# Patient Record
Sex: FEMALE | Race: WHITE | NOT HISPANIC OR LATINO | Employment: OTHER | URBAN - NONMETROPOLITAN AREA
[De-identification: names, ages, dates, MRNs, and addresses within clinical notes are randomized per-mention and may not be internally consistent; named-entity substitution may affect disease eponyms.]

---

## 2020-08-06 ENCOUNTER — HOSPITAL ENCOUNTER (EMERGENCY)
Facility: HOSPITAL | Age: 60
Discharge: HOME | End: 2020-08-06
Attending: EMERGENCY MEDICINE
Payer: MEDICARE

## 2020-08-06 VITALS
OXYGEN SATURATION: 97 % | RESPIRATION RATE: 17 BRPM | TEMPERATURE: 98 F | WEIGHT: 243 LBS | SYSTOLIC BLOOD PRESSURE: 140 MMHG | DIASTOLIC BLOOD PRESSURE: 91 MMHG | BODY MASS INDEX: 43.05 KG/M2 | HEIGHT: 63 IN | HEART RATE: 71 BPM

## 2020-08-06 DIAGNOSIS — R07.89 CHEST WALL PAIN: ICD-10-CM

## 2020-08-06 LAB
ALBUMIN SERPL BCP-MCNC: 2.9 G/DL (ref 3.5–5.2)
ALP SERPL-CCNC: 89 U/L (ref 55–135)
ALT SERPL W/O P-5'-P-CCNC: 30 U/L (ref 10–44)
ANION GAP SERPL CALC-SCNC: 6 MMOL/L (ref 8–16)
AST SERPL-CCNC: 15 U/L (ref 10–40)
BASOPHILS # BLD AUTO: 0.02 K/UL (ref 0–0.2)
BASOPHILS NFR BLD: 0.2 % (ref 0–1.9)
BILIRUB SERPL-MCNC: 0.4 MG/DL (ref 0.1–1)
BUN SERPL-MCNC: 16 MG/DL (ref 8–23)
CALCIUM SERPL-MCNC: 8.4 MG/DL (ref 8.7–10.5)
CHLORIDE SERPL-SCNC: 110 MMOL/L (ref 95–110)
CO2 SERPL-SCNC: 27 MMOL/L (ref 23–29)
CREAT SERPL-MCNC: 1.5 MG/DL (ref 0.5–1.4)
DIFFERENTIAL METHOD: ABNORMAL
EOSINOPHIL # BLD AUTO: 0.1 K/UL (ref 0–0.5)
EOSINOPHIL NFR BLD: 1 % (ref 0–8)
ERYTHROCYTE [DISTWIDTH] IN BLOOD BY AUTOMATED COUNT: 22.9 % (ref 11.5–14.5)
EST. GFR  (AFRICAN AMERICAN): 43 ML/MIN/1.73 M^2
EST. GFR  (NON AFRICAN AMERICAN): 37.3 ML/MIN/1.73 M^2
GLUCOSE SERPL-MCNC: 132 MG/DL (ref 70–110)
HCT VFR BLD AUTO: 33.2 % (ref 37–48.5)
HGB BLD-MCNC: 10.5 G/DL (ref 12–16)
IMM GRANULOCYTES # BLD AUTO: 0.05 K/UL (ref 0–0.04)
IMM GRANULOCYTES NFR BLD AUTO: 0.5 % (ref 0–0.5)
LYMPHOCYTES # BLD AUTO: 2.3 K/UL (ref 1–4.8)
LYMPHOCYTES NFR BLD: 22.8 % (ref 18–48)
MCH RBC QN AUTO: 25.7 PG (ref 27–31)
MCHC RBC AUTO-ENTMCNC: 31.6 G/DL (ref 32–36)
MCV RBC AUTO: 81 FL (ref 82–98)
MONOCYTES # BLD AUTO: 1 K/UL (ref 0.3–1)
MONOCYTES NFR BLD: 9.4 % (ref 4–15)
NEUTROPHILS # BLD AUTO: 6.7 K/UL (ref 1.8–7.7)
NEUTROPHILS NFR BLD: 66.1 % (ref 38–73)
NRBC BLD-RTO: 0 /100 WBC
NT-PROBNP: 478 PG/ML (ref 5–900)
PLATELET # BLD AUTO: 258 K/UL (ref 150–350)
PMV BLD AUTO: 10.8 FL (ref 9.2–12.9)
POTASSIUM SERPL-SCNC: 3.2 MMOL/L (ref 3.5–5.1)
PROT SERPL-MCNC: 6.1 G/DL (ref 6–8.4)
RBC # BLD AUTO: 4.08 M/UL (ref 4–5.4)
SODIUM SERPL-SCNC: 143 MMOL/L (ref 136–145)
TROPONIN I SERPL DL<=0.01 NG/ML-MCNC: <0.02 NG/ML (ref 0–0.03)
WBC # BLD AUTO: 10.08 K/UL (ref 3.9–12.7)

## 2020-08-06 PROCEDURE — 93005 ELECTROCARDIOGRAM TRACING: CPT

## 2020-08-06 PROCEDURE — U0003 INFECTIOUS AGENT DETECTION BY NUCLEIC ACID (DNA OR RNA); SEVERE ACUTE RESPIRATORY SYNDROME CORONAVIRUS 2 (SARS-COV-2) (CORONAVIRUS DISEASE [COVID-19]), AMPLIFIED PROBE TECHNIQUE, MAKING USE OF HIGH THROUGHPUT TECHNOLOGIES AS DESCRIBED BY CMS-2020-01-R: HCPCS

## 2020-08-06 PROCEDURE — 99284 EMERGENCY DEPT VISIT MOD MDM: CPT | Mod: 25

## 2020-08-06 PROCEDURE — 84484 ASSAY OF TROPONIN QUANT: CPT

## 2020-08-06 PROCEDURE — 80053 COMPREHEN METABOLIC PANEL: CPT

## 2020-08-06 PROCEDURE — 83880 ASSAY OF NATRIURETIC PEPTIDE: CPT

## 2020-08-06 PROCEDURE — 36415 COLL VENOUS BLD VENIPUNCTURE: CPT

## 2020-08-06 PROCEDURE — 93010 EKG 12-LEAD: ICD-10-PCS | Mod: ,,, | Performed by: INTERNAL MEDICINE

## 2020-08-06 PROCEDURE — 85025 COMPLETE CBC W/AUTO DIFF WBC: CPT

## 2020-08-06 PROCEDURE — 93010 ELECTROCARDIOGRAM REPORT: CPT | Mod: ,,, | Performed by: INTERNAL MEDICINE

## 2020-08-06 RX ORDER — LEVOTHYROXINE SODIUM 50 UG/1
50 TABLET ORAL
COMMUNITY

## 2020-08-06 RX ORDER — ESOMEPRAZOLE MAGNESIUM 40 MG/1
40 CAPSULE, DELAYED RELEASE ORAL
COMMUNITY
End: 2022-12-25

## 2020-08-06 RX ORDER — BENZTROPINE MESYLATE 2 MG/1
1 TABLET ORAL 2 TIMES DAILY
COMMUNITY

## 2020-08-06 RX ORDER — TRAZODONE HYDROCHLORIDE 100 MG/1
100 TABLET ORAL NIGHTLY
COMMUNITY

## 2020-08-06 RX ORDER — AMLODIPINE BESYLATE 5 MG/1
5 TABLET ORAL DAILY
COMMUNITY
End: 2023-12-21

## 2020-08-06 RX ORDER — RISPERIDONE 3 MG/1
3 TABLET ORAL NIGHTLY
COMMUNITY

## 2020-08-06 RX ORDER — GABAPENTIN 600 MG/1
600 TABLET ORAL 2 TIMES DAILY
COMMUNITY
End: 2023-12-21

## 2020-08-06 RX ORDER — ASPIRIN 81 MG/1
81 TABLET ORAL DAILY
COMMUNITY

## 2020-08-06 RX ORDER — CHOLECALCIFEROL (VITAMIN D3) 25 MCG
1000 TABLET ORAL DAILY
COMMUNITY

## 2020-08-06 RX ORDER — MELOXICAM 15 MG/1
15 TABLET ORAL DAILY
COMMUNITY
End: 2022-12-25

## 2020-08-06 RX ORDER — ALBUTEROL SULFATE 90 UG/1
1 AEROSOL, METERED RESPIRATORY (INHALATION) EVERY 4 HOURS PRN
COMMUNITY
End: 2020-10-24

## 2020-08-06 RX ORDER — CITALOPRAM 20 MG/1
20 TABLET, FILM COATED ORAL DAILY
COMMUNITY

## 2020-08-06 RX ORDER — FUROSEMIDE 20 MG/1
20 TABLET ORAL 2 TIMES DAILY
COMMUNITY
End: 2023-12-21

## 2020-08-06 NOTE — ED TRIAGE NOTES
59 y/o F presents to ED with c/o intermittent chest pain.  Started yesterday and saw Dr. Merchant.  Said EKG was abnormal but not emergent.  Later went to [place] [facility] and said same but needed to see cardiologist ASAP.  Went to [facility] today and recommended to come here for further testing.  Describes pain as pressure and reports vomiting last night.

## 2020-08-06 NOTE — ED NOTES
Assumed cared of pt. Bed placed in lowest position, side rails up x 2. Call light is within reach of pt. and family member and pt instructed on how to use call light . Explanation of care provided to pt .Plan of Care; Observe and reassure, explain procedures to pt,continue to observe and monitor and keep patient informed. Position of comfort for patient to be maintained.    Psych: No acute distress is noted. Pt is calm and cooperative, good eye contact.    LOC: The patient is awake, alert and aware of environment with an appropriate affect, the patient is oriented x 3 and speaking appropriately.     APPEARANCE: Patient resting comfortably and in no acute distress, patient is clean and well groomed, patient's clothing is properly fastened.    SKIN: The skin is warm and dry, patient has normal skin turgor and moist mucus membranes,no rashes or lesions.       MUSCULOSKELETAL:  Normal range of motion noted. Moves all extremeties well, No swelling, deformity or tenderness noted.    GASTRO: Reports vomiting that began last night.  Reports nausea today.    RESPIRATORY: Airway is open and patent, respirations are spontaneous, patient has a normal effort and rate. Pt reports intermittent SOB and labored respirations.    CARDIAC: Patient has a normal rate and rhythm, no periphreal edema noted, capillary refill < 3 seconds. Reports intermittent CP described as pressure beginning yesterday.  Denies radiation. Skin warm and dry.    PULSES: 2+  And symmetrical in all extremeties    NEUROLOGIC:  MAGDALENA, Follows commands without difficulty. Speech is clear. No neuro deficits observed.      Ivett Carranza RN  08/06/20 1911       Ivett Carranza RN  08/06/20 5052

## 2020-08-07 LAB — SARS-COV-2 RNA RESP QL NAA+PROBE: NOT DETECTED

## 2020-09-17 DIAGNOSIS — R11.0 NAUSEA: Primary | ICD-10-CM

## 2020-09-24 ENCOUNTER — HOSPITAL ENCOUNTER (OUTPATIENT)
Dept: RADIOLOGY | Facility: HOSPITAL | Age: 60
Discharge: HOME | End: 2020-09-24
Attending: INTERNAL MEDICINE
Payer: MEDICARE

## 2020-09-24 DIAGNOSIS — R11.0 NAUSEA: ICD-10-CM

## 2020-09-24 PROCEDURE — A9698 NON-RAD CONTRAST MATERIALNOC: HCPCS | Performed by: INTERNAL MEDICINE

## 2020-09-24 PROCEDURE — 76700 US EXAM ABDOM COMPLETE: CPT | Mod: TC

## 2020-09-24 PROCEDURE — 25500020 PHARM REV CODE 255: Performed by: INTERNAL MEDICINE

## 2020-09-24 PROCEDURE — 74248 X-RAY SM INT F-THRU STD: CPT | Mod: TC

## 2020-09-24 RX ADMIN — BARIUM SULFATE 176 G: 960 POWDER, FOR SUSPENSION ORAL at 09:09

## 2020-09-24 RX ADMIN — BARIUM SULFATE 135 ML: 980 POWDER, FOR SUSPENSION ORAL at 09:09

## 2020-09-24 RX ADMIN — DIATRIZOATE MEGLUMINE AND DIATRIZOATE SODIUM 120 ML: 660; 100 LIQUID ORAL; RECTAL at 09:09

## 2020-10-21 ENCOUNTER — HOSPITAL ENCOUNTER (EMERGENCY)
Facility: HOSPITAL | Age: 60
Discharge: HOME | End: 2020-10-21
Attending: EMERGENCY MEDICINE
Payer: MEDICARE

## 2020-10-21 VITALS
WEIGHT: 237 LBS | HEART RATE: 74 BPM | HEIGHT: 63 IN | BODY MASS INDEX: 41.99 KG/M2 | TEMPERATURE: 98 F | RESPIRATION RATE: 14 BRPM | OXYGEN SATURATION: 98 % | DIASTOLIC BLOOD PRESSURE: 88 MMHG | SYSTOLIC BLOOD PRESSURE: 150 MMHG

## 2020-10-21 DIAGNOSIS — R11.0 NAUSEA: ICD-10-CM

## 2020-10-21 DIAGNOSIS — R51.9 NONINTRACTABLE HEADACHE, UNSPECIFIED CHRONICITY PATTERN, UNSPECIFIED HEADACHE TYPE: Primary | ICD-10-CM

## 2020-10-21 PROCEDURE — 63600175 PHARM REV CODE 636 W HCPCS: Performed by: NURSE PRACTITIONER

## 2020-10-21 PROCEDURE — 96372 THER/PROPH/DIAG INJ SC/IM: CPT

## 2020-10-21 PROCEDURE — 99284 EMERGENCY DEPT VISIT MOD MDM: CPT | Mod: 25

## 2020-10-21 PROCEDURE — 25000003 PHARM REV CODE 250: Performed by: NURSE PRACTITIONER

## 2020-10-21 RX ORDER — ONDANSETRON 4 MG/1
4 TABLET, ORALLY DISINTEGRATING ORAL
Status: COMPLETED | OUTPATIENT
Start: 2020-10-21 | End: 2020-10-21

## 2020-10-21 RX ORDER — BUTALBITAL, ACETAMINOPHEN AND CAFFEINE 50; 325; 40 MG/1; MG/1; MG/1
1 TABLET ORAL EVERY 4 HOURS PRN
Qty: 10 TABLET | Refills: 0 | Status: SHIPPED | OUTPATIENT
Start: 2020-10-21 | End: 2020-11-20

## 2020-10-21 RX ORDER — KETOROLAC TROMETHAMINE 30 MG/ML
30 INJECTION, SOLUTION INTRAMUSCULAR; INTRAVENOUS
Status: COMPLETED | OUTPATIENT
Start: 2020-10-21 | End: 2020-10-21

## 2020-10-21 RX ORDER — DIPHENHYDRAMINE HCL 25 MG
25 CAPSULE ORAL
Status: COMPLETED | OUTPATIENT
Start: 2020-10-21 | End: 2020-10-21

## 2020-10-21 RX ORDER — ONDANSETRON 4 MG/1
4 TABLET, FILM COATED ORAL EVERY 8 HOURS PRN
Qty: 12 TABLET | Refills: 0 | OUTPATIENT
Start: 2020-10-21 | End: 2022-12-25

## 2020-10-21 RX ADMIN — KETOROLAC TROMETHAMINE 30 MG: 30 INJECTION, SOLUTION INTRAMUSCULAR; INTRAVENOUS at 06:10

## 2020-10-21 RX ADMIN — ONDANSETRON 4 MG: 4 TABLET, ORALLY DISINTEGRATING ORAL at 06:10

## 2020-10-21 RX ADMIN — DIPHENHYDRAMINE HYDROCHLORIDE 25 MG: 25 CAPSULE ORAL at 06:10

## 2020-10-21 NOTE — ED PROVIDER NOTES
Encounter Date: 10/21/2020       History     Chief Complaint   Patient presents with    Headache     Patient reports frontal headache since last night, history of migraine.     The patient presents with complaints of migraine started last night.  Patient states the pain is in her forehead and in the top of her head.  She took 1 ibuprofen today and states that has given her no relief from the headache.  Patient states she now feels nauseated.  Patient denies this being the worst headache ever.  Patient denies changes vision.  Patient states usually she can not lay down with an ice pack on her head and that will make pain better but it has not offered any relief.  Nothing makes the headache worse, and patient denies photophobia.        Review of patient's allergies indicates:  No Known Allergies  Past Medical History:   Diagnosis Date    Anxiety     Cancer     Breast    Depression     GERD (gastroesophageal reflux disease)     Hypertension     Insomnia     Thyroid disease      Past Surgical History:   Procedure Laterality Date    BLADDER SUSPENSION      BREAST LUMPECTOMY      Right breast    HYSTERECTOMY      KNEE ARTHROSCOPY      Right knee     History reviewed. No pertinent family history.  Social History     Tobacco Use    Smoking status: Current Every Day Smoker     Packs/day: 0.50    Smokeless tobacco: Never Used   Substance Use Topics    Alcohol use: Not Currently    Drug use: Not on file     Review of Systems   Gastrointestinal: Positive for nausea.   Neurological: Positive for headaches.   All other systems reviewed and are negative.      Physical Exam     Initial Vitals [10/21/20 1818]   BP Pulse Resp Temp SpO2   (!) 168/92 77 20 98.1 °F (36.7 °C) 100 %      MAP       --         Physical Exam    Nursing note and vitals reviewed.  Constitutional: She appears well-developed and well-nourished. She is not diaphoretic. No distress.   HENT:   Head: Normocephalic.   Right Ear: Tympanic membrane,  external ear and ear canal normal.   Left Ear: Tympanic membrane, external ear and ear canal normal.   Mouth/Throat: Oropharynx is clear and moist.   Eyes: Conjunctivae and EOM are normal. Pupils are equal, round, and reactive to light.   Neck: Normal range of motion. Neck supple.   Cardiovascular: Normal rate.   Pulmonary/Chest: Breath sounds normal. No respiratory distress. She has no wheezes.   Abdominal: Soft. Bowel sounds are normal. She exhibits no distension. There is no abdominal tenderness.   Musculoskeletal: Normal range of motion.   Neurological: She is alert and oriented to person, place, and time. She has normal strength. She displays no tremor. No sensory deficit. She exhibits normal muscle tone. She displays a negative Romberg sign. Gait normal. GCS score is 15. GCS eye subscore is 4. GCS verbal subscore is 5. GCS motor subscore is 6.   Skin: Skin is warm and dry. Capillary refill takes less than 2 seconds.         ED Course   Procedures  Labs Reviewed - No data to display       Imaging Results    None          Medical Decision Making:   Differential Diagnosis:   Migraine headache  Dental pain  Otitis media  Sinusitis  Tension headache                    ED Course as of Oct 21 1856   Wed Oct 21, 2020   1849 Pt states she is feeling better     [NL]      ED Course User Index  [NL] Lilliana Lowery NP            Clinical Impression:     ICD-10-CM ICD-9-CM   1. Nonintractable headache, unspecified chronicity pattern, unspecified headache type  R51.9 784.0   2. Nausea  R11.0 787.02                      Disposition:   Disposition: Discharged  Condition: Stable     ED Disposition Condition    Discharge Stable        ED Prescriptions     Medication Sig Dispense Start Date End Date Auth. Provider    ondansetron (ZOFRAN) 4 MG tablet Take 1 tablet (4 mg total) by mouth every 8 (eight) hours as needed for Nausea. 12 tablet 10/21/2020  Lilliana Lowery NP    butalbital-acetaminophen-caffeine -40 mg  (FIORICET, ESGIC) -40 mg per tablet Take 1 tablet by mouth every 4 (four) hours as needed for Pain. 10 tablet 10/21/2020 11/20/2020 Lilliana Lowery NP        Follow-up Information     Follow up With Specialties Details Why Contact Info    [facility]  Schedule an appointment as soon as possible for a visit in 2 days CONTINUATION OF CARE, fever, Further evaluation and treatment, If symptoms worsen, re-evaluation [address]  [facility] [address]  [phone]                                       Lilliana Lowery NP  10/21/20 6653

## 2020-10-24 ENCOUNTER — HOSPITAL ENCOUNTER (EMERGENCY)
Facility: HOSPITAL | Age: 60
Discharge: HOME | End: 2020-10-24
Attending: EMERGENCY MEDICINE
Payer: MEDICARE

## 2020-10-24 VITALS
BODY MASS INDEX: 41.22 KG/M2 | DIASTOLIC BLOOD PRESSURE: 85 MMHG | SYSTOLIC BLOOD PRESSURE: 159 MMHG | TEMPERATURE: 98 F | HEART RATE: 84 BPM | HEIGHT: 62 IN | OXYGEN SATURATION: 98 % | WEIGHT: 224 LBS | RESPIRATION RATE: 18 BRPM

## 2020-10-24 DIAGNOSIS — R06.02 SOB (SHORTNESS OF BREATH): Primary | ICD-10-CM

## 2020-10-24 DIAGNOSIS — F41.9 ANXIETY: ICD-10-CM

## 2020-10-24 LAB — SARS-COV-2 RDRP RESP QL NAA+PROBE: NEGATIVE

## 2020-10-24 PROCEDURE — U0002 COVID-19 LAB TEST NON-CDC: HCPCS

## 2020-10-24 PROCEDURE — 25000003 PHARM REV CODE 250: Performed by: NURSE PRACTITIONER

## 2020-10-24 PROCEDURE — 99283 EMERGENCY DEPT VISIT LOW MDM: CPT | Mod: 25

## 2020-10-24 RX ORDER — ALBUTEROL SULFATE 90 UG/1
1-2 AEROSOL, METERED RESPIRATORY (INHALATION)
Qty: 18 G | Refills: 0 | OUTPATIENT
Start: 2020-10-24 | End: 2020-11-07

## 2020-10-24 RX ORDER — ALPRAZOLAM 0.5 MG/1
0.5 TABLET ORAL
Status: COMPLETED | OUTPATIENT
Start: 2020-10-24 | End: 2020-10-24

## 2020-10-24 RX ADMIN — ALPRAZOLAM 0.5 MG: 0.5 TABLET ORAL at 07:10

## 2020-10-25 NOTE — ED PROVIDER NOTES
Encounter Date: 10/24/2020       History     Chief Complaint   Patient presents with    Shortness of Breath     I have been SOB for the last couple of weeks but today is just worse.     This is a 60-year-old white female smoker with a history of anxiety who presents emergency department today with complaints of shortness of breath intermittently over the last 2-3 weeks.  Patient also reports mild body aches, chills, and increased anxiety, however she reports any other symptoms.  Patient denies fever, cough, appetite changes, or urinary or bowel changes.  Patient denies contact with any individual with COVID-19 and denies changes in smell or taste.  Patient reports that she takes Ativan as directed for anxiety however she denies having taken any over the last several weeks.        Review of patient's allergies indicates:  No Known Allergies  Past Medical History:   Diagnosis Date    Anxiety     Cancer     Breast    Depression     GERD (gastroesophageal reflux disease)     Hypertension     Insomnia     Thyroid disease      Past Surgical History:   Procedure Laterality Date    BLADDER SUSPENSION      BREAST LUMPECTOMY      Right breast    HYSTERECTOMY      KNEE ARTHROSCOPY      Right knee     History reviewed. No pertinent family history.  Social History     Tobacco Use    Smoking status: Current Every Day Smoker     Packs/day: 0.50    Smokeless tobacco: Never Used   Substance Use Topics    Alcohol use: Not Currently    Drug use: Not on file     Review of Systems   Constitutional: Positive for chills. Negative for diaphoresis and fever.   HENT: Negative.    Eyes: Negative.    Respiratory: Positive for shortness of breath. Negative for cough, chest tightness and stridor.    Cardiovascular: Negative for chest pain, palpitations and leg swelling.   Gastrointestinal: Negative.    Genitourinary: Negative.    Musculoskeletal: Positive for myalgias. Negative for arthralgias and gait problem.   Skin: Negative.     Neurological: Negative.        Physical Exam     Initial Vitals [10/24/20 1900]   BP Pulse Resp Temp SpO2   (!) 164/84 84 18 97.8 °F (36.6 °C) 98 %      MAP       --         Physical Exam    Nursing note and vitals reviewed.  Constitutional: She appears well-developed and well-nourished. She appears distressed (Mild tachypnea, patient appears anxious.  Symptoms seemed to improve during conversation).   HENT:   Head: Normocephalic and atraumatic.   Mucous membranes dry   Eyes: Conjunctivae and EOM are normal. Pupils are equal, round, and reactive to light.   Neck: Normal range of motion. Neck supple.   Cardiovascular: Normal rate, regular rhythm, normal heart sounds and intact distal pulses.   Pulmonary/Chest: She has wheezes ( mild inspiratory and expiratory wheezes noted throughout). She has no rales.   Abdominal: Soft. Bowel sounds are normal.   Musculoskeletal: Normal range of motion. No tenderness or edema.   Neurological: She is alert and oriented to person, place, and time. She has normal strength. GCS score is 15. GCS eye subscore is 4. GCS verbal subscore is 5. GCS motor subscore is 6.   Skin: Skin is warm and dry. Capillary refill takes less than 2 seconds.   Psychiatric: Thought content normal.   Patient appears anxious, asking for anxiety medication         ED Course   Procedures  Labs Reviewed   SARS-COV-2 RNA AMPLIFICATION, QUAL          Imaging Results          X-Ray Chest AP Portable (In process)                  Medical Decision Making:   Differential Diagnosis:   Anxiety  COPD exacerbation  Asthma  Acute bronchitis                   ED Course as of Oct 24 2014   Sat Oct 24, 2020   1954 No abnormalities noted on chest x-ray    [CB]      ED Course User Index  [CB] Leila Saenz NP            Clinical Impression:     ICD-10-CM ICD-9-CM   1. SOB (shortness of breath)  R06.02 786.05   2. Anxiety  F41.9 300.00                          ED Disposition Condition    Discharge Stable        ED  Prescriptions     Medication Sig Dispense Start Date End Date Auth. Provider    albuterol (PROVENTIL/VENTOLIN HFA) 90 mcg/actuation inhaler Inhale 1-2 puffs into the lungs every 4 to 6 hours as needed for Wheezing (as needed for cough, wheezing, shortness of breath). Rescue 18 g 10/24/2020 10/24/2021 Leila Saenz NP        Follow-up Information     Follow up With Specialties Details Why Contact Info    PCP Follow UP  Call in 2 days for follow-up, for re-evaluation of today's complaint                                        Leila Saenz NP  10/24/20 2014

## 2020-10-25 NOTE — ED NOTES
Assumed cared of pt. Pt placed into gown.  Bed placed in lowest position, side rails up x 2. Call light is within reach of pt and family member and pt and family members instructed on how to use call light . Explanation of care provided to pt and family member.Alarms set on monitor for heart rate, pulse ox, Blood Pressure. Plan of Care; Observe and reassure, explain procedures to pt and family member, continue to observe and monitor and keep patient and family informed.Position of comfort for patient to be maintained.    Psych: No acute distress is noted. Pt is calm and cooperative, good eye contact, pt reports anxiety.  LOC: The patient is awake, alert and aware of environment with an appropriate affect, the patient is oriented x 3 and speaking appropriately.     APPEARANCE: Patient resting comfortably and in no acute distress, patient is clean and well groomed, patient's clothing is properly fastened.    SKIN: The skin is warm and dry, patient has normal skin turgor and moist mucus membranes,no rashes or lesions.Skin Intact , No Breakdown noted.    MUSCULOSKELETAL:  Normal range of motion noted. Moves all extremeties well, No swelling, deformity or tenderness noted.    RESPIRATORY: Airway is open and patent, respirations are spontaneous, patient has a normal effort and rate.Bilateral Lungs Sounds are clear. Pink nailbeds.     CARDIAC: Patient has a normal rate and rhythm, no periphreal edema noted, capillary refill < 3 seconds. Denies chest pain. Skin warm and dry.     ABDOMEN: Soft and non tender to palpation, no distention noted. Bowels Sounds are active.  PULSES: 2+  And symmetrical in all extremeties    NEUROLOGIC:  MAGDALENA, Follows commands without difficulty. Speech is clear. No neuro deficits observed.

## 2020-11-07 ENCOUNTER — HOSPITAL ENCOUNTER (EMERGENCY)
Facility: HOSPITAL | Age: 60
Discharge: HOME | End: 2020-11-07
Attending: EMERGENCY MEDICINE
Payer: MEDICARE

## 2020-11-07 VITALS
DIASTOLIC BLOOD PRESSURE: 79 MMHG | BODY MASS INDEX: 43.9 KG/M2 | OXYGEN SATURATION: 96 % | WEIGHT: 238.56 LBS | HEIGHT: 62 IN | TEMPERATURE: 98 F | RESPIRATION RATE: 20 BRPM | HEART RATE: 91 BPM | SYSTOLIC BLOOD PRESSURE: 175 MMHG

## 2020-11-07 DIAGNOSIS — R06.02 SOB (SHORTNESS OF BREATH): ICD-10-CM

## 2020-11-07 DIAGNOSIS — F41.9 ANXIETY: Primary | ICD-10-CM

## 2020-11-07 DIAGNOSIS — Z76.0 ENCOUNTER FOR MEDICATION REFILL: ICD-10-CM

## 2020-11-07 PROCEDURE — 93005 ELECTROCARDIOGRAM TRACING: CPT

## 2020-11-07 PROCEDURE — 94640 AIRWAY INHALATION TREATMENT: CPT

## 2020-11-07 PROCEDURE — 99900031 HC PATIENT EDUCATION (STAT)

## 2020-11-07 PROCEDURE — 93010 ELECTROCARDIOGRAM REPORT: CPT | Mod: ,,, | Performed by: INTERNAL MEDICINE

## 2020-11-07 PROCEDURE — 25000242 PHARM REV CODE 250 ALT 637 W/ HCPCS: Performed by: EMERGENCY MEDICINE

## 2020-11-07 PROCEDURE — 99900035 HC TECH TIME PER 15 MIN (STAT)

## 2020-11-07 PROCEDURE — 99284 EMERGENCY DEPT VISIT MOD MDM: CPT | Mod: 25

## 2020-11-07 PROCEDURE — 25000003 PHARM REV CODE 250: Performed by: EMERGENCY MEDICINE

## 2020-11-07 PROCEDURE — 93010 EKG 12-LEAD: ICD-10-PCS | Mod: ,,, | Performed by: INTERNAL MEDICINE

## 2020-11-07 RX ORDER — ALBUTEROL SULFATE 2.5 MG/.5ML
2.5 SOLUTION RESPIRATORY (INHALATION) EVERY 4 HOURS PRN
Qty: 1 EACH | Refills: 5 | OUTPATIENT
Start: 2020-11-07 | End: 2021-06-13

## 2020-11-07 RX ORDER — IPRATROPIUM BROMIDE AND ALBUTEROL SULFATE 2.5; .5 MG/3ML; MG/3ML
3 SOLUTION RESPIRATORY (INHALATION)
Status: COMPLETED | OUTPATIENT
Start: 2020-11-07 | End: 2020-11-07

## 2020-11-07 RX ORDER — LORAZEPAM 1 MG/1
1 TABLET ORAL EVERY 12 HOURS PRN
Qty: 20 TABLET | Refills: 0 | Status: SHIPPED | OUTPATIENT
Start: 2020-11-07 | End: 2023-12-21

## 2020-11-07 RX ORDER — LORAZEPAM 1 MG/1
1 TABLET ORAL
Status: COMPLETED | OUTPATIENT
Start: 2020-11-07 | End: 2020-11-07

## 2020-11-07 RX ORDER — ALBUTEROL SULFATE 90 UG/1
2 AEROSOL, METERED RESPIRATORY (INHALATION) EVERY 4 HOURS PRN
Qty: 18 G | Refills: 11 | Status: SHIPPED | OUTPATIENT
Start: 2020-11-07 | End: 2021-11-07

## 2020-11-07 RX ADMIN — LORAZEPAM 1 MG: 1 TABLET ORAL at 04:11

## 2020-11-07 RX ADMIN — IPRATROPIUM BROMIDE AND ALBUTEROL SULFATE 3 ML: .5; 3 SOLUTION RESPIRATORY (INHALATION) at 04:11

## 2020-11-07 NOTE — ED PROVIDER NOTES
Encounter Date: 11/7/2020       History     Chief Complaint   Patient presents with    Shortness of Breath     came here a while back and given an inhaler and it isn't helping.  Hx of COPD.  Denies CP.  Tested for Covid but negative.     Underlying history of COPD and anxiety, depression, others as listed.  Multiple medications.  She recently tested negative for coronavirus.  She has run out of her albuterol nebulized solution at home, and today used the last of her albuterol metered dose inhaler and needs a refill.  She is also using Xanax occasionally with some relief for anxious spells associated with dyspnea, these episodes seem to be possibly relieved with bronchodilators and possibly relieved with angulated.  She is also anxious about an upcoming procedure scheduled later this week for endoscopy.  Primary care is Dr. Merchant.  No other complaints.  She denies fever, chills, sweats, cough, sputum production, ill contacts, or any known coronavirus exposure.  At the time my exam, lungs are clear, she is mildly anxious but much better, normal oxygen saturation and vital signs with mild hypertension.  No other complaints.  Discussed in detail, she would like refills on both meter dose inhaler and albuterol solution for nebulizer, and would like to try something different for anxiety.  I will put her on scheduled b.i.d. Ativan for the moment and defer further to Dr. Merchant, encouraged to call him in the next few days.  She is not asking for any other cysts at this time and does not particularly want any other testing today.    The history is provided by the patient. No  was used.     Review of patient's allergies indicates:  No Known Allergies  Past Medical History:   Diagnosis Date    Anxiety     Cancer     Breast    Depression     GERD (gastroesophageal reflux disease)     Hypertension     Insomnia     Thyroid disease      Past Surgical History:   Procedure Laterality Date     BLADDER SUSPENSION      BREAST LUMPECTOMY      Right breast    HYSTERECTOMY      KNEE ARTHROSCOPY      Right knee     History reviewed. No pertinent family history.  Social History     Tobacco Use    Smoking status: Current Every Day Smoker     Packs/day: 0.50    Smokeless tobacco: Never Used   Substance Use Topics    Alcohol use: Not Currently    Drug use: Not on file     Review of Systems   Constitutional: Negative for activity change, fatigue and fever.   HENT: Negative for congestion, ear pain, facial swelling, nosebleeds, sinus pressure and sore throat.    Eyes: Negative for pain, discharge, redness and visual disturbance.   Respiratory: Positive for shortness of breath. Negative for cough, choking, chest tightness and wheezing.    Cardiovascular: Negative for chest pain, palpitations and leg swelling.   Gastrointestinal: Negative for abdominal distention, abdominal pain, nausea and vomiting.   Endocrine: Negative for heat intolerance, polydipsia and polyuria.   Genitourinary: Negative for difficulty urinating, dysuria, flank pain, hematuria and urgency.   Musculoskeletal: Negative for back pain, gait problem, joint swelling and myalgias.   Skin: Negative for color change and rash.   Allergic/Immunologic: Negative for environmental allergies and food allergies.   Neurological: Negative for dizziness, weakness, numbness and headaches.   Hematological: Negative for adenopathy. Does not bruise/bleed easily.   Psychiatric/Behavioral: Negative for agitation and behavioral problems. The patient is nervous/anxious.    All other systems reviewed and are negative.      Physical Exam     Initial Vitals   BP Pulse Resp Temp SpO2   11/07/20 1542 11/07/20 1542 11/07/20 1542 11/07/20 1545 11/07/20 1542   (!) 175/79 89 19 98.4 °F (36.9 °C) 97 %      MAP       --                Physical Exam    Nursing note and vitals reviewed.  Constitutional: She appears well-developed and well-nourished. She is not diaphoretic. No  distress.   Mildly obese/ mildly to moderately anxious   HENT:   Head: Normocephalic and atraumatic.   Mouth/Throat: No oropharyngeal exudate.   Eyes: Conjunctivae and EOM are normal. Pupils are equal, round, and reactive to light. Right eye exhibits no discharge. Left eye exhibits no discharge. No scleral icterus.   Neck: Normal range of motion. Neck supple. No thyromegaly present. No tracheal deviation present. No JVD present.   Cardiovascular: Normal rate, regular rhythm, normal heart sounds and intact distal pulses. Exam reveals no gallop and no friction rub.    No murmur heard.  Pulmonary/Chest: Breath sounds normal. No stridor. No respiratory distress. She has no wheezes. She has no rhonchi. She has no rales. She exhibits no tenderness.   Clear lungs   Abdominal: Soft. Bowel sounds are normal. She exhibits no distension and no mass. There is no abdominal tenderness. There is no rebound and no guarding.   Musculoskeletal: Normal range of motion. No tenderness or edema.   Neurological: She is alert and oriented to person, place, and time. She has normal strength.   Skin: Skin is warm and dry. No rash and no abscess noted. No erythema.   Psychiatric: She has a normal mood and affect. Her behavior is normal. Judgment and thought content normal.   Anxious         ED Course   Procedures  Labs Reviewed - No data to display  EKG Readings: (Independently Interpreted)   Initial Reading: No STEMI. Rhythm: Normal Sinus Rhythm. Heart Rate: 95. Ectopy: PVCs.   Normal sinus rhythm with frequent PVCs, nonspecific T-wave abnormality, noisy baseline, no acute change.       Imaging Results    None                                      Clinical Impression:       ICD-10-CM ICD-9-CM   1. Anxiety  F41.9 300.00   2. SOB (shortness of breath)  R06.02 786.05   3. Encounter for medication refill  Z76.0 V68.1                      Disposition:   Disposition: Discharged  Condition: Stable     ED Disposition Condition    Discharge Stable         ED Prescriptions     Medication Sig Dispense Start Date End Date Auth. Provider    LORazepam (ATIVAN) 1 MG tablet Take 1 tablet (1 mg total) by mouth every 12 (twelve) hours as needed for Anxiety. 20 tablet 11/7/2020 11/22/2020 Freddy Breaux MD    albuterol (PROVENTIL/VENTOLIN HFA) 90 mcg/actuation inhaler Inhale 2 puffs into the lungs every 4 (four) hours as needed for Wheezing. For cough, shortness of breath and/ or wheezing 18 g 11/7/2020 11/7/2021 Freddy Breaux MD    albuterol sulfate 2.5 mg/0.5 mL Nebu Take 2.5 mg by nebulization every 4 (four) hours as needed. Rescue 1 each 11/7/2020 11/7/2021 Freddy Breaux MD        Follow-up Information     Follow up With Specialties Details Why Contact Info Additional Information    Burke Merchant MD Internal Medicine Call in 2 days  [address] [address]  [phone]       [facility] - Emergency Department Emergency Medicine  As needed [address] [facility] [id number] [phone] Floor [address]                                       Freddy Breaux MD  11/07/20 0453

## 2020-11-07 NOTE — ED NOTES
NEUROLOGICAL:   Patient is awake , alert  and oriented x 4 . Pupils are PERRL. Gait is steady.   Moves all extremities without difficulty.   Patient reports no neuro complaints..  GCS 15    HEENT:   Head appears normocephalic  and symmetric .   Eyes appear WNL to both eyes. Patient reports no complaints  to both eyes .   Ears appear WNL. Patient reports no complaints  to both ears.   Nares appear patent . Patient reports no nose complaints .  Mouth appears moist, pink and teeth intact. Patient reports no mouth complaints.   Throat appears pink and moist . Patient reports no throat complaints.    CARDIOVASCULAR:   S1 and S2 present, no murmurs, gallops, or rubs, rate regular  and pulses palpable (2+)    On palpation no edema noted , noted to none.   Patient reports no CV complaints.  .   Patient vitals are WNL.    RESPIRATORY:   Airway Clear, Open, and Patent.  Respirations are even and unlabored.   Breath sounds inspiratory wheeze and expiratory wheeze to all lung fields.   Patient reports shortness of breath on exertion and shortness of breath at rest.     GASTROINTESTINAL:   Abdomen is soft  and non-tender x 4 quadrants. Bowel sounds are normoactive to all quadrants .   Patient reports no GI complaints .     GENITOURINARY:   Patient reports no  complaints.     MUSCULOSKELETAL:   full range of motion to all extremities, no swelling noted , no tenderness noted and no weakness noted.   Patient reports no musculoskeletal complaints     SKIN:   Skin appears warm , dry , good turgor, color normal for race and intact. Patient reports no skin complaints.

## 2020-11-07 NOTE — DISCHARGE INSTRUCTIONS
Call Dr. Merchant Monday about medicines.  Refill prescriptions as given. Return if worse.

## 2020-12-23 DIAGNOSIS — R10.84 GENERALIZED ABDOMINAL PAIN: Primary | ICD-10-CM

## 2020-12-28 ENCOUNTER — HOSPITAL ENCOUNTER (EMERGENCY)
Facility: HOSPITAL | Age: 60
Discharge: HOME | End: 2020-12-28
Attending: EMERGENCY MEDICINE
Payer: MEDICARE

## 2020-12-28 VITALS
SYSTOLIC BLOOD PRESSURE: 132 MMHG | RESPIRATION RATE: 18 BRPM | WEIGHT: 238 LBS | DIASTOLIC BLOOD PRESSURE: 74 MMHG | HEIGHT: 62 IN | TEMPERATURE: 98 F | HEART RATE: 87 BPM | BODY MASS INDEX: 43.79 KG/M2 | OXYGEN SATURATION: 98 %

## 2020-12-28 DIAGNOSIS — N39.0 LOWER URINARY TRACT INFECTIOUS DISEASE: Primary | ICD-10-CM

## 2020-12-28 LAB
ALBUMIN SERPL BCP-MCNC: 2.9 G/DL (ref 3.5–5.2)
ALP SERPL-CCNC: 119 U/L (ref 55–135)
ALT SERPL W/O P-5'-P-CCNC: 19 U/L (ref 10–44)
AMYLASE SERPL-CCNC: 22 U/L (ref 20–110)
ANION GAP SERPL CALC-SCNC: 4 MMOL/L (ref 8–16)
AST SERPL-CCNC: 8 U/L (ref 10–40)
BACTERIA #/AREA URNS HPF: ABNORMAL /HPF
BASOPHILS # BLD AUTO: 0.04 K/UL (ref 0–0.2)
BASOPHILS NFR BLD: 0.5 % (ref 0–1.9)
BILIRUB SERPL-MCNC: 0.3 MG/DL (ref 0.1–1)
BILIRUB UR QL STRIP: NEGATIVE
BUN SERPL-MCNC: 10 MG/DL (ref 8–23)
CALCIUM SERPL-MCNC: 8.8 MG/DL (ref 8.7–10.5)
CHLORIDE SERPL-SCNC: 108 MMOL/L (ref 95–110)
CLARITY UR: CLEAR
CO2 SERPL-SCNC: 30 MMOL/L (ref 23–29)
COLOR UR: YELLOW
CREAT SERPL-MCNC: 1.3 MG/DL (ref 0.5–1.4)
DIFFERENTIAL METHOD: ABNORMAL
EOSINOPHIL # BLD AUTO: 0.2 K/UL (ref 0–0.5)
EOSINOPHIL NFR BLD: 2.5 % (ref 0–8)
ERYTHROCYTE [DISTWIDTH] IN BLOOD BY AUTOMATED COUNT: 15.4 % (ref 11.5–14.5)
EST. GFR  (AFRICAN AMERICAN): 51.2 ML/MIN/1.73 M^2
EST. GFR  (NON AFRICAN AMERICAN): 44.4 ML/MIN/1.73 M^2
GLUCOSE SERPL-MCNC: 107 MG/DL (ref 70–110)
GLUCOSE UR QL STRIP: NEGATIVE
HCT VFR BLD AUTO: 35.7 % (ref 37–48.5)
HGB BLD-MCNC: 11.2 G/DL (ref 12–16)
HGB UR QL STRIP: NEGATIVE
HYALINE CASTS #/AREA URNS LPF: 2 /LPF
IMM GRANULOCYTES # BLD AUTO: 0.03 K/UL (ref 0–0.04)
IMM GRANULOCYTES NFR BLD AUTO: 0.4 % (ref 0–0.5)
KETONES UR QL STRIP: NEGATIVE
LEUKOCYTE ESTERASE UR QL STRIP: ABNORMAL
LIPASE SERPL-CCNC: 70 U/L (ref 23–300)
LYMPHOCYTES # BLD AUTO: 1.6 K/UL (ref 1–4.8)
LYMPHOCYTES NFR BLD: 21 % (ref 18–48)
MCH RBC QN AUTO: 27 PG (ref 27–31)
MCHC RBC AUTO-ENTMCNC: 31.4 G/DL (ref 32–36)
MCV RBC AUTO: 86 FL (ref 82–98)
MICROSCOPIC COMMENT: ABNORMAL
MONOCYTES # BLD AUTO: 0.8 K/UL (ref 0.3–1)
MONOCYTES NFR BLD: 10.2 % (ref 4–15)
NEUTROPHILS # BLD AUTO: 5 K/UL (ref 1.8–7.7)
NEUTROPHILS NFR BLD: 65.4 % (ref 38–73)
NITRITE UR QL STRIP: POSITIVE
NRBC BLD-RTO: 0 /100 WBC
PH UR STRIP: 6 [PH] (ref 5–8)
PLATELET # BLD AUTO: 283 K/UL (ref 150–350)
PMV BLD AUTO: 10.5 FL (ref 9.2–12.9)
POTASSIUM SERPL-SCNC: 3.7 MMOL/L (ref 3.5–5.1)
PROT SERPL-MCNC: 6.3 G/DL (ref 6–8.4)
PROT UR QL STRIP: NEGATIVE
RBC # BLD AUTO: 4.15 M/UL (ref 4–5.4)
RBC #/AREA URNS HPF: 0 /HPF (ref 0–4)
SODIUM SERPL-SCNC: 142 MMOL/L (ref 136–145)
SP GR UR STRIP: 1.01 (ref 1–1.03)
SQUAMOUS #/AREA URNS HPF: 1 /HPF
URN SPEC COLLECT METH UR: ABNORMAL
UROBILINOGEN UR STRIP-ACNC: 1 EU/DL
WBC # BLD AUTO: 7.66 K/UL (ref 3.9–12.7)
WBC #/AREA URNS HPF: 34 /HPF (ref 0–5)

## 2020-12-28 PROCEDURE — 85025 COMPLETE CBC W/AUTO DIFF WBC: CPT

## 2020-12-28 PROCEDURE — 83690 ASSAY OF LIPASE: CPT

## 2020-12-28 PROCEDURE — 99283 EMERGENCY DEPT VISIT LOW MDM: CPT

## 2020-12-28 PROCEDURE — 82150 ASSAY OF AMYLASE: CPT

## 2020-12-28 PROCEDURE — 87186 SC STD MICRODIL/AGAR DIL: CPT

## 2020-12-28 PROCEDURE — 25000003 PHARM REV CODE 250: Performed by: NURSE PRACTITIONER

## 2020-12-28 PROCEDURE — 87088 URINE BACTERIA CULTURE: CPT

## 2020-12-28 PROCEDURE — 80053 COMPREHEN METABOLIC PANEL: CPT

## 2020-12-28 PROCEDURE — 87086 URINE CULTURE/COLONY COUNT: CPT

## 2020-12-28 PROCEDURE — 36415 COLL VENOUS BLD VENIPUNCTURE: CPT

## 2020-12-28 PROCEDURE — 87077 CULTURE AEROBIC IDENTIFY: CPT

## 2020-12-28 PROCEDURE — 81000 URINALYSIS NONAUTO W/SCOPE: CPT

## 2020-12-28 RX ORDER — NITROFURANTOIN 25; 75 MG/1; MG/1
100 CAPSULE ORAL 2 TIMES DAILY
Qty: 14 CAPSULE | Refills: 0 | OUTPATIENT
Start: 2020-12-28 | End: 2021-05-01

## 2020-12-28 RX ORDER — NITROFURANTOIN 25; 75 MG/1; MG/1
100 CAPSULE ORAL
Status: COMPLETED | OUTPATIENT
Start: 2020-12-28 | End: 2020-12-28

## 2020-12-28 RX ADMIN — NITROFURANTOIN (MONOHYDRATE/MACROCRYSTALS) 100 MG: 75; 25 CAPSULE ORAL at 09:12

## 2020-12-30 ENCOUNTER — HOSPITAL ENCOUNTER (OUTPATIENT)
Dept: RADIOLOGY | Facility: HOSPITAL | Age: 60
Discharge: HOME | End: 2020-12-30
Attending: NURSE PRACTITIONER
Payer: MEDICARE

## 2020-12-30 DIAGNOSIS — R10.84 GENERALIZED ABDOMINAL PAIN: ICD-10-CM

## 2020-12-30 PROCEDURE — 76700 US EXAM ABDOM COMPLETE: CPT | Mod: TC

## 2020-12-31 LAB — BACTERIA UR CULT: ABNORMAL

## 2021-01-04 ENCOUNTER — HOSPITAL ENCOUNTER (OUTPATIENT)
Dept: RADIOLOGY | Facility: HOSPITAL | Age: 61
Discharge: HOME | End: 2021-01-04
Attending: NURSE PRACTITIONER
Payer: MEDICARE

## 2021-01-04 DIAGNOSIS — R10.84 GENERALIZED ABDOMINAL PAIN: ICD-10-CM

## 2021-01-04 PROCEDURE — 63600175 PHARM REV CODE 636 W HCPCS

## 2021-01-04 PROCEDURE — A9537 TC99M MEBROFENIN: HCPCS

## 2021-01-04 PROCEDURE — 78226 HEPATOBILIARY SYSTEM IMAGING: CPT | Mod: TC

## 2021-01-04 RX ORDER — SINCALIDE 5 UG/5ML
0.02 INJECTION, POWDER, LYOPHILIZED, FOR SOLUTION INTRAVENOUS ONCE
Status: COMPLETED | OUTPATIENT
Start: 2021-01-04 | End: 2021-01-04

## 2021-01-04 RX ADMIN — SINCALIDE 2.2 MCG: 5 INJECTION, POWDER, LYOPHILIZED, FOR SOLUTION INTRAVENOUS at 12:01

## 2021-01-11 DIAGNOSIS — Z12.31 SCREENING MAMMOGRAM, ENCOUNTER FOR: Primary | ICD-10-CM

## 2021-01-16 ENCOUNTER — HOSPITAL ENCOUNTER (EMERGENCY)
Facility: HOSPITAL | Age: 61
Discharge: HOME | End: 2021-01-16
Attending: EMERGENCY MEDICINE
Payer: MEDICARE

## 2021-01-16 VITALS
OXYGEN SATURATION: 98 % | TEMPERATURE: 98 F | HEIGHT: 62 IN | BODY MASS INDEX: 43.79 KG/M2 | DIASTOLIC BLOOD PRESSURE: 67 MMHG | RESPIRATION RATE: 18 BRPM | HEART RATE: 87 BPM | SYSTOLIC BLOOD PRESSURE: 116 MMHG | WEIGHT: 238 LBS

## 2021-01-16 DIAGNOSIS — R10.31 RIGHT LOWER QUADRANT ABDOMINAL PAIN: Primary | ICD-10-CM

## 2021-01-16 LAB
ALBUMIN SERPL BCP-MCNC: 3.2 G/DL (ref 3.5–5.2)
ALP SERPL-CCNC: 116 U/L (ref 55–135)
ALT SERPL W/O P-5'-P-CCNC: 20 U/L (ref 10–44)
ANION GAP SERPL CALC-SCNC: 4 MMOL/L (ref 8–16)
AST SERPL-CCNC: 6 U/L (ref 10–40)
BASOPHILS # BLD AUTO: 0.05 K/UL (ref 0–0.2)
BASOPHILS NFR BLD: 0.5 % (ref 0–1.9)
BILIRUB SERPL-MCNC: 0.3 MG/DL (ref 0.1–1)
BILIRUB UR QL STRIP: NEGATIVE
BUN SERPL-MCNC: 10 MG/DL (ref 8–23)
CALCIUM SERPL-MCNC: 9.1 MG/DL (ref 8.7–10.5)
CHLORIDE SERPL-SCNC: 111 MMOL/L (ref 95–110)
CLARITY UR: CLEAR
CO2 SERPL-SCNC: 29 MMOL/L (ref 23–29)
COLOR UR: YELLOW
CREAT SERPL-MCNC: 1 MG/DL (ref 0.5–1.4)
DIFFERENTIAL METHOD: ABNORMAL
EOSINOPHIL # BLD AUTO: 0.4 K/UL (ref 0–0.5)
EOSINOPHIL NFR BLD: 3.4 % (ref 0–8)
ERYTHROCYTE [DISTWIDTH] IN BLOOD BY AUTOMATED COUNT: 17.3 % (ref 11.5–14.5)
EST. GFR  (AFRICAN AMERICAN): >60 ML/MIN/1.73 M^2
EST. GFR  (NON AFRICAN AMERICAN): >60 ML/MIN/1.73 M^2
GLUCOSE SERPL-MCNC: 83 MG/DL (ref 70–110)
GLUCOSE UR QL STRIP: NEGATIVE
HCT VFR BLD AUTO: 38.3 % (ref 37–48.5)
HGB BLD-MCNC: 12.2 G/DL (ref 12–16)
HGB UR QL STRIP: NEGATIVE
IMM GRANULOCYTES # BLD AUTO: 0.04 K/UL (ref 0–0.04)
IMM GRANULOCYTES NFR BLD AUTO: 0.4 % (ref 0–0.5)
KETONES UR QL STRIP: NEGATIVE
LEUKOCYTE ESTERASE UR QL STRIP: NEGATIVE
LIPASE SERPL-CCNC: 71 U/L (ref 23–300)
LYMPHOCYTES # BLD AUTO: 2.5 K/UL (ref 1–4.8)
LYMPHOCYTES NFR BLD: 24.5 % (ref 18–48)
MCH RBC QN AUTO: 27.4 PG (ref 27–31)
MCHC RBC AUTO-ENTMCNC: 31.9 G/DL (ref 32–36)
MCV RBC AUTO: 86 FL (ref 82–98)
MONOCYTES # BLD AUTO: 1 K/UL (ref 0.3–1)
MONOCYTES NFR BLD: 9.8 % (ref 4–15)
NEUTROPHILS # BLD AUTO: 6.4 K/UL (ref 1.8–7.7)
NEUTROPHILS NFR BLD: 61.4 % (ref 38–73)
NITRITE UR QL STRIP: NEGATIVE
NRBC BLD-RTO: 0 /100 WBC
PH UR STRIP: 5 [PH] (ref 5–8)
PLATELET # BLD AUTO: 311 K/UL (ref 150–350)
PMV BLD AUTO: 10.3 FL (ref 9.2–12.9)
POTASSIUM SERPL-SCNC: 3.7 MMOL/L (ref 3.5–5.1)
PROT SERPL-MCNC: 6.9 G/DL (ref 6–8.4)
PROT UR QL STRIP: NEGATIVE
RBC # BLD AUTO: 4.46 M/UL (ref 4–5.4)
SODIUM SERPL-SCNC: 144 MMOL/L (ref 136–145)
SP GR UR STRIP: 1.01 (ref 1–1.03)
URN SPEC COLLECT METH UR: NORMAL
UROBILINOGEN UR STRIP-ACNC: NEGATIVE EU/DL
WBC # BLD AUTO: 10.37 K/UL (ref 3.9–12.7)

## 2021-01-16 PROCEDURE — 85025 COMPLETE CBC W/AUTO DIFF WBC: CPT

## 2021-01-16 PROCEDURE — 25000003 PHARM REV CODE 250: Performed by: NURSE PRACTITIONER

## 2021-01-16 PROCEDURE — 99284 EMERGENCY DEPT VISIT MOD MDM: CPT | Mod: 25

## 2021-01-16 PROCEDURE — 96372 THER/PROPH/DIAG INJ SC/IM: CPT

## 2021-01-16 PROCEDURE — 81003 URINALYSIS AUTO W/O SCOPE: CPT

## 2021-01-16 PROCEDURE — 63600175 PHARM REV CODE 636 W HCPCS: Performed by: NURSE PRACTITIONER

## 2021-01-16 PROCEDURE — 80053 COMPREHEN METABOLIC PANEL: CPT

## 2021-01-16 PROCEDURE — 83690 ASSAY OF LIPASE: CPT

## 2021-01-16 PROCEDURE — 36415 COLL VENOUS BLD VENIPUNCTURE: CPT

## 2021-01-16 RX ORDER — KETOROLAC TROMETHAMINE 30 MG/ML
30 INJECTION, SOLUTION INTRAMUSCULAR; INTRAVENOUS
Status: COMPLETED | OUTPATIENT
Start: 2021-01-16 | End: 2021-01-16

## 2021-01-16 RX ORDER — ONDANSETRON 4 MG/1
4 TABLET, ORALLY DISINTEGRATING ORAL
Status: COMPLETED | OUTPATIENT
Start: 2021-01-16 | End: 2021-01-16

## 2021-01-16 RX ADMIN — KETOROLAC TROMETHAMINE 30 MG: 30 INJECTION, SOLUTION INTRAMUSCULAR at 02:01

## 2021-01-16 RX ADMIN — ONDANSETRON 4 MG: 4 TABLET, ORALLY DISINTEGRATING ORAL at 02:01

## 2021-01-21 ENCOUNTER — HOSPITAL ENCOUNTER (OUTPATIENT)
Dept: RADIOLOGY | Facility: HOSPITAL | Age: 61
Discharge: HOME | End: 2021-01-21
Attending: SURGERY
Payer: MEDICARE

## 2021-01-21 VITALS — WEIGHT: 237 LBS | BODY MASS INDEX: 41.99 KG/M2 | HEIGHT: 63 IN

## 2021-01-21 DIAGNOSIS — Z12.31 SCREENING MAMMOGRAM, ENCOUNTER FOR: ICD-10-CM

## 2021-01-21 PROCEDURE — 77067 SCR MAMMO BI INCL CAD: CPT | Mod: TC

## 2021-02-07 ENCOUNTER — HOSPITAL ENCOUNTER (EMERGENCY)
Facility: HOSPITAL | Age: 61
Discharge: HOME | End: 2021-02-07
Attending: EMERGENCY MEDICINE
Payer: MEDICARE

## 2021-02-07 VITALS
WEIGHT: 242 LBS | SYSTOLIC BLOOD PRESSURE: 140 MMHG | HEIGHT: 63 IN | HEART RATE: 70 BPM | BODY MASS INDEX: 42.88 KG/M2 | TEMPERATURE: 98 F | DIASTOLIC BLOOD PRESSURE: 70 MMHG | OXYGEN SATURATION: 99 % | RESPIRATION RATE: 16 BRPM

## 2021-02-07 DIAGNOSIS — R10.84 GENERALIZED ABDOMINAL PAIN: Primary | ICD-10-CM

## 2021-02-07 LAB
ALBUMIN SERPL BCP-MCNC: 3.2 G/DL (ref 3.5–5.2)
ALP SERPL-CCNC: 120 U/L (ref 55–135)
ALT SERPL W/O P-5'-P-CCNC: 21 U/L (ref 10–44)
ANION GAP SERPL CALC-SCNC: 5 MMOL/L (ref 8–16)
AST SERPL-CCNC: 10 U/L (ref 10–40)
BASOPHILS # BLD AUTO: 0.05 K/UL (ref 0–0.2)
BASOPHILS NFR BLD: 0.7 % (ref 0–1.9)
BILIRUB SERPL-MCNC: 0.3 MG/DL (ref 0.1–1)
BILIRUB UR QL STRIP: NEGATIVE
BUN SERPL-MCNC: 8 MG/DL (ref 8–23)
CALCIUM SERPL-MCNC: 8.9 MG/DL (ref 8.7–10.5)
CHLORIDE SERPL-SCNC: 109 MMOL/L (ref 95–110)
CLARITY UR: CLEAR
CO2 SERPL-SCNC: 28 MMOL/L (ref 23–29)
COLOR UR: YELLOW
CREAT SERPL-MCNC: 1.1 MG/DL (ref 0.5–1.4)
DIFFERENTIAL METHOD: ABNORMAL
EOSINOPHIL # BLD AUTO: 0.4 K/UL (ref 0–0.5)
EOSINOPHIL NFR BLD: 5.1 % (ref 0–8)
ERYTHROCYTE [DISTWIDTH] IN BLOOD BY AUTOMATED COUNT: 17.2 % (ref 11.5–14.5)
EST. GFR  (AFRICAN AMERICAN): >60 ML/MIN/1.73 M^2
EST. GFR  (NON AFRICAN AMERICAN): 54.3 ML/MIN/1.73 M^2
GLUCOSE SERPL-MCNC: 128 MG/DL (ref 70–110)
GLUCOSE UR QL STRIP: NEGATIVE
HCT VFR BLD AUTO: 39.7 % (ref 37–48.5)
HGB BLD-MCNC: 12.3 G/DL (ref 12–16)
HGB UR QL STRIP: NEGATIVE
IMM GRANULOCYTES # BLD AUTO: 0.02 K/UL (ref 0–0.04)
IMM GRANULOCYTES NFR BLD AUTO: 0.3 % (ref 0–0.5)
KETONES UR QL STRIP: NEGATIVE
LEUKOCYTE ESTERASE UR QL STRIP: NEGATIVE
LYMPHOCYTES # BLD AUTO: 2.1 K/UL (ref 1–4.8)
LYMPHOCYTES NFR BLD: 30.2 % (ref 18–48)
MCH RBC QN AUTO: 26.7 PG (ref 27–31)
MCHC RBC AUTO-ENTMCNC: 31 G/DL (ref 32–36)
MCV RBC AUTO: 86 FL (ref 82–98)
MONOCYTES # BLD AUTO: 0.8 K/UL (ref 0.3–1)
MONOCYTES NFR BLD: 11.4 % (ref 4–15)
NEUTROPHILS # BLD AUTO: 3.6 K/UL (ref 1.8–7.7)
NEUTROPHILS NFR BLD: 52.3 % (ref 38–73)
NITRITE UR QL STRIP: NEGATIVE
NRBC BLD-RTO: 0 /100 WBC
PH UR STRIP: 6 [PH] (ref 5–8)
PLATELET # BLD AUTO: 289 K/UL (ref 150–350)
PMV BLD AUTO: 11.2 FL (ref 9.2–12.9)
POTASSIUM SERPL-SCNC: 3.9 MMOL/L (ref 3.5–5.1)
PROT SERPL-MCNC: 6.6 G/DL (ref 6–8.4)
PROT UR QL STRIP: NEGATIVE
RBC # BLD AUTO: 4.6 M/UL (ref 4–5.4)
SODIUM SERPL-SCNC: 142 MMOL/L (ref 136–145)
SP GR UR STRIP: 1.01 (ref 1–1.03)
URN SPEC COLLECT METH UR: NORMAL
UROBILINOGEN UR STRIP-ACNC: 1 EU/DL
WBC # BLD AUTO: 6.82 K/UL (ref 3.9–12.7)

## 2021-02-07 PROCEDURE — 25000003 PHARM REV CODE 250: Performed by: EMERGENCY MEDICINE

## 2021-02-07 PROCEDURE — 96360 HYDRATION IV INFUSION INIT: CPT

## 2021-02-07 PROCEDURE — 85025 COMPLETE CBC W/AUTO DIFF WBC: CPT

## 2021-02-07 PROCEDURE — 96361 HYDRATE IV INFUSION ADD-ON: CPT

## 2021-02-07 PROCEDURE — 81003 URINALYSIS AUTO W/O SCOPE: CPT

## 2021-02-07 PROCEDURE — 80053 COMPREHEN METABOLIC PANEL: CPT

## 2021-02-07 PROCEDURE — 36415 COLL VENOUS BLD VENIPUNCTURE: CPT

## 2021-02-07 PROCEDURE — 99284 EMERGENCY DEPT VISIT MOD MDM: CPT | Mod: 25

## 2021-02-07 RX ORDER — ONDANSETRON 4 MG/1
4 TABLET, ORALLY DISINTEGRATING ORAL EVERY 6 HOURS PRN
Qty: 10 TABLET | Refills: 0 | OUTPATIENT
Start: 2021-02-07 | End: 2021-05-01

## 2021-02-07 RX ORDER — METOCLOPRAMIDE 10 MG/1
10 TABLET ORAL EVERY 6 HOURS PRN
Qty: 30 TABLET | Refills: 0 | Status: SHIPPED | OUTPATIENT
Start: 2021-02-07 | End: 2023-12-21

## 2021-02-07 RX ORDER — SODIUM CHLORIDE 9 MG/ML
INJECTION, SOLUTION INTRAVENOUS
Status: COMPLETED | OUTPATIENT
Start: 2021-02-07 | End: 2021-02-07

## 2021-02-07 RX ADMIN — SODIUM CHLORIDE: 0.9 INJECTION, SOLUTION INTRAVENOUS at 07:02

## 2021-03-17 ENCOUNTER — HOSPITAL ENCOUNTER (EMERGENCY)
Facility: HOSPITAL | Age: 61
Discharge: HOME | End: 2021-03-17
Attending: EMERGENCY MEDICINE
Payer: MEDICARE

## 2021-03-17 VITALS
RESPIRATION RATE: 17 BRPM | OXYGEN SATURATION: 98 % | DIASTOLIC BLOOD PRESSURE: 65 MMHG | TEMPERATURE: 100 F | HEIGHT: 63 IN | WEIGHT: 238 LBS | HEART RATE: 81 BPM | SYSTOLIC BLOOD PRESSURE: 141 MMHG | BODY MASS INDEX: 42.17 KG/M2

## 2021-03-17 DIAGNOSIS — R10.31 RIGHT LOWER QUADRANT ABDOMINAL PAIN: Primary | ICD-10-CM

## 2021-03-17 LAB
ALBUMIN SERPL BCP-MCNC: 3 G/DL (ref 3.5–5.2)
ALP SERPL-CCNC: 115 U/L (ref 55–135)
ALT SERPL W/O P-5'-P-CCNC: 21 U/L (ref 10–44)
ANION GAP SERPL CALC-SCNC: 5 MMOL/L (ref 8–16)
AST SERPL-CCNC: 11 U/L (ref 10–40)
BASOPHILS # BLD AUTO: 0.04 K/UL (ref 0–0.2)
BASOPHILS NFR BLD: 0.6 % (ref 0–1.9)
BILIRUB SERPL-MCNC: 0.3 MG/DL (ref 0.1–1)
BUN SERPL-MCNC: 11 MG/DL (ref 8–23)
CALCIUM SERPL-MCNC: 8.8 MG/DL (ref 8.7–10.5)
CHLORIDE SERPL-SCNC: 111 MMOL/L (ref 95–110)
CO2 SERPL-SCNC: 29 MMOL/L (ref 23–29)
CREAT SERPL-MCNC: 1 MG/DL (ref 0.5–1.4)
DIFFERENTIAL METHOD: ABNORMAL
EOSINOPHIL # BLD AUTO: 0.4 K/UL (ref 0–0.5)
EOSINOPHIL NFR BLD: 5.8 % (ref 0–8)
ERYTHROCYTE [DISTWIDTH] IN BLOOD BY AUTOMATED COUNT: 16.5 % (ref 11.5–14.5)
EST. GFR  (AFRICAN AMERICAN): >60 ML/MIN/1.73 M^2
EST. GFR  (NON AFRICAN AMERICAN): >60 ML/MIN/1.73 M^2
GLUCOSE SERPL-MCNC: 107 MG/DL (ref 70–110)
HCT VFR BLD AUTO: 36.1 % (ref 37–48.5)
HGB BLD-MCNC: 11.2 G/DL (ref 12–16)
IMM GRANULOCYTES # BLD AUTO: 0.02 K/UL (ref 0–0.04)
IMM GRANULOCYTES NFR BLD AUTO: 0.3 % (ref 0–0.5)
LIPASE SERPL-CCNC: 90 U/L (ref 23–300)
LYMPHOCYTES # BLD AUTO: 1.7 K/UL (ref 1–4.8)
LYMPHOCYTES NFR BLD: 25 % (ref 18–48)
MCH RBC QN AUTO: 26 PG (ref 27–31)
MCHC RBC AUTO-ENTMCNC: 31 G/DL (ref 32–36)
MCV RBC AUTO: 84 FL (ref 82–98)
MONOCYTES # BLD AUTO: 0.9 K/UL (ref 0.3–1)
MONOCYTES NFR BLD: 12.8 % (ref 4–15)
NEUTROPHILS # BLD AUTO: 3.8 K/UL (ref 1.8–7.7)
NEUTROPHILS NFR BLD: 55.5 % (ref 38–73)
NRBC BLD-RTO: 0 /100 WBC
PLATELET # BLD AUTO: 281 K/UL (ref 150–350)
PMV BLD AUTO: 10.8 FL (ref 9.2–12.9)
POTASSIUM SERPL-SCNC: 4.1 MMOL/L (ref 3.5–5.1)
PROT SERPL-MCNC: 6.3 G/DL (ref 6–8.4)
RBC # BLD AUTO: 4.31 M/UL (ref 4–5.4)
SODIUM SERPL-SCNC: 145 MMOL/L (ref 136–145)
WBC # BLD AUTO: 6.88 K/UL (ref 3.9–12.7)

## 2021-03-17 PROCEDURE — 85025 COMPLETE CBC W/AUTO DIFF WBC: CPT | Performed by: EMERGENCY MEDICINE

## 2021-03-17 PROCEDURE — 36415 COLL VENOUS BLD VENIPUNCTURE: CPT | Performed by: EMERGENCY MEDICINE

## 2021-03-17 PROCEDURE — 83690 ASSAY OF LIPASE: CPT | Performed by: EMERGENCY MEDICINE

## 2021-03-17 PROCEDURE — 99283 EMERGENCY DEPT VISIT LOW MDM: CPT

## 2021-03-17 PROCEDURE — 80053 COMPREHEN METABOLIC PANEL: CPT | Performed by: EMERGENCY MEDICINE

## 2021-03-17 PROCEDURE — 25000003 PHARM REV CODE 250: Performed by: EMERGENCY MEDICINE

## 2021-03-17 RX ORDER — ONDANSETRON 4 MG/1
8 TABLET, ORALLY DISINTEGRATING ORAL
Status: COMPLETED | OUTPATIENT
Start: 2021-03-17 | End: 2021-03-17

## 2021-03-17 RX ADMIN — ONDANSETRON 8 MG: 4 TABLET, ORALLY DISINTEGRATING ORAL at 01:03

## 2021-03-23 ENCOUNTER — HOSPITAL ENCOUNTER (OUTPATIENT)
Dept: RADIOLOGY | Facility: HOSPITAL | Age: 61
Discharge: HOME | End: 2021-03-23
Attending: INTERNAL MEDICINE
Payer: MEDICARE

## 2021-03-23 DIAGNOSIS — R10.31 ABDOMINAL PAIN, RIGHT LOWER QUADRANT: ICD-10-CM

## 2021-03-23 PROCEDURE — 74178 CT ABD&PLV WO CNTR FLWD CNTR: CPT | Mod: TC

## 2021-03-23 PROCEDURE — 25500020 PHARM REV CODE 255: Performed by: INTERNAL MEDICINE

## 2021-03-23 RX ADMIN — IOHEXOL 100 ML: 350 INJECTION, SOLUTION INTRAVENOUS at 01:03

## 2021-04-21 ENCOUNTER — HOSPITAL ENCOUNTER (EMERGENCY)
Facility: HOSPITAL | Age: 61
Discharge: HOME | End: 2021-04-21
Attending: FAMILY MEDICINE
Payer: MEDICARE

## 2021-04-21 VITALS
HEART RATE: 83 BPM | WEIGHT: 240 LBS | RESPIRATION RATE: 18 BRPM | SYSTOLIC BLOOD PRESSURE: 181 MMHG | BODY MASS INDEX: 42.52 KG/M2 | DIASTOLIC BLOOD PRESSURE: 77 MMHG | TEMPERATURE: 99 F | HEIGHT: 63 IN | OXYGEN SATURATION: 98 %

## 2021-04-21 DIAGNOSIS — R07.9 CHEST PAIN: ICD-10-CM

## 2021-04-21 DIAGNOSIS — K21.9 HIATAL HERNIA WITH GERD: Primary | ICD-10-CM

## 2021-04-21 DIAGNOSIS — K44.9 HIATAL HERNIA WITH GERD: Primary | ICD-10-CM

## 2021-04-21 LAB
ALBUMIN SERPL BCP-MCNC: 3.3 G/DL (ref 3.5–5.2)
ALP SERPL-CCNC: 120 U/L (ref 55–135)
ALT SERPL W/O P-5'-P-CCNC: 31 U/L (ref 10–44)
ANION GAP SERPL CALC-SCNC: 5 MMOL/L (ref 8–16)
AST SERPL-CCNC: 16 U/L (ref 10–40)
BASOPHILS # BLD AUTO: 0.04 K/UL (ref 0–0.2)
BASOPHILS NFR BLD: 0.5 % (ref 0–1.9)
BILIRUB SERPL-MCNC: 0.4 MG/DL (ref 0.1–1)
BUN SERPL-MCNC: 8 MG/DL (ref 8–23)
CALCIUM SERPL-MCNC: 9.1 MG/DL (ref 8.7–10.5)
CHLORIDE SERPL-SCNC: 107 MMOL/L (ref 95–110)
CO2 SERPL-SCNC: 29 MMOL/L (ref 23–29)
CREAT SERPL-MCNC: 1 MG/DL (ref 0.5–1.4)
DIFFERENTIAL METHOD: ABNORMAL
EOSINOPHIL # BLD AUTO: 0.4 K/UL (ref 0–0.5)
EOSINOPHIL NFR BLD: 4.4 % (ref 0–8)
ERYTHROCYTE [DISTWIDTH] IN BLOOD BY AUTOMATED COUNT: 19 % (ref 11.5–14.5)
EST. GFR  (AFRICAN AMERICAN): >60 ML/MIN/1.73 M^2
EST. GFR  (NON AFRICAN AMERICAN): >60 ML/MIN/1.73 M^2
GLUCOSE SERPL-MCNC: 95 MG/DL (ref 70–110)
HCT VFR BLD AUTO: 40.5 % (ref 37–48.5)
HGB BLD-MCNC: 13.2 G/DL (ref 12–16)
IMM GRANULOCYTES # BLD AUTO: 0.02 K/UL (ref 0–0.04)
IMM GRANULOCYTES NFR BLD AUTO: 0.2 % (ref 0–0.5)
LIPASE SERPL-CCNC: 79 U/L (ref 23–300)
LYMPHOCYTES # BLD AUTO: 2.5 K/UL (ref 1–4.8)
LYMPHOCYTES NFR BLD: 30.1 % (ref 18–48)
MCH RBC QN AUTO: 27.4 PG (ref 27–31)
MCHC RBC AUTO-ENTMCNC: 32.6 G/DL (ref 32–36)
MCV RBC AUTO: 84 FL (ref 82–98)
MONOCYTES # BLD AUTO: 0.9 K/UL (ref 0.3–1)
MONOCYTES NFR BLD: 10.5 % (ref 4–15)
NEUTROPHILS # BLD AUTO: 4.5 K/UL (ref 1.8–7.7)
NEUTROPHILS NFR BLD: 54.3 % (ref 38–73)
NRBC BLD-RTO: 0 /100 WBC
PLATELET # BLD AUTO: 243 K/UL (ref 150–450)
PMV BLD AUTO: 10.6 FL (ref 9.2–12.9)
POTASSIUM SERPL-SCNC: 3.7 MMOL/L (ref 3.5–5.1)
PROT SERPL-MCNC: 6.8 G/DL (ref 6–8.4)
RBC # BLD AUTO: 4.82 M/UL (ref 4–5.4)
SODIUM SERPL-SCNC: 141 MMOL/L (ref 136–145)
TROPONIN I SERPL DL<=0.01 NG/ML-MCNC: <0.02 NG/ML (ref 0–0.03)
WBC # BLD AUTO: 8.25 K/UL (ref 3.9–12.7)

## 2021-04-21 PROCEDURE — 99284 EMERGENCY DEPT VISIT MOD MDM: CPT | Mod: 25

## 2021-04-21 PROCEDURE — 85025 COMPLETE CBC W/AUTO DIFF WBC: CPT | Performed by: FAMILY MEDICINE

## 2021-04-21 PROCEDURE — 93005 ELECTROCARDIOGRAM TRACING: CPT

## 2021-04-21 PROCEDURE — 80053 COMPREHEN METABOLIC PANEL: CPT | Performed by: FAMILY MEDICINE

## 2021-04-21 PROCEDURE — 36415 COLL VENOUS BLD VENIPUNCTURE: CPT | Performed by: FAMILY MEDICINE

## 2021-04-21 PROCEDURE — 93010 ELECTROCARDIOGRAM REPORT: CPT | Mod: ,,, | Performed by: INTERNAL MEDICINE

## 2021-04-21 PROCEDURE — 84484 ASSAY OF TROPONIN QUANT: CPT | Performed by: FAMILY MEDICINE

## 2021-04-21 PROCEDURE — 25000003 PHARM REV CODE 250: Performed by: FAMILY MEDICINE

## 2021-04-21 PROCEDURE — 83690 ASSAY OF LIPASE: CPT | Performed by: FAMILY MEDICINE

## 2021-04-21 PROCEDURE — 93010 EKG 12-LEAD: ICD-10-PCS | Mod: ,,, | Performed by: INTERNAL MEDICINE

## 2021-04-21 RX ORDER — CIMETIDINE 800 MG
800 TABLET ORAL NIGHTLY
Qty: 30 TABLET | Refills: 11 | OUTPATIENT
Start: 2021-04-21 | End: 2022-12-25

## 2021-04-21 RX ORDER — SUCRALFATE 1 G/1
1 TABLET ORAL 4 TIMES DAILY
Qty: 30 TABLET | Refills: 0 | OUTPATIENT
Start: 2021-04-21 | End: 2022-12-25

## 2021-04-21 RX ADMIN — LIDOCAINE HYDROCHLORIDE: 20 SOLUTION ORAL; TOPICAL at 08:04

## 2021-05-01 ENCOUNTER — HOSPITAL ENCOUNTER (EMERGENCY)
Facility: HOSPITAL | Age: 61
Discharge: HOME | End: 2021-05-01
Attending: EMERGENCY MEDICINE
Payer: MEDICARE

## 2021-05-01 VITALS
TEMPERATURE: 98 F | RESPIRATION RATE: 16 BRPM | BODY MASS INDEX: 41.65 KG/M2 | DIASTOLIC BLOOD PRESSURE: 110 MMHG | WEIGHT: 250 LBS | HEIGHT: 65 IN | SYSTOLIC BLOOD PRESSURE: 143 MMHG | HEART RATE: 76 BPM | OXYGEN SATURATION: 96 %

## 2021-05-01 DIAGNOSIS — R11.2 NAUSEA AND VOMITING, INTRACTABILITY OF VOMITING NOT SPECIFIED, UNSPECIFIED VOMITING TYPE: Primary | ICD-10-CM

## 2021-05-01 DIAGNOSIS — A08.4 VIRAL GASTROENTERITIS: ICD-10-CM

## 2021-05-01 DIAGNOSIS — N30.00 ACUTE CYSTITIS WITHOUT HEMATURIA: ICD-10-CM

## 2021-05-01 LAB
ALBUMIN SERPL BCP-MCNC: 3.1 G/DL (ref 3.5–5.2)
ALP SERPL-CCNC: 118 U/L (ref 55–135)
ALT SERPL W/O P-5'-P-CCNC: 34 U/L (ref 10–44)
ANION GAP SERPL CALC-SCNC: 11 MMOL/L (ref 8–16)
AST SERPL-CCNC: 22 U/L (ref 10–40)
BACTERIA #/AREA URNS HPF: ABNORMAL /HPF
BASOPHILS # BLD AUTO: 0.03 K/UL (ref 0–0.2)
BASOPHILS NFR BLD: 0.4 % (ref 0–1.9)
BILIRUB SERPL-MCNC: 0.6 MG/DL (ref 0.1–1)
BILIRUB UR QL STRIP: NEGATIVE
BUN SERPL-MCNC: 4 MG/DL (ref 8–23)
CALCIUM SERPL-MCNC: 8.6 MG/DL (ref 8.7–10.5)
CHLORIDE SERPL-SCNC: 103 MMOL/L (ref 95–110)
CLARITY UR: ABNORMAL
CO2 SERPL-SCNC: 25 MMOL/L (ref 23–29)
COLOR UR: YELLOW
CREAT SERPL-MCNC: 1.1 MG/DL (ref 0.5–1.4)
DIFFERENTIAL METHOD: ABNORMAL
EOSINOPHIL # BLD AUTO: 0.3 K/UL (ref 0–0.5)
EOSINOPHIL NFR BLD: 4 % (ref 0–8)
ERYTHROCYTE [DISTWIDTH] IN BLOOD BY AUTOMATED COUNT: 18.6 % (ref 11.5–14.5)
EST. GFR  (AFRICAN AMERICAN): >60 ML/MIN/1.73 M^2
EST. GFR  (NON AFRICAN AMERICAN): 53.9 ML/MIN/1.73 M^2
GLUCOSE SERPL-MCNC: 96 MG/DL (ref 70–110)
GLUCOSE UR QL STRIP: NEGATIVE
HCT VFR BLD AUTO: 36.8 % (ref 37–48.5)
HGB BLD-MCNC: 12 G/DL (ref 12–16)
HGB UR QL STRIP: NEGATIVE
HYALINE CASTS #/AREA URNS LPF: 2 /LPF
IMM GRANULOCYTES # BLD AUTO: 0.02 K/UL (ref 0–0.04)
IMM GRANULOCYTES NFR BLD AUTO: 0.3 % (ref 0–0.5)
KETONES UR QL STRIP: NEGATIVE
LEUKOCYTE ESTERASE UR QL STRIP: ABNORMAL
LYMPHOCYTES # BLD AUTO: 1.8 K/UL (ref 1–4.8)
LYMPHOCYTES NFR BLD: 24.7 % (ref 18–48)
MCH RBC QN AUTO: 27.8 PG (ref 27–31)
MCHC RBC AUTO-ENTMCNC: 32.6 G/DL (ref 32–36)
MCV RBC AUTO: 85 FL (ref 82–98)
MICROSCOPIC COMMENT: ABNORMAL
MONOCYTES # BLD AUTO: 0.7 K/UL (ref 0.3–1)
MONOCYTES NFR BLD: 9.2 % (ref 4–15)
NEUTROPHILS # BLD AUTO: 4.4 K/UL (ref 1.8–7.7)
NEUTROPHILS NFR BLD: 61.4 % (ref 38–73)
NITRITE UR QL STRIP: NEGATIVE
NRBC BLD-RTO: 0 /100 WBC
PH UR STRIP: 7 [PH] (ref 5–8)
PLATELET # BLD AUTO: 240 K/UL (ref 150–450)
PMV BLD AUTO: 10.7 FL (ref 9.2–12.9)
POTASSIUM SERPL-SCNC: 4 MMOL/L (ref 3.5–5.1)
PROT SERPL-MCNC: 6.5 G/DL (ref 6–8.4)
PROT UR QL STRIP: NEGATIVE
RBC # BLD AUTO: 4.31 M/UL (ref 4–5.4)
RBC #/AREA URNS HPF: 1 /HPF (ref 0–4)
SODIUM SERPL-SCNC: 139 MMOL/L (ref 136–145)
SP GR UR STRIP: 1.01 (ref 1–1.03)
SQUAMOUS #/AREA URNS HPF: 5 /HPF
URN SPEC COLLECT METH UR: ABNORMAL
UROBILINOGEN UR STRIP-ACNC: 1 EU/DL
WBC # BLD AUTO: 7.18 K/UL (ref 3.9–12.7)
WBC #/AREA URNS HPF: 75 /HPF (ref 0–5)

## 2021-05-01 PROCEDURE — 87088 URINE BACTERIA CULTURE: CPT | Performed by: EMERGENCY MEDICINE

## 2021-05-01 PROCEDURE — 63600175 PHARM REV CODE 636 W HCPCS: Performed by: EMERGENCY MEDICINE

## 2021-05-01 PROCEDURE — 81000 URINALYSIS NONAUTO W/SCOPE: CPT | Performed by: EMERGENCY MEDICINE

## 2021-05-01 PROCEDURE — 87086 URINE CULTURE/COLONY COUNT: CPT | Performed by: EMERGENCY MEDICINE

## 2021-05-01 PROCEDURE — 80053 COMPREHEN METABOLIC PANEL: CPT | Performed by: EMERGENCY MEDICINE

## 2021-05-01 PROCEDURE — 87186 SC STD MICRODIL/AGAR DIL: CPT | Performed by: EMERGENCY MEDICINE

## 2021-05-01 PROCEDURE — 99284 EMERGENCY DEPT VISIT MOD MDM: CPT | Mod: 25

## 2021-05-01 PROCEDURE — 87077 CULTURE AEROBIC IDENTIFY: CPT | Performed by: EMERGENCY MEDICINE

## 2021-05-01 PROCEDURE — 96361 HYDRATE IV INFUSION ADD-ON: CPT

## 2021-05-01 PROCEDURE — 85025 COMPLETE CBC W/AUTO DIFF WBC: CPT | Performed by: EMERGENCY MEDICINE

## 2021-05-01 PROCEDURE — 96374 THER/PROPH/DIAG INJ IV PUSH: CPT

## 2021-05-01 PROCEDURE — 25000003 PHARM REV CODE 250: Performed by: EMERGENCY MEDICINE

## 2021-05-01 RX ORDER — ONDANSETRON 4 MG/1
4 TABLET, ORALLY DISINTEGRATING ORAL EVERY 6 HOURS PRN
Qty: 12 TABLET | Refills: 1 | OUTPATIENT
Start: 2021-05-01 | End: 2022-12-25

## 2021-05-01 RX ORDER — ONDANSETRON 2 MG/ML
4 INJECTION INTRAMUSCULAR; INTRAVENOUS
Status: COMPLETED | OUTPATIENT
Start: 2021-05-01 | End: 2021-05-01

## 2021-05-01 RX ORDER — NITROFURANTOIN 25; 75 MG/1; MG/1
100 CAPSULE ORAL
Status: COMPLETED | OUTPATIENT
Start: 2021-05-01 | End: 2021-05-01

## 2021-05-01 RX ORDER — NITROFURANTOIN 25; 75 MG/1; MG/1
100 CAPSULE ORAL 2 TIMES DAILY
Qty: 10 CAPSULE | Refills: 0 | Status: SHIPPED | OUTPATIENT
Start: 2021-05-01 | End: 2021-05-06

## 2021-05-01 RX ADMIN — NITROFURANTOIN (MONOHYDRATE/MACROCRYSTALS) 100 MG: 75; 25 CAPSULE ORAL at 08:05

## 2021-05-01 RX ADMIN — ONDANSETRON HYDROCHLORIDE 4 MG: 2 SOLUTION INTRAMUSCULAR; INTRAVENOUS at 07:05

## 2021-05-01 RX ADMIN — SODIUM CHLORIDE, SODIUM LACTATE, POTASSIUM CHLORIDE, AND CALCIUM CHLORIDE 1000 ML: .6; .31; .03; .02 INJECTION, SOLUTION INTRAVENOUS at 07:05

## 2021-05-04 LAB — BACTERIA UR CULT: ABNORMAL

## 2021-05-17 ENCOUNTER — LAB VISIT (OUTPATIENT)
Dept: LAB | Facility: HOSPITAL | Age: 61
End: 2021-05-17
Attending: INTERNAL MEDICINE
Payer: MEDICARE

## 2021-05-17 DIAGNOSIS — R11.2 NAUSEA WITH VOMITING: ICD-10-CM

## 2021-05-17 DIAGNOSIS — K44.9 DIAPHRAGMATIC HERNIA WITHOUT OBSTRUCTION OR GANGRENE: ICD-10-CM

## 2021-05-17 DIAGNOSIS — R10.31 ABDOMINAL PAIN, RIGHT LOWER QUADRANT: Primary | ICD-10-CM

## 2021-05-17 DIAGNOSIS — R10.31 RIGHT LOWER QUADRANT ABDOMINAL PAIN: Primary | ICD-10-CM

## 2021-05-17 DIAGNOSIS — K59.00 CONSTIPATION: ICD-10-CM

## 2021-05-17 LAB
ANION GAP SERPL CALC-SCNC: 5 MMOL/L (ref 8–16)
BUN SERPL-MCNC: 6 MG/DL (ref 8–23)
CALCIUM SERPL-MCNC: 9.9 MG/DL (ref 8.7–10.5)
CHLORIDE SERPL-SCNC: 112 MMOL/L (ref 95–110)
CO2 SERPL-SCNC: 29 MMOL/L (ref 23–29)
CREAT SERPL-MCNC: 0.9 MG/DL (ref 0.5–1.4)
EST. GFR  (AFRICAN AMERICAN): >60 ML/MIN/1.73 M^2
EST. GFR  (NON AFRICAN AMERICAN): >60 ML/MIN/1.73 M^2
GLUCOSE SERPL-MCNC: 97 MG/DL (ref 70–110)
POTASSIUM SERPL-SCNC: 4.4 MMOL/L (ref 3.5–5.1)
SODIUM SERPL-SCNC: 146 MMOL/L (ref 136–145)

## 2021-05-17 PROCEDURE — 36415 COLL VENOUS BLD VENIPUNCTURE: CPT | Performed by: INTERNAL MEDICINE

## 2021-05-17 PROCEDURE — 80048 BASIC METABOLIC PNL TOTAL CA: CPT | Performed by: INTERNAL MEDICINE

## 2021-05-18 ENCOUNTER — HOSPITAL ENCOUNTER (OUTPATIENT)
Dept: RADIOLOGY | Facility: HOSPITAL | Age: 61
Discharge: HOME | End: 2021-05-18
Attending: NURSE PRACTITIONER
Payer: MEDICARE

## 2021-05-18 DIAGNOSIS — K44.9 DIAPHRAGMATIC HERNIA WITHOUT OBSTRUCTION OR GANGRENE: ICD-10-CM

## 2021-05-18 DIAGNOSIS — R10.31 ABDOMINAL PAIN, RIGHT LOWER QUADRANT: ICD-10-CM

## 2021-05-18 DIAGNOSIS — K59.00 CONSTIPATION: ICD-10-CM

## 2021-05-18 DIAGNOSIS — R11.2 NAUSEA WITH VOMITING: ICD-10-CM

## 2021-05-18 PROCEDURE — 74177 CT ABD & PELVIS W/CONTRAST: CPT | Mod: TC

## 2021-05-18 PROCEDURE — 25500020 PHARM REV CODE 255

## 2021-05-18 RX ADMIN — IOHEXOL 100 ML: 350 INJECTION, SOLUTION INTRAVENOUS at 09:05

## 2021-05-29 ENCOUNTER — HOSPITAL ENCOUNTER (EMERGENCY)
Facility: HOSPITAL | Age: 61
Discharge: HOME | End: 2021-05-29
Attending: EMERGENCY MEDICINE
Payer: MEDICARE

## 2021-05-29 VITALS
OXYGEN SATURATION: 96 % | HEIGHT: 63 IN | DIASTOLIC BLOOD PRESSURE: 69 MMHG | SYSTOLIC BLOOD PRESSURE: 136 MMHG | HEART RATE: 79 BPM | TEMPERATURE: 99 F | RESPIRATION RATE: 16 BRPM | BODY MASS INDEX: 42.52 KG/M2 | WEIGHT: 240 LBS

## 2021-05-29 DIAGNOSIS — R10.84 ABDOMINAL PAIN, GENERALIZED: Primary | ICD-10-CM

## 2021-05-29 DIAGNOSIS — R07.9 CHEST PAIN: ICD-10-CM

## 2021-05-29 LAB
ALBUMIN SERPL BCP-MCNC: 3 G/DL (ref 3.5–5.2)
ALP SERPL-CCNC: 118 U/L (ref 55–135)
ALT SERPL W/O P-5'-P-CCNC: 27 U/L (ref 10–44)
ANION GAP SERPL CALC-SCNC: 5 MMOL/L (ref 8–16)
AST SERPL-CCNC: 20 U/L (ref 10–40)
BASOPHILS # BLD AUTO: 0.04 K/UL (ref 0–0.2)
BASOPHILS NFR BLD: 0.6 % (ref 0–1.9)
BILIRUB SERPL-MCNC: 0.5 MG/DL (ref 0.1–1)
BUN SERPL-MCNC: 9 MG/DL (ref 8–23)
CALCIUM SERPL-MCNC: 8.9 MG/DL (ref 8.7–10.5)
CHLORIDE SERPL-SCNC: 110 MMOL/L (ref 95–110)
CO2 SERPL-SCNC: 28 MMOL/L (ref 23–29)
CREAT SERPL-MCNC: 0.9 MG/DL (ref 0.5–1.4)
DIFFERENTIAL METHOD: ABNORMAL
EOSINOPHIL # BLD AUTO: 0.3 K/UL (ref 0–0.5)
EOSINOPHIL NFR BLD: 4.3 % (ref 0–8)
ERYTHROCYTE [DISTWIDTH] IN BLOOD BY AUTOMATED COUNT: 17.3 % (ref 11.5–14.5)
EST. GFR  (AFRICAN AMERICAN): >60 ML/MIN/1.73 M^2
EST. GFR  (NON AFRICAN AMERICAN): >60 ML/MIN/1.73 M^2
GLUCOSE SERPL-MCNC: 98 MG/DL (ref 70–110)
HCT VFR BLD AUTO: 37.1 % (ref 37–48.5)
HGB BLD-MCNC: 11.8 G/DL (ref 12–16)
IMM GRANULOCYTES # BLD AUTO: 0.02 K/UL (ref 0–0.04)
IMM GRANULOCYTES NFR BLD AUTO: 0.3 % (ref 0–0.5)
LIPASE SERPL-CCNC: 72 U/L (ref 23–300)
LYMPHOCYTES # BLD AUTO: 1.9 K/UL (ref 1–4.8)
LYMPHOCYTES NFR BLD: 28.6 % (ref 18–48)
MCH RBC QN AUTO: 27.1 PG (ref 27–31)
MCHC RBC AUTO-ENTMCNC: 31.8 G/DL (ref 32–36)
MCV RBC AUTO: 85 FL (ref 82–98)
MONOCYTES # BLD AUTO: 0.7 K/UL (ref 0.3–1)
MONOCYTES NFR BLD: 10.8 % (ref 4–15)
NEUTROPHILS # BLD AUTO: 3.7 K/UL (ref 1.8–7.7)
NEUTROPHILS NFR BLD: 55.4 % (ref 38–73)
NRBC BLD-RTO: 0 /100 WBC
PLATELET # BLD AUTO: 268 K/UL (ref 150–450)
PMV BLD AUTO: 10.5 FL (ref 9.2–12.9)
POTASSIUM SERPL-SCNC: 4.1 MMOL/L (ref 3.5–5.1)
PROT SERPL-MCNC: 6.7 G/DL (ref 6–8.4)
RBC # BLD AUTO: 4.36 M/UL (ref 4–5.4)
SODIUM SERPL-SCNC: 143 MMOL/L (ref 136–145)
TROPONIN I SERPL DL<=0.01 NG/ML-MCNC: <0.02 NG/ML (ref 0–0.03)
WBC # BLD AUTO: 6.68 K/UL (ref 3.9–12.7)

## 2021-05-29 PROCEDURE — 96375 TX/PRO/DX INJ NEW DRUG ADDON: CPT

## 2021-05-29 PROCEDURE — 84484 ASSAY OF TROPONIN QUANT: CPT | Performed by: EMERGENCY MEDICINE

## 2021-05-29 PROCEDURE — 80053 COMPREHEN METABOLIC PANEL: CPT | Performed by: EMERGENCY MEDICINE

## 2021-05-29 PROCEDURE — 96374 THER/PROPH/DIAG INJ IV PUSH: CPT

## 2021-05-29 PROCEDURE — 25000003 PHARM REV CODE 250: Performed by: EMERGENCY MEDICINE

## 2021-05-29 PROCEDURE — 83690 ASSAY OF LIPASE: CPT | Performed by: EMERGENCY MEDICINE

## 2021-05-29 PROCEDURE — 99284 EMERGENCY DEPT VISIT MOD MDM: CPT | Mod: 25

## 2021-05-29 PROCEDURE — 63600175 PHARM REV CODE 636 W HCPCS: Performed by: EMERGENCY MEDICINE

## 2021-05-29 PROCEDURE — 85025 COMPLETE CBC W/AUTO DIFF WBC: CPT | Performed by: EMERGENCY MEDICINE

## 2021-05-29 RX ORDER — KETOROLAC TROMETHAMINE 30 MG/ML
15 INJECTION, SOLUTION INTRAMUSCULAR; INTRAVENOUS
Status: COMPLETED | OUTPATIENT
Start: 2021-05-29 | End: 2021-05-29

## 2021-05-29 RX ORDER — SUCRALFATE 1 G/10ML
1 SUSPENSION ORAL 4 TIMES DAILY
Qty: 414 ML | Refills: 0 | OUTPATIENT
Start: 2021-05-29 | End: 2022-12-25

## 2021-05-29 RX ORDER — FAMOTIDINE 10 MG/ML
40 INJECTION INTRAVENOUS
Status: COMPLETED | OUTPATIENT
Start: 2021-05-29 | End: 2021-05-29

## 2021-05-29 RX ORDER — FAMOTIDINE 20 MG/1
40 TABLET, FILM COATED ORAL 2 TIMES DAILY
Qty: 20 TABLET | Refills: 0 | OUTPATIENT
Start: 2021-05-29 | End: 2022-12-25

## 2021-05-29 RX ORDER — METOCLOPRAMIDE HYDROCHLORIDE 5 MG/ML
5 INJECTION INTRAMUSCULAR; INTRAVENOUS
Status: COMPLETED | OUTPATIENT
Start: 2021-05-29 | End: 2021-05-29

## 2021-05-29 RX ADMIN — DICYCLOMINE HYDROCHLORIDE: 10 SOLUTION ORAL at 09:05

## 2021-05-29 RX ADMIN — KETOROLAC TROMETHAMINE 15 MG: 30 INJECTION, SOLUTION INTRAMUSCULAR at 11:05

## 2021-05-29 RX ADMIN — METOCLOPRAMIDE 5 MG: 5 INJECTION, SOLUTION INTRAMUSCULAR; INTRAVENOUS at 10:05

## 2021-05-29 RX ADMIN — FAMOTIDINE 40 MG: 10 INJECTION INTRAVENOUS at 10:05

## 2021-06-12 ENCOUNTER — HOSPITAL ENCOUNTER (EMERGENCY)
Facility: HOSPITAL | Age: 61
Discharge: HOME | End: 2021-06-12
Attending: EMERGENCY MEDICINE
Payer: MEDICARE

## 2021-06-12 VITALS
RESPIRATION RATE: 20 BRPM | WEIGHT: 233 LBS | OXYGEN SATURATION: 95 % | SYSTOLIC BLOOD PRESSURE: 151 MMHG | HEART RATE: 82 BPM | BODY MASS INDEX: 41.29 KG/M2 | HEIGHT: 63 IN | DIASTOLIC BLOOD PRESSURE: 62 MMHG | TEMPERATURE: 98 F

## 2021-06-12 DIAGNOSIS — F41.9 ANXIETY: Primary | ICD-10-CM

## 2021-06-12 PROCEDURE — 25000242 PHARM REV CODE 250 ALT 637 W/ HCPCS: Performed by: EMERGENCY MEDICINE

## 2021-06-12 PROCEDURE — 99900035 HC TECH TIME PER 15 MIN (STAT)

## 2021-06-12 PROCEDURE — 99283 EMERGENCY DEPT VISIT LOW MDM: CPT | Mod: 25

## 2021-06-12 PROCEDURE — 94640 AIRWAY INHALATION TREATMENT: CPT

## 2021-06-12 PROCEDURE — 25000003 PHARM REV CODE 250: Performed by: EMERGENCY MEDICINE

## 2021-06-12 RX ORDER — IPRATROPIUM BROMIDE AND ALBUTEROL SULFATE 2.5; .5 MG/3ML; MG/3ML
3 SOLUTION RESPIRATORY (INHALATION)
Status: COMPLETED | OUTPATIENT
Start: 2021-06-12 | End: 2021-06-12

## 2021-06-12 RX ORDER — LORAZEPAM 1 MG/1
2 TABLET ORAL
Status: COMPLETED | OUTPATIENT
Start: 2021-06-12 | End: 2021-06-12

## 2021-06-12 RX ADMIN — IPRATROPIUM BROMIDE AND ALBUTEROL SULFATE 3 ML: 2.5; .5 SOLUTION RESPIRATORY (INHALATION) at 05:06

## 2021-06-12 RX ADMIN — LORAZEPAM 2 MG: 1 TABLET ORAL at 05:06

## 2021-06-13 ENCOUNTER — HOSPITAL ENCOUNTER (EMERGENCY)
Facility: HOSPITAL | Age: 61
Discharge: HOME | End: 2021-06-13
Attending: EMERGENCY MEDICINE
Payer: MEDICARE

## 2021-06-13 VITALS
BODY MASS INDEX: 41.04 KG/M2 | HEART RATE: 93 BPM | OXYGEN SATURATION: 97 % | SYSTOLIC BLOOD PRESSURE: 123 MMHG | TEMPERATURE: 98 F | WEIGHT: 231.63 LBS | DIASTOLIC BLOOD PRESSURE: 61 MMHG | HEIGHT: 63 IN | RESPIRATION RATE: 18 BRPM

## 2021-06-13 DIAGNOSIS — R06.02 SOB (SHORTNESS OF BREATH): Primary | ICD-10-CM

## 2021-06-13 PROCEDURE — 63600175 PHARM REV CODE 636 W HCPCS: Performed by: NURSE PRACTITIONER

## 2021-06-13 PROCEDURE — 99284 EMERGENCY DEPT VISIT MOD MDM: CPT | Mod: 25

## 2021-06-13 PROCEDURE — 96372 THER/PROPH/DIAG INJ SC/IM: CPT

## 2021-06-13 PROCEDURE — 27000221 HC OXYGEN, UP TO 24 HOURS

## 2021-06-13 PROCEDURE — 25000242 PHARM REV CODE 250 ALT 637 W/ HCPCS: Performed by: NURSE PRACTITIONER

## 2021-06-13 PROCEDURE — 94640 AIRWAY INHALATION TREATMENT: CPT

## 2021-06-13 PROCEDURE — 94761 N-INVAS EAR/PLS OXIMETRY MLT: CPT

## 2021-06-13 PROCEDURE — 99900035 HC TECH TIME PER 15 MIN (STAT)

## 2021-06-13 PROCEDURE — 99900031 HC PATIENT EDUCATION (STAT)

## 2021-06-13 RX ORDER — ALBUTEROL SULFATE 2.5 MG/.5ML
2.5 SOLUTION RESPIRATORY (INHALATION) EVERY 4 HOURS PRN
Qty: 30 EACH | Refills: 0 | OUTPATIENT
Start: 2021-06-13 | End: 2022-12-25

## 2021-06-13 RX ORDER — METHYLPREDNISOLONE SOD SUCC 125 MG
125 VIAL (EA) INJECTION
Status: COMPLETED | OUTPATIENT
Start: 2021-06-13 | End: 2021-06-13

## 2021-06-13 RX ORDER — IPRATROPIUM BROMIDE AND ALBUTEROL SULFATE 2.5; .5 MG/3ML; MG/3ML
3 SOLUTION RESPIRATORY (INHALATION)
Status: COMPLETED | OUTPATIENT
Start: 2021-06-13 | End: 2021-06-13

## 2021-06-13 RX ADMIN — METHYLPREDNISOLONE SODIUM SUCCINATE 125 MG: 125 INJECTION, POWDER, FOR SOLUTION INTRAMUSCULAR; INTRAVENOUS at 09:06

## 2021-06-13 RX ADMIN — IPRATROPIUM BROMIDE AND ALBUTEROL SULFATE 3 ML: .5; 2.5 SOLUTION RESPIRATORY (INHALATION) at 09:06

## 2021-06-26 ENCOUNTER — HOSPITAL ENCOUNTER (EMERGENCY)
Facility: HOSPITAL | Age: 61
Discharge: HOME | End: 2021-06-26
Attending: EMERGENCY MEDICINE
Payer: MEDICARE

## 2021-06-26 VITALS
OXYGEN SATURATION: 99 % | RESPIRATION RATE: 18 BRPM | BODY MASS INDEX: 41.11 KG/M2 | HEART RATE: 92 BPM | TEMPERATURE: 98 F | WEIGHT: 232 LBS | HEIGHT: 63 IN | DIASTOLIC BLOOD PRESSURE: 89 MMHG | SYSTOLIC BLOOD PRESSURE: 125 MMHG

## 2021-06-26 DIAGNOSIS — S39.91XA GROIN INJURY, INITIAL ENCOUNTER: Primary | ICD-10-CM

## 2021-06-26 PROCEDURE — 25000003 PHARM REV CODE 250: Performed by: CLINICAL NURSE SPECIALIST

## 2021-06-26 PROCEDURE — 99284 EMERGENCY DEPT VISIT MOD MDM: CPT | Mod: 25

## 2021-06-26 PROCEDURE — 96372 THER/PROPH/DIAG INJ SC/IM: CPT

## 2021-06-26 PROCEDURE — 63600175 PHARM REV CODE 636 W HCPCS: Performed by: CLINICAL NURSE SPECIALIST

## 2021-06-26 RX ORDER — KETOROLAC TROMETHAMINE 30 MG/ML
30 INJECTION, SOLUTION INTRAMUSCULAR; INTRAVENOUS
Status: COMPLETED | OUTPATIENT
Start: 2021-06-26 | End: 2021-06-26

## 2021-06-26 RX ORDER — METHOCARBAMOL 500 MG/1
500 TABLET, FILM COATED ORAL 4 TIMES DAILY
Qty: 40 TABLET | Refills: 0 | Status: SHIPPED | OUTPATIENT
Start: 2021-06-26 | End: 2021-07-06

## 2021-06-26 RX ORDER — CYCLOBENZAPRINE HCL 10 MG
10 TABLET ORAL
Status: COMPLETED | OUTPATIENT
Start: 2021-06-26 | End: 2021-06-26

## 2021-06-26 RX ORDER — DEXAMETHASONE SODIUM PHOSPHATE 4 MG/ML
4 INJECTION, SOLUTION INTRA-ARTICULAR; INTRALESIONAL; INTRAMUSCULAR; INTRAVENOUS; SOFT TISSUE
Status: COMPLETED | OUTPATIENT
Start: 2021-06-26 | End: 2021-06-26

## 2021-06-26 RX ADMIN — DEXAMETHASONE SODIUM PHOSPHATE 4 MG: 4 INJECTION, SOLUTION INTRA-ARTICULAR; INTRALESIONAL; INTRAMUSCULAR; INTRAVENOUS; SOFT TISSUE at 03:06

## 2021-06-26 RX ADMIN — KETOROLAC TROMETHAMINE 30 MG: 30 INJECTION, SOLUTION INTRAMUSCULAR at 03:06

## 2021-06-26 RX ADMIN — CYCLOBENZAPRINE HYDROCHLORIDE 10 MG: 10 TABLET, FILM COATED ORAL at 03:06

## 2021-11-21 ENCOUNTER — HOSPITAL ENCOUNTER (EMERGENCY)
Facility: HOSPITAL | Age: 61
Discharge: HOME | End: 2021-11-21
Attending: EMERGENCY MEDICINE
Payer: MEDICARE

## 2021-11-21 VITALS
WEIGHT: 235 LBS | HEIGHT: 63 IN | TEMPERATURE: 98 F | RESPIRATION RATE: 16 BRPM | SYSTOLIC BLOOD PRESSURE: 157 MMHG | OXYGEN SATURATION: 96 % | BODY MASS INDEX: 41.64 KG/M2 | DIASTOLIC BLOOD PRESSURE: 74 MMHG | HEART RATE: 85 BPM

## 2021-11-21 DIAGNOSIS — B00.52 HSV (HERPES SIMPLEX VIRUS) DENDRITIC KERATITIS: Primary | ICD-10-CM

## 2021-11-21 PROCEDURE — 99284 EMERGENCY DEPT VISIT MOD MDM: CPT

## 2021-11-21 PROCEDURE — 25000003 PHARM REV CODE 250: Performed by: EMERGENCY MEDICINE

## 2021-11-21 RX ORDER — ACETAMINOPHEN 500 MG
1000 TABLET ORAL EVERY 6 HOURS PRN
Qty: 30 TABLET | Refills: 0 | Status: SHIPPED | OUTPATIENT
Start: 2021-11-21

## 2021-11-21 RX ORDER — TETRACAINE HYDROCHLORIDE 5 MG/ML
2 SOLUTION OPHTHALMIC
Status: COMPLETED | OUTPATIENT
Start: 2021-11-21 | End: 2021-11-21

## 2021-11-21 RX ORDER — NAPROXEN 500 MG/1
500 TABLET ORAL EVERY 12 HOURS PRN
Qty: 20 TABLET | Refills: 0 | OUTPATIENT
Start: 2021-11-21 | End: 2021-12-18

## 2021-11-21 RX ADMIN — TETRACAINE HYDROCHLORIDE 2 DROP: 5 SOLUTION OPHTHALMIC at 09:11

## 2021-11-21 RX ADMIN — FLUORESCEIN SODIUM 1 EACH: 1 STRIP OPHTHALMIC at 09:11

## 2021-12-18 ENCOUNTER — HOSPITAL ENCOUNTER (EMERGENCY)
Facility: HOSPITAL | Age: 61
Discharge: HOME | End: 2021-12-18
Attending: STUDENT IN AN ORGANIZED HEALTH CARE EDUCATION/TRAINING PROGRAM
Payer: MEDICARE

## 2021-12-18 VITALS
OXYGEN SATURATION: 96 % | BODY MASS INDEX: 40.21 KG/M2 | HEART RATE: 93 BPM | RESPIRATION RATE: 18 BRPM | WEIGHT: 227 LBS | SYSTOLIC BLOOD PRESSURE: 150 MMHG | DIASTOLIC BLOOD PRESSURE: 66 MMHG | TEMPERATURE: 98 F

## 2021-12-18 DIAGNOSIS — M79.674 PAIN OF TOE OF RIGHT FOOT: Primary | ICD-10-CM

## 2021-12-18 PROCEDURE — 25000003 PHARM REV CODE 250: Performed by: STUDENT IN AN ORGANIZED HEALTH CARE EDUCATION/TRAINING PROGRAM

## 2021-12-18 PROCEDURE — 99283 EMERGENCY DEPT VISIT LOW MDM: CPT | Mod: 25

## 2021-12-18 RX ORDER — IBUPROFEN 600 MG/1
600 TABLET ORAL
Status: COMPLETED | OUTPATIENT
Start: 2021-12-18 | End: 2021-12-18

## 2021-12-18 RX ORDER — NAPROXEN 500 MG/1
500 TABLET ORAL EVERY 12 HOURS PRN
Qty: 20 TABLET | Refills: 0 | OUTPATIENT
Start: 2021-12-18 | End: 2022-12-25

## 2021-12-18 RX ADMIN — IBUPROFEN 600 MG: 600 TABLET ORAL at 05:12

## 2022-02-21 ENCOUNTER — HOSPITAL ENCOUNTER (EMERGENCY)
Facility: HOSPITAL | Age: 62
Discharge: HOME | End: 2022-02-21
Attending: EMERGENCY MEDICINE
Payer: MEDICARE

## 2022-02-21 VITALS
RESPIRATION RATE: 18 BRPM | TEMPERATURE: 98 F | SYSTOLIC BLOOD PRESSURE: 104 MMHG | DIASTOLIC BLOOD PRESSURE: 72 MMHG | BODY MASS INDEX: 40.75 KG/M2 | HEIGHT: 63 IN | HEART RATE: 84 BPM | WEIGHT: 230 LBS | OXYGEN SATURATION: 100 %

## 2022-02-21 DIAGNOSIS — N39.0 URINARY TRACT INFECTION WITHOUT HEMATURIA, SITE UNSPECIFIED: Primary | ICD-10-CM

## 2022-02-21 LAB
ALBUMIN SERPL BCP-MCNC: 3 G/DL (ref 3.5–5.2)
ALP SERPL-CCNC: 112 U/L (ref 55–135)
ALT SERPL W/O P-5'-P-CCNC: 25 U/L (ref 10–44)
ANION GAP SERPL CALC-SCNC: 1 MMOL/L (ref 8–16)
AST SERPL-CCNC: 13 U/L (ref 10–40)
BACTERIA #/AREA URNS HPF: ABNORMAL /HPF
BASOPHILS # BLD AUTO: 0.05 K/UL (ref 0–0.2)
BASOPHILS NFR BLD: 0.5 % (ref 0–1.9)
BILIRUB SERPL-MCNC: 0.3 MG/DL (ref 0.1–1)
BILIRUB UR QL STRIP: NEGATIVE
BUN SERPL-MCNC: 10 MG/DL (ref 8–23)
CALCIUM SERPL-MCNC: 8.9 MG/DL (ref 8.7–10.5)
CHLORIDE SERPL-SCNC: 111 MMOL/L (ref 95–110)
CLARITY UR: ABNORMAL
CO2 SERPL-SCNC: 32 MMOL/L (ref 23–29)
COLOR UR: YELLOW
CREAT SERPL-MCNC: 1.2 MG/DL (ref 0.5–1.4)
DIFFERENTIAL METHOD: ABNORMAL
EOSINOPHIL # BLD AUTO: 0.3 K/UL (ref 0–0.5)
EOSINOPHIL NFR BLD: 2.7 % (ref 0–8)
ERYTHROCYTE [DISTWIDTH] IN BLOOD BY AUTOMATED COUNT: 20 % (ref 11.5–14.5)
EST. GFR  (AFRICAN AMERICAN): 55.6 ML/MIN/1.73 M^2
EST. GFR  (NON AFRICAN AMERICAN): 48.2 ML/MIN/1.73 M^2
GLUCOSE SERPL-MCNC: 116 MG/DL (ref 70–110)
GLUCOSE UR QL STRIP: NEGATIVE
HCT VFR BLD AUTO: 38.9 % (ref 37–48.5)
HGB BLD-MCNC: 13 G/DL (ref 12–16)
HGB UR QL STRIP: NEGATIVE
HYALINE CASTS #/AREA URNS LPF: 12 /LPF
IMM GRANULOCYTES # BLD AUTO: 0.03 K/UL (ref 0–0.04)
IMM GRANULOCYTES NFR BLD AUTO: 0.3 % (ref 0–0.5)
KETONES UR QL STRIP: ABNORMAL
LEUKOCYTE ESTERASE UR QL STRIP: ABNORMAL
LYMPHOCYTES # BLD AUTO: 2.9 K/UL (ref 1–4.8)
LYMPHOCYTES NFR BLD: 26.7 % (ref 18–48)
MCH RBC QN AUTO: 29.2 PG (ref 27–31)
MCHC RBC AUTO-ENTMCNC: 33.4 G/DL (ref 32–36)
MCV RBC AUTO: 87 FL (ref 82–98)
MICROSCOPIC COMMENT: ABNORMAL
MONOCYTES # BLD AUTO: 0.9 K/UL (ref 0.3–1)
MONOCYTES NFR BLD: 8.8 % (ref 4–15)
NEUTROPHILS # BLD AUTO: 6.5 K/UL (ref 1.8–7.7)
NEUTROPHILS NFR BLD: 61 % (ref 38–73)
NITRITE UR QL STRIP: POSITIVE
NRBC BLD-RTO: 0 /100 WBC
PH UR STRIP: 6 [PH] (ref 5–8)
PLATELET # BLD AUTO: 280 K/UL (ref 150–450)
PMV BLD AUTO: 10.6 FL (ref 9.2–12.9)
POTASSIUM SERPL-SCNC: 4.3 MMOL/L (ref 3.5–5.1)
PROT SERPL-MCNC: 6.3 G/DL (ref 6–8.4)
PROT UR QL STRIP: NEGATIVE
RBC # BLD AUTO: 4.45 M/UL (ref 4–5.4)
RBC #/AREA URNS HPF: ABNORMAL /HPF (ref 0–4)
SODIUM SERPL-SCNC: 144 MMOL/L (ref 136–145)
SP GR UR STRIP: 1.02 (ref 1–1.03)
SQUAMOUS #/AREA URNS HPF: 10 /HPF
URN SPEC COLLECT METH UR: ABNORMAL
UROBILINOGEN UR STRIP-ACNC: 1 EU/DL
WBC # BLD AUTO: 10.67 K/UL (ref 3.9–12.7)
WBC #/AREA URNS HPF: 7 /HPF (ref 0–5)

## 2022-02-21 PROCEDURE — 81000 URINALYSIS NONAUTO W/SCOPE: CPT | Performed by: EMERGENCY MEDICINE

## 2022-02-21 PROCEDURE — 85025 COMPLETE CBC W/AUTO DIFF WBC: CPT | Performed by: EMERGENCY MEDICINE

## 2022-02-21 PROCEDURE — 80053 COMPREHEN METABOLIC PANEL: CPT | Performed by: EMERGENCY MEDICINE

## 2022-02-21 PROCEDURE — 36415 COLL VENOUS BLD VENIPUNCTURE: CPT | Performed by: EMERGENCY MEDICINE

## 2022-02-21 PROCEDURE — 99284 EMERGENCY DEPT VISIT MOD MDM: CPT | Mod: 25

## 2022-02-21 PROCEDURE — 96372 THER/PROPH/DIAG INJ SC/IM: CPT

## 2022-02-21 PROCEDURE — 63600175 PHARM REV CODE 636 W HCPCS: Performed by: EMERGENCY MEDICINE

## 2022-02-21 RX ORDER — KETOROLAC TROMETHAMINE 30 MG/ML
30 INJECTION, SOLUTION INTRAMUSCULAR; INTRAVENOUS
Status: COMPLETED | OUTPATIENT
Start: 2022-02-21 | End: 2022-02-21

## 2022-02-21 RX ORDER — CEPHALEXIN 500 MG/1
500 CAPSULE ORAL 4 TIMES DAILY
Qty: 20 CAPSULE | Refills: 0 | Status: SHIPPED | OUTPATIENT
Start: 2022-02-21 | End: 2022-02-26

## 2022-02-21 RX ADMIN — KETOROLAC TROMETHAMINE 30 MG: 30 INJECTION, SOLUTION INTRAMUSCULAR at 03:02

## 2022-02-21 NOTE — ED PROVIDER NOTES
Encounter Date: 2/21/2022       History     Chief Complaint   Patient presents with    Abdominal Pain     Right lower quadrant pain that started yesterday no urine problems patient reports the pain as burning.      61 yo female presents with burning suprapubic pain, R>L onset yesterday. Constant. No dysuria. Endorses frequency. No hematuria. No fever. No N/V/D/C. Gradual onset. Similar to previous.         Review of patient's allergies indicates:  No Known Allergies  Past Medical History:   Diagnosis Date    Anxiety     Breast cancer     Cancer     Breast    COPD (chronic obstructive pulmonary disease)     Depression     GERD (gastroesophageal reflux disease)     Hypertension     Insomnia     Thyroid disease      Past Surgical History:   Procedure Laterality Date    BLADDER SUSPENSION      BREAST LUMPECTOMY      Right breast    CHOLECYSTECTOMY      HYSTERECTOMY      KNEE ARTHROSCOPY      Right knee     No family history on file.  Social History     Tobacco Use    Smoking status: Current Every Day Smoker     Packs/day: 0.50    Smokeless tobacco: Never Used    Tobacco comment: quit yesterday 6/12/21   Substance Use Topics    Alcohol use: Not Currently    Drug use: Never     Review of Systems   Constitutional: Negative.    Respiratory: Negative.    Cardiovascular: Negative.    Gastrointestinal: Negative.    All other systems reviewed and are negative.      Physical Exam     Initial Vitals [02/21/22 1520]   BP Pulse Resp Temp SpO2   (!) 145/70 84 18 98 °F (36.7 °C) 100 %      MAP       --         Physical Exam    Nursing note and vitals reviewed.  Constitutional: She appears well-developed and well-nourished. She is not diaphoretic. No distress.   HENT:   Head: Normocephalic and atraumatic.   Eyes: EOM are normal. Pupils are equal, round, and reactive to light.   Neck: Neck supple.   Normal range of motion.  Cardiovascular: Normal rate, regular rhythm and intact distal pulses.   Pulmonary/Chest:  Breath sounds normal. No respiratory distress. She has no wheezes. She has no rales.   Abdominal: Abdomen is soft. Bowel sounds are normal. She exhibits no distension. There is no abdominal tenderness. There is no rebound and no guarding.   Musculoskeletal:         General: No tenderness or edema. Normal range of motion.      Cervical back: Normal range of motion and neck supple.     Neurological: She is alert and oriented to person, place, and time. She has normal strength and normal reflexes.   Skin: Skin is warm and dry. Capillary refill takes less than 2 seconds.         ED Course   Procedures  Labs Reviewed   CBC W/ AUTO DIFFERENTIAL - Abnormal; Notable for the following components:       Result Value    RDW 20.0 (*)     All other components within normal limits   COMPREHENSIVE METABOLIC PANEL - Abnormal; Notable for the following components:    Chloride 111 (*)     CO2 32 (*)     Glucose 116 (*)     Albumin 3.0 (*)     Anion Gap 1 (*)     eGFR if  55.6 (*)     eGFR if non  48.2 (*)     All other components within normal limits   URINALYSIS, REFLEX TO URINE CULTURE - Abnormal; Notable for the following components:    Appearance, UA Cloudy (*)     Ketones, UA Trace (*)     Nitrite, UA Positive (*)     Leukocytes, UA Trace (*)     All other components within normal limits    Narrative:     Preferred Collection Type->Urine, Clean Catch  Specimen Source->Urine   URINALYSIS MICROSCOPIC - Abnormal; Notable for the following components:    RBC, UA Review (*)     WBC, UA 7 (*)     Bacteria Many (*)     Hyaline Casts, UA 12 (*)     All other components within normal limits    Narrative:     Preferred Collection Type->Urine, Clean Catch  Specimen Source->Urine          Imaging Results    None          Medications   ketorolac injection 30 mg (30 mg Intramuscular Given 2/21/22 6897)     Medical Decision Making:   Clinical Tests:   Lab Tests: Ordered and Reviewed                      Clinical  Impression:   Final diagnoses:  [N39.0] Urinary tract infection without hematuria, site unspecified (Primary)          ED Disposition Condition    Discharge Stable        ED Prescriptions     Medication Sig Dispense Start Date End Date Auth. Provider    cephALEXin (KEFLEX) 500 MG capsule Take 1 capsule (500 mg total) by mouth 4 (four) times daily. for 5 days 20 capsule 2/21/2022 2/26/2022 Philip Lopez MD        Follow-up Information     Follow up With Specialties Details Why Contact Info    Burke Merchant MD Internal Medicine Schedule an appointment as soon as possible for a visit   [address] [place] [id number]  [phone]             Philip Lopez MD  02/21/22 7564

## 2022-02-23 ENCOUNTER — PATIENT OUTREACH (OUTPATIENT)
Dept: EMERGENCY MEDICINE | Facility: HOSPITAL | Age: 62
End: 2022-02-23
Payer: MEDICARE

## 2022-03-03 NOTE — PROGRESS NOTES
Attempted to contact patient on 3 separate occasions, patient is unable to reach. ED Navigator to close encounter at this time.    Shasta Gutierres  ED Navigator- [place]

## 2022-03-19 ENCOUNTER — HOSPITAL ENCOUNTER (EMERGENCY)
Facility: HOSPITAL | Age: 62
Discharge: HOME | End: 2022-03-19
Attending: STUDENT IN AN ORGANIZED HEALTH CARE EDUCATION/TRAINING PROGRAM
Payer: MEDICARE

## 2022-03-19 VITALS
SYSTOLIC BLOOD PRESSURE: 128 MMHG | HEIGHT: 63 IN | TEMPERATURE: 98 F | RESPIRATION RATE: 20 BRPM | DIASTOLIC BLOOD PRESSURE: 65 MMHG | WEIGHT: 229 LBS | OXYGEN SATURATION: 98 % | BODY MASS INDEX: 40.57 KG/M2 | HEART RATE: 74 BPM

## 2022-03-19 DIAGNOSIS — R10.9 ABDOMINAL PAIN, UNSPECIFIED ABDOMINAL LOCATION: Primary | ICD-10-CM

## 2022-03-19 LAB
ALBUMIN SERPL BCP-MCNC: 3.2 G/DL (ref 3.5–5.2)
ALP SERPL-CCNC: 118 U/L (ref 55–135)
ALT SERPL W/O P-5'-P-CCNC: 28 U/L (ref 10–44)
ANION GAP SERPL CALC-SCNC: 1 MMOL/L (ref 8–16)
AST SERPL-CCNC: 15 U/L (ref 10–40)
BASOPHILS # BLD AUTO: 0.04 K/UL (ref 0–0.2)
BASOPHILS NFR BLD: 0.5 % (ref 0–1.9)
BILIRUB SERPL-MCNC: 0.3 MG/DL (ref 0.1–1)
BILIRUB UR QL STRIP: NEGATIVE
BUN SERPL-MCNC: 8 MG/DL (ref 8–23)
CALCIUM SERPL-MCNC: 8.9 MG/DL (ref 8.7–10.5)
CHLORIDE SERPL-SCNC: 110 MMOL/L (ref 95–110)
CLARITY UR: CLEAR
CO2 SERPL-SCNC: 30 MMOL/L (ref 23–29)
COLOR UR: YELLOW
CREAT SERPL-MCNC: 0.9 MG/DL (ref 0.5–1.4)
DIFFERENTIAL METHOD: ABNORMAL
EOSINOPHIL # BLD AUTO: 0.2 K/UL (ref 0–0.5)
EOSINOPHIL NFR BLD: 3.1 % (ref 0–8)
ERYTHROCYTE [DISTWIDTH] IN BLOOD BY AUTOMATED COUNT: 16.8 % (ref 11.5–14.5)
EST. GFR  (AFRICAN AMERICAN): >60 ML/MIN/1.73 M^2
EST. GFR  (NON AFRICAN AMERICAN): >60 ML/MIN/1.73 M^2
GLUCOSE SERPL-MCNC: 109 MG/DL (ref 70–110)
GLUCOSE UR QL STRIP: NEGATIVE
HCT VFR BLD AUTO: 38.4 % (ref 37–48.5)
HGB BLD-MCNC: 12.4 G/DL (ref 12–16)
HGB UR QL STRIP: NEGATIVE
IMM GRANULOCYTES # BLD AUTO: 0.02 K/UL (ref 0–0.04)
IMM GRANULOCYTES NFR BLD AUTO: 0.3 % (ref 0–0.5)
KETONES UR QL STRIP: NEGATIVE
LACTATE SERPL-SCNC: 1.3 MMOL/L (ref 0.5–2.2)
LEUKOCYTE ESTERASE UR QL STRIP: NEGATIVE
LYMPHOCYTES # BLD AUTO: 2.4 K/UL (ref 1–4.8)
LYMPHOCYTES NFR BLD: 30.8 % (ref 18–48)
MCH RBC QN AUTO: 27.7 PG (ref 27–31)
MCHC RBC AUTO-ENTMCNC: 32.3 G/DL (ref 32–36)
MCV RBC AUTO: 86 FL (ref 82–98)
MONOCYTES # BLD AUTO: 0.7 K/UL (ref 0.3–1)
MONOCYTES NFR BLD: 8.7 % (ref 4–15)
NEUTROPHILS # BLD AUTO: 4.4 K/UL (ref 1.8–7.7)
NEUTROPHILS NFR BLD: 56.6 % (ref 38–73)
NITRITE UR QL STRIP: NEGATIVE
NRBC BLD-RTO: 0 /100 WBC
PH UR STRIP: 6 [PH] (ref 5–8)
PLATELET # BLD AUTO: 277 K/UL (ref 150–450)
PMV BLD AUTO: 11.4 FL (ref 9.2–12.9)
POTASSIUM SERPL-SCNC: 3.7 MMOL/L (ref 3.5–5.1)
PROT SERPL-MCNC: 6.7 G/DL (ref 6–8.4)
PROT UR QL STRIP: NEGATIVE
RBC # BLD AUTO: 4.47 M/UL (ref 4–5.4)
SODIUM SERPL-SCNC: 141 MMOL/L (ref 136–145)
SP GR UR STRIP: 1.01 (ref 1–1.03)
URN SPEC COLLECT METH UR: NORMAL
UROBILINOGEN UR STRIP-ACNC: NEGATIVE EU/DL
WBC # BLD AUTO: 7.73 K/UL (ref 3.9–12.7)

## 2022-03-19 PROCEDURE — 83605 ASSAY OF LACTIC ACID: CPT | Performed by: STUDENT IN AN ORGANIZED HEALTH CARE EDUCATION/TRAINING PROGRAM

## 2022-03-19 PROCEDURE — 96374 THER/PROPH/DIAG INJ IV PUSH: CPT

## 2022-03-19 PROCEDURE — 99284 EMERGENCY DEPT VISIT MOD MDM: CPT | Mod: 25

## 2022-03-19 PROCEDURE — 36415 COLL VENOUS BLD VENIPUNCTURE: CPT | Performed by: STUDENT IN AN ORGANIZED HEALTH CARE EDUCATION/TRAINING PROGRAM

## 2022-03-19 PROCEDURE — 63600175 PHARM REV CODE 636 W HCPCS: Performed by: STUDENT IN AN ORGANIZED HEALTH CARE EDUCATION/TRAINING PROGRAM

## 2022-03-19 PROCEDURE — 81003 URINALYSIS AUTO W/O SCOPE: CPT | Performed by: STUDENT IN AN ORGANIZED HEALTH CARE EDUCATION/TRAINING PROGRAM

## 2022-03-19 PROCEDURE — 96375 TX/PRO/DX INJ NEW DRUG ADDON: CPT

## 2022-03-19 PROCEDURE — 80053 COMPREHEN METABOLIC PANEL: CPT | Performed by: STUDENT IN AN ORGANIZED HEALTH CARE EDUCATION/TRAINING PROGRAM

## 2022-03-19 PROCEDURE — 85025 COMPLETE CBC W/AUTO DIFF WBC: CPT | Performed by: STUDENT IN AN ORGANIZED HEALTH CARE EDUCATION/TRAINING PROGRAM

## 2022-03-19 RX ORDER — KETOROLAC TROMETHAMINE 10 MG/1
10 TABLET, FILM COATED ORAL EVERY 6 HOURS
Qty: 20 TABLET | Refills: 0 | Status: SHIPPED | OUTPATIENT
Start: 2022-03-19 | End: 2022-03-24

## 2022-03-19 RX ORDER — ONDANSETRON 2 MG/ML
4 INJECTION INTRAMUSCULAR; INTRAVENOUS
Status: COMPLETED | OUTPATIENT
Start: 2022-03-19 | End: 2022-03-19

## 2022-03-19 RX ORDER — KETOROLAC TROMETHAMINE 30 MG/ML
30 INJECTION, SOLUTION INTRAMUSCULAR; INTRAVENOUS
Status: COMPLETED | OUTPATIENT
Start: 2022-03-19 | End: 2022-03-19

## 2022-03-19 RX ADMIN — ONDANSETRON HYDROCHLORIDE 4 MG: 2 SOLUTION INTRAMUSCULAR; INTRAVENOUS at 11:03

## 2022-03-19 RX ADMIN — KETOROLAC TROMETHAMINE 30 MG: 30 INJECTION, SOLUTION INTRAMUSCULAR at 11:03

## 2022-03-19 NOTE — FIRST PROVIDER EVALUATION
Medical screening exam completed.  I have conducted a focused provider triage encounter, findings are as follows:    Brief history of present illness: Patient with complaints of right lower abdomen pain. Patient has had pain for about 3 months but it is getting worse. Patient states she has abdominal hernia . Denies fever, vomiting. Associated nausea. Patient was seen here on 2/21 with similar problem and treated for UTI  There were no vitals filed for this visit.    Pertinent physical exam:  Abdomen soft non distended. .AAOx3  Brief workup plan:     Preliminary workup initiated; this workup will be continued and followed by the physician or advanced practice provider that is assigned to the patient when roomed.

## 2022-03-19 NOTE — Clinical Note
"Vero SARGENT"Alyssia Allen was seen and treated in our emergency department on 3/19/2022.  She may return to work on 03/22/2022.       If you have any questions or concerns, please don't hesitate to call.      Akash Dailey MD"

## 2022-03-19 NOTE — ED PROVIDER NOTES
Encounter Date: 3/19/2022       History     Chief Complaint   Patient presents with    Abdominal Pain     Pt stated that she is experiencing RLQ/pelvic pain for the past year. Last few days has gotten worse and described as burning pain. Denied dysuria. Hx hernia.      62-year-old female with history of hiatal hernia, hypertension presents with right lower quadrant pelvic pain for over a year.  Patient says that the pain has progressively gotten worse and describes as burning in nature.  Patient has tried over-the-counter medication with some relief.  Patient is aware that she has a hernia and has follow-up on Monday.  Denies any fever, vomiting, diarrhea, dysuria, flank pain        Review of patient's allergies indicates:  No Known Allergies  Past Medical History:   Diagnosis Date    Anxiety     Breast cancer     Cancer     Breast    COPD (chronic obstructive pulmonary disease)     Depression     GERD (gastroesophageal reflux disease)     Hypertension     Insomnia     Thyroid disease      Past Surgical History:   Procedure Laterality Date    BLADDER SUSPENSION      BREAST LUMPECTOMY      Right breast    CHOLECYSTECTOMY      HYSTERECTOMY      KNEE ARTHROSCOPY      Right knee     No family history on file.  Social History     Tobacco Use    Smoking status: Current Every Day Smoker     Packs/day: 0.50    Smokeless tobacco: Never Used    Tobacco comment: quit yesterday 6/12/21   Substance Use Topics    Alcohol use: Not Currently    Drug use: Never     Review of Systems   Constitutional: Negative.    HENT: Negative.    Respiratory: Negative.    Cardiovascular: Negative.    Gastrointestinal: Positive for abdominal pain.   Genitourinary: Negative.    Musculoskeletal: Negative.    Skin: Negative.    Neurological: Negative.    Psychiatric/Behavioral: Negative.    All other systems reviewed and are negative.      Physical Exam     Initial Vitals [03/19/22 1039]   BP Pulse Resp Temp SpO2   128/65 74 20  97.9 °F (36.6 °C) 98 %      MAP       --         Physical Exam    Nursing note and vitals reviewed.  Constitutional: Vital signs are normal. She appears well-developed and well-nourished.   HENT:   Head: Normocephalic and atraumatic.   Eyes: Conjunctivae and lids are normal.   Neck: Trachea normal. Neck supple.   Cardiovascular: Normal rate, regular rhythm, normal heart sounds, intact distal pulses and normal pulses.   No murmur heard.  Pulmonary/Chest: Breath sounds normal. No respiratory distress.   Abdominal: Abdomen is soft. Bowel sounds are normal. She exhibits no distension. There is abdominal tenderness. There is no rebound.   Musculoskeletal:         General: Normal range of motion.      Cervical back: Neck supple.     Neurological: She is alert and oriented to person, place, and time. She has normal strength. No cranial nerve deficit or sensory deficit.   Skin: Skin is warm. Capillary refill takes less than 2 seconds.   Psychiatric: She has a normal mood and affect. Her speech is normal. Thought content normal.         ED Course   Procedures  Labs Reviewed   CBC W/ AUTO DIFFERENTIAL - Abnormal; Notable for the following components:       Result Value    RDW 16.8 (*)     All other components within normal limits   COMPREHENSIVE METABOLIC PANEL - Abnormal; Notable for the following components:    CO2 30 (*)     Albumin 3.2 (*)     Anion Gap 1 (*)     All other components within normal limits   URINALYSIS, REFLEX TO URINE CULTURE    Narrative:     Preferred Collection Type->Urine, Clean Catch  Specimen Source->Urine   LACTIC ACID, PLASMA          Imaging Results    None          Medications   ketorolac injection 30 mg (30 mg Intravenous Given 3/19/22 1112)   ondansetron injection 4 mg (4 mg Intravenous Given 3/19/22 1112)     Medical Decision Making:   Initial Assessment:   62-year-old female with history of hiatal  hernia, hypertension presents with right lower quadrant pelvic pain for over a year.  Afebrile  vitals stable.  Physical noted.  Will get labs to rule out possible strangulation, UTI.  Treat pain.  Re-evaluate abdomen none peritonitic  Clinical Tests:   Lab Tests: Ordered and Reviewed  The following lab test(s) were unremarkable: CBC, CMP and Urinalysis  Radiological Study: Ordered and Reviewed             ED Course as of 03/19/22 1203   Sat Mar 19, 2022   1202 Abdomen remained non peritonitic.  Advised patient that she should follow up with surgery on Monday.  Strict return precautions given.  Patient tolerated p.o.. [HD]      ED Course User Index  [HD] Akash Dailey MD             Clinical Impression:   Final diagnoses:  [R10.9] Abdominal pain, unspecified abdominal location (Primary)          ED Disposition Condition    Discharge Stable        ED Prescriptions     Medication Sig Dispense Start Date End Date Auth. Provider    ketorolac (TORADOL) 10 mg tablet Take 1 tablet (10 mg total) by mouth every 6 (six) hours. for 5 days 20 tablet 3/19/2022 3/24/2022 Akash Dailey MD        Follow-up Information     Follow up With Specialties Details Why Contact Info    Megan Burton MD General Surgery   [address]  [address]  [facility] [address]  [phone]             Akash Dailey MD  03/19/22 8753

## 2022-03-28 DIAGNOSIS — Z85.3 HX OF BREAST CANCER: ICD-10-CM

## 2022-03-28 DIAGNOSIS — Z12.31 ENCOUNTER FOR SCREENING MAMMOGRAM FOR MALIGNANT NEOPLASM OF BREAST: Primary | ICD-10-CM

## 2022-03-29 ENCOUNTER — HOSPITAL ENCOUNTER (OUTPATIENT)
Dept: RADIOLOGY | Facility: HOSPITAL | Age: 62
Discharge: HOME | End: 2022-03-29
Attending: SURGERY
Payer: MEDICARE

## 2022-03-29 VITALS — WEIGHT: 229 LBS | BODY MASS INDEX: 40.57 KG/M2 | HEIGHT: 63 IN

## 2022-03-29 DIAGNOSIS — Z85.3 HX OF BREAST CANCER: ICD-10-CM

## 2022-03-29 DIAGNOSIS — Z12.31 ENCOUNTER FOR SCREENING MAMMOGRAM FOR MALIGNANT NEOPLASM OF BREAST: ICD-10-CM

## 2022-03-29 PROCEDURE — 77067 SCR MAMMO BI INCL CAD: CPT | Mod: TC

## 2022-07-06 ENCOUNTER — HOSPITAL ENCOUNTER (OUTPATIENT)
Dept: RADIOLOGY | Facility: HOSPITAL | Age: 62
Discharge: HOME | End: 2022-07-06
Attending: INTERNAL MEDICINE
Payer: MEDICARE

## 2022-07-06 DIAGNOSIS — M54.6 THORACIC SPINE PAIN: Primary | ICD-10-CM

## 2022-07-06 DIAGNOSIS — M54.6 THORACIC SPINE PAIN: ICD-10-CM

## 2022-07-06 PROCEDURE — 72070 X-RAY EXAM THORAC SPINE 2VWS: CPT | Mod: TC

## 2022-08-02 DIAGNOSIS — R42 DIZZINESS: Primary | ICD-10-CM

## 2022-08-02 DIAGNOSIS — R51.9 HA (HEADACHE): ICD-10-CM

## 2022-12-16 ENCOUNTER — HOSPITAL ENCOUNTER (EMERGENCY)
Facility: HOSPITAL | Age: 62
Discharge: HOME | End: 2022-12-16
Attending: STUDENT IN AN ORGANIZED HEALTH CARE EDUCATION/TRAINING PROGRAM
Payer: MEDICARE

## 2022-12-16 VITALS
OXYGEN SATURATION: 98 % | WEIGHT: 230 LBS | BODY MASS INDEX: 40.74 KG/M2 | RESPIRATION RATE: 18 BRPM | DIASTOLIC BLOOD PRESSURE: 94 MMHG | HEART RATE: 93 BPM | SYSTOLIC BLOOD PRESSURE: 157 MMHG | TEMPERATURE: 98 F

## 2022-12-16 DIAGNOSIS — R51.9 NONINTRACTABLE HEADACHE, UNSPECIFIED CHRONICITY PATTERN, UNSPECIFIED HEADACHE TYPE: Primary | ICD-10-CM

## 2022-12-16 PROCEDURE — 63600175 PHARM REV CODE 636 W HCPCS: Performed by: CLINICAL NURSE SPECIALIST

## 2022-12-16 PROCEDURE — 99284 EMERGENCY DEPT VISIT MOD MDM: CPT

## 2022-12-16 PROCEDURE — 96372 THER/PROPH/DIAG INJ SC/IM: CPT | Performed by: CLINICAL NURSE SPECIALIST

## 2022-12-16 RX ORDER — IBUPROFEN 800 MG/1
800 TABLET ORAL EVERY 6 HOURS PRN
Qty: 20 TABLET | Refills: 0 | OUTPATIENT
Start: 2022-12-16 | End: 2023-06-13

## 2022-12-16 RX ORDER — PROCHLORPERAZINE EDISYLATE 5 MG/ML
10 INJECTION INTRAMUSCULAR; INTRAVENOUS ONCE
Status: COMPLETED | OUTPATIENT
Start: 2022-12-16 | End: 2022-12-16

## 2022-12-16 RX ORDER — DIPHENHYDRAMINE HYDROCHLORIDE 50 MG/ML
50 INJECTION INTRAMUSCULAR; INTRAVENOUS ONCE
Status: COMPLETED | OUTPATIENT
Start: 2022-12-16 | End: 2022-12-16

## 2022-12-16 RX ORDER — KETOROLAC TROMETHAMINE 30 MG/ML
30 INJECTION, SOLUTION INTRAMUSCULAR; INTRAVENOUS
Status: COMPLETED | OUTPATIENT
Start: 2022-12-16 | End: 2022-12-16

## 2022-12-16 RX ADMIN — PROCHLORPERAZINE EDISYLATE 10 MG: 5 INJECTION INTRAMUSCULAR; INTRAVENOUS at 06:12

## 2022-12-16 RX ADMIN — KETOROLAC TROMETHAMINE 30 MG: 30 INJECTION, SOLUTION INTRAMUSCULAR at 06:12

## 2022-12-16 RX ADMIN — DIPHENHYDRAMINE HYDROCHLORIDE 50 MG: 50 INJECTION, SOLUTION INTRAMUSCULAR; INTRAVENOUS at 06:12

## 2022-12-17 NOTE — ED PROVIDER NOTES
Encounter Date: 12/16/2022       History     Chief Complaint   Patient presents with    Headache     Patient reports headache, patient reports no head injury, ibuprofen taken 5 hours ago.     62-year-old female presents to the emergency room with frontal headache that started today.  Patient states she took ibuprofen approximately 5 hours ago with no improvement.  Denies any history of headaches.  Denies any blurred vision, dizziness, sensitivity light, nausea, vomiting.  Patient does have a history of depression anxiety and breast cancer    Review of patient's allergies indicates:  No Known Allergies  Past Medical History:   Diagnosis Date    Anxiety     Breast cancer 2010    Cancer     Breast    COPD (chronic obstructive pulmonary disease)     Depression     GERD (gastroesophageal reflux disease)     Hypertension     Insomnia     Thyroid disease      Past Surgical History:   Procedure Laterality Date    BLADDER SUSPENSION      BREAST LUMPECTOMY      Right breast    CHOLECYSTECTOMY      HYSTERECTOMY      KNEE ARTHROSCOPY      Right knee    OOPHORECTOMY       Family History   Problem Relation Age of Onset    No Known Problems Mother     No Known Problems Father     No Known Problems Sister     No Known Problems Daughter     No Known Problems Maternal Aunt     No Known Problems Maternal Uncle     No Known Problems Paternal Aunt     No Known Problems Paternal Uncle     No Known Problems Maternal Grandmother     No Known Problems Maternal Grandfather     No Known Problems Paternal Grandmother     No Known Problems Paternal Grandfather     Breast cancer Neg Hx     Ovarian cancer Neg Hx     BRCA 1/2 Neg Hx      Social History     Tobacco Use    Smoking status: Every Day     Packs/day: 0.50     Types: Cigarettes    Smokeless tobacco: Never    Tobacco comments:     quit yesterday 6/12/21   Substance Use Topics    Alcohol use: Not Currently    Drug use: Never     Review of Systems   Constitutional:  Negative for fever.    HENT:  Negative for sore throat.    Respiratory:  Negative for shortness of breath.    Cardiovascular:  Negative for chest pain.   Gastrointestinal:  Negative for nausea.   Genitourinary:  Negative for dysuria.   Musculoskeletal:  Negative for back pain.   Skin:  Negative for rash.   Neurological:  Positive for headaches. Negative for weakness.   Hematological:  Does not bruise/bleed easily.   All other systems reviewed and are negative.    Physical Exam     Initial Vitals [12/16/22 1837]   BP Pulse Resp Temp SpO2   (!) 157/94 93 18 98.2 °F (36.8 °C) 98 %      MAP       --         Physical Exam    Nursing note and vitals reviewed.  Constitutional: She appears well-developed and well-nourished.   HENT:   Head: Normocephalic and atraumatic.   Eyes: Pupils are equal, round, and reactive to light.   Neck:   Normal range of motion.  Cardiovascular:  Normal rate and regular rhythm.           Pulmonary/Chest: Breath sounds normal.   Abdominal: Abdomen is soft. Bowel sounds are normal.   Musculoskeletal:         General: Normal range of motion.      Cervical back: Normal range of motion.     Neurological: She is alert and oriented to person, place, and time.   Skin: Skin is warm and dry.   Psychiatric: She has a normal mood and affect.       ED Course   Procedures  Labs Reviewed - No data to display       Imaging Results    None          Medications   prochlorperazine injection Soln 10 mg (10 mg Intramuscular Given 12/16/22 1852)   ketorolac injection 30 mg (30 mg Intramuscular Given 12/16/22 1852)   diphenhydrAMINE injection 50 mg (50 mg Intramuscular Given 12/16/22 1852)     Medical Decision Making:   Differential Diagnosis:   Headache, migraine                        Clinical Impression:   Final diagnoses:  [R51.9] Nonintractable headache, unspecified chronicity pattern, unspecified headache type (Primary)        ED Disposition Condition    Discharge Stable          ED Prescriptions       Medication Sig Dispense Start  Date End Date Auth. Provider    ibuprofen (ADVIL,MOTRIN) 800 MG tablet Take 1 tablet (800 mg total) by mouth every 6 (six) hours as needed for Pain. 20 tablet 12/16/2022 -- Stefania Allen NP          Follow-up Information       Follow up With Specialties Details Why Contact Info    Burke Merchant MD Internal Medicine  As needed [address]  [facility] [address]  [phone]               Stefania Allen NP  12/16/22 3957

## 2022-12-25 ENCOUNTER — HOSPITAL ENCOUNTER (EMERGENCY)
Facility: HOSPITAL | Age: 62
Discharge: HOME | End: 2022-12-25
Attending: EMERGENCY MEDICINE
Payer: MEDICARE

## 2022-12-25 VITALS
DIASTOLIC BLOOD PRESSURE: 61 MMHG | TEMPERATURE: 98 F | RESPIRATION RATE: 16 BRPM | HEIGHT: 63 IN | HEART RATE: 70 BPM | OXYGEN SATURATION: 97 % | SYSTOLIC BLOOD PRESSURE: 137 MMHG | WEIGHT: 237 LBS | BODY MASS INDEX: 41.99 KG/M2

## 2022-12-25 DIAGNOSIS — R07.89 ATYPICAL CHEST PAIN: ICD-10-CM

## 2022-12-25 DIAGNOSIS — R11.0 NAUSEA: ICD-10-CM

## 2022-12-25 DIAGNOSIS — R07.9 CHEST PAIN: ICD-10-CM

## 2022-12-25 DIAGNOSIS — R06.02 SOB (SHORTNESS OF BREATH): ICD-10-CM

## 2022-12-25 DIAGNOSIS — K21.9 GASTROESOPHAGEAL REFLUX DISEASE, UNSPECIFIED WHETHER ESOPHAGITIS PRESENT: ICD-10-CM

## 2022-12-25 DIAGNOSIS — R05.9 COUGH, UNSPECIFIED TYPE: ICD-10-CM

## 2022-12-25 DIAGNOSIS — J06.9 UPPER RESPIRATORY TRACT INFECTION, UNSPECIFIED TYPE: Primary | ICD-10-CM

## 2022-12-25 LAB
ALBUMIN SERPL BCP-MCNC: 3 G/DL (ref 3.5–5.2)
ALP SERPL-CCNC: 95 U/L (ref 55–135)
ALT SERPL W/O P-5'-P-CCNC: 21 U/L (ref 10–44)
ANION GAP SERPL CALC-SCNC: 3 MMOL/L (ref 8–16)
AST SERPL-CCNC: 12 U/L (ref 10–40)
BASOPHILS # BLD AUTO: 0.05 K/UL (ref 0–0.2)
BASOPHILS NFR BLD: 0.5 % (ref 0–1.9)
BILIRUB SERPL-MCNC: 0.2 MG/DL (ref 0.1–1)
BUN SERPL-MCNC: 12 MG/DL (ref 8–23)
CALCIUM SERPL-MCNC: 8.5 MG/DL (ref 8.7–10.5)
CHLORIDE SERPL-SCNC: 109 MMOL/L (ref 95–110)
CO2 SERPL-SCNC: 28 MMOL/L (ref 23–29)
CREAT SERPL-MCNC: 1.3 MG/DL (ref 0.5–1.4)
CTP QC/QA: YES
CTP QC/QA: YES
DIFFERENTIAL METHOD: ABNORMAL
EOSINOPHIL # BLD AUTO: 0.4 K/UL (ref 0–0.5)
EOSINOPHIL NFR BLD: 3.6 % (ref 0–8)
ERYTHROCYTE [DISTWIDTH] IN BLOOD BY AUTOMATED COUNT: 21.8 % (ref 11.5–14.5)
EST. GFR  (NO RACE VARIABLE): 46.2 ML/MIN/1.73 M^2
GLUCOSE SERPL-MCNC: 140 MG/DL (ref 70–110)
HCT VFR BLD AUTO: 33.1 % (ref 37–48.5)
HGB BLD-MCNC: 10.5 G/DL (ref 12–16)
IMM GRANULOCYTES # BLD AUTO: 0.03 K/UL (ref 0–0.04)
IMM GRANULOCYTES NFR BLD AUTO: 0.3 % (ref 0–0.5)
INR PPP: 1 (ref 0.8–1.2)
LYMPHOCYTES # BLD AUTO: 2.8 K/UL (ref 1–4.8)
LYMPHOCYTES NFR BLD: 28.9 % (ref 18–48)
MAGNESIUM SERPL-MCNC: 1.9 MG/DL (ref 1.6–2.6)
MCH RBC QN AUTO: 23.9 PG (ref 27–31)
MCHC RBC AUTO-ENTMCNC: 31.7 G/DL (ref 32–36)
MCV RBC AUTO: 75 FL (ref 82–98)
MONOCYTES # BLD AUTO: 1.1 K/UL (ref 0.3–1)
MONOCYTES NFR BLD: 11 % (ref 4–15)
NEUTROPHILS # BLD AUTO: 5.4 K/UL (ref 1.8–7.7)
NEUTROPHILS NFR BLD: 55.7 % (ref 38–73)
NRBC BLD-RTO: 0 /100 WBC
NT-PROBNP SERPL-MCNC: 191 PG/ML (ref 5–900)
PLATELET # BLD AUTO: 286 K/UL (ref 150–450)
PMV BLD AUTO: 10.7 FL (ref 9.2–12.9)
POC MOLECULAR INFLUENZA A AGN: NEGATIVE
POC MOLECULAR INFLUENZA B AGN: NEGATIVE
POTASSIUM SERPL-SCNC: 3.8 MMOL/L (ref 3.5–5.1)
PROT SERPL-MCNC: 6.3 G/DL (ref 6–8.4)
PROTHROMBIN TIME: 11.3 SEC (ref 9–12.5)
RBC # BLD AUTO: 4.4 M/UL (ref 4–5.4)
SARS-COV-2 RDRP RESP QL NAA+PROBE: NEGATIVE
SODIUM SERPL-SCNC: 140 MMOL/L (ref 136–145)
TROPONIN I SERPL DL<=0.01 NG/ML-MCNC: 8.1 PG/ML (ref 0–60)
WBC # BLD AUTO: 9.64 K/UL (ref 3.9–12.7)

## 2022-12-25 PROCEDURE — 80053 COMPREHEN METABOLIC PANEL: CPT | Performed by: EMERGENCY MEDICINE

## 2022-12-25 PROCEDURE — 84484 ASSAY OF TROPONIN QUANT: CPT | Performed by: EMERGENCY MEDICINE

## 2022-12-25 PROCEDURE — 25000003 PHARM REV CODE 250: Performed by: EMERGENCY MEDICINE

## 2022-12-25 PROCEDURE — 85610 PROTHROMBIN TIME: CPT | Performed by: EMERGENCY MEDICINE

## 2022-12-25 PROCEDURE — 83880 ASSAY OF NATRIURETIC PEPTIDE: CPT | Performed by: EMERGENCY MEDICINE

## 2022-12-25 PROCEDURE — 85025 COMPLETE CBC W/AUTO DIFF WBC: CPT | Performed by: EMERGENCY MEDICINE

## 2022-12-25 PROCEDURE — 87502 INFLUENZA DNA AMP PROBE: CPT

## 2022-12-25 PROCEDURE — 87635 SARS-COV-2 COVID-19 AMP PRB: CPT | Performed by: EMERGENCY MEDICINE

## 2022-12-25 PROCEDURE — 63600175 PHARM REV CODE 636 W HCPCS: Performed by: EMERGENCY MEDICINE

## 2022-12-25 PROCEDURE — 99285 EMERGENCY DEPT VISIT HI MDM: CPT | Mod: 25

## 2022-12-25 PROCEDURE — 96374 THER/PROPH/DIAG INJ IV PUSH: CPT

## 2022-12-25 PROCEDURE — 93005 ELECTROCARDIOGRAM TRACING: CPT

## 2022-12-25 PROCEDURE — 83735 ASSAY OF MAGNESIUM: CPT | Performed by: EMERGENCY MEDICINE

## 2022-12-25 PROCEDURE — 36415 COLL VENOUS BLD VENIPUNCTURE: CPT | Performed by: EMERGENCY MEDICINE

## 2022-12-25 PROCEDURE — 93010 ELECTROCARDIOGRAM REPORT: CPT | Mod: ,,, | Performed by: INTERNAL MEDICINE

## 2022-12-25 PROCEDURE — 93010 EKG 12-LEAD: ICD-10-PCS | Mod: ,,, | Performed by: INTERNAL MEDICINE

## 2022-12-25 RX ORDER — LIDOCAINE HYDROCHLORIDE 20 MG/ML
15 SOLUTION OROPHARYNGEAL ONCE
Status: COMPLETED | OUTPATIENT
Start: 2022-12-25 | End: 2022-12-25

## 2022-12-25 RX ORDER — AZITHROMYCIN 250 MG/1
TABLET, FILM COATED ORAL
Qty: 6 TABLET | Refills: 0 | Status: SHIPPED | OUTPATIENT
Start: 2022-12-25 | End: 2023-12-21

## 2022-12-25 RX ORDER — IPRATROPIUM BROMIDE AND ALBUTEROL SULFATE 2.5; .5 MG/3ML; MG/3ML
3 SOLUTION RESPIRATORY (INHALATION) EVERY 6 HOURS PRN
Qty: 75 ML | Refills: 0 | Status: SHIPPED | OUTPATIENT
Start: 2022-12-25 | End: 2023-11-26 | Stop reason: SDUPTHER

## 2022-12-25 RX ORDER — SUCRALFATE 1 G/1
1 TABLET ORAL
Qty: 28 TABLET | Refills: 0 | Status: SHIPPED | OUTPATIENT
Start: 2022-12-25 | End: 2023-01-01

## 2022-12-25 RX ORDER — PANTOPRAZOLE SODIUM 20 MG/1
20 TABLET, DELAYED RELEASE ORAL
Qty: 28 TABLET | Refills: 0 | Status: SHIPPED | OUTPATIENT
Start: 2022-12-25 | End: 2023-01-08

## 2022-12-25 RX ORDER — ALBUTEROL SULFATE 90 UG/1
1-2 AEROSOL, METERED RESPIRATORY (INHALATION) EVERY 6 HOURS PRN
Qty: 8 G | Refills: 0 | Status: SHIPPED | OUTPATIENT
Start: 2022-12-25 | End: 2023-11-26 | Stop reason: SDUPTHER

## 2022-12-25 RX ORDER — FAMOTIDINE 20 MG/1
20 TABLET, FILM COATED ORAL 2 TIMES DAILY
Qty: 28 TABLET | Refills: 0 | Status: SHIPPED | OUTPATIENT
Start: 2022-12-25 | End: 2023-12-21

## 2022-12-25 RX ORDER — ONDANSETRON 2 MG/ML
8 INJECTION INTRAMUSCULAR; INTRAVENOUS
Status: COMPLETED | OUTPATIENT
Start: 2022-12-25 | End: 2022-12-25

## 2022-12-25 RX ORDER — MAG HYDROX/ALUMINUM HYD/SIMETH 200-200-20
30 SUSPENSION, ORAL (FINAL DOSE FORM) ORAL ONCE
Status: COMPLETED | OUTPATIENT
Start: 2022-12-25 | End: 2022-12-25

## 2022-12-25 RX ORDER — PROMETHAZINE HYDROCHLORIDE AND CODEINE PHOSPHATE 6.25; 1 MG/5ML; MG/5ML
5 SOLUTION ORAL EVERY 4 HOURS PRN
Qty: 118 ML | Refills: 0 | Status: SHIPPED | OUTPATIENT
Start: 2022-12-25 | End: 2023-01-04

## 2022-12-25 RX ADMIN — ONDANSETRON HYDROCHLORIDE 8 MG: 2 SOLUTION INTRAMUSCULAR; INTRAVENOUS at 08:12

## 2022-12-25 RX ADMIN — ALUMINUM HYDROXIDE, MAGNESIUM HYDROXIDE, AND SIMETHICONE 30 ML: 200; 200; 20 SUSPENSION ORAL at 08:12

## 2022-12-25 RX ADMIN — LIDOCAINE HYDROCHLORIDE 15 ML: 20 SOLUTION ORAL at 08:12

## 2022-12-26 NOTE — DISCHARGE INSTRUCTIONS
Do not take promethazine with codeine cough syrup at the same time you take your gabapentin or you will be too sedated.  I have written you a prescriptions for gastritis as I believe that this is causing your symptoms as well as upper respiratory infection.  Patient is non-toxic appearing, in no acute distress, and vital signs are stable and normal upon discharge. Upon completion of ED evaluation and management, with consideration of thorough differential diagnosis, the patient was found to have no acutely abnormal physical exam findings or other pathology requiring further emergent intervention or admission at this time. Patient/caregiver has no complaints upon discharge and verbalizes understanding and agreement with diagnosis and treatment plan. Discharge instructions with return precautions provided. Patient/caregiver verbalizes understanding to return to ED immediately for any new or worsening symptoms and to follow up with PCP/specialist recommended in 1-2 days.

## 2022-12-26 NOTE — ED PROVIDER NOTES
Encounter Date: 12/25/2022       History     Chief Complaint   Patient presents with    Chest Pain     Pt stated that approx 2 hours prior to arrival she began experiencing chest pain accompanied by nausea / dyspnea.      63 y/o F w a PMHx anxiety, COPD, HTN, comes in c/o epigastric CP accompanied by nausea and cough.  Reports symptoms have been present for a couple of hours prior to arrival.  Patient also with mild cough and SOB. No diaphoresis, abd pain, vomiting, diarrhea, fever.     Review of patient's allergies indicates:  No Known Allergies  Past Medical History:   Diagnosis Date    Anxiety     Breast cancer 2010    Cancer     Breast    COPD (chronic obstructive pulmonary disease)     Depression     GERD (gastroesophageal reflux disease)     Hypertension     Insomnia     Thyroid disease      Past Surgical History:   Procedure Laterality Date    BLADDER SUSPENSION      BREAST LUMPECTOMY      Right breast    CHOLECYSTECTOMY      HYSTERECTOMY      KNEE ARTHROSCOPY      Right knee    OOPHORECTOMY       Family History   Problem Relation Age of Onset    No Known Problems Mother     No Known Problems Father     No Known Problems Sister     No Known Problems Daughter     No Known Problems Maternal Aunt     No Known Problems Maternal Uncle     No Known Problems Paternal Aunt     No Known Problems Paternal Uncle     No Known Problems Maternal Grandmother     No Known Problems Maternal Grandfather     No Known Problems Paternal Grandmother     No Known Problems Paternal Grandfather     Breast cancer Neg Hx     Ovarian cancer Neg Hx     BRCA 1/2 Neg Hx      Social History     Tobacco Use    Smoking status: Every Day     Packs/day: 0.50     Types: Cigarettes    Smokeless tobacco: Never    Tobacco comments:     quit yesterday 6/12/21   Substance Use Topics    Alcohol use: Not Currently    Drug use: Never     Review of Systems   Constitutional:  Negative for fever.   HENT:  Negative for sore throat, trouble swallowing and  voice change.    Eyes:  Negative for visual disturbance.   Respiratory:  Positive for cough. Negative for shortness of breath, wheezing and stridor.    Cardiovascular:  Positive for chest pain.   Gastrointestinal:  Positive for nausea. Negative for abdominal pain, diarrhea and vomiting.   Genitourinary:  Negative for dysuria.   Musculoskeletal:  Negative for back pain and neck stiffness.   Skin:  Negative for rash.   Neurological:  Negative for syncope, facial asymmetry, speech difficulty, weakness, numbness and headaches.   Hematological:  Does not bruise/bleed easily.   Psychiatric/Behavioral:  Negative for confusion.    All other systems reviewed and are negative.    Physical Exam     Initial Vitals [12/25/22 1914]   BP Pulse Resp Temp SpO2   (!) 140/66 80 18 98.2 °F (36.8 °C) 97 %      MAP       --         Physical Exam    Nursing note and vitals reviewed.  Constitutional: She appears well-developed and well-nourished. She is not diaphoretic. No distress.   HENT:   Head: Normocephalic and atraumatic.   Eyes: EOM are normal. Pupils are equal, round, and reactive to light.   Neck: Neck supple.   Normal range of motion.  Cardiovascular:  Normal rate and regular rhythm.           Pulmonary/Chest: Breath sounds normal. She has no wheezes.   Abdominal: Abdomen is soft. Bowel sounds are normal. There is no abdominal tenderness.   Musculoskeletal:         General: No edema. Normal range of motion.      Cervical back: Normal range of motion and neck supple.     Neurological: She is alert and oriented to person, place, and time. She has normal strength. No cranial nerve deficit or sensory deficit.   Skin: Skin is warm and dry. Capillary refill takes less than 2 seconds.   Psychiatric: She has a normal mood and affect. Thought content normal.       ED Course   Procedures  Labs Reviewed   CBC W/ AUTO DIFFERENTIAL - Abnormal; Notable for the following components:       Result Value    Hemoglobin 10.5 (*)     Hematocrit 33.1  (*)     MCV 75 (*)     MCH 23.9 (*)     MCHC 31.7 (*)     RDW 21.8 (*)     Mono # 1.1 (*)     All other components within normal limits   COMPREHENSIVE METABOLIC PANEL - Abnormal; Notable for the following components:    Glucose 140 (*)     Calcium 8.5 (*)     Albumin 3.0 (*)     Anion Gap 3 (*)     eGFR 46.2 (*)     All other components within normal limits   NT-PRO NATRIURETIC PEPTIDE   MAGNESIUM   PROTIME-INR   TROPONIN I HIGH SENSITIVITY   SARS-COV-2 RDRP GENE    Narrative:     This test utilizes isothermal nucleic acid amplification technology to detect the SARS-CoV-2 RdRp nucleic acid segment. The analytical sensitivity (limit of detection) is 500 copies/swab.     A POSITIVE result is indicative of the presence of SARS-CoV-2 RNA; clinical correlation with patient history and other diagnostic information is necessary to determine patient infection status.    A NEGATIVE result means that SARS-CoV-2 nucleic acids are not present above the limit of detection. A NEGATIVE result should be treated as presumptive. It does not rule out the possibility of COVID-19 and should not be the sole basis for treatment decisions. If COVID-19 is strongly suspected based on clinical and exposure history, re-testing using an alternate molecular assay should be considered.     This test is only for use under the Food and Drug Administration s Emergency Use Authorization (EUA).     Commercial kits are provided by [employer]. Performance characteristics of the EUA have been independently verified by [facility] Department of Pathology and Laboratory Medicine.   _________________________________________________________________   The authorized Fact Sheet for Healthcare Providers and the authorized Fact Sheet for Patients of the ID NOW COVID-19 are available on the FDA website:    https://www.fda.gov/media/932223/download      https://www.fda.gov/media/757159/download       POCT INFLUENZA A/B MOLECULAR     EKG  Readings: (Independently Interpreted)   Initial Reading: No STEMI. Rhythm: Normal Sinus Rhythm.     Imaging Results              X-Ray Chest AP Portable (In process)                   X-Rays:   Independently Interpreted Readings:   Chest X-Ray: No infiltrates.  No acute abnormalities.   Medications   ondansetron injection 8 mg (8 mg Intravenous Given 12/25/22 2025)   aluminum-magnesium hydroxide-simethicone 200-200-20 mg/5 mL suspension 30 mL (30 mLs Oral Given 12/25/22 2026)     And   LIDOcaine HCl 2% oral solution 15 mL (15 mLs Oral Given 12/25/22 2025)                Attending Attestation:             Attending ED Notes:   63 y/o F w a PMHx anxiety, COPD, HTN, comes in c/o epigastric CP accompanied by cough and nausea.  Reports symptoms have been present for a couple of hours prior to arrival.  No diaphoresis, abd pain, vomiting, diarrhea, fever.     GI cocktail Zofran given.  Patient remained chest pain-free while in the emergency department.  I have considered but do not suspect ACS, PE, any other emergent intrathoracic pathology.  CBC CMP troponin chest x-ray EKG influenza COVID BNP magnesium INR all revealed no acute/emergent pathology.  Presentation consistent with GERD, atypical chest pain, URI.  Patient given prescriptions for Protonix, Carafate, Pepcid, Phenergan with codeine, DuoNeb, Ventolin, Z-Jignesh. Patient is non-toxic appearing, in no acute distress, and vital signs are stable and normal upon discharge. Upon completion of ED evaluation and management, with consideration of thorough differential diagnosis, the patient was found to have no acutely abnormal physical exam findings or other pathology requiring further emergent intervention or admission at this time. Patient/caregiver has no complaints upon discharge and verbalizes understanding and agreement with diagnosis and treatment plan. Discharge instructions with return precautions provided. Patient/caregiver verbalizes understanding to return to ED  immediately for any new or worsening symptoms and to follow up with PCP/specialist recommended in 1-2 days.                          Clinical Impression:   Final diagnoses:  [R07.9] Chest pain  [R07.89] Atypical chest pain  [R05.9] Cough, unspecified type  [R06.02] SOB (shortness of breath)  [R11.0] Nausea  [J06.9] Upper respiratory tract infection, unspecified type (Primary)  [K21.9] Gastroesophageal reflux disease, unspecified whether esophagitis present        ED Disposition Condition    Discharge Stable          ED Prescriptions       Medication Sig Dispense Start Date End Date Auth. Provider    pantoprazole (PROTONIX) 20 MG tablet Take 1 tablet (20 mg total) by mouth 2 (two) times daily before meals. for 14 days 28 tablet 12/25/2022 1/8/2023 Marilou Newell MD    sucralfate (CARAFATE) 1 gram tablet Take 1 tablet (1 g total) by mouth 4 (four) times daily before meals and nightly. for 7 days 28 tablet 12/25/2022 1/1/2023 Marilou Newell MD    famotidine (PEPCID) 20 MG tablet Take 1 tablet (20 mg total) by mouth 2 (two) times daily. for 14 days 28 tablet 12/25/2022 1/8/2023 Marilou Newell MD    promethazine-codeine 6.25-10 mg/5 ml (PHENERGAN WITH CODEINE) 6.25-10 mg/5 mL syrup Take 5 mLs by mouth every 4 (four) hours as needed for Cough. 118 mL 12/25/2022 1/4/2023 Marilou Newell MD    albuterol-ipratropium (DUO-NEB) 2.5 mg-0.5 mg/3 mL nebulizer solution Take 3 mLs by nebulization every 6 (six) hours as needed for Wheezing. Rescue 75 mL 12/25/2022 12/25/2023 Marilou Newell MD    albuterol (PROVENTIL/VENTOLIN HFA) 90 mcg/actuation inhaler Inhale 1-2 puffs into the lungs every 6 (six) hours as needed for Wheezing or Shortness of Breath. Rescue 8 g 12/25/2022 -- Marilou Newell MD    azithromycin (Z-PAOLA) 250 MG tablet Take 2 tablets by mouth on day 1; Take 1 tablet by mouth on days 2-5 6 tablet 12/25/2022 -- Marilou Newell MD          Follow-up Information       Follow up With Specialties Details Why Contact Info  Additional Information    Burke Merchant MD Internal Medicine Schedule an appointment as soon as possible for a visit   [address] [address]  [phone]       [facility] Emergency Department Emergency Medicine Go to  As needed, If symptoms worsen [address] [facility] [id number] [phone] Floor [address]             Marilou Newell MD  12/26/22 0542

## 2023-01-20 ENCOUNTER — HOSPITAL ENCOUNTER (EMERGENCY)
Facility: HOSPITAL | Age: 63
Discharge: HOME | End: 2023-01-21
Attending: STUDENT IN AN ORGANIZED HEALTH CARE EDUCATION/TRAINING PROGRAM
Payer: MEDICARE

## 2023-01-20 DIAGNOSIS — J02.9 PHARYNGITIS, UNSPECIFIED ETIOLOGY: Primary | ICD-10-CM

## 2023-01-20 PROCEDURE — 99284 EMERGENCY DEPT VISIT MOD MDM: CPT | Mod: 25

## 2023-01-21 VITALS
WEIGHT: 237.38 LBS | HEART RATE: 85 BPM | TEMPERATURE: 99 F | OXYGEN SATURATION: 96 % | DIASTOLIC BLOOD PRESSURE: 78 MMHG | BODY MASS INDEX: 42.06 KG/M2 | HEIGHT: 63 IN | RESPIRATION RATE: 16 BRPM | SYSTOLIC BLOOD PRESSURE: 138 MMHG

## 2023-01-21 LAB — GROUP A STREP, MOLECULAR: NEGATIVE

## 2023-01-21 PROCEDURE — 96372 THER/PROPH/DIAG INJ SC/IM: CPT | Performed by: STUDENT IN AN ORGANIZED HEALTH CARE EDUCATION/TRAINING PROGRAM

## 2023-01-21 PROCEDURE — 87651 STREP A DNA AMP PROBE: CPT | Performed by: STUDENT IN AN ORGANIZED HEALTH CARE EDUCATION/TRAINING PROGRAM

## 2023-01-21 PROCEDURE — 63600175 PHARM REV CODE 636 W HCPCS: Performed by: STUDENT IN AN ORGANIZED HEALTH CARE EDUCATION/TRAINING PROGRAM

## 2023-01-21 RX ORDER — AMOXICILLIN AND CLAVULANATE POTASSIUM 875; 125 MG/1; MG/1
1 TABLET, FILM COATED ORAL EVERY 12 HOURS
Qty: 14 TABLET | Refills: 0 | Status: SHIPPED | OUTPATIENT
Start: 2023-01-21 | End: 2023-01-28

## 2023-01-21 RX ORDER — DEXAMETHASONE SODIUM PHOSPHATE 4 MG/ML
12 INJECTION, SOLUTION INTRA-ARTICULAR; INTRALESIONAL; INTRAMUSCULAR; INTRAVENOUS; SOFT TISSUE
Status: COMPLETED | OUTPATIENT
Start: 2023-01-21 | End: 2023-01-21

## 2023-01-21 RX ADMIN — DEXAMETHASONE SODIUM PHOSPHATE 12 MG: 4 INJECTION, SOLUTION INTRA-ARTICULAR; INTRALESIONAL; INTRAMUSCULAR; INTRAVENOUS; SOFT TISSUE at 01:01

## 2023-01-21 NOTE — ED PROVIDER NOTES
"  History  Chief Complaint   Patient presents with    Sore Throat     Sore throat x 2 days. Denies n/v/d/f.      62-year-old female with history of COPD, depression and hypertension presents due to a sore throat.  Symptoms ongoing for the past 2 days      Past Medical History:   Diagnosis Date    Anxiety     Breast cancer 2010    Cancer     Breast    COPD (chronic obstructive pulmonary disease)     Depression     GERD (gastroesophageal reflux disease)     Hypertension     Insomnia     Thyroid disease        Past Surgical History:   Procedure Laterality Date    BLADDER SUSPENSION      BREAST LUMPECTOMY      Right breast    CHOLECYSTECTOMY      HYSTERECTOMY      KNEE ARTHROSCOPY      Right knee    OOPHORECTOMY         Family History   Problem Relation Age of Onset    No Known Problems Mother     No Known Problems Father     No Known Problems Sister     No Known Problems Daughter     No Known Problems Maternal Aunt     No Known Problems Maternal Uncle     No Known Problems Paternal Aunt     No Known Problems Paternal Uncle     No Known Problems Maternal Grandmother     No Known Problems Maternal Grandfather     No Known Problems Paternal Grandmother     No Known Problems Paternal Grandfather     Breast cancer Neg Hx     Ovarian cancer Neg Hx     BRCA 1/2 Neg Hx        Social History     Tobacco Use    Smoking status: Every Day     Packs/day: 0.50     Types: Cigarettes    Smokeless tobacco: Never    Tobacco comments:     quit yesterday 6/12/21   Substance Use Topics    Alcohol use: Not Currently    Drug use: Never       ROS  Review of Systems   HENT:  Positive for sore throat.      Physical Exam  /78   Pulse 85   Temp 98.7 °F (37.1 °C)   Resp 16   Ht 5' 3" (1.6 m)   Wt 107.7 kg (237 lb 6.4 oz)   SpO2 96%   Breastfeeding No   BMI 42.05 kg/m²   Physical Exam    Constitutional: She appears well-developed and well-nourished. She is cooperative.   HENT:   Head: Normocephalic and atraumatic.   Mouth/Throat: " Posterior oropharyngeal erythema present.   Uvular erythema with mild    Eyes: Conjunctivae, EOM and lids are normal. Pupils are equal, round, and reactive to light.   Neck: Phonation normal.   Normal range of motion.  Cardiovascular:  Normal rate, regular rhythm and intact distal pulses.           Pulmonary/Chest: Breath sounds normal. No stridor. No respiratory distress.   Abdominal: Abdomen is soft. There is no abdominal tenderness.   Musculoskeletal:         General: No tenderness. Normal range of motion.      Cervical back: Normal range of motion.     Lymphadenopathy:     She has cervical adenopathy.   Neurological: She is alert and oriented to person, place, and time.   Skin: Skin is warm and dry.   Psychiatric: She has a normal mood and affect. Her speech is normal and behavior is normal.             Labs Reviewed   GROUP A STREP, MOLECULAR                         Procedures             Medical Decision Making  Amount and/or Complexity of Data Reviewed  Labs: ordered. Decision-making details documented in ED Course.    Risk  Prescription drug management.               ED Course as of 01/23/23 0537   Sat Jan 21, 2023   0106 Group A Strep, Molecular: Negative  Despite negative strep test patient does have posterior or pharyngeal erythema with slight edema.  Will give Decadron.  Will still give prescription for antibiotics symptoms likely related to tonsillitis versus alternate pathology.  Patient advised continue pain control with anti-inflammatories in the outpatient setting.  Return precautions were given. [NA]   0106 Covid negative   [NA]      ED Course User Index  [NA] Kellie Cruz MD       Clinical Impression  The encounter diagnosis was Pharyngitis, unspecified etiology.       Kellie Cruz MD  01/23/23 0537

## 2023-02-26 ENCOUNTER — HOSPITAL ENCOUNTER (EMERGENCY)
Facility: HOSPITAL | Age: 63
Discharge: HOME | End: 2023-02-26
Attending: EMERGENCY MEDICINE
Payer: MEDICARE

## 2023-02-26 VITALS
HEIGHT: 63 IN | RESPIRATION RATE: 18 BRPM | BODY MASS INDEX: 42.17 KG/M2 | WEIGHT: 238 LBS | HEART RATE: 60 BPM | DIASTOLIC BLOOD PRESSURE: 70 MMHG | SYSTOLIC BLOOD PRESSURE: 144 MMHG | TEMPERATURE: 98 F | OXYGEN SATURATION: 98 %

## 2023-02-26 DIAGNOSIS — R10.9 ABDOMINAL PAIN, UNSPECIFIED ABDOMINAL LOCATION: ICD-10-CM

## 2023-02-26 DIAGNOSIS — R11.2 NAUSEA AND VOMITING, UNSPECIFIED VOMITING TYPE: Primary | ICD-10-CM

## 2023-02-26 LAB
ALBUMIN SERPL BCP-MCNC: 3.2 G/DL (ref 3.5–5.2)
ALP SERPL-CCNC: 104 U/L (ref 55–135)
ALT SERPL W/O P-5'-P-CCNC: 21 U/L (ref 10–44)
ANION GAP SERPL CALC-SCNC: 3 MMOL/L (ref 8–16)
AST SERPL-CCNC: 11 U/L (ref 10–40)
BASOPHILS # BLD AUTO: 0.04 K/UL (ref 0–0.2)
BASOPHILS NFR BLD: 0.4 % (ref 0–1.9)
BILIRUB SERPL-MCNC: 0.5 MG/DL (ref 0.1–1)
BILIRUB UR QL STRIP: NEGATIVE
BUN SERPL-MCNC: 10 MG/DL (ref 8–23)
CALCIUM SERPL-MCNC: 9.6 MG/DL (ref 8.7–10.5)
CHLORIDE SERPL-SCNC: 110 MMOL/L (ref 95–110)
CLARITY UR: CLEAR
CO2 SERPL-SCNC: 28 MMOL/L (ref 23–29)
COLOR UR: YELLOW
CREAT SERPL-MCNC: 1 MG/DL (ref 0.5–1.4)
DIFFERENTIAL METHOD: ABNORMAL
EOSINOPHIL # BLD AUTO: 0.3 K/UL (ref 0–0.5)
EOSINOPHIL NFR BLD: 2.3 % (ref 0–8)
ERYTHROCYTE [DISTWIDTH] IN BLOOD BY AUTOMATED COUNT: 20.9 % (ref 11.5–14.5)
EST. GFR  (NO RACE VARIABLE): >60 ML/MIN/1.73 M^2
GLUCOSE SERPL-MCNC: 109 MG/DL (ref 70–110)
GLUCOSE UR QL STRIP: NEGATIVE
HCT VFR BLD AUTO: 39 % (ref 37–48.5)
HGB BLD-MCNC: 12.4 G/DL (ref 12–16)
HGB UR QL STRIP: NEGATIVE
IMM GRANULOCYTES # BLD AUTO: 0.04 K/UL (ref 0–0.04)
IMM GRANULOCYTES NFR BLD AUTO: 0.4 % (ref 0–0.5)
KETONES UR QL STRIP: NEGATIVE
LEUKOCYTE ESTERASE UR QL STRIP: NEGATIVE
LIPASE SERPL-CCNC: 84 U/L (ref 23–300)
LYMPHOCYTES # BLD AUTO: 2.3 K/UL (ref 1–4.8)
LYMPHOCYTES NFR BLD: 20.3 % (ref 18–48)
MCH RBC QN AUTO: 25.7 PG (ref 27–31)
MCHC RBC AUTO-ENTMCNC: 31.8 G/DL (ref 32–36)
MCV RBC AUTO: 81 FL (ref 82–98)
MONOCYTES # BLD AUTO: 1.1 K/UL (ref 0.3–1)
MONOCYTES NFR BLD: 9.7 % (ref 4–15)
NEUTROPHILS # BLD AUTO: 7.4 K/UL (ref 1.8–7.7)
NEUTROPHILS NFR BLD: 66.9 % (ref 38–73)
NITRITE UR QL STRIP: NEGATIVE
NRBC BLD-RTO: 0 /100 WBC
PH UR STRIP: 6 [PH] (ref 5–8)
PLATELET # BLD AUTO: 272 K/UL (ref 150–450)
PMV BLD AUTO: 10.5 FL (ref 9.2–12.9)
POTASSIUM SERPL-SCNC: 3.9 MMOL/L (ref 3.5–5.1)
PROT SERPL-MCNC: 6.7 G/DL (ref 6–8.4)
PROT UR QL STRIP: NEGATIVE
RBC # BLD AUTO: 4.82 M/UL (ref 4–5.4)
SODIUM SERPL-SCNC: 141 MMOL/L (ref 136–145)
SP GR UR STRIP: 1.01 (ref 1–1.03)
URN SPEC COLLECT METH UR: NORMAL
UROBILINOGEN UR STRIP-ACNC: NEGATIVE EU/DL
WBC # BLD AUTO: 11.1 K/UL (ref 3.9–12.7)

## 2023-02-26 PROCEDURE — 96361 HYDRATE IV INFUSION ADD-ON: CPT

## 2023-02-26 PROCEDURE — 96372 THER/PROPH/DIAG INJ SC/IM: CPT | Performed by: EMERGENCY MEDICINE

## 2023-02-26 PROCEDURE — 80053 COMPREHEN METABOLIC PANEL: CPT | Performed by: EMERGENCY MEDICINE

## 2023-02-26 PROCEDURE — 63600175 PHARM REV CODE 636 W HCPCS: Performed by: EMERGENCY MEDICINE

## 2023-02-26 PROCEDURE — 96374 THER/PROPH/DIAG INJ IV PUSH: CPT

## 2023-02-26 PROCEDURE — 99285 EMERGENCY DEPT VISIT HI MDM: CPT | Mod: 25

## 2023-02-26 PROCEDURE — 25000003 PHARM REV CODE 250: Performed by: EMERGENCY MEDICINE

## 2023-02-26 PROCEDURE — 83690 ASSAY OF LIPASE: CPT | Performed by: EMERGENCY MEDICINE

## 2023-02-26 PROCEDURE — 81003 URINALYSIS AUTO W/O SCOPE: CPT | Performed by: EMERGENCY MEDICINE

## 2023-02-26 PROCEDURE — 85025 COMPLETE CBC W/AUTO DIFF WBC: CPT | Performed by: EMERGENCY MEDICINE

## 2023-02-26 RX ORDER — DICYCLOMINE HYDROCHLORIDE 20 MG/1
20 TABLET ORAL EVERY 8 HOURS PRN
Qty: 20 TABLET | Refills: 0 | Status: SHIPPED | OUTPATIENT
Start: 2023-02-26 | End: 2023-03-28

## 2023-02-26 RX ORDER — ONDANSETRON 4 MG/1
4 TABLET, FILM COATED ORAL EVERY 6 HOURS PRN
Qty: 12 TABLET | Refills: 0 | Status: SHIPPED | OUTPATIENT
Start: 2023-02-26 | End: 2023-12-21

## 2023-02-26 RX ORDER — ONDANSETRON 2 MG/ML
4 INJECTION INTRAMUSCULAR; INTRAVENOUS
Status: COMPLETED | OUTPATIENT
Start: 2023-02-26 | End: 2023-02-26

## 2023-02-26 RX ORDER — DICYCLOMINE HYDROCHLORIDE 10 MG/ML
20 INJECTION INTRAMUSCULAR
Status: COMPLETED | OUTPATIENT
Start: 2023-02-26 | End: 2023-02-26

## 2023-02-26 RX ADMIN — DICYCLOMINE HYDROCHLORIDE 20 MG: 20 INJECTION INTRAMUSCULAR at 05:02

## 2023-02-26 RX ADMIN — ONDANSETRON HYDROCHLORIDE 4 MG: 2 SOLUTION INTRAMUSCULAR; INTRAVENOUS at 03:02

## 2023-02-26 RX ADMIN — SODIUM CHLORIDE 1000 ML: 9 INJECTION, SOLUTION INTRAVENOUS at 03:02

## 2023-02-26 NOTE — ED PROVIDER NOTES
Encounter Date: 2/26/2023       History     Chief Complaint   Patient presents with    Abdominal Pain    Vomiting     Pt stated that she has been experiencing RLQ abdominal pain with nausea / vomiting for the past 2 days. Denied dysuria. Last BM couple days ago and normal.      62 yo female with history as below here via POV with RLQ/R flank pain x 2 days. Associated with N/V. No fever. No urinary complaint. No aggravating or alleviating factors. No prior similar. No known sick contacts.     Review of patient's allergies indicates:  No Known Allergies  Past Medical History:   Diagnosis Date    Anxiety     Breast cancer 2010    Cancer     Breast    COPD (chronic obstructive pulmonary disease)     Depression     GERD (gastroesophageal reflux disease)     Hypertension     Insomnia     Thyroid disease      Past Surgical History:   Procedure Laterality Date    BLADDER SUSPENSION      BREAST LUMPECTOMY      Right breast    CHOLECYSTECTOMY      HYSTERECTOMY      KNEE ARTHROSCOPY      Right knee    OOPHORECTOMY       Family History   Problem Relation Age of Onset    No Known Problems Mother     No Known Problems Father     No Known Problems Sister     No Known Problems Daughter     No Known Problems Maternal Aunt     No Known Problems Maternal Uncle     No Known Problems Paternal Aunt     No Known Problems Paternal Uncle     No Known Problems Maternal Grandmother     No Known Problems Maternal Grandfather     No Known Problems Paternal Grandmother     No Known Problems Paternal Grandfather     Breast cancer Neg Hx     Ovarian cancer Neg Hx     BRCA 1/2 Neg Hx      Social History     Tobacco Use    Smoking status: Every Day     Packs/day: 0.50     Types: Cigarettes    Smokeless tobacco: Never    Tobacco comments:     quit yesterday 6/12/21   Substance Use Topics    Alcohol use: Not Currently    Drug use: Never     Review of Systems   Constitutional: Negative.    Respiratory: Negative.     Cardiovascular: Negative.     Gastrointestinal:  Positive for abdominal pain, nausea and vomiting.   All other systems reviewed and are negative.    Physical Exam     Initial Vitals   BP Pulse Resp Temp SpO2   02/26/23 1418 02/26/23 1418 02/26/23 1418 02/26/23 1418 02/26/23 1419   (!) 148/79 77 14 97.8 °F (36.6 °C) 98 %      MAP       --                Physical Exam    Nursing note and vitals reviewed.  Constitutional: She appears well-developed and well-nourished. She is not diaphoretic. No distress.   HENT:   Head: Normocephalic and atraumatic.   Eyes: EOM are normal. Pupils are equal, round, and reactive to light.   Neck: Neck supple.   Normal range of motion.  Cardiovascular:  Normal rate, regular rhythm and intact distal pulses.           Pulmonary/Chest: Breath sounds normal. No respiratory distress. She has no wheezes. She has no rales.   Abdominal: Abdomen is soft. Bowel sounds are normal. She exhibits no distension. There is abdominal tenderness (RLQ). There is no rebound.   Musculoskeletal:         General: No tenderness or edema. Normal range of motion.      Cervical back: Normal range of motion and neck supple.     Neurological: She is alert. She has normal strength and normal reflexes.   Skin: Skin is warm and dry. Capillary refill takes less than 2 seconds.   Psychiatric: She has a normal mood and affect.       ED Course   Procedures  Labs Reviewed   CBC W/ AUTO DIFFERENTIAL - Abnormal; Notable for the following components:       Result Value    MCV 81 (*)     MCH 25.7 (*)     MCHC 31.8 (*)     RDW 20.9 (*)     Mono # 1.1 (*)     All other components within normal limits   COMPREHENSIVE METABOLIC PANEL - Abnormal; Notable for the following components:    Albumin 3.2 (*)     Anion Gap 3 (*)     All other components within normal limits   LIPASE   URINALYSIS, REFLEX TO URINE CULTURE    Narrative:     Preferred Collection Type->Urine, Clean Catch  Specimen Source->Urine          Imaging Results              CT Renal Stone Study ABD  Pelvis WO (In process)                   X-Rays:   Other Radiology Reports: CTAP: No acute findings.    Medications   dicyclomine injection 20 mg (has no administration in time range)   ondansetron injection 4 mg (4 mg Intravenous Given 2/26/23 1501)   sodium chloride 0.9% bolus 1,000 mL 1,000 mL (0 mLs Intravenous Stopped 2/26/23 1601)     Medical Decision Making:   Clinical Tests:   Lab Tests: Reviewed and Ordered  Radiological Study: Ordered and Reviewed                        Clinical Impression:   Final diagnoses:  [R11.2] Nausea and vomiting, unspecified vomiting type (Primary)  [R10.9] Abdominal pain, unspecified abdominal location        ED Disposition Condition    Discharge Stable          ED Prescriptions       Medication Sig Dispense Start Date End Date Auth. Provider    dicyclomine (BENTYL) 20 mg tablet Take 1 tablet (20 mg total) by mouth every 8 (eight) hours as needed (abdominal pain). 20 tablet 2/26/2023 3/28/2023 Philip Lopez MD    ondansetron (ZOFRAN) 4 MG tablet Take 1 tablet (4 mg total) by mouth every 6 (six) hours as needed for Nausea. 12 tablet 2/26/2023 -- Philip Lopez MD          Follow-up Information       Follow up With Specialties Details Why Contact Info    Burke Merchant MD Internal Medicine Schedule an appointment as soon as possible for a visit   [address] [place] [address]  [phone]               Philip Lopez MD  02/26/23 7752

## 2023-03-30 ENCOUNTER — LAB VISIT (OUTPATIENT)
Dept: LAB | Facility: HOSPITAL | Age: 63
End: 2023-03-30
Attending: NURSE PRACTITIONER
Payer: MEDICARE

## 2023-03-30 DIAGNOSIS — K59.00 CN (CONSTIPATION): ICD-10-CM

## 2023-03-30 DIAGNOSIS — K76.0 FATTY METAMORPHOSIS OF LIVER: ICD-10-CM

## 2023-03-30 DIAGNOSIS — K21.9 ESOPHAGEAL REFLUX: ICD-10-CM

## 2023-03-30 DIAGNOSIS — R10.31 ABDOMINAL PAIN, RIGHT LOWER QUADRANT: Primary | ICD-10-CM

## 2023-03-30 LAB
BASOPHILS # BLD AUTO: 0.04 K/UL (ref 0–0.2)
BASOPHILS NFR BLD: 0.6 % (ref 0–1.9)
DIFFERENTIAL METHOD: ABNORMAL
EOSINOPHIL # BLD AUTO: 0.3 K/UL (ref 0–0.5)
EOSINOPHIL NFR BLD: 3.8 % (ref 0–8)
ERYTHROCYTE [DISTWIDTH] IN BLOOD BY AUTOMATED COUNT: 19.7 % (ref 11.5–14.5)
HCT VFR BLD AUTO: 40.3 % (ref 37–48.5)
HGB BLD-MCNC: 12.7 G/DL (ref 12–16)
IMM GRANULOCYTES # BLD AUTO: 0.02 K/UL (ref 0–0.04)
IMM GRANULOCYTES NFR BLD AUTO: 0.3 % (ref 0–0.5)
LYMPHOCYTES # BLD AUTO: 2.1 K/UL (ref 1–4.8)
LYMPHOCYTES NFR BLD: 29.3 % (ref 18–48)
MCH RBC QN AUTO: 26.5 PG (ref 27–31)
MCHC RBC AUTO-ENTMCNC: 31.5 G/DL (ref 32–36)
MCV RBC AUTO: 84 FL (ref 82–98)
MONOCYTES # BLD AUTO: 0.7 K/UL (ref 0.3–1)
MONOCYTES NFR BLD: 9.4 % (ref 4–15)
NEUTROPHILS # BLD AUTO: 4.1 K/UL (ref 1.8–7.7)
NEUTROPHILS NFR BLD: 56.6 % (ref 38–73)
NRBC BLD-RTO: 0 /100 WBC
PLATELET # BLD AUTO: 223 K/UL (ref 150–450)
PMV BLD AUTO: 11.1 FL (ref 9.2–12.9)
RBC # BLD AUTO: 4.8 M/UL (ref 4–5.4)
WBC # BLD AUTO: 7.2 K/UL (ref 3.9–12.7)

## 2023-03-30 PROCEDURE — 85025 COMPLETE CBC W/AUTO DIFF WBC: CPT | Performed by: NURSE PRACTITIONER

## 2023-03-30 PROCEDURE — 36415 COLL VENOUS BLD VENIPUNCTURE: CPT | Performed by: NURSE PRACTITIONER

## 2023-04-12 ENCOUNTER — HOSPITAL ENCOUNTER (OUTPATIENT)
Dept: RADIOLOGY | Facility: HOSPITAL | Age: 63
Discharge: HOME | End: 2023-04-12
Attending: INTERNAL MEDICINE
Payer: MEDICARE

## 2023-04-12 DIAGNOSIS — J41.0 SIMPLE CHRONIC BRONCHITIS: Primary | ICD-10-CM

## 2023-04-12 DIAGNOSIS — J41.0 SIMPLE CHRONIC BRONCHITIS: ICD-10-CM

## 2023-04-12 PROCEDURE — 71046 X-RAY EXAM CHEST 2 VIEWS: CPT | Mod: TC

## 2023-04-13 ENCOUNTER — HOSPITAL ENCOUNTER (OUTPATIENT)
Dept: RADIOLOGY | Facility: HOSPITAL | Age: 63
Discharge: HOME | End: 2023-04-13
Attending: NURSE PRACTITIONER
Payer: MEDICARE

## 2023-04-13 DIAGNOSIS — R10.31 ABDOMINAL PAIN, RIGHT LOWER QUADRANT: ICD-10-CM

## 2023-04-13 DIAGNOSIS — K21.9 GERD (GASTROESOPHAGEAL REFLUX DISEASE): ICD-10-CM

## 2023-04-13 DIAGNOSIS — K76.0 FATTY LIVER DISEASE, NONALCOHOLIC: ICD-10-CM

## 2023-04-13 DIAGNOSIS — K59.00 CONSTIPATION: ICD-10-CM

## 2023-04-13 PROCEDURE — 74177 CT ABD & PELVIS W/CONTRAST: CPT | Mod: TC

## 2023-04-13 PROCEDURE — 25500020 PHARM REV CODE 255: Performed by: NURSE PRACTITIONER

## 2023-04-13 RX ADMIN — IOHEXOL 100 ML: 350 INJECTION, SOLUTION INTRAVENOUS at 10:04

## 2023-06-01 ENCOUNTER — HOSPITAL ENCOUNTER (EMERGENCY)
Facility: HOSPITAL | Age: 63
Discharge: HOME | End: 2023-06-01
Attending: STUDENT IN AN ORGANIZED HEALTH CARE EDUCATION/TRAINING PROGRAM
Payer: MEDICARE

## 2023-06-01 VITALS
TEMPERATURE: 98 F | BODY MASS INDEX: 40.08 KG/M2 | WEIGHT: 226.19 LBS | RESPIRATION RATE: 16 BRPM | OXYGEN SATURATION: 97 % | SYSTOLIC BLOOD PRESSURE: 133 MMHG | HEART RATE: 77 BPM | HEIGHT: 63 IN | DIASTOLIC BLOOD PRESSURE: 69 MMHG

## 2023-06-01 DIAGNOSIS — K14.6 TONGUE PAIN: Primary | ICD-10-CM

## 2023-06-01 PROCEDURE — 99283 EMERGENCY DEPT VISIT LOW MDM: CPT

## 2023-06-01 PROCEDURE — 25000003 PHARM REV CODE 250: Performed by: STUDENT IN AN ORGANIZED HEALTH CARE EDUCATION/TRAINING PROGRAM

## 2023-06-01 RX ORDER — LIDOCAINE HYDROCHLORIDE 20 MG/ML
5 SOLUTION OROPHARYNGEAL
Status: COMPLETED | OUTPATIENT
Start: 2023-06-01 | End: 2023-06-01

## 2023-06-01 RX ADMIN — LIDOCAINE HYDROCHLORIDE 5 ML: 20 SOLUTION ORAL at 09:06

## 2023-06-01 NOTE — Clinical Note
"Vero SARGENT"Alyssia Allen was seen and treated in our emergency department on 6/1/2023.  She may return to work on 06/02/2023.       If you have any questions or concerns, please don't hesitate to call.      Akash Dailey MD"

## 2023-06-01 NOTE — ED PROVIDER NOTES
Encounter Date: 6/1/2023       History     Chief Complaint   Patient presents with    Mouth Lesions     Patient reports ulcers on tongue x 2-3 days.      63-year-old female with multiple medical problems presents with pain to tongue.  Denies any fever, trauma, difficulty swallowing, shortness of breath    Review of patient's allergies indicates:  No Known Allergies  Past Medical History:   Diagnosis Date    Anxiety     Breast cancer 2010    Cancer     Breast    COPD (chronic obstructive pulmonary disease)     Depression     Diabetes mellitus     GERD (gastroesophageal reflux disease)     Hypertension     Insomnia     Thyroid disease      Past Surgical History:   Procedure Laterality Date    BLADDER SUSPENSION      BREAST LUMPECTOMY      Right breast    CHOLECYSTECTOMY      HYSTERECTOMY      KNEE ARTHROSCOPY      Right knee    OOPHORECTOMY       Family History   Problem Relation Age of Onset    No Known Problems Mother     No Known Problems Father     No Known Problems Sister     No Known Problems Daughter     No Known Problems Maternal Aunt     No Known Problems Maternal Uncle     No Known Problems Paternal Aunt     No Known Problems Paternal Uncle     No Known Problems Maternal Grandmother     No Known Problems Maternal Grandfather     No Known Problems Paternal Grandmother     No Known Problems Paternal Grandfather     Breast cancer Neg Hx     Ovarian cancer Neg Hx     BRCA 1/2 Neg Hx      Social History     Tobacco Use    Smoking status: Every Day     Packs/day: 0.50     Types: Cigarettes    Smokeless tobacco: Never    Tobacco comments:     quit yesterday 6/12/21   Substance Use Topics    Alcohol use: Not Currently    Drug use: Never     Review of Systems   Constitutional: Negative.    HENT:  Positive for mouth sores.    Respiratory: Negative.     Cardiovascular: Negative.    Gastrointestinal: Negative.    Genitourinary: Negative.    Musculoskeletal: Negative.    Skin: Negative.    Neurological: Negative.     Psychiatric/Behavioral: Negative.     All other systems reviewed and are negative.    Physical Exam     Initial Vitals [06/01/23 0925]   BP Pulse Resp Temp SpO2   133/69 77 16 97.5 °F (36.4 °C) 97 %      MAP       --         Physical Exam    Nursing note and vitals reviewed.  Constitutional: Vital signs are normal. She appears well-developed and well-nourished.   HENT:   Head: Normocephalic and atraumatic.   Abrasion to tip of tongue.  No obvious ulcer laceration.  No Alek.  No peritonsillar abscess.   Eyes: Conjunctivae and lids are normal.   Neck: Trachea normal. Neck supple.   Cardiovascular:  Normal rate, regular rhythm, normal heart sounds, intact distal pulses and normal pulses.           No murmur heard.  Pulmonary/Chest: Breath sounds normal. No respiratory distress.   Abdominal: Abdomen is soft. Bowel sounds are normal.   Musculoskeletal:         General: Normal range of motion.      Cervical back: Neck supple.     Neurological: She is alert and oriented to person, place, and time. She has normal strength. No cranial nerve deficit or sensory deficit.   Skin: Skin is warm. Capillary refill takes less than 2 seconds.   Psychiatric: She has a normal mood and affect. Her speech is normal. Thought content normal.       ED Course   Procedures  Labs Reviewed - No data to display       Imaging Results    None          Medications   LIDOcaine HCl 2% oral solution 5 mL (has no administration in time range)     Medical Decision Making:   Initial Assessment:   Small abrasion to tip of tongue.  No pooling of saliva.  No Alek.  No ulcer.  Will advise symptomatic treatment                        Clinical Impression:   Final diagnoses:  [K14.6] Tongue pain (Primary)        ED Disposition Condition    Discharge Stable          ED Prescriptions    None       Follow-up Information       Follow up With Specialties Details Why Contact Info    Burke Merchant MD Internal Medicine In 2 days  [address]  [place] [address]  [phone]               Akash Dailey MD  06/01/23 0941

## 2023-06-13 ENCOUNTER — HOSPITAL ENCOUNTER (EMERGENCY)
Facility: HOSPITAL | Age: 63
Discharge: HOME | End: 2023-06-13
Attending: STUDENT IN AN ORGANIZED HEALTH CARE EDUCATION/TRAINING PROGRAM
Payer: MEDICARE

## 2023-06-13 VITALS
WEIGHT: 225 LBS | RESPIRATION RATE: 16 BRPM | BODY MASS INDEX: 39.87 KG/M2 | TEMPERATURE: 98 F | SYSTOLIC BLOOD PRESSURE: 136 MMHG | OXYGEN SATURATION: 97 % | HEIGHT: 63 IN | HEART RATE: 72 BPM | DIASTOLIC BLOOD PRESSURE: 66 MMHG

## 2023-06-13 DIAGNOSIS — R10.31 RIGHT LOWER QUADRANT ABDOMINAL PAIN: Primary | ICD-10-CM

## 2023-06-13 LAB
ALBUMIN SERPL BCP-MCNC: 3.3 G/DL (ref 3.5–5.2)
ALP SERPL-CCNC: 99 U/L (ref 55–135)
ALT SERPL W/O P-5'-P-CCNC: 26 U/L (ref 10–44)
ANION GAP SERPL CALC-SCNC: 3 MMOL/L (ref 8–16)
AST SERPL-CCNC: 15 U/L (ref 10–40)
BASOPHILS # BLD AUTO: 0.04 K/UL (ref 0–0.2)
BASOPHILS NFR BLD: 0.5 % (ref 0–1.9)
BILIRUB SERPL-MCNC: 0.5 MG/DL (ref 0.1–1)
BILIRUB UR QL STRIP: NEGATIVE
BUN SERPL-MCNC: 10 MG/DL (ref 8–23)
CALCIUM SERPL-MCNC: 9.5 MG/DL (ref 8.7–10.5)
CHLORIDE SERPL-SCNC: 113 MMOL/L (ref 95–110)
CLARITY UR: CLEAR
CO2 SERPL-SCNC: 23 MMOL/L (ref 23–29)
COLOR UR: YELLOW
CREAT SERPL-MCNC: 0.9 MG/DL (ref 0.5–1.4)
DIFFERENTIAL METHOD: ABNORMAL
EOSINOPHIL # BLD AUTO: 0.2 K/UL (ref 0–0.5)
EOSINOPHIL NFR BLD: 2.8 % (ref 0–8)
ERYTHROCYTE [DISTWIDTH] IN BLOOD BY AUTOMATED COUNT: 16.1 % (ref 11.5–14.5)
EST. GFR  (NO RACE VARIABLE): >60 ML/MIN/1.73 M^2
GLUCOSE SERPL-MCNC: 97 MG/DL (ref 70–110)
GLUCOSE UR QL STRIP: NEGATIVE
HCT VFR BLD AUTO: 37.8 % (ref 37–48.5)
HGB BLD-MCNC: 12.1 G/DL (ref 12–16)
HGB UR QL STRIP: NEGATIVE
IMM GRANULOCYTES # BLD AUTO: 0.01 K/UL (ref 0–0.04)
IMM GRANULOCYTES NFR BLD AUTO: 0.1 % (ref 0–0.5)
KETONES UR QL STRIP: NEGATIVE
LEUKOCYTE ESTERASE UR QL STRIP: NEGATIVE
LIPASE SERPL-CCNC: 82 U/L (ref 23–300)
LYMPHOCYTES # BLD AUTO: 2.5 K/UL (ref 1–4.8)
LYMPHOCYTES NFR BLD: 33.6 % (ref 18–48)
MCH RBC QN AUTO: 26.3 PG (ref 27–31)
MCHC RBC AUTO-ENTMCNC: 32 G/DL (ref 32–36)
MCV RBC AUTO: 82 FL (ref 82–98)
MONOCYTES # BLD AUTO: 0.8 K/UL (ref 0.3–1)
MONOCYTES NFR BLD: 10.8 % (ref 4–15)
NEUTROPHILS # BLD AUTO: 3.9 K/UL (ref 1.8–7.7)
NEUTROPHILS NFR BLD: 52.2 % (ref 38–73)
NITRITE UR QL STRIP: NEGATIVE
NRBC BLD-RTO: 0 /100 WBC
PH UR STRIP: 6 [PH] (ref 5–8)
PLATELET # BLD AUTO: 306 K/UL (ref 150–450)
PMV BLD AUTO: 10.6 FL (ref 9.2–12.9)
POTASSIUM SERPL-SCNC: 3.6 MMOL/L (ref 3.5–5.1)
PROT SERPL-MCNC: 6.7 G/DL (ref 6–8.4)
PROT UR QL STRIP: NEGATIVE
RBC # BLD AUTO: 4.6 M/UL (ref 4–5.4)
SODIUM SERPL-SCNC: 139 MMOL/L (ref 136–145)
SP GR UR STRIP: 1.01 (ref 1–1.03)
URN SPEC COLLECT METH UR: NORMAL
UROBILINOGEN UR STRIP-ACNC: 1 EU/DL
WBC # BLD AUTO: 7.42 K/UL (ref 3.9–12.7)

## 2023-06-13 PROCEDURE — 96372 THER/PROPH/DIAG INJ SC/IM: CPT | Performed by: NURSE PRACTITIONER

## 2023-06-13 PROCEDURE — 36415 COLL VENOUS BLD VENIPUNCTURE: CPT | Performed by: NURSE PRACTITIONER

## 2023-06-13 PROCEDURE — 83690 ASSAY OF LIPASE: CPT | Performed by: NURSE PRACTITIONER

## 2023-06-13 PROCEDURE — 99284 EMERGENCY DEPT VISIT MOD MDM: CPT

## 2023-06-13 PROCEDURE — 63600175 PHARM REV CODE 636 W HCPCS: Performed by: NURSE PRACTITIONER

## 2023-06-13 PROCEDURE — 85025 COMPLETE CBC W/AUTO DIFF WBC: CPT | Performed by: NURSE PRACTITIONER

## 2023-06-13 PROCEDURE — 80053 COMPREHEN METABOLIC PANEL: CPT | Performed by: NURSE PRACTITIONER

## 2023-06-13 PROCEDURE — 81003 URINALYSIS AUTO W/O SCOPE: CPT | Performed by: NURSE PRACTITIONER

## 2023-06-13 RX ORDER — DICLOFENAC SODIUM 75 MG/1
75 TABLET, DELAYED RELEASE ORAL 2 TIMES DAILY
Qty: 8 TABLET | Refills: 0 | Status: SHIPPED | OUTPATIENT
Start: 2023-06-13 | End: 2023-06-17

## 2023-06-13 RX ORDER — KETOROLAC TROMETHAMINE 30 MG/ML
30 INJECTION, SOLUTION INTRAMUSCULAR; INTRAVENOUS
Status: COMPLETED | OUTPATIENT
Start: 2023-06-13 | End: 2023-06-13

## 2023-06-13 RX ADMIN — KETOROLAC TROMETHAMINE 30 MG: 30 INJECTION, SOLUTION INTRAMUSCULAR; INTRAVENOUS at 05:06

## 2023-06-14 NOTE — DISCHARGE INSTRUCTIONS
Take medication as directed.  It is important that you see your primary doctor for further evaluation of your ongoing abdominal pain.  Return to the emergency department for worsening condition.

## 2023-06-30 ENCOUNTER — TELEPHONE (OUTPATIENT)
Dept: OBSTETRICS AND GYNECOLOGY | Facility: CLINIC | Age: 63
End: 2023-06-30
Payer: MEDICARE

## 2023-06-30 NOTE — TELEPHONE ENCOUNTER
Attempted to call pt regarding appt on 7/12/23. Would like her to come in closer to 9:30, as dr rodriguez will be in a meeting until then

## 2023-07-03 ENCOUNTER — TELEPHONE (OUTPATIENT)
Dept: OBSTETRICS AND GYNECOLOGY | Facility: CLINIC | Age: 63
End: 2023-07-03
Payer: MEDICARE

## 2023-08-03 ENCOUNTER — OFFICE VISIT (OUTPATIENT)
Dept: OBSTETRICS AND GYNECOLOGY | Facility: CLINIC | Age: 63
End: 2023-08-03
Payer: MEDICARE

## 2023-08-03 VITALS
SYSTOLIC BLOOD PRESSURE: 125 MMHG | DIASTOLIC BLOOD PRESSURE: 60 MMHG | BODY MASS INDEX: 39.51 KG/M2 | HEIGHT: 63 IN | HEART RATE: 68 BPM | WEIGHT: 223 LBS

## 2023-08-03 DIAGNOSIS — Z90.710 HISTORY OF HYSTERECTOMY: ICD-10-CM

## 2023-08-03 DIAGNOSIS — Z01.419 ENCOUNTER FOR ANNUAL ROUTINE GYNECOLOGICAL EXAMINATION: Primary | ICD-10-CM

## 2023-08-03 DIAGNOSIS — E11.69 TYPE 2 DIABETES MELLITUS WITH OTHER SPECIFIED COMPLICATION, WITHOUT LONG-TERM CURRENT USE OF INSULIN: ICD-10-CM

## 2023-08-03 DIAGNOSIS — I10 PRIMARY HYPERTENSION: ICD-10-CM

## 2023-08-03 DIAGNOSIS — Z12.72 SPECIAL SCREENING FOR MALIGNANT NEOPLASMS, VAGINA: ICD-10-CM

## 2023-08-03 DIAGNOSIS — Z12.31 SCREENING MAMMOGRAM, ENCOUNTER FOR: ICD-10-CM

## 2023-08-03 PROCEDURE — 1159F PR MEDICATION LIST DOCUMENTED IN MEDICAL RECORD: ICD-10-PCS | Mod: CPTII,S$GLB,, | Performed by: OBSTETRICS & GYNECOLOGY

## 2023-08-03 PROCEDURE — 1160F RVW MEDS BY RX/DR IN RCRD: CPT | Mod: CPTII,S$GLB,, | Performed by: OBSTETRICS & GYNECOLOGY

## 2023-08-03 PROCEDURE — G0101 CA SCREEN;PELVIC/BREAST EXAM: HCPCS | Mod: S$GLB,,, | Performed by: OBSTETRICS & GYNECOLOGY

## 2023-08-03 PROCEDURE — 1159F MED LIST DOCD IN RCRD: CPT | Mod: CPTII,S$GLB,, | Performed by: OBSTETRICS & GYNECOLOGY

## 2023-08-03 PROCEDURE — 99999 PR PBB SHADOW E&M-EST. PATIENT-LVL III: ICD-10-PCS | Mod: PBBFAC,,, | Performed by: OBSTETRICS & GYNECOLOGY

## 2023-08-03 PROCEDURE — 99999 PR PBB SHADOW E&M-EST. PATIENT-LVL III: CPT | Mod: PBBFAC,,, | Performed by: OBSTETRICS & GYNECOLOGY

## 2023-08-03 PROCEDURE — G0101 PR CA SCREEN;PELVIC/BREAST EXAM: ICD-10-PCS | Mod: S$GLB,,, | Performed by: OBSTETRICS & GYNECOLOGY

## 2023-08-03 PROCEDURE — 3074F PR MOST RECENT SYSTOLIC BLOOD PRESSURE < 130 MM HG: ICD-10-PCS | Mod: CPTII,S$GLB,, | Performed by: OBSTETRICS & GYNECOLOGY

## 2023-08-03 PROCEDURE — 3074F SYST BP LT 130 MM HG: CPT | Mod: CPTII,S$GLB,, | Performed by: OBSTETRICS & GYNECOLOGY

## 2023-08-03 PROCEDURE — 1160F PR REVIEW ALL MEDS BY PRESCRIBER/CLIN PHARMACIST DOCUMENTED: ICD-10-PCS | Mod: CPTII,S$GLB,, | Performed by: OBSTETRICS & GYNECOLOGY

## 2023-08-03 PROCEDURE — 3078F PR MOST RECENT DIASTOLIC BLOOD PRESSURE < 80 MM HG: ICD-10-PCS | Mod: CPTII,S$GLB,, | Performed by: OBSTETRICS & GYNECOLOGY

## 2023-08-03 PROCEDURE — 3008F BODY MASS INDEX DOCD: CPT | Mod: CPTII,S$GLB,, | Performed by: OBSTETRICS & GYNECOLOGY

## 2023-08-03 PROCEDURE — 3078F DIAST BP <80 MM HG: CPT | Mod: CPTII,S$GLB,, | Performed by: OBSTETRICS & GYNECOLOGY

## 2023-08-03 PROCEDURE — 3008F PR BODY MASS INDEX (BMI) DOCUMENTED: ICD-10-PCS | Mod: CPTII,S$GLB,, | Performed by: OBSTETRICS & GYNECOLOGY

## 2023-08-03 RX ORDER — HYDROGEN PEROXIDE 3 %
20 SOLUTION, NON-ORAL MISCELLANEOUS
COMMUNITY

## 2023-08-03 NOTE — PROGRESS NOTES
Subjective:    Patient ID: Vero Allen is a 63 y.o. female.     Chief Complaint: Annual Well Woman Exam     History of Present Illness:  Vero SARGENT presents today for Annual Well Woman exam. .No LMP recorded. Patient has had a hysterectomy.. She is currently using no hormone therapy and she reports no problems with hot flashes, night sweats, irritability or vaginal dryness. She denies breast tenderness, denies masses, denies nipple discharge. She denies difficulty with urination. Bowel movements have not significantly changed.    States her boyfriend  in February and says she has been coping fairly well       Menstrual History:   No LMP recorded. Patient has had a hysterectomy..     OB History          0    Para   0    Term   0       0    AB   0    Living   0         SAB   0    IAB   0    Ectopic   0    Multiple   0    Live Births   0                 Review of Systems   Constitutional:  Negative for chills, fatigue and fever.   Respiratory:  Negative for shortness of breath.    Cardiovascular:  Negative for chest pain.   Gastrointestinal:  Negative for abdominal pain, constipation, diarrhea and nausea.   Genitourinary:  Negative for bladder incontinence, dysuria, hot flashes, pelvic pain and vaginal bleeding.   Neurological:  Negative for headaches.   Psychiatric/Behavioral:  Negative for depression.          Objective:    Vital Signs:  Vitals:    23 0836   BP: 125/60   Pulse: 68       Physical Exam:  General:  alert, no distress, obese   Skin:  Skin color, texture, turgor normal. No rashes or lesions   HEENT:  conjunctivae/corneas clear. PERRL.   Neck: supple, trachea midline, no adenopathy or thyromegaly   Respiratory:  clear to auscultation bilaterally   Heart:  regular rate and rhythm, S1, S2 normal, no murmur, click, rub or gallop   Breasts: Left Breast: Nipple protruding. No nipple discharge. No palpable masses, erythema, skin changes, tenderness, or adenopathy.  Right Breast:  Nipple protruding. No nipple discharge. No palpable masses, erythema, skin changes, tenderness, or adenopathy.   Abdomen:  Soft, non-tender. Bowel sounds normal. No masses,  no organomegaly   Pelvis: External genitalia: normal general appearance  Urinary system: urethral meatus normal, bladder nontender  Vaginal: normal mucosa without prolapse or lesions  Cervix: removed surgically  Uterus: removed surgically  Adnexa: normal bimanual exam   Extremities: Normal ROM; no edema, no cyanosis   Neurological: Normal strength and tone. No focal numbness or weakness. Reflexes 2+ and equal.   Psychiatric: normal mood, speech, dress, and thought processes             Assessment:      1. Encounter for annual routine gynecological examination    2. Special screening for malignant neoplasms, vagina    3. Screening mammogram, encounter for    4. History of hysterectomy    5. Primary hypertension    6. Type 2 diabetes mellitus with other specified complication, without long-term current use of insulin          Plan:      Encounter for annual routine gynecological examination    Special screening for malignant neoplasms, vagina  -     Liquid-Based Pap Smear, Screening    Screening mammogram, encounter for  -     Mammo Digital Screening Bilat w/ Silviano; Future; Expected date: 08/03/2023    History of hysterectomy    Primary hypertension    Type 2 diabetes mellitus with other specified complication, without long-term current use of insulin        Health Maintenance and Screening:   Vero SARGENT was counseled on A.C.O.G. Pap guidelines and recommendations for yearly pelvic exams in addition to recommendations for yearly mammograms and monthly self breast exams, and adequate calcium and vitamin D in her diet.     A discussion of needed Health Maintenance Screening was done with Vero SARGENT. She was counseled that she is overdue for Mammogram: She will schedule: today.    Sagrario Carrera MD FACOG    08/03/2023  9:03 AM

## 2023-08-06 ENCOUNTER — HOSPITAL ENCOUNTER (EMERGENCY)
Facility: HOSPITAL | Age: 63
Discharge: HOME | End: 2023-08-06
Attending: STUDENT IN AN ORGANIZED HEALTH CARE EDUCATION/TRAINING PROGRAM
Payer: MEDICARE

## 2023-08-06 VITALS
DIASTOLIC BLOOD PRESSURE: 91 MMHG | HEART RATE: 79 BPM | HEIGHT: 63 IN | SYSTOLIC BLOOD PRESSURE: 145 MMHG | WEIGHT: 224 LBS | TEMPERATURE: 98 F | BODY MASS INDEX: 39.69 KG/M2 | RESPIRATION RATE: 16 BRPM | OXYGEN SATURATION: 99 %

## 2023-08-06 DIAGNOSIS — L30.4 INTERTRIGO: Primary | ICD-10-CM

## 2023-08-06 PROCEDURE — 99283 EMERGENCY DEPT VISIT LOW MDM: CPT

## 2023-08-06 RX ORDER — CLOTRIMAZOLE AND BETAMETHASONE DIPROPIONATE 10; .64 MG/G; MG/G
CREAM TOPICAL 2 TIMES DAILY
Qty: 30 G | Refills: 0 | Status: SHIPPED | OUTPATIENT
Start: 2023-08-06

## 2023-08-06 NOTE — DISCHARGE INSTRUCTIONS
Clean and dry site prior to applying cream. Use of barrier cream (A&D, armando's butt paste). Follow up with PCP if worsens

## 2023-08-06 NOTE — ED PROVIDER NOTES
Encounter Date: 8/6/2023       History     Chief Complaint   Patient presents with    Skin Problem     Patient report ringworm to right axilla x 1 day.     63-year-old female with complaints of a skin irritation right axilla x1 day.  No associated itching.      Review of patient's allergies indicates:  No Known Allergies  Past Medical History:   Diagnosis Date    Anxiety     Breast cancer 2010    Cancer     Breast    COPD (chronic obstructive pulmonary disease)     Depression     Diabetes mellitus     GERD (gastroesophageal reflux disease)     Hypertension     Insomnia     Thyroid disease      Past Surgical History:   Procedure Laterality Date    BLADDER SUSPENSION      BREAST LUMPECTOMY      Right breast    CHOLECYSTECTOMY      HYSTERECTOMY      KNEE ARTHROSCOPY      Right knee    OOPHORECTOMY       Family History   Problem Relation Age of Onset    No Known Problems Mother     No Known Problems Father     No Known Problems Sister     No Known Problems Daughter     No Known Problems Maternal Aunt     No Known Problems Maternal Uncle     No Known Problems Paternal Aunt     No Known Problems Paternal Uncle     No Known Problems Maternal Grandmother     No Known Problems Maternal Grandfather     No Known Problems Paternal Grandmother     No Known Problems Paternal Grandfather     Breast cancer Neg Hx     Ovarian cancer Neg Hx     BRCA 1/2 Neg Hx      Social History     Tobacco Use    Smoking status: Every Day     Current packs/day: 0.50     Types: Cigarettes    Smokeless tobacco: Never    Tobacco comments:     quit yesterday 6/12/21   Substance Use Topics    Alcohol use: Not Currently    Drug use: Never     Review of Systems   Constitutional:  Negative for fever.   HENT:  Negative for sore throat.    Respiratory:  Negative for shortness of breath.    Cardiovascular:  Negative for chest pain.   Gastrointestinal:  Negative for nausea.   Genitourinary:  Negative for dysuria.   Musculoskeletal:  Negative for back pain.    Skin:  Positive for rash.        Red irritated skin of right axilla   Neurological:  Negative for weakness.   Hematological:  Does not bruise/bleed easily.       Physical Exam     Initial Vitals [08/06/23 1144]   BP Pulse Resp Temp SpO2   (!) 145/91 79 16 97.9 °F (36.6 °C) 99 %      MAP       --         Physical Exam    Nursing note and vitals reviewed.  Constitutional: Vital signs are normal. She appears well-developed and well-nourished. She is cooperative.   HENT:   Head: Normocephalic and atraumatic.   Right Ear: Hearing and external ear normal.   Left Ear: Hearing and external ear normal.   Nose: Nose normal.   Mouth/Throat: Uvula is midline, oropharynx is clear and moist and mucous membranes are normal.   Eyes: Conjunctivae, EOM and lids are normal. Pupils are equal, round, and reactive to light.   Neck: Trachea normal. Neck supple.   Normal range of motion.   Full passive range of motion without pain.     Cardiovascular:  Normal rate, regular rhythm, S1 normal, S2 normal, normal heart sounds and normal pulses.           Pulmonary/Chest: Effort normal and breath sounds normal.   Musculoskeletal:      Cervical back: Full passive range of motion without pain, normal range of motion and neck supple.     Neurological: She is alert.   Skin: Skin is warm. Capillary refill takes less than 2 seconds. Rash noted. Rash is macular.         ED Course   Procedures  Labs Reviewed - No data to display       Imaging Results    None          Medications - No data to display  Medical Decision Making:   Differential Diagnosis:   Intertrigo, candidiasis  ED Management:  At the history physical exam discussed with patient that I believe that the have intertrigo.  Will start patient on a course of Lotrisone.                          Clinical Impression:   Final diagnoses:  [L30.4] Intertrigo (Primary)        ED Disposition Condition    Discharge Stable          ED Prescriptions       Medication Sig Dispense Start Date End Date  Auth. Provider    clotrimazole-betamethasone 1-0.05% (LOTRISONE) cream Apply topically 2 (two) times daily. 30 g 8/6/2023 -- Rambo Rodriguez III, NP          Follow-up Information       Follow up With Specialties Details Why Contact Info    Burke Merchant MD Internal Medicine In 1 week If symptoms worsen [address] [facility] [address]  [facility] [address]  [phone]               Rambo Rodriguez III, NP  08/06/23 1200

## 2023-08-14 LAB
HPV16+18+H RISK 12 DNA CVX-IMP: NORMAL
LIQUID BASED PAP SMEAR, SCREENING: NORMAL

## 2023-08-18 ENCOUNTER — HOSPITAL ENCOUNTER (OUTPATIENT)
Dept: RADIOLOGY | Facility: HOSPITAL | Age: 63
Discharge: HOME | End: 2023-08-18
Attending: OBSTETRICS & GYNECOLOGY
Payer: MEDICARE

## 2023-08-18 VITALS — WEIGHT: 224 LBS | BODY MASS INDEX: 39.69 KG/M2 | HEIGHT: 63 IN

## 2023-08-18 DIAGNOSIS — Z12.31 SCREENING MAMMOGRAM, ENCOUNTER FOR: ICD-10-CM

## 2023-08-18 PROCEDURE — 77067 SCR MAMMO BI INCL CAD: CPT | Mod: TC

## 2023-09-07 ENCOUNTER — HOSPITAL ENCOUNTER (OUTPATIENT)
Dept: RADIOLOGY | Facility: HOSPITAL | Age: 63
Discharge: HOME | End: 2023-09-07
Attending: INTERNAL MEDICINE
Payer: MEDICARE

## 2023-09-07 DIAGNOSIS — M54.50 LOW BACK PAIN: ICD-10-CM

## 2023-09-07 DIAGNOSIS — M54.50 LOW BACK PAIN: Primary | ICD-10-CM

## 2023-09-07 PROCEDURE — 72114 X-RAY EXAM L-S SPINE BENDING: CPT | Mod: TC

## 2023-09-07 PROCEDURE — 72070 X-RAY EXAM THORAC SPINE 2VWS: CPT | Mod: TC

## 2023-11-26 ENCOUNTER — HOSPITAL ENCOUNTER (EMERGENCY)
Facility: HOSPITAL | Age: 63
Discharge: HOME | End: 2023-11-26
Attending: EMERGENCY MEDICINE
Payer: MEDICARE

## 2023-11-26 VITALS
BODY MASS INDEX: 41.64 KG/M2 | OXYGEN SATURATION: 96 % | DIASTOLIC BLOOD PRESSURE: 63 MMHG | HEIGHT: 63 IN | WEIGHT: 235 LBS | TEMPERATURE: 98 F | SYSTOLIC BLOOD PRESSURE: 131 MMHG | RESPIRATION RATE: 18 BRPM | HEART RATE: 67 BPM

## 2023-11-26 DIAGNOSIS — R07.89 CHEST WALL PAIN: Primary | ICD-10-CM

## 2023-11-26 DIAGNOSIS — F41.9 ANXIETY: ICD-10-CM

## 2023-11-26 DIAGNOSIS — J44.9 CHRONIC OBSTRUCTIVE PULMONARY DISEASE, UNSPECIFIED COPD TYPE: ICD-10-CM

## 2023-11-26 DIAGNOSIS — R07.9 CHEST PAIN: ICD-10-CM

## 2023-11-26 LAB
ALBUMIN SERPL BCP-MCNC: 3.2 G/DL (ref 3.5–5.2)
ALP SERPL-CCNC: 93 U/L (ref 55–135)
ALT SERPL W/O P-5'-P-CCNC: 20 U/L (ref 10–44)
ANION GAP SERPL CALC-SCNC: 3 MMOL/L (ref 3–11)
AST SERPL-CCNC: 13 U/L (ref 10–40)
BASOPHILS # BLD AUTO: 0.05 K/UL (ref 0–0.2)
BASOPHILS NFR BLD: 0.5 % (ref 0–1.9)
BILIRUB SERPL-MCNC: 0.3 MG/DL (ref 0.1–1)
BUN SERPL-MCNC: 14 MG/DL (ref 8–23)
CALCIUM SERPL-MCNC: 9.5 MG/DL (ref 8.7–10.5)
CHLORIDE SERPL-SCNC: 107 MMOL/L (ref 95–110)
CO2 SERPL-SCNC: 29 MMOL/L (ref 23–29)
CREAT SERPL-MCNC: 0.9 MG/DL (ref 0.5–1.4)
DIFFERENTIAL METHOD: ABNORMAL
EOSINOPHIL # BLD AUTO: 0.2 K/UL (ref 0–0.5)
EOSINOPHIL NFR BLD: 2.4 % (ref 0–8)
ERYTHROCYTE [DISTWIDTH] IN BLOOD BY AUTOMATED COUNT: 20.1 % (ref 11.5–14.5)
EST. GFR  (NO RACE VARIABLE): >60 ML/MIN/1.73 M^2
GLUCOSE SERPL-MCNC: 100 MG/DL (ref 70–110)
HCT VFR BLD AUTO: 42.3 % (ref 37–48.5)
HGB BLD-MCNC: 13.7 G/DL (ref 12–16)
IMM GRANULOCYTES # BLD AUTO: 0.03 K/UL (ref 0–0.04)
IMM GRANULOCYTES NFR BLD AUTO: 0.3 % (ref 0–0.5)
LYMPHOCYTES # BLD AUTO: 2.6 K/UL (ref 1–4.8)
LYMPHOCYTES NFR BLD: 26.5 % (ref 18–48)
MCH RBC QN AUTO: 27.6 PG (ref 27–31)
MCHC RBC AUTO-ENTMCNC: 32.4 G/DL (ref 32–36)
MCV RBC AUTO: 85 FL (ref 82–98)
MONOCYTES # BLD AUTO: 0.9 K/UL (ref 0.3–1)
MONOCYTES NFR BLD: 9.5 % (ref 4–15)
NEUTROPHILS # BLD AUTO: 5.9 K/UL (ref 1.8–7.7)
NEUTROPHILS NFR BLD: 60.8 % (ref 38–73)
NRBC BLD-RTO: 0 /100 WBC
NT-PROBNP SERPL-MCNC: 94 PG/ML (ref 5–900)
PLATELET # BLD AUTO: 261 K/UL (ref 150–450)
PMV BLD AUTO: 10.4 FL (ref 9.2–12.9)
POTASSIUM SERPL-SCNC: 4.1 MMOL/L (ref 3.5–5.1)
PROT SERPL-MCNC: 6.9 G/DL (ref 6–8.4)
RBC # BLD AUTO: 4.96 M/UL (ref 4–5.4)
SODIUM SERPL-SCNC: 139 MMOL/L (ref 136–145)
TROPONIN I SERPL DL<=0.01 NG/ML-MCNC: 6.4 PG/ML (ref 0–60)
TROPONIN I SERPL DL<=0.01 NG/ML-MCNC: 7 PG/ML (ref 0–60)
WBC # BLD AUTO: 9.74 K/UL (ref 3.9–12.7)

## 2023-11-26 PROCEDURE — 85025 COMPLETE CBC W/AUTO DIFF WBC: CPT | Performed by: NURSE PRACTITIONER

## 2023-11-26 PROCEDURE — 80053 COMPREHEN METABOLIC PANEL: CPT | Performed by: NURSE PRACTITIONER

## 2023-11-26 PROCEDURE — 96374 THER/PROPH/DIAG INJ IV PUSH: CPT

## 2023-11-26 PROCEDURE — 93010 EKG 12-LEAD: ICD-10-PCS | Mod: ,,, | Performed by: INTERNAL MEDICINE

## 2023-11-26 PROCEDURE — 99285 EMERGENCY DEPT VISIT HI MDM: CPT | Mod: 25

## 2023-11-26 PROCEDURE — 93005 ELECTROCARDIOGRAM TRACING: CPT

## 2023-11-26 PROCEDURE — 63600175 PHARM REV CODE 636 W HCPCS: Performed by: NURSE PRACTITIONER

## 2023-11-26 PROCEDURE — 96375 TX/PRO/DX INJ NEW DRUG ADDON: CPT

## 2023-11-26 PROCEDURE — 93010 ELECTROCARDIOGRAM REPORT: CPT | Mod: ,,, | Performed by: INTERNAL MEDICINE

## 2023-11-26 PROCEDURE — 84484 ASSAY OF TROPONIN QUANT: CPT | Performed by: NURSE PRACTITIONER

## 2023-11-26 PROCEDURE — 83880 ASSAY OF NATRIURETIC PEPTIDE: CPT | Performed by: NURSE PRACTITIONER

## 2023-11-26 PROCEDURE — 36415 COLL VENOUS BLD VENIPUNCTURE: CPT | Performed by: NURSE PRACTITIONER

## 2023-11-26 RX ORDER — ALBUTEROL SULFATE 90 UG/1
1-2 AEROSOL, METERED RESPIRATORY (INHALATION) EVERY 6 HOURS PRN
Qty: 8 G | Refills: 0 | Status: SHIPPED | OUTPATIENT
Start: 2023-11-26

## 2023-11-26 RX ORDER — IPRATROPIUM BROMIDE AND ALBUTEROL SULFATE 2.5; .5 MG/3ML; MG/3ML
3 SOLUTION RESPIRATORY (INHALATION) EVERY 6 HOURS PRN
Qty: 75 ML | Refills: 0 | Status: SHIPPED | OUTPATIENT
Start: 2023-11-26 | End: 2023-12-21

## 2023-11-26 RX ORDER — LORAZEPAM 2 MG/ML
0.5 INJECTION INTRAMUSCULAR
Status: COMPLETED | OUTPATIENT
Start: 2023-11-26 | End: 2023-11-26

## 2023-11-26 RX ORDER — DEXAMETHASONE SODIUM PHOSPHATE 4 MG/ML
4 INJECTION, SOLUTION INTRA-ARTICULAR; INTRALESIONAL; INTRAMUSCULAR; INTRAVENOUS; SOFT TISSUE
Status: COMPLETED | OUTPATIENT
Start: 2023-11-26 | End: 2023-11-26

## 2023-11-26 RX ADMIN — DEXAMETHASONE SODIUM PHOSPHATE 4 MG: 4 INJECTION INTRA-ARTICULAR; INTRALESIONAL; INTRAMUSCULAR; INTRAVENOUS; SOFT TISSUE at 03:11

## 2023-11-26 RX ADMIN — LORAZEPAM 0.5 MG: 2 INJECTION INTRAMUSCULAR; INTRAVENOUS at 03:11

## 2023-11-26 NOTE — DISCHARGE INSTRUCTIONS
Continue use of nebulizer/albuterol at home as directed.  Follow-up with your primary doctor in 1-2 days and return to the emergency department for worsening symptoms.

## 2023-11-26 NOTE — ED PROVIDER NOTES
Encounter Date: 11/26/2023       History     Chief Complaint   Patient presents with    Chest Pain     Midsternal chest pain, non radiating. Began at rest. Pain is described as tightness. Onset 1 hour ago while relaxing.      This is a 63-year-old white female with multiple medical problems including hypertension, diabetes mellitus type 2, COPD, and anxiety who presents to the emergency department with complaints of chest pain that began 1 hour prior to arrival.  Patient reports that she was resting in a chair when she developed gradual onset midsternal chest pain, characterized as sharp/tight and constant.  Patient states that pain is seemingly worse with movement and position changes, however no other specific exacerbating or relenting features.  She denies headache, vision changes, cough, URI signs and symptoms, shortness of breath, nausea/vomiting, abdominal pain, or diarrhea.  She denies palpitations or peripheral swelling.  She has experienced similar symptoms in the past with anxiety.      Review of patient's allergies indicates:  No Known Allergies  Past Medical History:   Diagnosis Date    Anxiety     Breast cancer 2010    Cancer     Breast    COPD (chronic obstructive pulmonary disease)     Depression     Diabetes mellitus     GERD (gastroesophageal reflux disease)     Hypertension     Insomnia     Thyroid disease      Past Surgical History:   Procedure Laterality Date    BLADDER SUSPENSION      BREAST LUMPECTOMY      Right breast    CHOLECYSTECTOMY      HYSTERECTOMY      KNEE ARTHROSCOPY      Right knee    OOPHORECTOMY       Family History   Problem Relation Age of Onset    No Known Problems Mother     No Known Problems Father     No Known Problems Sister     No Known Problems Daughter     No Known Problems Maternal Aunt     No Known Problems Maternal Uncle     No Known Problems Paternal Aunt     No Known Problems Paternal Uncle     No Known Problems Maternal Grandmother     No Known Problems Maternal  Grandfather     No Known Problems Paternal Grandmother     No Known Problems Paternal Grandfather     Breast cancer Neg Hx     Ovarian cancer Neg Hx     BRCA 1/2 Neg Hx      Social History     Tobacco Use    Smoking status: Every Day     Current packs/day: 0.50     Types: Cigarettes    Smokeless tobacco: Never    Tobacco comments:     quit yesterday 6/12/21   Substance Use Topics    Alcohol use: Not Currently    Drug use: Never     Review of Systems   Constitutional:  Negative for appetite change and fever.   HENT:  Negative for congestion and rhinorrhea.    Respiratory:  Positive for chest tightness. Negative for cough and shortness of breath.    Cardiovascular:  Positive for chest pain. Negative for palpitations and leg swelling.   Gastrointestinal:  Negative for abdominal pain, diarrhea and vomiting.   Neurological:  Negative for dizziness, syncope, weakness, light-headedness and headaches.       Physical Exam     Initial Vitals   BP Pulse Resp Temp SpO2   11/26/23 1435 11/26/23 1433 11/26/23 1433 11/26/23 1433 11/26/23 1433   (!) 163/81 75 20 97.9 °F (36.6 °C) 97 %      MAP       --                Physical Exam    Nursing note and vitals reviewed.  Constitutional: She appears well-developed and well-nourished. She is active. No distress.   HENT:   Head: Normocephalic and atraumatic.   Mouth/Throat: Oropharynx is clear and moist.   Eyes: EOM are normal. Pupils are equal, round, and reactive to light.   Neck: Neck supple.   Normal range of motion.  Cardiovascular:  Normal rate and regular rhythm.           Pulmonary/Chest: No respiratory distress. She has wheezes (mild expiratory wheezing bilaterally).   Abdominal: Abdomen is soft. Bowel sounds are normal. She exhibits no distension.   Musculoskeletal:         General: No edema.      Cervical back: Normal range of motion and neck supple.     Neurological: She is alert and oriented to person, place, and time. GCS score is 15. GCS eye subscore is 4. GCS verbal  subscore is 5. GCS motor subscore is 6.   Skin: Skin is warm and dry. Capillary refill takes less than 2 seconds.   Psychiatric: Her behavior is normal. Thought content normal.   Patient is tearful, appears anxious         ED Course   Procedures  Labs Reviewed   CBC W/ AUTO DIFFERENTIAL - Abnormal; Notable for the following components:       Result Value    RDW 20.1 (*)     All other components within normal limits   COMPREHENSIVE METABOLIC PANEL - Abnormal; Notable for the following components:    Albumin 3.2 (*)     All other components within normal limits   TROPONIN I HIGH SENSITIVITY   NT-PRO NATRIURETIC PEPTIDE   TROPONIN I HIGH SENSITIVITY     EKG Readings: (Independently Interpreted)   Initial Reading: No STEMI. Rhythm: Normal Sinus Rhythm. Heart Rate: 68. Ectopy: No Ectopy. ST Segments: Normal ST Segments.       Imaging Results              X-Ray Chest AP Portable (Final result)  Result time 11/26/23 15:04:43      Final result by Marshall Whitehead MD (11/26/23 15:04:43)                   Impression:      No acute findings.      Electronically signed by: Marshall Whitehead MD  Date:    11/26/2023  Time:    15:04               Narrative:    EXAMINATION:  XR CHEST AP PORTABLE    CLINICAL HISTORY:  Chest Pain;    COMPARISON:  Chest x-ray 12/25/2022.    FINDINGS:  Prominent AP cardiac silhouette.  No focal airspace disease.  No pleural fluid.                                       Medications   LORazepam injection 0.5 mg (0.5 mg Intravenous Given 11/26/23 1553)   dexAMETHasone injection 4 mg (4 mg Intravenous Given 11/26/23 1553)     Medical Decision Making  Amount and/or Complexity of Data Reviewed  Labs: ordered.  Radiology: ordered.    Risk  Prescription drug management.               ED Course as of 11/26/23 1734   Sun Nov 26, 2023   1733 Initial labs relatively unremarkable, normal WBCs, normal BNP, and no electrolyte disturbances.  Initial troponin is 6.4.  Given patient developed symptoms within an hour of  arrival to ED repeat troponin was obtained and resulted at 7.0.  No acute findings on chest x-ray.  Given patient's atypical chest pain and associated wheezing likely underlying COPD exacerbation versus musculoskeletal pain complicated by underlying anxiety.  Patient reports resolution of symptoms after receiving anti-inflammatory antianxiety medication. [CB]      ED Course User Index  [CB] Leila Saenz NP                        Clinical Impression:  Final diagnoses:  [R07.9] Chest pain  [R07.89] Chest wall pain (Primary)  [J44.9] Chronic obstructive pulmonary disease, unspecified COPD type  [F41.9] Anxiety          ED Disposition Condition    Discharge Stable          ED Prescriptions    None       Follow-up Information       Follow up With Specialties Details Why Contact Info    Burke Merchant MD Internal Medicine Schedule an appointment as soon as possible for a visit in 2 days for re-evaluation of today's complaint [address]  [facility] [address]  [phone]               Leila Saenz NP  11/26/23 9606

## 2023-12-12 ENCOUNTER — HOSPITAL ENCOUNTER (EMERGENCY)
Facility: HOSPITAL | Age: 63
Discharge: HOME | End: 2023-12-12
Attending: INTERNAL MEDICINE
Payer: MEDICARE

## 2023-12-12 VITALS
BODY MASS INDEX: 44.3 KG/M2 | HEIGHT: 63 IN | DIASTOLIC BLOOD PRESSURE: 80 MMHG | WEIGHT: 250 LBS | TEMPERATURE: 98 F | OXYGEN SATURATION: 99 % | RESPIRATION RATE: 16 BRPM | SYSTOLIC BLOOD PRESSURE: 165 MMHG | HEART RATE: 74 BPM

## 2023-12-12 DIAGNOSIS — H60.501 ACUTE OTITIS EXTERNA OF RIGHT EAR, UNSPECIFIED TYPE: Primary | ICD-10-CM

## 2023-12-12 DIAGNOSIS — H65.193 ACUTE MIDDLE EAR EFFUSION, BILATERAL: ICD-10-CM

## 2023-12-12 PROCEDURE — 99283 EMERGENCY DEPT VISIT LOW MDM: CPT

## 2023-12-12 RX ORDER — CETIRIZINE HYDROCHLORIDE 10 MG/1
10 TABLET ORAL DAILY
Qty: 10 TABLET | Refills: 0 | Status: SHIPPED | OUTPATIENT
Start: 2023-12-12 | End: 2023-12-22

## 2023-12-12 RX ORDER — NEOMYCIN SULFATE, POLYMYXIN B SULFATE AND HYDROCORTISONE 10; 3.5; 1 MG/ML; MG/ML; [USP'U]/ML
3 SUSPENSION/ DROPS AURICULAR (OTIC) 3 TIMES DAILY
Qty: 6 ML | Refills: 0 | Status: SHIPPED | OUTPATIENT
Start: 2023-12-12 | End: 2023-12-22

## 2023-12-12 NOTE — ED PROVIDER NOTES
"Encounter Date: 12/12/2023       History     Chief Complaint   Patient presents with    Ear Problem     Pt stated that since earlier this morning she is experiencing right ear fullness like there is a "bump" inside. Denied drainage.      This is a 63-year-old white female with a history of anxiety, hypertension, smoker who presents to the emergency department with complaints of right ear fullness that began today.  Patient suspects that something may have called into her ear she has been experiencing relatively constant right ear pain and muffled hearing.  She denies recent/current URI, fever, or known injury.  She denies drainage from the ear.      Review of patient's allergies indicates:  No Known Allergies  Past Medical History:   Diagnosis Date    Anxiety     Breast cancer 2010    Cancer     Breast    COPD (chronic obstructive pulmonary disease)     Depression     Diabetes mellitus     GERD (gastroesophageal reflux disease)     Hypertension     Insomnia     Thyroid disease      Past Surgical History:   Procedure Laterality Date    BLADDER SUSPENSION      BREAST LUMPECTOMY      Right breast    CHOLECYSTECTOMY      HYSTERECTOMY      KNEE ARTHROSCOPY      Right knee    OOPHORECTOMY       Family History   Problem Relation Age of Onset    No Known Problems Mother     No Known Problems Father     No Known Problems Sister     No Known Problems Daughter     No Known Problems Maternal Aunt     No Known Problems Maternal Uncle     No Known Problems Paternal Aunt     No Known Problems Paternal Uncle     No Known Problems Maternal Grandmother     No Known Problems Maternal Grandfather     No Known Problems Paternal Grandmother     No Known Problems Paternal Grandfather     Breast cancer Neg Hx     Ovarian cancer Neg Hx     BRCA 1/2 Neg Hx      Social History     Tobacco Use    Smoking status: Every Day     Current packs/day: 0.50     Types: Cigarettes    Smokeless tobacco: Never    Tobacco comments:     quit yesterday " 6/12/21   Substance Use Topics    Alcohol use: Not Currently    Drug use: Never     Review of Systems   Constitutional:  Negative for appetite change and fever.   HENT:  Positive for ear pain. Negative for congestion, ear discharge and rhinorrhea.    Respiratory:  Negative for cough.    Gastrointestinal:  Negative for diarrhea and vomiting.       Physical Exam     Initial Vitals   BP Pulse Resp Temp SpO2   12/12/23 1709 12/12/23 1708 12/12/23 1708 12/12/23 1708 12/12/23 1708   (!) 165/80 74 16 97.9 °F (36.6 °C) 99 %      MAP       --                Physical Exam    Nursing note and vitals reviewed.  Constitutional: She appears well-developed and well-nourished. She is active. No distress.   HENT:   Head: Normocephalic and atraumatic.   Right Ear: There is swelling (erythema and swelling to right ear canal) and tenderness. Tympanic membrane is not erythematous, not retracted and not bulging. A middle ear effusion (clear) is present.   Left Ear: No swelling or tenderness. Tympanic membrane is not erythematous, not retracted and not bulging. A middle ear effusion (clear) is present.   Mouth/Throat: Oropharynx is clear and moist.   Eyes: EOM are normal. Pupils are equal, round, and reactive to light.   Neck: Neck supple.   Normal range of motion.  Cardiovascular:  Normal rate.           Pulmonary/Chest: No respiratory distress.   Musculoskeletal:         General: Normal range of motion.      Cervical back: Normal range of motion and neck supple.     Neurological: She is alert and oriented to person, place, and time. GCS score is 15. GCS eye subscore is 4. GCS verbal subscore is 5. GCS motor subscore is 6.   Skin: Skin is warm and dry. Capillary refill takes less than 2 seconds.   Psychiatric: She has a normal mood and affect. Her behavior is normal. Thought content normal.         ED Course   Procedures  Labs Reviewed - No data to display       Imaging Results    None          Medications - No data to display  Medical  Decision Making  Risk  OTC drugs.  Prescription drug management.                                      Clinical Impression:  Final diagnoses:  [H60.501] Acute otitis externa of right ear, unspecified type (Primary)  [H65.193] Acute middle ear effusion, bilateral          ED Disposition Condition    Discharge Stable          ED Prescriptions       Medication Sig Dispense Start Date End Date Auth. Provider    cetirizine (ZYRTEC) 10 MG tablet Take 1 tablet (10 mg total) by mouth once daily. for 10 days 10 tablet 12/12/2023 12/22/2023 Leila Saenz NP    neomycin-polymyxin-hydrocortisone (CORTISPORIN) 3.5-10,000-1 mg/mL-unit/mL-% otic suspension Place 3 drops into the right ear 3 (three) times daily. for 10 days 6 mL 12/12/2023 12/22/2023 Leila Saenz NP          Follow-up Information       Follow up With Specialties Details Why Contact Info    Burke Merchant MD Internal Medicine Schedule an appointment as soon as possible for a visit in 2 days for re-evaluation of today's complaint [address]  [facility] [address]  [phone]               Leila Saenz NP  12/12/23 9145

## 2023-12-12 NOTE — DISCHARGE INSTRUCTIONS
Be sure to complete all antibiotics. Use medications as directed. Avoid inserting anything into your ear except for drops prescribed for your right ear. See your doctor in 1-2 days and return to the ED for worsening symptoms.

## 2023-12-21 PROBLEM — E66.813 CLASS 3 OBESITY: Status: ACTIVE | Noted: 2023-12-21

## 2023-12-21 PROBLEM — E66.01 CLASS 3 OBESITY: Status: ACTIVE | Noted: 2023-12-21

## 2023-12-21 PROBLEM — F17.200 NICOTINE DEPENDENCE, UNCOMPLICATED: Status: ACTIVE | Noted: 2023-12-21

## 2024-01-12 ENCOUNTER — LAB VISIT (OUTPATIENT)
Dept: LAB | Facility: HOSPITAL | Age: 64
End: 2024-01-12
Attending: INTERNAL MEDICINE
Payer: MEDICARE

## 2024-01-12 DIAGNOSIS — E78.00 HIGH CHOLESTEROL: ICD-10-CM

## 2024-01-12 DIAGNOSIS — Z13.1 SCREENING FOR DIABETES MELLITUS: ICD-10-CM

## 2024-01-12 DIAGNOSIS — Z13.29 SCREENING FOR THYROID DISORDER: ICD-10-CM

## 2024-01-12 DIAGNOSIS — Z01.89 ENCOUNTER FOR OTHER SPECIFIED SPECIAL EXAMINATIONS: ICD-10-CM

## 2024-01-12 DIAGNOSIS — Z13.21 ENCOUNTER FOR VITAMIN DEFICIENCY SCREENING: ICD-10-CM

## 2024-01-12 DIAGNOSIS — E66.01 CLASS 3 OBESITY: ICD-10-CM

## 2024-01-12 DIAGNOSIS — Z13.6 SCREENING FOR CARDIOVASCULAR CONDITION: ICD-10-CM

## 2024-01-12 DIAGNOSIS — Z79.899 ENCOUNTER FOR LONG-TERM (CURRENT) USE OF OTHER MEDICATIONS: ICD-10-CM

## 2024-01-12 DIAGNOSIS — Z13.220 SCREENING FOR LIPOID DISORDERS: ICD-10-CM

## 2024-01-12 DIAGNOSIS — F17.210 CIGARETTE NICOTINE DEPENDENCE WITHOUT COMPLICATION: ICD-10-CM

## 2024-01-12 LAB
25(OH)D3+25(OH)D2 SERPL-MCNC: 42 NG/ML (ref 30–96)
ALBUMIN SERPL BCP-MCNC: 3.2 G/DL (ref 3.5–5.2)
ALP SERPL-CCNC: 89 U/L (ref 55–135)
ALT SERPL W/O P-5'-P-CCNC: 25 U/L (ref 10–44)
ANION GAP SERPL CALC-SCNC: 7 MMOL/L (ref 3–11)
AST SERPL-CCNC: 15 U/L (ref 10–40)
BASOPHILS # BLD AUTO: 0.04 K/UL (ref 0–0.2)
BASOPHILS NFR BLD: 0.5 % (ref 0–1.9)
BILIRUB SERPL-MCNC: 0.5 MG/DL (ref 0.1–1)
BNP SERPL-MCNC: 37 PG/ML (ref 0–99)
BUN SERPL-MCNC: 10 MG/DL (ref 8–23)
CALCIUM SERPL-MCNC: 9.5 MG/DL (ref 8.7–10.5)
CHLORIDE SERPL-SCNC: 108 MMOL/L (ref 95–110)
CHOLEST SERPL-MCNC: 147 MG/DL (ref 120–199)
CHOLEST/HDLC SERPL: 3.3 {RATIO} (ref 2–5)
CO2 SERPL-SCNC: 25 MMOL/L (ref 23–29)
CREAT SERPL-MCNC: 0.9 MG/DL (ref 0.5–1.4)
CRP SERPL-MCNC: 5.74 MG/L (ref 0–3.19)
DIFFERENTIAL METHOD BLD: ABNORMAL
EOSINOPHIL # BLD AUTO: 0.2 K/UL (ref 0–0.5)
EOSINOPHIL NFR BLD: 2.7 % (ref 0–8)
ERYTHROCYTE [DISTWIDTH] IN BLOOD BY AUTOMATED COUNT: 15.5 % (ref 11.5–14.5)
EST. GFR  (NO RACE VARIABLE): >60 ML/MIN/1.73 M^2
ESTIMATED AVG GLUCOSE: 117 MG/DL (ref 68–131)
FOLATE SERPL-MCNC: 7 NG/ML (ref 4–24)
GLUCOSE SERPL-MCNC: 90 MG/DL (ref 70–110)
HBA1C MFR BLD: 5.7 % (ref 4–5.6)
HCT VFR BLD AUTO: 41.3 % (ref 37–48.5)
HCYS SERPL-SCNC: 8.6 UMOL/L (ref 4–15.5)
HDLC SERPL-MCNC: 44 MG/DL (ref 40–75)
HDLC SERPL: 29.9 % (ref 20–50)
HGB BLD-MCNC: 13.4 G/DL (ref 12–16)
IMM GRANULOCYTES # BLD AUTO: 0.03 K/UL (ref 0–0.04)
IMM GRANULOCYTES NFR BLD AUTO: 0.3 % (ref 0–0.5)
INSULIN COLLECTION INTERVAL: NORMAL
INSULIN SERPL-ACNC: 14.3 UU/ML
LDLC SERPL CALC-MCNC: 55.4 MG/DL (ref 63–159)
LYMPHOCYTES # BLD AUTO: 2.1 K/UL (ref 1–4.8)
LYMPHOCYTES NFR BLD: 24 % (ref 18–48)
MAGNESIUM SERPL-MCNC: 2.1 MG/DL (ref 1.6–2.6)
MCH RBC QN AUTO: 29.2 PG (ref 27–31)
MCHC RBC AUTO-ENTMCNC: 32.4 G/DL (ref 32–36)
MCV RBC AUTO: 90 FL (ref 82–98)
MONOCYTES # BLD AUTO: 0.9 K/UL (ref 0.3–1)
MONOCYTES NFR BLD: 9.9 % (ref 4–15)
NEUTROPHILS # BLD AUTO: 5.5 K/UL (ref 1.8–7.7)
NEUTROPHILS NFR BLD: 62.6 % (ref 38–73)
NONHDLC SERPL-MCNC: 103 MG/DL
NRBC BLD-RTO: 0 /100 WBC
PLATELET # BLD AUTO: 272 K/UL (ref 150–450)
PMV BLD AUTO: 11.7 FL (ref 9.2–12.9)
POTASSIUM SERPL-SCNC: 3.9 MMOL/L (ref 3.5–5.1)
PROT SERPL-MCNC: 6.7 G/DL (ref 6–8.4)
RBC # BLD AUTO: 4.59 M/UL (ref 4–5.4)
SODIUM SERPL-SCNC: 140 MMOL/L (ref 136–145)
T3FREE SERPL-MCNC: 2.7 PG/ML (ref 2.3–4.2)
T4 FREE SERPL-MCNC: 1.08 NG/DL (ref 0.71–1.51)
TRIGL SERPL-MCNC: 238 MG/DL (ref 30–150)
TSH SERPL DL<=0.005 MIU/L-ACNC: 2.45 UIU/ML (ref 0.4–4)
URATE SERPL-MCNC: 5.8 MG/DL (ref 2.4–5.7)
VIT B12 SERPL-MCNC: 369 PG/ML (ref 210–950)
WBC # BLD AUTO: 8.79 K/UL (ref 3.9–12.7)

## 2024-01-12 PROCEDURE — 82306 VITAMIN D 25 HYDROXY: CPT | Performed by: INTERNAL MEDICINE

## 2024-01-12 PROCEDURE — 83698 ASSAY LIPOPROTEIN PLA2: CPT | Performed by: INTERNAL MEDICINE

## 2024-01-12 PROCEDURE — 82542 COL CHROMOTOGRAPHY QUAL/QUAN: CPT | Performed by: INTERNAL MEDICINE

## 2024-01-12 PROCEDURE — 82746 ASSAY OF FOLIC ACID SERUM: CPT | Performed by: INTERNAL MEDICINE

## 2024-01-12 PROCEDURE — 80053 COMPREHEN METABOLIC PANEL: CPT | Performed by: INTERNAL MEDICINE

## 2024-01-12 PROCEDURE — 80061 LIPID PANEL: CPT | Performed by: INTERNAL MEDICINE

## 2024-01-12 PROCEDURE — 86141 C-REACTIVE PROTEIN HS: CPT | Performed by: INTERNAL MEDICINE

## 2024-01-12 PROCEDURE — 84481 FREE ASSAY (FT-3): CPT | Performed by: INTERNAL MEDICINE

## 2024-01-12 PROCEDURE — 85025 COMPLETE CBC W/AUTO DIFF WBC: CPT | Performed by: INTERNAL MEDICINE

## 2024-01-12 PROCEDURE — 83695 ASSAY OF LIPOPROTEIN(A): CPT | Performed by: INTERNAL MEDICINE

## 2024-01-12 PROCEDURE — 84443 ASSAY THYROID STIM HORMONE: CPT | Performed by: INTERNAL MEDICINE

## 2024-01-12 PROCEDURE — 82607 VITAMIN B-12: CPT | Performed by: INTERNAL MEDICINE

## 2024-01-12 PROCEDURE — 82172 ASSAY OF APOLIPOPROTEIN: CPT | Performed by: INTERNAL MEDICINE

## 2024-01-12 PROCEDURE — 83880 ASSAY OF NATRIURETIC PEPTIDE: CPT | Performed by: INTERNAL MEDICINE

## 2024-01-12 PROCEDURE — 83735 ASSAY OF MAGNESIUM: CPT | Performed by: INTERNAL MEDICINE

## 2024-01-12 PROCEDURE — 83036 HEMOGLOBIN GLYCOSYLATED A1C: CPT | Performed by: INTERNAL MEDICINE

## 2024-01-12 PROCEDURE — 36415 COLL VENOUS BLD VENIPUNCTURE: CPT | Performed by: INTERNAL MEDICINE

## 2024-01-12 PROCEDURE — 83525 ASSAY OF INSULIN: CPT | Performed by: INTERNAL MEDICINE

## 2024-01-12 PROCEDURE — 83090 ASSAY OF HOMOCYSTEINE: CPT | Performed by: INTERNAL MEDICINE

## 2024-01-12 PROCEDURE — 84550 ASSAY OF BLOOD/URIC ACID: CPT | Performed by: INTERNAL MEDICINE

## 2024-01-12 PROCEDURE — 84439 ASSAY OF FREE THYROXINE: CPT | Performed by: INTERNAL MEDICINE

## 2024-01-15 LAB — APO B SERPL-MCNC: 66 MG/DL

## 2024-01-16 LAB — LPA SERPL-MCNC: 8 MG/DL (ref 0–30)

## 2024-01-18 LAB — LP-PLA2 SERPL-CCNC: 76 NMOL/MIN/ML

## 2024-01-19 ENCOUNTER — HOSPITAL ENCOUNTER (EMERGENCY)
Facility: HOSPITAL | Age: 64
Discharge: HOME | End: 2024-01-19
Attending: EMERGENCY MEDICINE
Payer: MEDICARE

## 2024-01-19 VITALS
BODY MASS INDEX: 44.05 KG/M2 | OXYGEN SATURATION: 96 % | SYSTOLIC BLOOD PRESSURE: 124 MMHG | TEMPERATURE: 98 F | RESPIRATION RATE: 18 BRPM | DIASTOLIC BLOOD PRESSURE: 70 MMHG | WEIGHT: 248.63 LBS | HEART RATE: 69 BPM | HEIGHT: 63 IN

## 2024-01-19 DIAGNOSIS — R45.89 ANXIETY ABOUT HEALTH: Primary | ICD-10-CM

## 2024-01-19 PROCEDURE — 25000003 PHARM REV CODE 250

## 2024-01-19 PROCEDURE — 99283 EMERGENCY DEPT VISIT LOW MDM: CPT

## 2024-01-19 RX ORDER — HYDROXYZINE PAMOATE 25 MG/1
25 CAPSULE ORAL
Status: COMPLETED | OUTPATIENT
Start: 2024-01-19 | End: 2024-01-19

## 2024-01-19 RX ORDER — HYDROXYZINE HYDROCHLORIDE 25 MG/1
25 TABLET, FILM COATED ORAL EVERY 6 HOURS
Qty: 12 TABLET | Refills: 0 | Status: SHIPPED | OUTPATIENT
Start: 2024-01-19

## 2024-01-19 RX ADMIN — HYDROXYZINE PAMOATE 25 MG: 25 CAPSULE ORAL at 02:01

## 2024-01-19 NOTE — ED PROVIDER NOTES
Encounter Date: 1/19/2024       History     Chief Complaint   Patient presents with    Headache     Went to my PCP this morning and my BP was elevated.  Unsure what the systolic was but diasolic 99.  Thinks it got worse because now with a lot of pressure in her head.       63-year-old female with history of anxiety, COPD, diabetes, GERD, hypertension presents to ED after she reported elevated blood pressure reading at her primary care's office today.  Went to clinic this morning to learn how to give herself diabetic injections and found to have high blood pressure.  She reported some lightheadedness and dizziness at the time.  No aggravating factors.  No relieving factors.  Symptoms intermittent.  Denies any nausea or vomiting.  No medications treatment attempted home.  She did take her amlodipine this morning.    The history is provided by the patient.     Review of patient's allergies indicates:  No Known Allergies  Past Medical History:   Diagnosis Date    Anxiety     Breast cancer 2010    Cancer     Breast    COPD (chronic obstructive pulmonary disease)     Depression     Diabetes mellitus     GERD (gastroesophageal reflux disease)     Hypertension     Insomnia     Thyroid disease      Past Surgical History:   Procedure Laterality Date    BLADDER SUSPENSION      BREAST LUMPECTOMY      Right breast    CHOLECYSTECTOMY      HYSTERECTOMY      KNEE ARTHROSCOPY      Right knee    OOPHORECTOMY       Family History   Problem Relation Age of Onset    No Known Problems Mother     No Known Problems Father     No Known Problems Sister     No Known Problems Daughter     No Known Problems Maternal Aunt     No Known Problems Maternal Uncle     No Known Problems Paternal Aunt     No Known Problems Paternal Uncle     No Known Problems Maternal Grandmother     No Known Problems Maternal Grandfather     No Known Problems Paternal Grandmother     No Known Problems Paternal Grandfather     Breast cancer Neg Hx     Ovarian cancer Neg  Hx     BRCA 1/2 Neg Hx      Social History     Tobacco Use    Smoking status: Every Day     Current packs/day: 0.50     Types: Cigarettes    Smokeless tobacco: Never    Tobacco comments:     quit yesterday 6/12/21   Substance Use Topics    Alcohol use: Not Currently    Drug use: Never     Review of Systems   Constitutional:  Negative for fever.   HENT:  Negative for sore throat.    Eyes: Negative.    Respiratory:  Negative for shortness of breath.    Cardiovascular:  Negative for chest pain.   Gastrointestinal:  Negative for nausea.   Endocrine: Negative.    Genitourinary:  Negative for dysuria.   Musculoskeletal:  Negative for back pain.   Skin:  Negative for rash.   Allergic/Immunologic: Negative.    Neurological:  Positive for dizziness and light-headedness. Negative for weakness.   Hematological:  Does not bruise/bleed easily.   Psychiatric/Behavioral: Negative.         Physical Exam     Initial Vitals [01/19/24 1326]   BP Pulse Resp Temp SpO2   133/64 78 18 97.7 °F (36.5 °C) 99 %      MAP       --         Physical Exam    Nursing note and vitals reviewed.  Constitutional: She appears well-developed and well-nourished.   HENT:   Head: Normocephalic and atraumatic.   Eyes: EOM are normal. Pupils are equal, round, and reactive to light. Right eye exhibits no nystagmus. Left eye exhibits no nystagmus.   HINTS exam negative   Neck: Neck supple.   Normal range of motion.  Cardiovascular:  Normal rate and regular rhythm.           Pulmonary/Chest: Breath sounds normal. No respiratory distress. She has no wheezes.   Abdominal: She exhibits no distension.   Musculoskeletal:         General: Normal range of motion.      Cervical back: Normal range of motion and neck supple.     Neurological: She is alert and oriented to person, place, and time. She has normal strength. No cranial nerve deficit or sensory deficit. GCS score is 15. GCS eye subscore is 4. GCS verbal subscore is 5. GCS motor subscore is 6.   Skin: Skin is  warm and dry.   Psychiatric: Thought content normal.         ED Course   Procedures  Labs Reviewed - No data to display       Imaging Results    None          Medications   hydrOXYzine pamoate capsule 25 mg (25 mg Oral Given 1/19/24 1441)     Medical Decision Making  63-year-old female to ED for above complaints.  She was normotensive in ED. no respiratory distress noted. HINTS exam was negative.  Neuro exam unremarkable.  Anxious appearing patient.  Will medicate with hydroxyzine.  Return precautions given.  Patient was to follow up with primary care.    Risk  Prescription drug management.                                      Clinical Impression:  Final diagnoses:  [F41.8] Anxiety about health (Primary)          ED Disposition Condition    Discharge Stable          ED Prescriptions       Medication Sig Dispense Start Date End Date Auth. Provider    hydrOXYzine HCL (ATARAX) 25 MG tablet Take 1 tablet (25 mg total) by mouth every 6 (six) hours. 12 tablet 1/19/2024 -- Martin Martinez NP          Follow-up Information       Follow up With Specialties Details Why Contact Info    Burke Merchant MD Internal Medicine In 2 days  [address] [address]  [facility] [address]  [phone]               Martin Martinez NP  01/20/24 4720

## 2024-01-19 NOTE — DISCHARGE INSTRUCTIONS
Take your home meds as prescribed.  You can take a hydroxyzine every 6 hours as needed for anxiety.  Follow up with primary care if symptoms do not improve.

## 2024-01-23 LAB — UBIQUINONE10 SERPL-MCNC: 0.56 UG/ML

## 2024-01-26 ENCOUNTER — HOSPITAL ENCOUNTER (EMERGENCY)
Facility: HOSPITAL | Age: 64
Discharge: HOME | End: 2024-01-26
Attending: EMERGENCY MEDICINE
Payer: MEDICARE

## 2024-01-26 VITALS
SYSTOLIC BLOOD PRESSURE: 104 MMHG | OXYGEN SATURATION: 96 % | HEART RATE: 84 BPM | HEIGHT: 63 IN | TEMPERATURE: 98 F | BODY MASS INDEX: 43.23 KG/M2 | RESPIRATION RATE: 18 BRPM | DIASTOLIC BLOOD PRESSURE: 52 MMHG | WEIGHT: 244 LBS

## 2024-01-26 DIAGNOSIS — R06.2 WHEEZING: ICD-10-CM

## 2024-01-26 DIAGNOSIS — Z72.0 TOBACCO USE: ICD-10-CM

## 2024-01-26 DIAGNOSIS — J44.1 COPD EXACERBATION: Primary | ICD-10-CM

## 2024-01-26 LAB
CTP QC/QA: YES
SARS-COV-2 RDRP RESP QL NAA+PROBE: NEGATIVE

## 2024-01-26 PROCEDURE — 94640 AIRWAY INHALATION TREATMENT: CPT | Mod: XB

## 2024-01-26 PROCEDURE — 63600175 PHARM REV CODE 636 W HCPCS: Performed by: EMERGENCY MEDICINE

## 2024-01-26 PROCEDURE — 99900035 HC TECH TIME PER 15 MIN (STAT)

## 2024-01-26 PROCEDURE — 87635 SARS-COV-2 COVID-19 AMP PRB: CPT | Performed by: EMERGENCY MEDICINE

## 2024-01-26 PROCEDURE — 25000003 PHARM REV CODE 250: Performed by: EMERGENCY MEDICINE

## 2024-01-26 PROCEDURE — 25000242 PHARM REV CODE 250 ALT 637 W/ HCPCS: Performed by: EMERGENCY MEDICINE

## 2024-01-26 PROCEDURE — 99285 EMERGENCY DEPT VISIT HI MDM: CPT | Mod: 25

## 2024-01-26 PROCEDURE — 94761 N-INVAS EAR/PLS OXIMETRY MLT: CPT

## 2024-01-26 RX ORDER — IPRATROPIUM BROMIDE AND ALBUTEROL SULFATE 2.5; .5 MG/3ML; MG/3ML
3 SOLUTION RESPIRATORY (INHALATION)
Status: COMPLETED | OUTPATIENT
Start: 2024-01-26 | End: 2024-01-26

## 2024-01-26 RX ORDER — PREDNISONE 20 MG/1
60 TABLET ORAL
Status: COMPLETED | OUTPATIENT
Start: 2024-01-26 | End: 2024-01-26

## 2024-01-26 RX ORDER — ALBUTEROL SULFATE 90 UG/1
1-2 AEROSOL, METERED RESPIRATORY (INHALATION) EVERY 4 HOURS PRN
Qty: 8 G | Refills: 1 | Status: SHIPPED | OUTPATIENT
Start: 2024-01-26

## 2024-01-26 RX ORDER — BENZONATATE 100 MG/1
100 CAPSULE ORAL 3 TIMES DAILY PRN
Qty: 20 CAPSULE | Refills: 0 | Status: SHIPPED | OUTPATIENT
Start: 2024-01-26 | End: 2024-02-05

## 2024-01-26 RX ORDER — PREDNISONE 50 MG/1
50 TABLET ORAL DAILY
Qty: 4 TABLET | Refills: 0 | Status: SHIPPED | OUTPATIENT
Start: 2024-01-27 | End: 2024-01-31

## 2024-01-26 RX ORDER — BENZOCAINE, MENTHOL, CETYLPYRIDINIUM CHLORIDE 2; .5; .1 G/100ML; G/100ML; G/100ML
2 SOLUTION TOPICAL EVERY 6 HOURS PRN
Qty: 30 ML | Refills: 0 | Status: SHIPPED | OUTPATIENT
Start: 2024-01-26

## 2024-01-26 RX ADMIN — IPRATROPIUM BROMIDE AND ALBUTEROL SULFATE 3 ML: .5; 3 SOLUTION RESPIRATORY (INHALATION) at 02:01

## 2024-01-26 RX ADMIN — PREDNISONE 60 MG: 20 TABLET ORAL at 03:01

## 2024-01-26 RX ADMIN — TOPICAL ANESTHETIC 1 EACH: 200 SPRAY DENTAL; PERIODONTAL at 03:01

## 2024-01-26 NOTE — ED PROVIDER NOTES
"EMERGENCY DEPARTMENT HISTORY AND PHYSICAL EXAM     This note is dictated on M*[employer] word recognition program.  There are word recognition mistakes and grammatical errors that are occasionally missed on review.     Date: 1/26/2024   Patient Name: Vero Allen       History of Presenting Illness      Chief Complaint   Patient presents with    Shortness of Breath     Pt reports having SOB, sore throat, and cough x "a few hours". Hx of COPD. Denies home o2. Denies chest pain           Vero Allen is a 63 y.o. female with PMHX of COPD, hypothyroidism, anxiety, tobacco use who presents to the emergency department C/O shortness of breath.    Patient reports cough, shortness of breath, chest tightness that began at home a few hours prior to arrival.  Patient has a maintenance inhaler but does not recall his name and uses it sporadically.  No fever.      PCP: Burke Merchant MD        No current facility-administered medications for this encounter.     Current Outpatient Medications   Medication Sig Dispense Refill    acetaminophen (TYLENOL) 500 MG tablet Take 2 tablets (1,000 mg total) by mouth every 6 (six) hours as needed for Pain or Temperature greater than (101). 30 tablet 0    albuterol (PROVENTIL/VENTOLIN HFA) 90 mcg/actuation inhaler Inhale 1-2 puffs into the lungs every 6 (six) hours as needed for Wheezing or Shortness of Breath. Rescue 8 g 0    albuterol (PROVENTIL/VENTOLIN HFA) 90 mcg/actuation inhaler Inhale 1-2 puffs into the lungs every 4 (four) hours as needed for Wheezing or Shortness of Breath. Rescue 8 g 1    amLODIPine (NORVASC) 10 MG tablet Take 10 mg by mouth.      aspirin (ECOTRIN) 81 MG EC tablet Take 81 mg by mouth once daily.      benzocaine-menthoL-cetylpyrid (ACTISEP) 2-0.5-0.1 % Soln 2 sprays by Mucous Membrane route every 6 (six) hours as needed (sore throat). 30 mL 0    benzonatate (TESSALON) 100 MG capsule Take 1 capsule (100 mg total) by mouth 3 (three) times daily " as needed for Cough. 20 capsule 0    benztropine (COGENTIN) 2 MG Tab Take 1 mg by mouth 2 (two) times daily.      cetirizine (ZYRTEC) 10 MG tablet Take 1 tablet (10 mg total) by mouth once daily. for 10 days 10 tablet 0    citalopram (CELEXA) 20 MG tablet Take 20 mg by mouth once daily.      clotrimazole-betamethasone 1-0.05% (LOTRISONE) cream Apply topically 2 (two) times daily. 30 g 0    esomeprazole (NEXIUM) 20 MG capsule Take 20 mg by mouth before breakfast.      hydrOXYzine HCL (ATARAX) 25 MG tablet Take 1 tablet (25 mg total) by mouth every 6 (six) hours. 12 tablet 0    levothyroxine (SYNTHROID) 50 MCG tablet Take 50 mcg by mouth before breakfast.      metFORMIN (GLUCOPHAGE-XR) 750 MG ER 24hr tablet Take 750 mg by mouth 2 (two) times daily.      pantoprazole (PROTONIX) 20 MG tablet Take 1 tablet (20 mg total) by mouth 2 (two) times daily before meals. for 14 days 28 tablet 0    [START ON 1/27/2024] predniSONE (DELTASONE) 50 MG Tab Take 1 tablet (50 mg total) by mouth once daily. for 4 days 4 tablet 0    risperiDONE (RISPERDAL) 3 MG Tab Take 3 mg by mouth nightly.      traZODone (DESYREL) 100 MG tablet Take 100 mg by mouth every evening. 2 caps      vitamin D (VITAMIN D3) 1000 units Tab Take 1,000 Units by mouth once daily.      vitamin E 100 UNIT capsule Take 100 Units by mouth once daily.      zolpidem (AMBIEN) 10 mg Tab Take 10 mg by mouth nightly as needed.             Past History     Past Medical History:   Past Medical History:   Diagnosis Date    Anxiety     Breast cancer 2010    Cancer     Breast    COPD (chronic obstructive pulmonary disease)     Depression     Diabetes mellitus     GERD (gastroesophageal reflux disease)     Hypertension     Insomnia     Thyroid disease         Past Surgical History:   Past Surgical History:   Procedure Laterality Date    BLADDER SUSPENSION      BREAST LUMPECTOMY      Right breast    CHOLECYSTECTOMY      HYSTERECTOMY      KNEE ARTHROSCOPY      Right knee     OOPHORECTOMY          Family History:   Family History   Problem Relation Age of Onset    No Known Problems Mother     No Known Problems Father     No Known Problems Sister     No Known Problems Daughter     No Known Problems Maternal Aunt     No Known Problems Maternal Uncle     No Known Problems Paternal Aunt     No Known Problems Paternal Uncle     No Known Problems Maternal Grandmother     No Known Problems Maternal Grandfather     No Known Problems Paternal Grandmother     No Known Problems Paternal Grandfather     Breast cancer Neg Hx     Ovarian cancer Neg Hx     BRCA 1/2 Neg Hx         Social History:   Social History     Tobacco Use    Smoking status: Every Day     Current packs/day: 0.50     Types: Cigarettes    Smokeless tobacco: Never    Tobacco comments:     quit yesterday 6/12/21   Substance Use Topics    Alcohol use: Not Currently    Drug use: Never        Allergies:   Review of patient's allergies indicates:  No Known Allergies       Review of Systems   Review of Systems   See HPI for pertinent positives and negatives       Physical Exam     Vitals:    01/26/24 0209 01/26/24 0211 01/26/24 0212 01/26/24 0332   BP:    (!) 104/52   Pulse: 98 96 90 84   Resp: 18 18 18    Temp:       TempSrc:       SpO2: 100% 100% 100% 96%   Weight:       Height:          Physical Exam  Vitals and nursing note reviewed.   Constitutional:       General: She is not in acute distress.     Appearance: Normal appearance. She is not ill-appearing.   HENT:      Head: Normocephalic and atraumatic.      Right Ear: External ear normal.      Left Ear: External ear normal.      Nose: Nose normal. No congestion or rhinorrhea.      Mouth/Throat:      Mouth: Mucous membranes are moist.      Pharynx: Pharyngeal swelling present.      Comments: Mild pharyngeal and uvula edema  Eyes:      Conjunctiva/sclera: Conjunctivae normal.      Pupils: Pupils are equal, round, and reactive to light.   Cardiovascular:      Rate and Rhythm: Normal rate  and regular rhythm.   Pulmonary:      Effort: Pulmonary effort is normal. No respiratory distress.      Breath sounds: Wheezing present.   Chest:      Chest wall: No tenderness.   Musculoskeletal:         General: No deformity. Normal range of motion.      Cervical back: Normal range of motion. No rigidity.      Right lower leg: No edema.      Left lower leg: No edema.   Skin:     General: Skin is dry.   Neurological:      General: No focal deficit present.      Mental Status: She is alert and oriented to person, place, and time. Mental status is at baseline.   Psychiatric:         Mood and Affect: Mood normal.         Behavior: Behavior normal.              Diagnostic Study Results      Labs -   Recent Results (from the past 12 hour(s))   POCT COVID-19 Rapid Screening    Collection Time: 01/26/24  2:41 AM   Result Value Ref Range    POC Rapid COVID Negative Negative     Acceptable Yes         Radiologic Studies -    X-Ray Chest 1 View    (Results Pending)        Medications given in the ED-   Medications   albuterol-ipratropium 2.5 mg-0.5 mg/3 mL nebulizer solution 3 mL (3 mLs Nebulization Given 1/26/24 0212)   predniSONE tablet 60 mg (60 mg Oral Given 1/26/24 0338)   benzocaine 20 % oral spray (1 each Mouth/Throat Given 1/26/24 0339)           Medical Decision Making    I am the first provider for this patient.     I reviewed the vital signs, available nursing notes, past medical history, past surgical history, family history and social history.     Vital Signs:  Reviewed the patient's vital signs.     Pulse Oximetry Analysis and Interpretation:    97% on Room Air, normal        CXR  Interpretation: (Per my independent interpretation, pending formal read)   CXR read by Dr. Henry Kapadia at 0219    Cardiac silhouette similar to prior, no focal infiltrate, no pleural effusion     External Test Results (Pertinent to encounter):    Records Reviewed: Nursing Notes, Current Prescription Medications,  Old Medical Records, External Medical Records , and Previous Radiology Studies    History Obtained By: Patient and Spouse    Provider Notes: Vero Allen is a 63 y.o. female with cough, wheezing    Co-morbidities Considered: COPD, tobacco use    Differential Diagnosis: COPD exacerbation, URI, PNA, CHF      ED Course:      SMOKING CESSATION:   3:37 AM     The patient was counseled on the dangers of tobacco use, and was?advised to quit. ?Reviewed strategies to maximize success, including removing cigarettes and smoking materials from environment and written materials. Discussion took?3-5?minutes, and patient expressed understanding.      Patient presents with cough, mild wheezing on exam.  Not in respiratory distress.  History of tobacco use and COPD and patient states she smoked cigarette this evening prior to onset of symptoms.  Given bronchodilators and on re-evaluation had improvement of aeration.  Patient not in respiratory distress.  Reviewed radiographs which do not demonstrate a lobar pneumonia.  COVID testing negative.  Patient satting adequately on room air with normal work of breathing.  Will treat or steroid burst, bronchodilators, advised close follow-up with primary care.  Reasons to return to ED discussed.       Problems Addressed:  Wheezing    Procedures:   Procedures       Diagnosis and Disposition     Critical Care:      DISCHARGE NOTE:       Vero Allen's  results have been reviewed with her.  She has been counseled regarding her diagnosis, treatment, and plan.  She verbally conveys understanding and agreement of the signs, symptoms, diagnosis, treatment and prognosis and additionally agrees to follow up as discussed.  She also agrees with the care-plan and conveys that all of her questions have been answered.  I have also provided discharge instructions for her that include: educational information regarding their diagnosis and treatment, and list of reasons why they would  want to return to the ED prior to their follow-up appointment, should her condition change. She has been provided with education for proper emergency department utilization.         CLINICAL IMPRESSION:         1. COPD exacerbation    2. Wheezing    3. Tobacco use              PLAN:   1. Discharge Home  2.      Medication List        START taking these medications      ACTISEP 2-0.5-0.1 % Soln  Generic drug: benzocaine-menthoL-cetylpyrid  2 sprays by Mucous Membrane route every 6 (six) hours as needed (sore throat).     benzonatate 100 MG capsule  Commonly known as: TESSALON  Take 1 capsule (100 mg total) by mouth 3 (three) times daily as needed for Cough.     predniSONE 50 MG Tab  Commonly known as: DELTASONE  Take 1 tablet (50 mg total) by mouth once daily. for 4 days  Start taking on: January 27, 2024            CHANGE how you take these medications      * albuterol 90 mcg/actuation inhaler  Commonly known as: PROVENTIL/VENTOLIN HFA  Inhale 1-2 puffs into the lungs every 6 (six) hours as needed for Wheezing or Shortness of Breath. Rescue  What changed: Another medication with the same name was added. Make sure you understand how and when to take each.     * albuterol 90 mcg/actuation inhaler  Commonly known as: PROVENTIL/VENTOLIN HFA  Inhale 1-2 puffs into the lungs every 4 (four) hours as needed for Wheezing or Shortness of Breath. Rescue  What changed: You were already taking a medication with the same name, and this prescription was added. Make sure you understand how and when to take each.           * This list has 2 medication(s) that are the same as other medications prescribed for you. Read the directions carefully, and ask your doctor or other care provider to review them with you.                ASK your doctor about these medications      acetaminophen 500 MG tablet  Commonly known as: TYLENOL  Take 2 tablets (1,000 mg total) by mouth every 6 (six) hours as needed for Pain or Temperature greater than  (101).     amLODIPine 10 MG tablet  Commonly known as: NORVASC     aspirin 81 MG EC tablet  Commonly known as: ECOTRIN     benztropine 2 MG Tab  Commonly known as: COGENTIN     cetirizine 10 MG tablet  Commonly known as: ZYRTEC  Take 1 tablet (10 mg total) by mouth once daily. for 10 days     citalopram 20 MG tablet  Commonly known as: CeleXA     clotrimazole-betamethasone 1-0.05% cream  Commonly known as: LOTRISONE  Apply topically 2 (two) times daily.     esomeprazole 20 MG capsule  Commonly known as: NEXIUM     hydrOXYzine HCL 25 MG tablet  Commonly known as: ATARAX  Take 1 tablet (25 mg total) by mouth every 6 (six) hours.     levothyroxine 50 MCG tablet  Commonly known as: SYNTHROID     metFORMIN 750 MG ER 24hr tablet  Commonly known as: GLUCOPHAGE-XR     pantoprazole 20 MG tablet  Commonly known as: PROTONIX  Take 1 tablet (20 mg total) by mouth 2 (two) times daily before meals. for 14 days     risperiDONE 3 MG Tab  Commonly known as: RISPERDAL     traZODone 100 MG tablet  Commonly known as: DESYREL     vitamin D 1000 units Tab  Commonly known as: VITAMIN D3     vitamin E 100 UNIT capsule     zolpidem 10 mg Tab  Commonly known as: AMBIEN               Where to Get Your Medications        These medications were sent to [facility] [address] [place] [address] - [address] [place] [address]  [address] [facility] [address] [address]      Phone: [phone]   ACTISEP 2-0.5-0.1 % Soln  albuterol 90 mcg/actuation inhaler  benzonatate 100 MG capsule  predniSONE 50 MG Tab        3. Burke Merchant MD  [address] [facility] [address]  [facility] [address] [phone]    Schedule an appointment as soon as possible for a visit   Primary care follow up    [facility] Emergency Department  [address] [facility] [address] [phone]  Go to   If symptoms worsen       _______________________________     Please note that this dictation was completed with [employer], the computer voice recognition software.  Quite often  unanticipated grammatical, syntax, homophones, and other interpretive errors are inadvertently transcribed by the computer software.  Please disregard these errors.  Please excuse any errors that have escaped final proofreading.             Henry Kapadia MD  01/26/24 1421

## 2024-02-06 ENCOUNTER — HOSPITAL ENCOUNTER (EMERGENCY)
Facility: HOSPITAL | Age: 64
Discharge: HOME | End: 2024-02-06
Attending: EMERGENCY MEDICINE
Payer: MEDICARE

## 2024-02-06 VITALS
HEART RATE: 81 BPM | OXYGEN SATURATION: 95 % | DIASTOLIC BLOOD PRESSURE: 66 MMHG | WEIGHT: 244.5 LBS | TEMPERATURE: 98 F | SYSTOLIC BLOOD PRESSURE: 160 MMHG | BODY MASS INDEX: 43.31 KG/M2 | RESPIRATION RATE: 20 BRPM

## 2024-02-06 DIAGNOSIS — J44.1 COPD WITH ACUTE EXACERBATION: ICD-10-CM

## 2024-02-06 DIAGNOSIS — Z72.0 TOBACCO ABUSE: ICD-10-CM

## 2024-02-06 DIAGNOSIS — R06.02 SHORTNESS OF BREATH: ICD-10-CM

## 2024-02-06 DIAGNOSIS — F06.4 ANXIETY DISORDER DUE TO GENERAL MEDICAL CONDITION: Primary | ICD-10-CM

## 2024-02-06 DIAGNOSIS — J44.1 COPD EXACERBATION: ICD-10-CM

## 2024-02-06 LAB — POCT GLUCOSE: 115 MG/DL (ref 70–110)

## 2024-02-06 PROCEDURE — 94761 N-INVAS EAR/PLS OXIMETRY MLT: CPT

## 2024-02-06 PROCEDURE — 99900031 HC PATIENT EDUCATION (STAT)

## 2024-02-06 PROCEDURE — 25000242 PHARM REV CODE 250 ALT 637 W/ HCPCS: Performed by: EMERGENCY MEDICINE

## 2024-02-06 PROCEDURE — 99900035 HC TECH TIME PER 15 MIN (STAT)

## 2024-02-06 PROCEDURE — 63600175 PHARM REV CODE 636 W HCPCS: Performed by: EMERGENCY MEDICINE

## 2024-02-06 PROCEDURE — 96372 THER/PROPH/DIAG INJ SC/IM: CPT | Performed by: EMERGENCY MEDICINE

## 2024-02-06 PROCEDURE — 82962 GLUCOSE BLOOD TEST: CPT

## 2024-02-06 PROCEDURE — 99284 EMERGENCY DEPT VISIT MOD MDM: CPT | Mod: 25

## 2024-02-06 PROCEDURE — 94640 AIRWAY INHALATION TREATMENT: CPT

## 2024-02-06 RX ORDER — IPRATROPIUM BROMIDE AND ALBUTEROL SULFATE 2.5; .5 MG/3ML; MG/3ML
3 SOLUTION RESPIRATORY (INHALATION)
Status: COMPLETED | OUTPATIENT
Start: 2024-02-06 | End: 2024-02-06

## 2024-02-06 RX ORDER — CELECOXIB 200 MG/1
200 CAPSULE ORAL 2 TIMES DAILY PRN
COMMUNITY
Start: 2024-01-19

## 2024-02-06 RX ORDER — ALPRAZOLAM 0.5 MG/1
TABLET ORAL
Status: ON HOLD | COMMUNITY
Start: 2024-01-04 | End: 2024-05-15

## 2024-02-06 RX ORDER — METHYLPREDNISOLONE SOD SUCC 125 MG
125 VIAL (EA) INJECTION
Status: COMPLETED | OUTPATIENT
Start: 2024-02-06 | End: 2024-02-06

## 2024-02-06 RX ADMIN — METHYLPREDNISOLONE SODIUM SUCCINATE 125 MG: 125 INJECTION, POWDER, FOR SOLUTION INTRAMUSCULAR; INTRAVENOUS at 07:02

## 2024-02-06 RX ADMIN — IPRATROPIUM BROMIDE AND ALBUTEROL SULFATE 3 ML: .5; 3 SOLUTION RESPIRATORY (INHALATION) at 07:02

## 2024-02-06 NOTE — ED PROVIDER NOTES
Encounter Date: 2/6/2024       History     Chief Complaint   Patient presents with    Shortness of Breath     Pt to the ER w/ complaints of SOB after waking up this morning, reports hx of COPD and was recently diagnosed w/ pneumonia.     63-year-old female with a history of COPD, anxiety, tobacco abuse presents the ER with shortness of breath for the past 2 weeks.  She continues to smoke.  Denies fever.  Oxygen saturation is 98% on room air.  Denies chest pain.  Not ill appearing, alert oriented x4, GCS is 15.  No alleviating factors.  Using her nebulizer at home.  No nausea vomiting or diarrhea      Review of patient's allergies indicates:  No Known Allergies  Past Medical History:   Diagnosis Date    Anxiety     Breast cancer 2010    Cancer     Breast    COPD (chronic obstructive pulmonary disease)     Depression     Diabetes mellitus     GERD (gastroesophageal reflux disease)     Hypertension     Insomnia     Thyroid disease      Past Surgical History:   Procedure Laterality Date    BLADDER SUSPENSION      BREAST LUMPECTOMY      Right breast    CHOLECYSTECTOMY      HYSTERECTOMY      KNEE ARTHROSCOPY      Right knee    OOPHORECTOMY       Family History   Problem Relation Age of Onset    No Known Problems Mother     No Known Problems Father     No Known Problems Sister     No Known Problems Daughter     No Known Problems Maternal Aunt     No Known Problems Maternal Uncle     No Known Problems Paternal Aunt     No Known Problems Paternal Uncle     No Known Problems Maternal Grandmother     No Known Problems Maternal Grandfather     No Known Problems Paternal Grandmother     No Known Problems Paternal Grandfather     Breast cancer Neg Hx     Ovarian cancer Neg Hx     BRCA 1/2 Neg Hx      Social History     Tobacco Use    Smoking status: Every Day     Current packs/day: 0.50     Types: Cigarettes    Smokeless tobacco: Never    Tobacco comments:     quit yesterday 6/12/21   Substance Use Topics    Alcohol use: Not  Currently    Drug use: Never     Review of Systems   Constitutional:  Negative for fever.   HENT:  Negative for sore throat.    Respiratory:  Positive for cough and shortness of breath.    Cardiovascular:  Negative for chest pain.   Gastrointestinal:  Negative for nausea.   Genitourinary:  Negative for dysuria.   Musculoskeletal:  Negative for back pain.   Skin:  Negative for rash.   Neurological:  Negative for weakness.   Hematological:  Does not bruise/bleed easily.   All other systems reviewed and are negative.      Physical Exam     Initial Vitals [02/06/24 0655]   BP Pulse Resp Temp SpO2   (!) 177/104 109 (!) 24 98 °F (36.7 °C) 97 %      MAP       --         Physical Exam    Nursing note and vitals reviewed.  Constitutional: She appears well-developed and well-nourished. She is not diaphoretic. No distress.   HENT:   Head: Normocephalic and atraumatic.   Mouth/Throat: Oropharynx is clear and moist.   Eyes: Conjunctivae and EOM are normal. Pupils are equal, round, and reactive to light.   Neck: Neck supple. No tracheal deviation present. No JVD present.   Normal range of motion.  Cardiovascular:  Normal rate, regular rhythm, normal heart sounds and intact distal pulses.           No murmur heard.  Pulmonary/Chest: No stridor. No respiratory distress. She has no wheezes. She has rhonchi. She has no rales. She exhibits no tenderness.   Abdominal: Abdomen is soft. Bowel sounds are normal. There is no abdominal tenderness. There is no rebound and no guarding.   Musculoskeletal:         General: No tenderness or edema. Normal range of motion.      Cervical back: Normal range of motion and neck supple.     Neurological: She is alert and oriented to person, place, and time. She has normal strength. No cranial nerve deficit. GCS score is 15. GCS eye subscore is 4. GCS verbal subscore is 5. GCS motor subscore is 6.   Skin: Skin is warm and dry. Capillary refill takes less than 2 seconds.         ED Course    Procedures  Labs Reviewed   POCT GLUCOSE - Abnormal; Notable for the following components:       Result Value    POCT Glucose 115 (*)     All other components within normal limits          Imaging Results              X-Ray Chest 1 View (In process)                      Medications   albuterol-ipratropium 2.5 mg-0.5 mg/3 mL nebulizer solution 3 mL (3 mLs Nebulization Given 2/6/24 0725)   methylPREDNISolone sodium succinate injection 125 mg (125 mg Intramuscular Given 2/6/24 0714)     Medical Decision Making  Amount and/or Complexity of Data Reviewed  Radiology: ordered.    Risk  Prescription drug management.               ED Course as of 02/06/24 0754   Tue Feb 06, 2024   0725 Chest x-ray with chronic changes [SD]   0744 Improved after treatment.  Stable for discharge and follow up to primary care physician [SD]      ED Course User Index  [SD] Kunal Saunders MD               Medical Decision Making:   Differential Diagnosis:   COPD exacerbation, tobacco abuse             Clinical Impression:  Final diagnoses:  [R06.02] Shortness of breath  [J44.1] COPD exacerbation  [J44.1] COPD with acute exacerbation  [Z72.0] Tobacco abuse  [F06.4] Anxiety disorder due to general medical condition (Primary)          ED Disposition Condition    Discharge Stable          ED Prescriptions    None       Follow-up Information       Follow up With Specialties Details Why Contact Info    Primary care physician  In 2 days               Kunal Saunders MD  02/06/24 4955       Kunal Saunders MD  02/06/24 4739

## 2024-02-08 ENCOUNTER — PATIENT OUTREACH (OUTPATIENT)
Dept: EMERGENCY MEDICINE | Facility: HOSPITAL | Age: 64
End: 2024-02-08
Payer: MEDICARE

## 2024-02-14 LAB
MISCELLANEOUS TEST NAME: NORMAL
REFERENCE LAB: NORMAL
SPECIMEN TYPE: NORMAL
TEST RESULT: NORMAL

## 2024-02-15 NOTE — PROGRESS NOTES
ED Navigator attempted to contact patient on 3 or more separate occasions, patient is unable to reach. ED Navigator to close encounter at this time.    Shasta Benjamin  ED Navigator- [place]  [phone]

## 2024-02-16 ENCOUNTER — HOSPITAL ENCOUNTER (EMERGENCY)
Facility: HOSPITAL | Age: 64
Discharge: HOME | End: 2024-02-16
Attending: EMERGENCY MEDICINE
Payer: MEDICARE

## 2024-02-16 VITALS
WEIGHT: 245 LBS | SYSTOLIC BLOOD PRESSURE: 126 MMHG | RESPIRATION RATE: 20 BRPM | OXYGEN SATURATION: 95 % | TEMPERATURE: 98 F | HEIGHT: 63 IN | HEART RATE: 92 BPM | DIASTOLIC BLOOD PRESSURE: 62 MMHG | BODY MASS INDEX: 43.41 KG/M2

## 2024-02-16 DIAGNOSIS — J18.9 COMMUNITY ACQUIRED PNEUMONIA, UNSPECIFIED LATERALITY: Primary | ICD-10-CM

## 2024-02-16 DIAGNOSIS — J44.9 COPD (CHRONIC OBSTRUCTIVE PULMONARY DISEASE): ICD-10-CM

## 2024-02-16 LAB
ANION GAP SERPL CALC-SCNC: 1 MMOL/L (ref 3–11)
BASOPHILS # BLD AUTO: 0.03 K/UL (ref 0–0.2)
BASOPHILS NFR BLD: 0.2 % (ref 0–1.9)
BUN SERPL-MCNC: 12 MG/DL (ref 8–23)
CALCIUM SERPL-MCNC: 9.4 MG/DL (ref 8.7–10.5)
CHLORIDE SERPL-SCNC: 108 MMOL/L (ref 95–110)
CO2 SERPL-SCNC: 31 MMOL/L (ref 23–29)
CREAT SERPL-MCNC: 1 MG/DL (ref 0.5–1.4)
DIFFERENTIAL METHOD BLD: ABNORMAL
EOSINOPHIL # BLD AUTO: 0 K/UL (ref 0–0.5)
EOSINOPHIL NFR BLD: 0 % (ref 0–8)
ERYTHROCYTE [DISTWIDTH] IN BLOOD BY AUTOMATED COUNT: 15.2 % (ref 11.5–14.5)
EST. GFR  (NO RACE VARIABLE): >60 ML/MIN/1.73 M^2
GLUCOSE SERPL-MCNC: 220 MG/DL (ref 70–110)
HCT VFR BLD AUTO: 39.6 % (ref 37–48.5)
HGB BLD-MCNC: 13.3 G/DL (ref 12–16)
IMM GRANULOCYTES # BLD AUTO: 0.06 K/UL (ref 0–0.04)
IMM GRANULOCYTES NFR BLD AUTO: 0.3 % (ref 0–0.5)
LYMPHOCYTES # BLD AUTO: 1.4 K/UL (ref 1–4.8)
LYMPHOCYTES NFR BLD: 7.9 % (ref 18–48)
MCH RBC QN AUTO: 30.1 PG (ref 27–31)
MCHC RBC AUTO-ENTMCNC: 33.6 G/DL (ref 32–36)
MCV RBC AUTO: 90 FL (ref 82–98)
MONOCYTES # BLD AUTO: 1.1 K/UL (ref 0.3–1)
MONOCYTES NFR BLD: 6.4 % (ref 4–15)
NEUTROPHILS # BLD AUTO: 15.1 K/UL (ref 1.8–7.7)
NEUTROPHILS NFR BLD: 85.2 % (ref 38–73)
NRBC BLD-RTO: 0 /100 WBC
PLATELET # BLD AUTO: 251 K/UL (ref 150–450)
PMV BLD AUTO: 11.8 FL (ref 9.2–12.9)
POTASSIUM SERPL-SCNC: 3.3 MMOL/L (ref 3.5–5.1)
RBC # BLD AUTO: 4.42 M/UL (ref 4–5.4)
SODIUM SERPL-SCNC: 140 MMOL/L (ref 136–145)
WBC # BLD AUTO: 17.73 K/UL (ref 3.9–12.7)

## 2024-02-16 PROCEDURE — 25000242 PHARM REV CODE 250 ALT 637 W/ HCPCS: Performed by: EMERGENCY MEDICINE

## 2024-02-16 PROCEDURE — 63600175 PHARM REV CODE 636 W HCPCS: Performed by: EMERGENCY MEDICINE

## 2024-02-16 PROCEDURE — 99285 EMERGENCY DEPT VISIT HI MDM: CPT | Mod: 25

## 2024-02-16 PROCEDURE — 85025 COMPLETE CBC W/AUTO DIFF WBC: CPT | Performed by: EMERGENCY MEDICINE

## 2024-02-16 PROCEDURE — 94640 AIRWAY INHALATION TREATMENT: CPT

## 2024-02-16 PROCEDURE — 36415 COLL VENOUS BLD VENIPUNCTURE: CPT | Performed by: EMERGENCY MEDICINE

## 2024-02-16 PROCEDURE — 99900035 HC TECH TIME PER 15 MIN (STAT)

## 2024-02-16 PROCEDURE — 25000003 PHARM REV CODE 250: Performed by: EMERGENCY MEDICINE

## 2024-02-16 PROCEDURE — 80048 BASIC METABOLIC PNL TOTAL CA: CPT | Performed by: EMERGENCY MEDICINE

## 2024-02-16 PROCEDURE — 94761 N-INVAS EAR/PLS OXIMETRY MLT: CPT

## 2024-02-16 RX ORDER — AMOXICILLIN AND CLAVULANATE POTASSIUM 875; 125 MG/1; MG/1
1 TABLET, FILM COATED ORAL
Status: COMPLETED | OUTPATIENT
Start: 2024-02-16 | End: 2024-02-16

## 2024-02-16 RX ORDER — DOXYCYCLINE 100 MG/1
100 CAPSULE ORAL EVERY 12 HOURS
Qty: 14 CAPSULE | Refills: 0 | Status: SHIPPED | OUTPATIENT
Start: 2024-02-16 | End: 2024-02-23

## 2024-02-16 RX ORDER — DOXYCYCLINE HYCLATE 100 MG
100 TABLET ORAL
Status: COMPLETED | OUTPATIENT
Start: 2024-02-16 | End: 2024-02-16

## 2024-02-16 RX ORDER — IBUPROFEN 200 MG
1 TABLET ORAL DAILY
Qty: 14 PATCH | Refills: 0 | Status: SHIPPED | OUTPATIENT
Start: 2024-02-16 | End: 2024-02-16 | Stop reason: CLARIF

## 2024-02-16 RX ORDER — IPRATROPIUM BROMIDE AND ALBUTEROL SULFATE 2.5; .5 MG/3ML; MG/3ML
3 SOLUTION RESPIRATORY (INHALATION) EVERY 4 HOURS PRN
Qty: 75 ML | Refills: 0 | Status: SHIPPED | OUTPATIENT
Start: 2024-02-16 | End: 2025-02-15

## 2024-02-16 RX ORDER — IPRATROPIUM BROMIDE AND ALBUTEROL SULFATE 2.5; .5 MG/3ML; MG/3ML
3 SOLUTION RESPIRATORY (INHALATION)
Status: COMPLETED | OUTPATIENT
Start: 2024-02-16 | End: 2024-02-16

## 2024-02-16 RX ORDER — AMOXICILLIN AND CLAVULANATE POTASSIUM 875; 125 MG/1; MG/1
1 TABLET, FILM COATED ORAL 2 TIMES DAILY
Qty: 14 TABLET | Refills: 0 | Status: SHIPPED | OUTPATIENT
Start: 2024-02-16

## 2024-02-16 RX ORDER — PREDNISONE 20 MG/1
60 TABLET ORAL
Status: COMPLETED | OUTPATIENT
Start: 2024-02-16 | End: 2024-02-16

## 2024-02-16 RX ADMIN — AMOXICILLIN AND CLAVULANATE POTASSIUM 1 TABLET: 875; 125 TABLET, FILM COATED ORAL at 10:02

## 2024-02-16 RX ADMIN — DOXYCYCLINE HYCLATE 100 MG: 100 TABLET, COATED ORAL at 10:02

## 2024-02-16 RX ADMIN — PREDNISONE 60 MG: 20 TABLET ORAL at 09:02

## 2024-02-16 RX ADMIN — IPRATROPIUM BROMIDE AND ALBUTEROL SULFATE 3 ML: .5; 3 SOLUTION RESPIRATORY (INHALATION) at 08:02

## 2024-02-17 NOTE — ED PROVIDER NOTES
EMERGENCY DEPARTMENT HISTORY AND PHYSICAL EXAM     This note is dictated on M*Modal word recognition program.  There are word recognition mistakes and grammatical errors that are occasionally missed on review.     Date: 2/16/2024   Patient Name: Vero Allen       History of Presenting Illness      Chief Complaint   Patient presents with    Shortness of Breath     Hx of COPD.  Has been having breathing issues off and on for a while.  Get flare ups from time to time.  Reports cough but unable to get anything up.  Doing home neb txs but not helping.        2049   Vero Allen is a 64 y.o. female with PMHX of COPD, anxiety, tobacco use who presents to the emergency department C/O shortness of breath.    Patient reports chronic shortness of breath worse over the past day.  Home nebulizer is not helping.  No fever.  Reports cough.      PCP: Burke Merchant MD        Current Facility-Administered Medications   Medication Dose Route Frequency Provider Last Rate Last Admin    potassium bicarbonate disintegrating tablet 25 mEq  25 mEq Oral Once Henry Kapadia MD         Current Outpatient Medications   Medication Sig Dispense Refill    acetaminophen (TYLENOL) 500 MG tablet Take 2 tablets (1,000 mg total) by mouth every 6 (six) hours as needed for Pain or Temperature greater than (101). 30 tablet 0    albuterol (PROVENTIL/VENTOLIN HFA) 90 mcg/actuation inhaler Inhale 1-2 puffs into the lungs every 6 (six) hours as needed for Wheezing or Shortness of Breath. Rescue 8 g 0    albuterol (PROVENTIL/VENTOLIN HFA) 90 mcg/actuation inhaler Inhale 1-2 puffs into the lungs every 4 (four) hours as needed for Wheezing or Shortness of Breath. Rescue 8 g 1    albuterol-ipratropium (DUO-NEB) 2.5 mg-0.5 mg/3 mL nebulizer solution Take 3 mLs by nebulization every 4 (four) hours as needed for Wheezing or Shortness of Breath. Rescue 75 mL 0    ALPRAZolam (XANAX) 0.5 MG tablet TAKE 1/2 TO 1 TABLET BY MOUTH  EVERY 8 HOURS AS NEEDED FOR ANXIETY. WARNING: ADDICTIVE      amLODIPine (NORVASC) 10 MG tablet Take 10 mg by mouth.      amoxicillin-clavulanate 875-125mg (AUGMENTIN) 875-125 mg per tablet Take 1 tablet by mouth 2 (two) times daily. 14 tablet 0    aspirin (ECOTRIN) 81 MG EC tablet Take 81 mg by mouth once daily.      benzocaine-menthoL-cetylpyrid (ACTISEP) 2-0.5-0.1 % Soln 2 sprays by Mucous Membrane route every 6 (six) hours as needed (sore throat). 30 mL 0    benztropine (COGENTIN) 2 MG Tab Take 1 mg by mouth 2 (two) times daily.      celecoxib (CELEBREX) 200 MG capsule Take 200 mg by mouth 2 (two) times daily as needed.      cetirizine (ZYRTEC) 10 MG tablet Take 1 tablet (10 mg total) by mouth once daily. for 10 days 10 tablet 0    citalopram (CELEXA) 20 MG tablet Take 20 mg by mouth once daily.      clotrimazole-betamethasone 1-0.05% (LOTRISONE) cream Apply topically 2 (two) times daily. 30 g 0    doxycycline (VIBRAMYCIN) 100 MG Cap Take 1 capsule (100 mg total) by mouth every 12 (twelve) hours. for 7 days 14 capsule 0    esomeprazole (NEXIUM) 20 MG capsule Take 20 mg by mouth before breakfast.      hydrOXYzine HCL (ATARAX) 25 MG tablet Take 1 tablet (25 mg total) by mouth every 6 (six) hours. 12 tablet 0    levothyroxine (SYNTHROID) 50 MCG tablet Take 50 mcg by mouth before breakfast.      metFORMIN (GLUCOPHAGE-XR) 750 MG ER 24hr tablet Take 750 mg by mouth 2 (two) times daily.      ondansetron (ZOFRAN-ODT) 4 MG TbDL Take 1 tablet (4 mg total) by mouth every 8 (eight) hours as needed ([place]). 8 tablet 0    pantoprazole (PROTONIX) 20 MG tablet Take 1 tablet (20 mg total) by mouth 2 (two) times daily before meals. for 14 days 28 tablet 0    risperiDONE (RISPERDAL) 3 MG Tab Take 3 mg by mouth nightly.      traZODone (DESYREL) 100 MG tablet Take 100 mg by mouth every evening. 2 caps      vitamin D (VITAMIN D3) 1000 units Tab Take 1,000 Units by mouth once daily.      vitamin E 100 UNIT capsule  Take 100 Units by mouth once daily.      zolpidem (AMBIEN) 10 mg Tab Take 10 mg by mouth nightly as needed.             Past History     Past Medical History:   Past Medical History:   Diagnosis Date    Anxiety     Breast cancer 2010    Cancer     Breast    COPD (chronic obstructive pulmonary disease)     Depression     Diabetes mellitus     GERD (gastroesophageal reflux disease)     Hypertension     Insomnia     Thyroid disease         Past Surgical History:   Past Surgical History:   Procedure Laterality Date    BLADDER SUSPENSION      BREAST LUMPECTOMY      Right breast    CHOLECYSTECTOMY      HYSTERECTOMY      KNEE ARTHROSCOPY      Right knee    OOPHORECTOMY          Family History:   Family History   Problem Relation Age of Onset    No Known Problems Mother     No Known Problems Father     No Known Problems Sister     No Known Problems Daughter     No Known Problems Maternal Aunt     No Known Problems Maternal Uncle     No Known Problems Paternal Aunt     No Known Problems Paternal Uncle     No Known Problems Maternal Grandmother     No Known Problems Maternal Grandfather     No Known Problems Paternal Grandmother     No Known Problems Paternal Grandfather     Breast cancer Neg Hx     Ovarian cancer Neg Hx     BRCA 1/2 Neg Hx         Social History:   Social History     Tobacco Use    Smoking status: Every Day     Current packs/day: 0.50     Types: Cigarettes    Smokeless tobacco: Never    Tobacco comments:     quit yesterday 6/12/21   Substance Use Topics    Alcohol use: Not Currently    Drug use: Never        Allergies:   Review of patient's allergies indicates:  No Known Allergies       Review of Systems   Review of Systems   See HPI for pertinent positives and negatives       Physical Exam     Vitals:    02/16/24 2039 02/16/24 2044 02/16/24 2047 02/16/24 2051   BP: (!) 128/59      Pulse:  93 93 93   Resp:  (!) 23 (!) 23 (!) 23   Temp: 97.8 °F (36.6 °C)      TempSrc: Oral       SpO2:  (!) 93% (!) 93% (!) 93%   Weight:       Height:          Physical Exam  Vitals and nursing note reviewed.   Constitutional:       General: She is not in acute distress.     Appearance: Normal appearance. She is not ill-appearing.   HENT:      Head: Normocephalic and atraumatic.      Right Ear: External ear normal.      Left Ear: External ear normal.      Nose: Nose normal. No congestion or rhinorrhea.      Mouth/Throat:      Mouth: Mucous membranes are moist.   Eyes:      Conjunctiva/sclera: Conjunctivae normal.      Pupils: Pupils are equal, round, and reactive to light.   Cardiovascular:      Rate and Rhythm: Normal rate and regular rhythm.   Pulmonary:      Effort: Tachypnea present. No accessory muscle usage or respiratory distress.      Breath sounds: No stridor. Decreased breath sounds and wheezing present.   Musculoskeletal:         General: No deformity. Normal range of motion.      Cervical back: Normal range of motion. No rigidity.   Skin:     General: Skin is dry.   Neurological:      General: No focal deficit present.      Mental Status: She is alert and oriented to person, place, and time. Mental status is at baseline.   Psychiatric:         Mood and Affect: Mood normal.         Behavior: Behavior normal.              Diagnostic Study Results      Labs -   Recent Results (from the past 12 hour(s))   CBC Auto Differential    Collection Time: 02/16/24  9:33 PM   Result Value Ref Range    WBC 17.73 (H) 3.90 - 12.70 K/uL    RBC 4.42 4.00 - 5.40 M/uL    Hemoglobin 13.3 12.0 - 16.0 g/dL    Hematocrit 39.6 37.0 - 48.5 %    MCV 90 82 - 98 fL    MCH 30.1 27.0 - 31.0 pg    MCHC 33.6 32.0 - 36.0 g/dL    RDW 15.2 (H) 11.5 - 14.5 %    Platelets 251 150 - 450 K/uL    MPV 11.8 9.2 - 12.9 fL    Immature Granulocytes 0.3 0.0 - 0.5 %    Gran # (ANC) 15.1 (H) 1.8 - 7.7 K/uL    Immature Grans (Abs) 0.06 (H) 0.00 - 0.04 K/uL    Lymph # 1.4 1.0 - 4.8 K/uL    Mono # 1.1 (H) 0.3 - 1.0 K/uL    Eos # 0.0 0.0 - 0.5  K/uL    Baso # 0.03 0.00 - 0.20 K/uL    nRBC 0 0 /100 WBC    Gran % 85.2 (H) 38.0 - 73.0 %    Lymph % 7.9 (L) 18.0 - 48.0 %    Mono % 6.4 4.0 - 15.0 %    Eosinophil % 0.0 0.0 - 8.0 %    Basophil % 0.2 0.0 - 1.9 %    Differential Method Automated    Basic Metabolic Panel    Collection Time: 02/16/24  9:33 PM   Result Value Ref Range    Sodium 140 136 - 145 mmol/L    Potassium 3.3 (L) 3.5 - 5.1 mmol/L    Chloride 108 95 - 110 mmol/L    CO2 31 (H) 23 - 29 mmol/L    Glucose 220 (H) 70 - 110 mg/dL    BUN 12 8 - 23 mg/dL    Creatinine 1.0 0.5 - 1.4 mg/dL    Calcium 9.4 8.7 - 10.5 mg/dL    Anion Gap 1 (L) 3 - 11 mmol/L    eGFR >60.0 >60 mL/min/1.73 m^2        Radiologic Studies -    X-Ray Chest 1 View    (Results Pending)   CT Chest Without Contrast    (Results Pending)        Medications given in the ED-   Medications   potassium bicarbonate disintegrating tablet 25 mEq (has no administration in time range)   albuterol-ipratropium 2.5 mg-0.5 mg/3 mL nebulizer solution 3 mL (3 mLs Nebulization Given 2/16/24 2051)   predniSONE tablet 60 mg (60 mg Oral Given 2/16/24 2101)   amoxicillin-clavulanate 875-125mg per tablet 1 tablet (1 tablet Oral Given 2/16/24 2209)   doxycycline tablet 100 mg (100 mg Oral Given 2/16/24 2209)           Medical Decision Making    I am the first provider for this patient.     I reviewed the vital signs, available nursing notes, past medical history, past surgical history, family history and social history.     Vital Signs:  Reviewed the patient's vital signs.     Pulse Oximetry Analysis and Interpretation:    93% on Room Air, low normal      CXR  Interpretation: (Per my independent interpretation, pending formal read)   CXR read by Dr. Henry Kapadia at 2106    Slightly rotated study,  perihilar opacities    External Test Results (Pertinent to encounter):    Records Reviewed: Nursing Notes, Current Prescription Medications, Old Medical Records, External Medical Records , and Previous Radiology  Studies    History Obtained By: Patient    Provider Notes: Vero Allen is a 64 y.o. female with COPD exacerbation, shortness of breath    Co-morbidities Considered:  COPD, tobacco use    Differential Diagnosis:  COPD exacerbation, pneumonia, viral URI      ED Course:    10:20 PM  Patient was able to sleep after nebulizer treatment.  She has not in acute distress.  Satting adequately on room air.  Breathing appears to be at baseline.  Imaging demonstrates bilateral ground-glass infiltrates and will treat patient for pneumonia.  She has a mild leukocytosis but is on a Medrol Dosepak.  BUN is not elevated, patient is not confused or persistently hypoxic.  Reasonable to trial outpatient therapy for community-acquired pneumonia.  Will place on Augmentin and doxycycline.  Requested close follow-up with her primary care provider.  Refill patient's nebulizer.  Instructed to use this every 4-6 hours for the next 2-3 days.  She is to return to ER for worsening respiratory status or signs of systemic illness such as fever, rigors, nausea, or symptoms of dehydration.  Patient expressed understanding and comfort with plan.    SMOKING CESSATION:   8:52 PM     The patient was counseled on the dangers of tobacco use, and was?advised to quit. ?Reviewed strategies to maximize success, including removing cigarettes and smoking materials from environment and written materials. Discussion took?3-5?minutes, and patient expressed understanding.      ED Course as of 02/16/24 2221 Fri Feb 16, 2024 2150 WBC(!): 17.73 [MO]   2207 Potassium(!): 3.3 [MO]      ED Course User Index  [MO] Henry Kapadia MD       Problems Addressed:  Pneumonia    Procedures:   Procedures       Diagnosis and Disposition     Critical Care:      DISCHARGE NOTE:       Vero Allen's  results have been reviewed with her.  She has been counseled regarding her diagnosis, treatment, and plan.  She verbally conveys understanding and  agreement of the signs, symptoms, diagnosis, treatment and prognosis and additionally agrees to follow up as discussed.  She also agrees with the care-plan and conveys that all of her questions have been answered.  I have also provided discharge instructions for her that include: educational information regarding their diagnosis and treatment, and list of reasons why they would want to return to the ED prior to their follow-up appointment, should her condition change. She has been provided with education for proper emergency department utilization.         CLINICAL IMPRESSION:         1. Community acquired pneumonia, unspecified laterality    2. COPD (chronic obstructive pulmonary disease)              PLAN:   1. Discharge Home  2.      Medication List        START taking these medications      albuterol-ipratropium 2.5 mg-0.5 mg/3 mL nebulizer solution  Commonly known as: DUO-NEB  Take 3 mLs by nebulization every 4 (four) hours as needed for Wheezing or Shortness of Breath. Rescue     amoxicillin-clavulanate 875-125mg 875-125 mg per tablet  Commonly known as: AUGMENTIN  Take 1 tablet by mouth 2 (two) times daily.     doxycycline 100 MG Cap  Commonly known as: VIBRAMYCIN  Take 1 capsule (100 mg total) by mouth every 12 (twelve) hours. for 7 days            ASK your doctor about these medications      acetaminophen 500 MG tablet  Commonly known as: TYLENOL  Take 2 tablets (1,000 mg total) by mouth every 6 (six) hours as needed for Pain or Temperature greater than (101).     ACTISEP 2-0.5-0.1 % Soln  Generic drug: benzocaine-menthoL-cetylpyrid  2 sprays by Mucous Membrane route every 6 (six) hours as needed (sore throat).     * albuterol 90 mcg/actuation inhaler  Commonly known as: PROVENTIL/VENTOLIN HFA  Inhale 1-2 puffs into the lungs every 6 (six) hours as needed for Wheezing or Shortness of Breath. Rescue     * albuterol 90 mcg/actuation inhaler  Commonly known as: PROVENTIL/VENTOLIN HFA  Inhale 1-2 puffs into the  lungs every 4 (four) hours as needed for Wheezing or Shortness of Breath. Rescue     ALPRAZolam 0.5 MG tablet  Commonly known as: XANAX     amLODIPine 10 MG tablet  Commonly known as: NORVASC     aspirin 81 MG EC tablet  Commonly known as: ECOTRIN     benztropine 2 MG Tab  Commonly known as: COGENTIN     celecoxib 200 MG capsule  Commonly known as: CeleBREX     cetirizine 10 MG tablet  Commonly known as: ZYRTEC  Take 1 tablet (10 mg total) by mouth once daily. for 10 days     citalopram 20 MG tablet  Commonly known as: CeleXA     clotrimazole-betamethasone 1-0.05% cream  Commonly known as: LOTRISONE  Apply topically 2 (two) times daily.     esomeprazole 20 MG capsule  Commonly known as: NEXIUM     hydrOXYzine HCL 25 MG tablet  Commonly known as: ATARAX  Take 1 tablet (25 mg total) by mouth every 6 (six) hours.     levothyroxine 50 MCG tablet  Commonly known as: SYNTHROID     metFORMIN 750 MG ER 24hr tablet  Commonly known as: GLUCOPHAGE-XR     ondansetron 4 MG Tbdl  Commonly known as: ZOFRAN-ODT  Take 1 tablet (4 mg total) by mouth every 8 (eight) hours as needed ([place]).     pantoprazole 20 MG tablet  Commonly known as: PROTONIX  Take 1 tablet (20 mg total) by mouth 2 (two) times daily before meals. for 14 days     risperiDONE 3 MG Tab  Commonly known as: RISPERDAL     traZODone 100 MG tablet  Commonly known as: DESYREL     vitamin D 1000 units Tab  Commonly known as: VITAMIN D3     vitamin E 100 UNIT capsule     zolpidem 10 mg Tab  Commonly known as: AMBIEN           * This list has 2 medication(s) that are the same as other medications prescribed for you. Read the directions carefully, and ask your doctor or other care provider to review them with you.                   Where to Get Your Medications        These medications were sent to [facility]/pharmacy #[id number] - [place], [place] - [address], [facility] [address]      Phone: [phone]   albuterol-ipratropium 2.5 mg-0.5 mg/3 mL nebulizer  solution  amoxicillin-clavulanate 875-125mg 875-125 mg per tablet  doxycycline 100 MG Cap        3. Burke Merchant MD  [address] [address]  [phone]    Schedule an appointment as soon as possible for a visit   Primary care follow up    [facility] Emergency Department  [address] [facility] [address] [phone]  Go to   If symptoms worsen       _______________________________     Please note that this dictation was completed with [employer], the computer voice recognition software.  Quite often unanticipated grammatical, syntax, homophones, and other interpretive errors are inadvertently transcribed by the computer software.  Please disregard these errors.  Please excuse any errors that have escaped final proofreading.             Henry Kapadia MD  02/16/24 8601

## 2024-02-22 ENCOUNTER — HOSPITAL ENCOUNTER (EMERGENCY)
Facility: HOSPITAL | Age: 64
Discharge: HOME | End: 2024-02-22
Attending: EMERGENCY MEDICINE
Payer: MEDICARE

## 2024-02-22 VITALS
BODY MASS INDEX: 43.29 KG/M2 | SYSTOLIC BLOOD PRESSURE: 118 MMHG | HEART RATE: 78 BPM | DIASTOLIC BLOOD PRESSURE: 58 MMHG | RESPIRATION RATE: 14 BRPM | TEMPERATURE: 98 F | OXYGEN SATURATION: 96 % | WEIGHT: 244.38 LBS

## 2024-02-22 DIAGNOSIS — J44.9 CHRONIC OBSTRUCTIVE PULMONARY DISEASE, UNSPECIFIED COPD TYPE: Primary | ICD-10-CM

## 2024-02-22 DIAGNOSIS — R07.9 CHEST PAIN: ICD-10-CM

## 2024-02-22 DIAGNOSIS — R07.81 PLEURITIC CHEST PAIN: ICD-10-CM

## 2024-02-22 LAB
ALBUMIN SERPL BCP-MCNC: 2.6 G/DL (ref 3.5–5.2)
ALP SERPL-CCNC: 83 U/L (ref 55–135)
ALT SERPL W/O P-5'-P-CCNC: 28 U/L (ref 10–44)
ANION GAP SERPL CALC-SCNC: 8 MMOL/L (ref 3–11)
AST SERPL-CCNC: 10 U/L (ref 10–40)
BASOPHILS # BLD AUTO: 0.03 K/UL (ref 0–0.2)
BASOPHILS NFR BLD: 0.3 % (ref 0–1.9)
BILIRUB SERPL-MCNC: 0.7 MG/DL (ref 0.1–1)
BUN SERPL-MCNC: 14 MG/DL (ref 8–23)
CALCIUM SERPL-MCNC: 9.5 MG/DL (ref 8.7–10.5)
CHLORIDE SERPL-SCNC: 106 MMOL/L (ref 95–110)
CO2 SERPL-SCNC: 32 MMOL/L (ref 23–29)
CREAT SERPL-MCNC: 1.1 MG/DL (ref 0.5–1.4)
DIFFERENTIAL METHOD BLD: ABNORMAL
EOSINOPHIL # BLD AUTO: 0.3 K/UL (ref 0–0.5)
EOSINOPHIL NFR BLD: 2.8 % (ref 0–8)
ERYTHROCYTE [DISTWIDTH] IN BLOOD BY AUTOMATED COUNT: 15 % (ref 11.5–14.5)
EST. GFR  (NO RACE VARIABLE): 55.8 ML/MIN/1.73 M^2
GLUCOSE SERPL-MCNC: 116 MG/DL (ref 70–110)
HCT VFR BLD AUTO: 41.8 % (ref 37–48.5)
HGB BLD-MCNC: 13.5 G/DL (ref 12–16)
IMM GRANULOCYTES # BLD AUTO: 0.24 K/UL (ref 0–0.04)
IMM GRANULOCYTES NFR BLD AUTO: 2.5 % (ref 0–0.5)
LYMPHOCYTES # BLD AUTO: 1.5 K/UL (ref 1–4.8)
LYMPHOCYTES NFR BLD: 15.5 % (ref 18–48)
MCH RBC QN AUTO: 29.9 PG (ref 27–31)
MCHC RBC AUTO-ENTMCNC: 32.3 G/DL (ref 32–36)
MCV RBC AUTO: 93 FL (ref 82–98)
MONOCYTES # BLD AUTO: 1 K/UL (ref 0.3–1)
MONOCYTES NFR BLD: 10.5 % (ref 4–15)
NEUTROPHILS # BLD AUTO: 6.6 K/UL (ref 1.8–7.7)
NEUTROPHILS NFR BLD: 68.4 % (ref 38–73)
NRBC BLD-RTO: 0 /100 WBC
NT-PROBNP SERPL-MCNC: 219 PG/ML (ref 5–900)
OHS QRS DURATION: 74 MS
OHS QTC CALCULATION: 451 MS
PLATELET # BLD AUTO: 275 K/UL (ref 150–450)
PMV BLD AUTO: 10.9 FL (ref 9.2–12.9)
POTASSIUM SERPL-SCNC: 4.1 MMOL/L (ref 3.5–5.1)
PROT SERPL-MCNC: 6.1 G/DL (ref 6–8.4)
RBC # BLD AUTO: 4.51 M/UL (ref 4–5.4)
SODIUM SERPL-SCNC: 146 MMOL/L (ref 136–145)
TROPONIN I SERPL DL<=0.01 NG/ML-MCNC: 11.4 PG/ML (ref 0–60)
WBC # BLD AUTO: 9.66 K/UL (ref 3.9–12.7)

## 2024-02-22 PROCEDURE — 99285 EMERGENCY DEPT VISIT HI MDM: CPT | Mod: 25

## 2024-02-22 PROCEDURE — 93005 ELECTROCARDIOGRAM TRACING: CPT

## 2024-02-22 PROCEDURE — 83880 ASSAY OF NATRIURETIC PEPTIDE: CPT | Performed by: CLINICAL NURSE SPECIALIST

## 2024-02-22 PROCEDURE — 85025 COMPLETE CBC W/AUTO DIFF WBC: CPT | Performed by: CLINICAL NURSE SPECIALIST

## 2024-02-22 PROCEDURE — 36415 COLL VENOUS BLD VENIPUNCTURE: CPT | Performed by: CLINICAL NURSE SPECIALIST

## 2024-02-22 PROCEDURE — 80053 COMPREHEN METABOLIC PANEL: CPT | Performed by: CLINICAL NURSE SPECIALIST

## 2024-02-22 PROCEDURE — 93010 ELECTROCARDIOGRAM REPORT: CPT | Mod: ,,, | Performed by: INTERNAL MEDICINE

## 2024-02-22 PROCEDURE — 84484 ASSAY OF TROPONIN QUANT: CPT | Performed by: CLINICAL NURSE SPECIALIST

## 2024-02-22 NOTE — ED PROVIDER NOTES
Encounter Date: 2/22/2024       History     Chief Complaint   Patient presents with    Chest Pain     Pt states started with CP this morning.  Described as intermittent and stabbing.   Recently diagnosed with Pneumonia. Hx of COPD     64-year-old female with a history of COPD complaining of pleuritic chest pain that began early this morning, recently diagnosed with pneumonia and treated.  Denies any nausea vomiting, no diaphoresis.  Not ill appearing, alert oriented x4, GCS is 15.  Oxygen saturations 97% on room air        Review of patient's allergies indicates:  No Known Allergies  Past Medical History:   Diagnosis Date    Anxiety     Breast cancer 2010    Cancer     Breast    COPD (chronic obstructive pulmonary disease)     Depression     Diabetes mellitus     GERD (gastroesophageal reflux disease)     Hypertension     Insomnia     Thyroid disease      Past Surgical History:   Procedure Laterality Date    BLADDER SUSPENSION      BREAST LUMPECTOMY      Right breast    CHOLECYSTECTOMY      HYSTERECTOMY      KNEE ARTHROSCOPY      Right knee    OOPHORECTOMY       Family History   Problem Relation Age of Onset    No Known Problems Mother     No Known Problems Father     No Known Problems Sister     No Known Problems Daughter     No Known Problems Maternal Aunt     No Known Problems Maternal Uncle     No Known Problems Paternal Aunt     No Known Problems Paternal Uncle     No Known Problems Maternal Grandmother     No Known Problems Maternal Grandfather     No Known Problems Paternal Grandmother     No Known Problems Paternal Grandfather     Breast cancer Neg Hx     Ovarian cancer Neg Hx     BRCA 1/2 Neg Hx      Social History     Tobacco Use    Smoking status: Every Day     Current packs/day: 0.50     Types: Cigarettes    Smokeless tobacco: Never    Tobacco comments:     quit yesterday 6/12/21   Substance Use Topics    Alcohol use: Not Currently    Drug use: Never     Review of Systems   Constitutional:  Negative for  fever.   HENT:  Negative for sore throat.    Respiratory:  Negative for shortness of breath.    Cardiovascular:  Negative for chest pain.   Gastrointestinal:  Negative for nausea.   Genitourinary:  Negative for dysuria.   Musculoskeletal:  Negative for back pain.   Skin:  Negative for rash.   Neurological:  Negative for weakness.   Hematological:  Does not bruise/bleed easily.   All other systems reviewed and are negative.      Physical Exam     Initial Vitals   BP Pulse Resp Temp SpO2   02/22/24 1154 02/22/24 1152 02/22/24 1152 02/22/24 1152 02/22/24 1154   (!) 128/57 100 18 98 °F (36.7 °C) 97 %      MAP       --                Physical Exam    Nursing note and vitals reviewed.  Constitutional: She appears well-developed and well-nourished. She is not diaphoretic. No distress.   HENT:   Head: Normocephalic and atraumatic.   Eyes: Conjunctivae and EOM are normal. Pupils are equal, round, and reactive to light.   Neck: Neck supple.   Normal range of motion.  Cardiovascular:  Normal rate, regular rhythm, normal heart sounds and intact distal pulses.           No murmur heard.  Pulmonary/Chest: Breath sounds normal. No respiratory distress. She has no wheezes. She has no rhonchi. She has no rales. She exhibits no tenderness.   Abdominal: Abdomen is soft. Bowel sounds are normal.   Musculoskeletal:         General: No tenderness or edema. Normal range of motion.      Cervical back: Normal range of motion and neck supple.     Neurological: She is alert and oriented to person, place, and time. She has normal strength. No cranial nerve deficit. GCS score is 15. GCS eye subscore is 4. GCS verbal subscore is 5. GCS motor subscore is 6.   Skin: Skin is warm and dry. Capillary refill takes less than 2 seconds.         ED Course   Procedures  Labs Reviewed   CBC W/ AUTO DIFFERENTIAL - Abnormal; Notable for the following components:       Result Value    RDW 15.0 (*)     Immature Granulocytes 2.5 (*)     Immature Grans (Abs)  0.24 (*)     Lymph % 15.5 (*)     All other components within normal limits   COMPREHENSIVE METABOLIC PANEL - Abnormal; Notable for the following components:    Sodium 146 (*)     CO2 32 (*)     Glucose 116 (*)     Albumin 2.6 (*)     eGFR 55.8 (*)     All other components within normal limits   NT-PRO NATRIURETIC PEPTIDE   TROPONIN I HIGH SENSITIVITY     EKG Readings: (Independently Interpreted)   Initial Reading: No STEMI. Rhythm: Normal Sinus Rhythm. Heart Rate: 99. Ectopy: No Ectopy PVCs PACs. ST Segments: Normal ST Segments. T Waves: Normal. Axis: Normal. Clinical Impression: Normal Sinus Rhythm with PACs and with PVCs       Imaging Results              X-Ray Chest AP Portable (Final result)  Result time 02/22/24 12:21:39      Final result by Jh Lynne MD (02/22/24 12:21:39)                   Impression:      No evidence of acute disease.      Electronically signed by: Jh Lynne MD  Date:    02/22/2024  Time:    12:21               Narrative:    EXAMINATION:  XR CHEST AP PORTABLE    CLINICAL HISTORY:  Chest pain, unspecified    COMPARISON:  Frontal chest 02/16/2024.    FINDINGS:  Frontal chest radiograph is rotated to the right and demonstrates clear lungs.  No pleural fluid.  Cardiomediastinal silhouette is unremarkable, allowing for rotation.  No osseous abnormality.                                       Medications - No data to display  Medical Decision Making  Amount and/or Complexity of Data Reviewed  Labs: ordered.  Radiology: ordered.                          Medical Decision Making:   Differential Diagnosis:   COPD, pleuritic chest pain, anxiety             Clinical Impression:  Final diagnoses:  [R07.9] Chest pain  [J44.9] Chronic obstructive pulmonary disease, unspecified COPD type (Primary)  [R07.81] Pleuritic chest pain          ED Disposition Condition    Discharge Stable          ED Prescriptions    None       Follow-up Information       Follow up With Specialties Details Why  Contact Info Additional Information    Primary care physician  In 2 days       [facility] Emergency Department Emergency Medicine  As needed, If symptoms worsen [facility] [facility] [address] [phone] Floor 1             Kunal Saunders MD  02/22/24 3619

## 2024-03-03 ENCOUNTER — HOSPITAL ENCOUNTER (EMERGENCY)
Facility: HOSPITAL | Age: 64
Discharge: HOME | End: 2024-03-03
Attending: EMERGENCY MEDICINE
Payer: MEDICARE

## 2024-03-03 VITALS
HEIGHT: 63 IN | HEART RATE: 82 BPM | WEIGHT: 246.19 LBS | RESPIRATION RATE: 18 BRPM | DIASTOLIC BLOOD PRESSURE: 76 MMHG | BODY MASS INDEX: 43.62 KG/M2 | SYSTOLIC BLOOD PRESSURE: 138 MMHG | OXYGEN SATURATION: 95 % | TEMPERATURE: 99 F

## 2024-03-03 DIAGNOSIS — J44.9 COPD (CHRONIC OBSTRUCTIVE PULMONARY DISEASE): Primary | ICD-10-CM

## 2024-03-03 DIAGNOSIS — F41.9 ANXIETY: ICD-10-CM

## 2024-03-03 PROCEDURE — 99900031 HC PATIENT EDUCATION (STAT)

## 2024-03-03 PROCEDURE — 99285 EMERGENCY DEPT VISIT HI MDM: CPT | Mod: 25

## 2024-03-03 PROCEDURE — 94761 N-INVAS EAR/PLS OXIMETRY MLT: CPT

## 2024-03-03 PROCEDURE — 99900035 HC TECH TIME PER 15 MIN (STAT)

## 2024-03-03 PROCEDURE — 94640 AIRWAY INHALATION TREATMENT: CPT

## 2024-03-03 PROCEDURE — 25000003 PHARM REV CODE 250: Performed by: EMERGENCY MEDICINE

## 2024-03-03 PROCEDURE — 25000242 PHARM REV CODE 250 ALT 637 W/ HCPCS: Performed by: EMERGENCY MEDICINE

## 2024-03-03 RX ORDER — CLONAZEPAM 0.5 MG/1
1 TABLET ORAL
Status: COMPLETED | OUTPATIENT
Start: 2024-03-03 | End: 2024-03-03

## 2024-03-03 RX ORDER — IPRATROPIUM BROMIDE AND ALBUTEROL SULFATE 2.5; .5 MG/3ML; MG/3ML
3 SOLUTION RESPIRATORY (INHALATION) EVERY 4 HOURS PRN
Qty: 75 ML | Refills: 0 | Status: SHIPPED | OUTPATIENT
Start: 2024-03-03 | End: 2025-03-03

## 2024-03-03 RX ORDER — IPRATROPIUM BROMIDE AND ALBUTEROL SULFATE 2.5; .5 MG/3ML; MG/3ML
3 SOLUTION RESPIRATORY (INHALATION)
Status: COMPLETED | OUTPATIENT
Start: 2024-03-03 | End: 2024-03-03

## 2024-03-03 RX ADMIN — IPRATROPIUM BROMIDE AND ALBUTEROL SULFATE 3 ML: 2.5; .5 SOLUTION RESPIRATORY (INHALATION) at 10:03

## 2024-03-03 RX ADMIN — CLONAZEPAM 1 MG: 0.5 TABLET ORAL at 11:03

## 2024-03-03 NOTE — ED PROVIDER NOTES
"EMERGENCY DEPARTMENT HISTORY AND PHYSICAL EXAM     This note is dictated on M*Modal word recognition program.  There are word recognition mistakes and grammatical errors that are occasionally missed on review.     Date: 3/3/2024   Patient Name: Vero Allen       History of Presenting Illness      Chief Complaint   Patient presents with    Shortness of Breath     Patient stated "I can't breathe". Patient stated that she has been short of breath for a long time. Patient reports taking her at home breathing tx and that she needs one from the hospital.         1023   Vero Allen is a 64 y.o. female with PMHX of COPD, anxiety who presents to the emergency department C/O shortness of breath.    Patient reports shortness of breath.  States this is a chronic problem that is gotten worse.  Used her nebulizer at 6:00 a.m. this morning but feels like it is not as effective as the nebulizers here.  Denies fever, change in sputum.  No recent steroids.  Patient was treated for community-acquired pneumonia on February 16th and completed antibiotic course of Augmentin and doxycycline.  She reports increased anxiety that is exacerbated by her breathing difficulty.    PCP: Burke Merchant MD        No current facility-administered medications for this encounter.     Current Outpatient Medications   Medication Sig Dispense Refill    acetaminophen (TYLENOL) 500 MG tablet Take 2 tablets (1,000 mg total) by mouth every 6 (six) hours as needed for Pain or Temperature greater than (101). 30 tablet 0    albuterol (PROVENTIL/VENTOLIN HFA) 90 mcg/actuation inhaler Inhale 1-2 puffs into the lungs every 6 (six) hours as needed for Wheezing or Shortness of Breath. Rescue 8 g 0    albuterol (PROVENTIL/VENTOLIN HFA) 90 mcg/actuation inhaler Inhale 1-2 puffs into the lungs every 4 (four) hours as needed for Wheezing or Shortness of Breath. Rescue 8 g 1    albuterol-ipratropium (DUO-NEB) 2.5 mg-0.5 mg/3 mL nebulizer " solution Take 3 mLs by nebulization every 4 (four) hours as needed for Wheezing or Shortness of Breath. Rescue 75 mL 0    albuterol-ipratropium (DUO-NEB) 2.5 mg-0.5 mg/3 mL nebulizer solution Take 3 mLs by nebulization every 4 (four) hours as needed for Wheezing. Rescue 75 mL 0    ALPRAZolam (XANAX) 0.5 MG tablet TAKE 1/2 TO 1 TABLET BY MOUTH EVERY 8 HOURS AS NEEDED FOR ANXIETY. WARNING: ADDICTIVE      amLODIPine (NORVASC) 10 MG tablet Take 10 mg by mouth.      amoxicillin-clavulanate 875-125mg (AUGMENTIN) 875-125 mg per tablet Take 1 tablet by mouth 2 (two) times daily. 14 tablet 0    aspirin (ECOTRIN) 81 MG EC tablet Take 81 mg by mouth once daily.      benzocaine-menthoL-cetylpyrid (ACTISEP) 2-0.5-0.1 % Soln 2 sprays by Mucous Membrane route every 6 (six) hours as needed (sore throat). 30 mL 0    benztropine (COGENTIN) 2 MG Tab Take 1 mg by mouth 2 (two) times daily.      celecoxib (CELEBREX) 200 MG capsule Take 200 mg by mouth 2 (two) times daily as needed.      cetirizine (ZYRTEC) 10 MG tablet Take 1 tablet (10 mg total) by mouth once daily. for 10 days 10 tablet 0    citalopram (CELEXA) 20 MG tablet Take 20 mg by mouth once daily.      clotrimazole-betamethasone 1-0.05% (LOTRISONE) cream Apply topically 2 (two) times daily. 30 g 0    esomeprazole (NEXIUM) 20 MG capsule Take 20 mg by mouth before breakfast.      hydrOXYzine HCL (ATARAX) 25 MG tablet Take 1 tablet (25 mg total) by mouth every 6 (six) hours. 12 tablet 0    levothyroxine (SYNTHROID) 50 MCG tablet Take 50 mcg by mouth before breakfast.      metFORMIN (GLUCOPHAGE-XR) 750 MG ER 24hr tablet Take 750 mg by mouth 2 (two) times daily.      ondansetron (ZOFRAN-ODT) 4 MG TbDL Take 1 tablet (4 mg total) by mouth every 8 (eight) hours as needed ([place]). 8 tablet 0    pantoprazole (PROTONIX) 20 MG tablet Take 1 tablet (20 mg total) by mouth 2 (two) times daily before meals. for 14 days 28 tablet 0    risperiDONE (RISPERDAL) 3 MG Tab Take 3 mg by mouth  nightly.      traZODone (DESYREL) 100 MG tablet Take 100 mg by mouth every evening. 2 caps      vitamin D (VITAMIN D3) 1000 units Tab Take 1,000 Units by mouth once daily.      vitamin E 100 UNIT capsule Take 100 Units by mouth once daily.      zolpidem (AMBIEN) 10 mg Tab Take 10 mg by mouth nightly as needed.             Past History     Past Medical History:   Past Medical History:   Diagnosis Date    Anxiety     Breast cancer 2010    Cancer     Breast    COPD (chronic obstructive pulmonary disease)     Depression     Diabetes mellitus     GERD (gastroesophageal reflux disease)     Hypertension     Insomnia     Thyroid disease         Past Surgical History:   Past Surgical History:   Procedure Laterality Date    BLADDER SUSPENSION      BREAST LUMPECTOMY      Right breast    CHOLECYSTECTOMY      HYSTERECTOMY      KNEE ARTHROSCOPY      Right knee    OOPHORECTOMY          Family History:   Family History   Problem Relation Age of Onset    No Known Problems Mother     No Known Problems Father     No Known Problems Sister     No Known Problems Daughter     No Known Problems Maternal Aunt     No Known Problems Maternal Uncle     No Known Problems Paternal Aunt     No Known Problems Paternal Uncle     No Known Problems Maternal Grandmother     No Known Problems Maternal Grandfather     No Known Problems Paternal Grandmother     No Known Problems Paternal Grandfather     Breast cancer Neg Hx     Ovarian cancer Neg Hx     BRCA 1/2 Neg Hx         Social History:   Social History     Tobacco Use    Smoking status: Every Day     Current packs/day: 0.50     Types: Cigarettes    Smokeless tobacco: Never    Tobacco comments:     quit yesterday 6/12/21   Substance Use Topics    Alcohol use: Not Currently    Drug use: Never        Allergies:   Review of patient's allergies indicates:  No Known Allergies       Review of Systems   Review of Systems   See HPI for pertinent positives and negatives       Physical Exam     Vitals:     03/03/24 1035 03/03/24 1038 03/03/24 1043 03/03/24 1118   BP:    (!) 143/76   Pulse: 82 85 86 83   Resp: 20 20 20 (!) 22   Temp:    98.6 °F (37 °C)   TempSrc:    Oral   SpO2: 95% 96% 96% 96%   Weight:       Height:          Physical Exam  Vitals and nursing note reviewed.   Constitutional:       General: She is not in acute distress.     Appearance: Normal appearance. She is obese.   HENT:      Head: Normocephalic and atraumatic.      Right Ear: External ear normal.      Left Ear: External ear normal.      Nose: Nose normal. No congestion or rhinorrhea.      Mouth/Throat:      Mouth: Mucous membranes are moist.   Eyes:      Conjunctiva/sclera: Conjunctivae normal.      Pupils: Pupils are equal, round, and reactive to light.   Pulmonary:      Effort: Pulmonary effort is normal. No respiratory distress.      Breath sounds: Wheezing (forced expiratory) present.   Chest:      Chest wall: No tenderness.   Musculoskeletal:         General: No deformity. Normal range of motion.      Cervical back: Normal range of motion. No rigidity.   Skin:     General: Skin is dry.   Neurological:      General: No focal deficit present.      Mental Status: She is alert and oriented to person, place, and time. Mental status is at baseline.   Psychiatric:         Mood and Affect: Mood normal.         Behavior: Behavior normal.              Diagnostic Study Results      Labs -   No results found for this or any previous visit (from the past 12 hour(s)).     Radiologic Studies -    X-Ray Chest 1 View    (Results Pending)        Medications given in the ED-   Medications   albuterol-ipratropium 2.5 mg-0.5 mg/3 mL nebulizer solution 3 mL (3 mLs Nebulization Given 3/3/24 1043)   clonazePAM tablet 1 mg (1 mg Oral Given 3/3/24 1129)           Medical Decision Making    I am the first provider for this patient.     I reviewed the vital signs, available nursing notes, past medical history, past surgical history, family history and social history.      Vital Signs:  Reviewed the patient's vital signs.     Pulse Oximetry Analysis and Interpretation:    95% on Room Air, normal      CXR  Interpretation: (Per my independent interpretation, pending formal read)   CXR read by Dr. Henry Kapadia     Hypoinflated lung field, no focal infiltrate, similar to prior     External Test Results (Pertinent to encounter):    Records Reviewed: Nursing Notes, Current Prescription Medications, Old Medical Records, and External Medical Records     History Obtained By: Patient    Provider Notes: Vero Allen is a 64 y.o. female with COPD, shortness of breath    Co-morbidities Considered:  COPD, anxiety    Differential Diagnosis:  COPD exacerbation, URI, pneumonia      ED Course:    Patient presents with shortness of breath.  Very anxious on exam, lung fields clear however with cough or forced expiratory breathing wheezing noted.  Given bronchodilator treatment in emergency department as well as medication for anxiety which is exacerbating her symptoms.  No evidence of pneumonia bacterial infection.  Would hold off on steroid course at this time given multiple recent ED and PCP visits.  Patient has not hypoxic, tachycardic, or complaining of pleuritic chest pain.  Low suspicion for PE or other comorbid condition.  Reasons to return to ED discussed.  Advised close follow-up with primary care.         Problems Addressed:  COPD exacerbation, anxiety    Procedures:   Procedures       Diagnosis and Disposition     Critical Care:      DISCHARGE NOTE:       Vero Allen's  results have been reviewed with her.  She has been counseled regarding her diagnosis, treatment, and plan.  She verbally conveys understanding and agreement of the signs, symptoms, diagnosis, treatment and prognosis and additionally agrees to follow up as discussed.  She also agrees with the care-plan and conveys that all of her questions have been answered.  I have also provided discharge  instructions for her that include: educational information regarding their diagnosis and treatment, and list of reasons why they would want to return to the ED prior to their follow-up appointment, should her condition change. She has been provided with education for proper emergency department utilization.         CLINICAL IMPRESSION:         1. COPD (chronic obstructive pulmonary disease)    2. Anxiety              PLAN:   1. Discharge Home  2.      Medication List        CHANGE how you take these medications      * albuterol-ipratropium 2.5 mg-0.5 mg/3 mL nebulizer solution  Commonly known as: DUO-NEB  Take 3 mLs by nebulization every 4 (four) hours as needed for Wheezing or Shortness of Breath. Rescue  What changed: Another medication with the same name was added. Make sure you understand how and when to take each.     * albuterol-ipratropium 2.5 mg-0.5 mg/3 mL nebulizer solution  Commonly known as: DUO-NEB  Take 3 mLs by nebulization every 4 (four) hours as needed for Wheezing. Rescue  What changed: You were already taking a medication with the same name, and this prescription was added. Make sure you understand how and when to take each.           * This list has 2 medication(s) that are the same as other medications prescribed for you. Read the directions carefully, and ask your doctor or other care provider to review them with you.                ASK your doctor about these medications      acetaminophen 500 MG tablet  Commonly known as: TYLENOL  Take 2 tablets (1,000 mg total) by mouth every 6 (six) hours as needed for Pain or Temperature greater than (101).     ACTISEP 2-0.5-0.1 % Soln  Generic drug: benzocaine-menthoL-cetylpyrid  2 sprays by Mucous Membrane route every 6 (six) hours as needed (sore throat).     * albuterol 90 mcg/actuation inhaler  Commonly known as: PROVENTIL/VENTOLIN HFA  Inhale 1-2 puffs into the lungs every 6 (six) hours as needed for Wheezing or Shortness of Breath. Rescue     *  albuterol 90 mcg/actuation inhaler  Commonly known as: PROVENTIL/VENTOLIN HFA  Inhale 1-2 puffs into the lungs every 4 (four) hours as needed for Wheezing or Shortness of Breath. Rescue     ALPRAZolam 0.5 MG tablet  Commonly known as: XANAX     amLODIPine 10 MG tablet  Commonly known as: NORVASC     amoxicillin-clavulanate 875-125mg 875-125 mg per tablet  Commonly known as: AUGMENTIN  Take 1 tablet by mouth 2 (two) times daily.     aspirin 81 MG EC tablet  Commonly known as: ECOTRIN     benztropine 2 MG Tab  Commonly known as: COGENTIN     celecoxib 200 MG capsule  Commonly known as: CeleBREX     cetirizine 10 MG tablet  Commonly known as: ZYRTEC  Take 1 tablet (10 mg total) by mouth once daily. for 10 days     citalopram 20 MG tablet  Commonly known as: CeleXA     clotrimazole-betamethasone 1-0.05% cream  Commonly known as: LOTRISONE  Apply topically 2 (two) times daily.     esomeprazole 20 MG capsule  Commonly known as: NEXIUM     hydrOXYzine HCL 25 MG tablet  Commonly known as: ATARAX  Take 1 tablet (25 mg total) by mouth every 6 (six) hours.     levothyroxine 50 MCG tablet  Commonly known as: SYNTHROID     metFORMIN 750 MG ER 24hr tablet  Commonly known as: GLUCOPHAGE-XR     ondansetron 4 MG Tbdl  Commonly known as: ZOFRAN-ODT  Take 1 tablet (4 mg total) by mouth every 8 (eight) hours as needed ([place]).     pantoprazole 20 MG tablet  Commonly known as: PROTONIX  Take 1 tablet (20 mg total) by mouth 2 (two) times daily before meals. for 14 days     risperiDONE 3 MG Tab  Commonly known as: RISPERDAL     traZODone 100 MG tablet  Commonly known as: DESYREL     vitamin D 1000 units Tab  Commonly known as: VITAMIN D3     vitamin E 100 UNIT capsule     zolpidem 10 mg Tab  Commonly known as: AMBIEN           * This list has 2 medication(s) that are the same as other medications prescribed for you. Read the directions carefully, and ask your doctor or other care provider to review them with you.                   Where  to Get Your Medications        These medications were sent to [facility] - [place], [place] - [address]  [address], [facility] [address]      Phone: [phone]   albuterol-ipratropium 2.5 mg-0.5 mg/3 mL nebulizer solution        3. Burke Merchant MD  [address] [facility] [address] [address]  [facility] [address]  [phone]    Schedule an appointment as soon as possible for a visit   Primary care follow up    [facility] [place] [facility]  [address] [facility] [address] [phone]  Go to   If symptoms worsen       _______________________________     Please note that this dictation was completed with [employer], the computer voice recognition software.  Quite often unanticipated grammatical, syntax, homophones, and other interpretive errors are inadvertently transcribed by the computer software.  Please disregard these errors.  Please excuse any errors that have escaped final proofreading.             Henry Kapadia MD  03/03/24 4573

## 2024-03-09 ENCOUNTER — HOSPITAL ENCOUNTER (INPATIENT)
Facility: HOSPITAL | Age: 64
LOS: 1 days | Discharge: LEFT BEFORE DISCHARGE | DRG: 194 | End: 2024-03-10
Attending: STUDENT IN AN ORGANIZED HEALTH CARE EDUCATION/TRAINING PROGRAM | Admitting: INTERNAL MEDICINE
Payer: MEDICARE

## 2024-03-09 DIAGNOSIS — J44.1 COPD EXACERBATION: Primary | ICD-10-CM

## 2024-03-09 DIAGNOSIS — J18.9 CAP (COMMUNITY ACQUIRED PNEUMONIA): ICD-10-CM

## 2024-03-09 DIAGNOSIS — J18.9 PNEUMONIA DUE TO INFECTIOUS ORGANISM, UNSPECIFIED LATERALITY, UNSPECIFIED PART OF LUNG: ICD-10-CM

## 2024-03-09 LAB
ALBUMIN SERPL BCP-MCNC: 3.2 G/DL (ref 3.5–5.2)
ALP SERPL-CCNC: 84 U/L (ref 55–135)
ALT SERPL W/O P-5'-P-CCNC: 34 U/L (ref 10–44)
ANION GAP SERPL CALC-SCNC: 5 MMOL/L (ref 3–11)
AST SERPL-CCNC: 14 U/L (ref 10–40)
BASOPHILS # BLD AUTO: 0.01 K/UL (ref 0–0.2)
BASOPHILS NFR BLD: 0.1 % (ref 0–1.9)
BILIRUB SERPL-MCNC: 0.3 MG/DL (ref 0.1–1)
BUN SERPL-MCNC: 14 MG/DL (ref 8–23)
CALCIUM SERPL-MCNC: 8.9 MG/DL (ref 8.7–10.5)
CHLORIDE SERPL-SCNC: 109 MMOL/L (ref 95–110)
CO2 SERPL-SCNC: 26 MMOL/L (ref 23–29)
CREAT SERPL-MCNC: 1 MG/DL (ref 0.5–1.4)
DIFFERENTIAL METHOD BLD: ABNORMAL
EOSINOPHIL # BLD AUTO: 0 K/UL (ref 0–0.5)
EOSINOPHIL NFR BLD: 0 % (ref 0–8)
ERYTHROCYTE [DISTWIDTH] IN BLOOD BY AUTOMATED COUNT: 14.5 % (ref 11.5–14.5)
EST. GFR  (NO RACE VARIABLE): >60 ML/MIN/1.73 M^2
GLUCOSE SERPL-MCNC: 207 MG/DL (ref 70–110)
HCT VFR BLD AUTO: 41.7 % (ref 37–48.5)
HGB BLD-MCNC: 13.5 G/DL (ref 12–16)
IMM GRANULOCYTES # BLD AUTO: 0.08 K/UL (ref 0–0.04)
IMM GRANULOCYTES NFR BLD AUTO: 0.8 % (ref 0–0.5)
LYMPHOCYTES # BLD AUTO: 0.6 K/UL (ref 1–4.8)
LYMPHOCYTES NFR BLD: 6.3 % (ref 18–48)
MCH RBC QN AUTO: 29.5 PG (ref 27–31)
MCHC RBC AUTO-ENTMCNC: 32.4 G/DL (ref 32–36)
MCV RBC AUTO: 91 FL (ref 82–98)
MONOCYTES # BLD AUTO: 0.2 K/UL (ref 0.3–1)
MONOCYTES NFR BLD: 1.9 % (ref 4–15)
NEUTROPHILS # BLD AUTO: 9.3 K/UL (ref 1.8–7.7)
NEUTROPHILS NFR BLD: 90.9 % (ref 38–73)
NRBC BLD-RTO: 0 /100 WBC
PLATELET # BLD AUTO: 275 K/UL (ref 150–450)
PMV BLD AUTO: 10.4 FL (ref 9.2–12.9)
POTASSIUM SERPL-SCNC: 3.8 MMOL/L (ref 3.5–5.1)
PROT SERPL-MCNC: 7 G/DL (ref 6–8.4)
RBC # BLD AUTO: 4.57 M/UL (ref 4–5.4)
SODIUM SERPL-SCNC: 140 MMOL/L (ref 136–145)
TROPONIN I SERPL DL<=0.01 NG/ML-MCNC: 7.3 PG/ML (ref 0–60)
WBC # BLD AUTO: 10.19 K/UL (ref 3.9–12.7)

## 2024-03-09 PROCEDURE — 93005 ELECTROCARDIOGRAM TRACING: CPT

## 2024-03-09 PROCEDURE — 25500020 PHARM REV CODE 255: Performed by: STUDENT IN AN ORGANIZED HEALTH CARE EDUCATION/TRAINING PROGRAM

## 2024-03-09 PROCEDURE — 85025 COMPLETE CBC W/AUTO DIFF WBC: CPT | Performed by: STUDENT IN AN ORGANIZED HEALTH CARE EDUCATION/TRAINING PROGRAM

## 2024-03-09 PROCEDURE — 99900031 HC PATIENT EDUCATION (STAT)

## 2024-03-09 PROCEDURE — 84484 ASSAY OF TROPONIN QUANT: CPT | Performed by: STUDENT IN AN ORGANIZED HEALTH CARE EDUCATION/TRAINING PROGRAM

## 2024-03-09 PROCEDURE — 25000242 PHARM REV CODE 250 ALT 637 W/ HCPCS: Performed by: STUDENT IN AN ORGANIZED HEALTH CARE EDUCATION/TRAINING PROGRAM

## 2024-03-09 PROCEDURE — 99900035 HC TECH TIME PER 15 MIN (STAT)

## 2024-03-09 PROCEDURE — 96365 THER/PROPH/DIAG IV INF INIT: CPT

## 2024-03-09 PROCEDURE — 63600175 PHARM REV CODE 636 W HCPCS: Performed by: STUDENT IN AN ORGANIZED HEALTH CARE EDUCATION/TRAINING PROGRAM

## 2024-03-09 PROCEDURE — G0378 HOSPITAL OBSERVATION PER HR: HCPCS

## 2024-03-09 PROCEDURE — 93010 ELECTROCARDIOGRAM REPORT: CPT | Mod: ,,, | Performed by: INTERNAL MEDICINE

## 2024-03-09 PROCEDURE — 94761 N-INVAS EAR/PLS OXIMETRY MLT: CPT

## 2024-03-09 PROCEDURE — 96366 THER/PROPH/DIAG IV INF ADDON: CPT

## 2024-03-09 PROCEDURE — 36415 COLL VENOUS BLD VENIPUNCTURE: CPT | Performed by: STUDENT IN AN ORGANIZED HEALTH CARE EDUCATION/TRAINING PROGRAM

## 2024-03-09 PROCEDURE — 99285 EMERGENCY DEPT VISIT HI MDM: CPT | Mod: 25

## 2024-03-09 PROCEDURE — 94640 AIRWAY INHALATION TREATMENT: CPT | Mod: XB

## 2024-03-09 PROCEDURE — 96375 TX/PRO/DX INJ NEW DRUG ADDON: CPT

## 2024-03-09 PROCEDURE — 80053 COMPREHEN METABOLIC PANEL: CPT | Performed by: STUDENT IN AN ORGANIZED HEALTH CARE EDUCATION/TRAINING PROGRAM

## 2024-03-09 RX ORDER — METHYLPREDNISOLONE SOD SUCC 125 MG
125 VIAL (EA) INJECTION
Status: COMPLETED | OUTPATIENT
Start: 2024-03-09 | End: 2024-03-09

## 2024-03-09 RX ORDER — MAGNESIUM SULFATE HEPTAHYDRATE 40 MG/ML
2 INJECTION, SOLUTION INTRAVENOUS
Status: COMPLETED | OUTPATIENT
Start: 2024-03-09 | End: 2024-03-09

## 2024-03-09 RX ORDER — IPRATROPIUM BROMIDE AND ALBUTEROL SULFATE 2.5; .5 MG/3ML; MG/3ML
3 SOLUTION RESPIRATORY (INHALATION)
Status: COMPLETED | OUTPATIENT
Start: 2024-03-09 | End: 2024-03-09

## 2024-03-09 RX ADMIN — IOHEXOL 100 ML: 350 INJECTION, SOLUTION INTRAVENOUS at 09:03

## 2024-03-09 RX ADMIN — METHYLPREDNISOLONE SODIUM SUCCINATE 125 MG: 125 INJECTION, POWDER, FOR SOLUTION INTRAMUSCULAR; INTRAVENOUS at 08:03

## 2024-03-09 RX ADMIN — MAGNESIUM SULFATE HEPTAHYDRATE 2 G: 40 INJECTION, SOLUTION INTRAVENOUS at 08:03

## 2024-03-09 RX ADMIN — IPRATROPIUM BROMIDE AND ALBUTEROL SULFATE 3 ML: 2.5; .5 SOLUTION RESPIRATORY (INHALATION) at 08:03

## 2024-03-09 NOTE — Clinical Note
Diagnosis: CAP (community acquired pneumonia) [579903]   Future Attending Provider: AROLDO MUÑIZ III [[id number]]   Reason for IP Medical Treatment  (Clinical interventions that can only be accomplished in the IP setting? ) :: IV medications   I certify that Inpatient services for greater than or equal to 2 midnights are medically necessary:: Yes   Plans for Post-Acute care--if anticipated (pick the single best option):: A. No post acute care anticipated at this time   Special Needs:: No Special Needs [1]

## 2024-03-09 NOTE — LETTER
Patient: Vero Allen  YOB: 1960  Date: 3/10/2024 Time: 6:14 AM  Location: [facility]    Leaving the [facility] Against Medical Advice    Chart #:[id number]    This will certify that I, the undersigned,    ______________________________________________________________________    A patient in the above named [facility] center, having requested discharge and removal from the medical [place] against the advice of my attending physician(s), hereby release [facility], its physicians, officers and employees, severally and individually, from any and all liability of any nature whatsoever for any injury or harm or complication of any kind that may result directly or indirectly, by reason of my terminating my stay as a patient at [facility] and my departure from [facility], and hereby waive any and all rights of action I may now have or later acquire as a result of my voluntary departure from [facility] and the termination of my stay as a patient therein.    This release is made with the full knowledge of the danger that may result from the action which I am taking.      Date:_______________________                         ___________________________                                                                                    Patient/Legal Representative    Witness:        ____________________________                          ___________________________  Nurse                                                                        Physician

## 2024-03-09 NOTE — LETTER
Patient: Vero Allen  YOB: 1960  Date: 3/10/2024 Time: 6:14 AM  Location: [facility] Emergency Department    Leaving the [facility] Against Medical Advice    Chart #:[id number]    This will certify that I, the undersigned,    ______________________________________________________________________    A patient in the above named [facility] center, having requested discharge and removal from the medical [place] against the advice of my attending physician(s), hereby release [facility], its physicians, officers and employees, severally and individually, from any and all liability of any nature whatsoever for any injury or harm or complication of any kind that may result directly or indirectly, by reason of my terminating my stay as a patient at [facility] and my departure from [facility], and hereby waive any and all rights of action I may now have or later acquire as a result of my voluntary departure from [facility] and the termination of my stay as a patient therein.    This release is made with the full knowledge of the danger that may result from the action which I am taking.      Date:_______________________                         ___________________________                                                                                    Patient/Legal Representative    Witness:        ____________________________                          ___________________________  Nurse                                                                        Physician

## 2024-03-10 VITALS
HEIGHT: 63 IN | OXYGEN SATURATION: 97 % | TEMPERATURE: 98 F | DIASTOLIC BLOOD PRESSURE: 67 MMHG | SYSTOLIC BLOOD PRESSURE: 145 MMHG | HEART RATE: 89 BPM | RESPIRATION RATE: 18 BRPM | BODY MASS INDEX: 43.23 KG/M2 | WEIGHT: 244 LBS

## 2024-03-10 PROBLEM — Z87.891 HISTORY OF TOBACCO ABUSE: Status: ACTIVE | Noted: 2024-03-10

## 2024-03-10 PROBLEM — J44.1 COPD EXACERBATION: Status: ACTIVE | Noted: 2024-03-10

## 2024-03-10 PROBLEM — F41.1 GENERALIZED ANXIETY DISORDER: Status: ACTIVE | Noted: 2024-03-10

## 2024-03-10 PROBLEM — J18.9 CAP (COMMUNITY ACQUIRED PNEUMONIA): Status: ACTIVE | Noted: 2024-03-10

## 2024-03-10 PROBLEM — I10 PRIMARY HYPERTENSION: Status: ACTIVE | Noted: 2024-03-10

## 2024-03-10 LAB
ANION GAP SERPL CALC-SCNC: 4 MMOL/L (ref 3–11)
BASOPHILS # BLD AUTO: 0.01 K/UL (ref 0–0.2)
BASOPHILS NFR BLD: 0.1 % (ref 0–1.9)
BUN SERPL-MCNC: 10 MG/DL (ref 8–23)
CALCIUM SERPL-MCNC: 9.3 MG/DL (ref 8.7–10.5)
CHLORIDE SERPL-SCNC: 111 MMOL/L (ref 95–110)
CO2 SERPL-SCNC: 26 MMOL/L (ref 23–29)
CREAT SERPL-MCNC: 0.9 MG/DL (ref 0.5–1.4)
DIFFERENTIAL METHOD BLD: ABNORMAL
EOSINOPHIL # BLD AUTO: 0 K/UL (ref 0–0.5)
EOSINOPHIL NFR BLD: 0 % (ref 0–8)
ERYTHROCYTE [DISTWIDTH] IN BLOOD BY AUTOMATED COUNT: 14.6 % (ref 11.5–14.5)
EST. GFR  (NO RACE VARIABLE): >60 ML/MIN/1.73 M^2
GLUCOSE SERPL-MCNC: 205 MG/DL (ref 70–110)
HCT VFR BLD AUTO: 40 % (ref 37–48.5)
HGB BLD-MCNC: 13.4 G/DL (ref 12–16)
IMM GRANULOCYTES # BLD AUTO: 0.11 K/UL (ref 0–0.04)
IMM GRANULOCYTES NFR BLD AUTO: 1.1 % (ref 0–0.5)
LYMPHOCYTES # BLD AUTO: 0.7 K/UL (ref 1–4.8)
LYMPHOCYTES NFR BLD: 7.6 % (ref 18–48)
MCH RBC QN AUTO: 30 PG (ref 27–31)
MCHC RBC AUTO-ENTMCNC: 33.5 G/DL (ref 32–36)
MCV RBC AUTO: 90 FL (ref 82–98)
MONOCYTES # BLD AUTO: 0.2 K/UL (ref 0.3–1)
MONOCYTES NFR BLD: 2.1 % (ref 4–15)
NEUTROPHILS # BLD AUTO: 8.6 K/UL (ref 1.8–7.7)
NEUTROPHILS NFR BLD: 89.1 % (ref 38–73)
NRBC BLD-RTO: 0 /100 WBC
OHS QRS DURATION: 70 MS
OHS QTC CALCULATION: 454 MS
PLATELET # BLD AUTO: 274 K/UL (ref 150–450)
PMV BLD AUTO: 10.8 FL (ref 9.2–12.9)
POTASSIUM SERPL-SCNC: 4.1 MMOL/L (ref 3.5–5.1)
RBC # BLD AUTO: 4.46 M/UL (ref 4–5.4)
SODIUM SERPL-SCNC: 141 MMOL/L (ref 136–145)
WBC # BLD AUTO: 9.61 K/UL (ref 3.9–12.7)

## 2024-03-10 PROCEDURE — 96365 THER/PROPH/DIAG IV INF INIT: CPT | Mod: 59

## 2024-03-10 PROCEDURE — 63600175 PHARM REV CODE 636 W HCPCS: Performed by: INTERNAL MEDICINE

## 2024-03-10 PROCEDURE — 96376 TX/PRO/DX INJ SAME DRUG ADON: CPT

## 2024-03-10 PROCEDURE — 25000003 PHARM REV CODE 250: Performed by: STUDENT IN AN ORGANIZED HEALTH CARE EDUCATION/TRAINING PROGRAM

## 2024-03-10 PROCEDURE — 99900031 HC PATIENT EDUCATION (STAT)

## 2024-03-10 PROCEDURE — 25000003 PHARM REV CODE 250: Performed by: INTERNAL MEDICINE

## 2024-03-10 PROCEDURE — 96375 TX/PRO/DX INJ NEW DRUG ADDON: CPT

## 2024-03-10 PROCEDURE — 96366 THER/PROPH/DIAG IV INF ADDON: CPT

## 2024-03-10 PROCEDURE — 63600175 PHARM REV CODE 636 W HCPCS: Performed by: STUDENT IN AN ORGANIZED HEALTH CARE EDUCATION/TRAINING PROGRAM

## 2024-03-10 PROCEDURE — 96367 TX/PROPH/DG ADDL SEQ IV INF: CPT

## 2024-03-10 PROCEDURE — 94640 AIRWAY INHALATION TREATMENT: CPT | Mod: XB

## 2024-03-10 PROCEDURE — 94761 N-INVAS EAR/PLS OXIMETRY MLT: CPT

## 2024-03-10 PROCEDURE — 99900035 HC TECH TIME PER 15 MIN (STAT)

## 2024-03-10 PROCEDURE — 25000242 PHARM REV CODE 250 ALT 637 W/ HCPCS: Performed by: STUDENT IN AN ORGANIZED HEALTH CARE EDUCATION/TRAINING PROGRAM

## 2024-03-10 PROCEDURE — 85025 COMPLETE CBC W/AUTO DIFF WBC: CPT | Performed by: STUDENT IN AN ORGANIZED HEALTH CARE EDUCATION/TRAINING PROGRAM

## 2024-03-10 PROCEDURE — 25000242 PHARM REV CODE 250 ALT 637 W/ HCPCS: Performed by: INTERNAL MEDICINE

## 2024-03-10 PROCEDURE — 36415 COLL VENOUS BLD VENIPUNCTURE: CPT | Performed by: STUDENT IN AN ORGANIZED HEALTH CARE EDUCATION/TRAINING PROGRAM

## 2024-03-10 PROCEDURE — G0378 HOSPITAL OBSERVATION PER HR: HCPCS

## 2024-03-10 PROCEDURE — 11000001 HC ACUTE MED/SURG PRIVATE ROOM

## 2024-03-10 PROCEDURE — 80048 BASIC METABOLIC PNL TOTAL CA: CPT | Performed by: STUDENT IN AN ORGANIZED HEALTH CARE EDUCATION/TRAINING PROGRAM

## 2024-03-10 RX ORDER — ALBUTEROL SULFATE 2.5 MG/.5ML
7.5 SOLUTION RESPIRATORY (INHALATION)
Status: COMPLETED | OUTPATIENT
Start: 2024-03-10 | End: 2024-03-10

## 2024-03-10 RX ORDER — ACETAMINOPHEN 325 MG/1
650 TABLET ORAL EVERY 8 HOURS PRN
Status: DISCONTINUED | OUTPATIENT
Start: 2024-03-10 | End: 2024-03-10 | Stop reason: HOSPADM

## 2024-03-10 RX ORDER — RISPERIDONE 1 MG/1
2 TABLET ORAL NIGHTLY
Status: DISCONTINUED | OUTPATIENT
Start: 2024-03-10 | End: 2024-03-10 | Stop reason: HOSPADM

## 2024-03-10 RX ORDER — ONDANSETRON HYDROCHLORIDE 2 MG/ML
4 INJECTION, SOLUTION INTRAVENOUS EVERY 8 HOURS PRN
Status: DISCONTINUED | OUTPATIENT
Start: 2024-03-10 | End: 2024-03-10 | Stop reason: HOSPADM

## 2024-03-10 RX ORDER — SPIRONOLACTONE 25 MG/1
50 TABLET ORAL DAILY
Status: DISCONTINUED | OUTPATIENT
Start: 2024-03-10 | End: 2024-03-10 | Stop reason: HOSPADM

## 2024-03-10 RX ORDER — ALPRAZOLAM 0.5 MG/1
0.5 TABLET ORAL 2 TIMES DAILY PRN
Status: DISCONTINUED | OUTPATIENT
Start: 2024-03-10 | End: 2024-03-10 | Stop reason: HOSPADM

## 2024-03-10 RX ORDER — PROCHLORPERAZINE EDISYLATE 5 MG/ML
5 INJECTION INTRAMUSCULAR; INTRAVENOUS EVERY 6 HOURS PRN
Status: DISCONTINUED | OUTPATIENT
Start: 2024-03-10 | End: 2024-03-10 | Stop reason: HOSPADM

## 2024-03-10 RX ORDER — DEXAMETHASONE SODIUM PHOSPHATE 4 MG/ML
4 INJECTION, SOLUTION INTRA-ARTICULAR; INTRALESIONAL; INTRAMUSCULAR; INTRAVENOUS; SOFT TISSUE EVERY 12 HOURS
Status: DISCONTINUED | OUTPATIENT
Start: 2024-03-10 | End: 2024-03-10 | Stop reason: HOSPADM

## 2024-03-10 RX ORDER — IPRATROPIUM BROMIDE AND ALBUTEROL SULFATE 2.5; .5 MG/3ML; MG/3ML
3 SOLUTION RESPIRATORY (INHALATION) EVERY 4 HOURS PRN
Status: DISCONTINUED | OUTPATIENT
Start: 2024-03-10 | End: 2024-03-10 | Stop reason: HOSPADM

## 2024-03-10 RX ORDER — FAMOTIDINE 10 MG/ML
20 INJECTION INTRAVENOUS EVERY 12 HOURS
Status: DISCONTINUED | OUTPATIENT
Start: 2024-03-10 | End: 2024-03-10 | Stop reason: HOSPADM

## 2024-03-10 RX ORDER — SODIUM CHLORIDE 0.9 % (FLUSH) 0.9 %
10 SYRINGE (ML) INJECTION
Status: DISCONTINUED | OUTPATIENT
Start: 2024-03-10 | End: 2024-03-10 | Stop reason: HOSPADM

## 2024-03-10 RX ORDER — ASPIRIN 81 MG/1
81 TABLET ORAL DAILY
Status: DISCONTINUED | OUTPATIENT
Start: 2024-03-10 | End: 2024-03-10 | Stop reason: HOSPADM

## 2024-03-10 RX ORDER — ENOXAPARIN SODIUM 100 MG/ML
40 INJECTION SUBCUTANEOUS EVERY 24 HOURS
Status: DISCONTINUED | OUTPATIENT
Start: 2024-03-10 | End: 2024-03-10 | Stop reason: HOSPADM

## 2024-03-10 RX ORDER — LEVOTHYROXINE SODIUM 50 UG/1
50 TABLET ORAL
Status: DISCONTINUED | OUTPATIENT
Start: 2024-03-10 | End: 2024-03-10 | Stop reason: HOSPADM

## 2024-03-10 RX ORDER — AMLODIPINE BESYLATE 10 MG/1
10 TABLET ORAL DAILY
Status: DISCONTINUED | OUTPATIENT
Start: 2024-03-10 | End: 2024-03-10 | Stop reason: HOSPADM

## 2024-03-10 RX ORDER — ALBUTEROL SULFATE 2.5 MG/.5ML
2.5 SOLUTION RESPIRATORY (INHALATION) EVERY 4 HOURS
Status: DISCONTINUED | OUTPATIENT
Start: 2024-03-10 | End: 2024-03-10

## 2024-03-10 RX ORDER — TRAMADOL HYDROCHLORIDE 50 MG/1
50 TABLET ORAL EVERY 6 HOURS PRN
Status: DISCONTINUED | OUTPATIENT
Start: 2024-03-10 | End: 2024-03-10 | Stop reason: HOSPADM

## 2024-03-10 RX ORDER — CITALOPRAM 10 MG/1
20 TABLET ORAL DAILY
Status: DISCONTINUED | OUTPATIENT
Start: 2024-03-10 | End: 2024-03-10 | Stop reason: HOSPADM

## 2024-03-10 RX ORDER — TALC
6 POWDER (GRAM) TOPICAL NIGHTLY PRN
Status: DISCONTINUED | OUTPATIENT
Start: 2024-03-10 | End: 2024-03-10 | Stop reason: HOSPADM

## 2024-03-10 RX ORDER — IPRATROPIUM BROMIDE AND ALBUTEROL SULFATE 2.5; .5 MG/3ML; MG/3ML
3 SOLUTION RESPIRATORY (INHALATION) EVERY 4 HOURS
Status: DISCONTINUED | OUTPATIENT
Start: 2024-03-10 | End: 2024-03-10

## 2024-03-10 RX ORDER — IPRATROPIUM BROMIDE AND ALBUTEROL SULFATE 2.5; .5 MG/3ML; MG/3ML
3 SOLUTION RESPIRATORY (INHALATION)
Status: DISCONTINUED | OUTPATIENT
Start: 2024-03-10 | End: 2024-03-10 | Stop reason: HOSPADM

## 2024-03-10 RX ORDER — IBUPROFEN 600 MG/1
600 TABLET ORAL EVERY 6 HOURS PRN
Status: DISCONTINUED | OUTPATIENT
Start: 2024-03-10 | End: 2024-03-10 | Stop reason: HOSPADM

## 2024-03-10 RX ADMIN — ENOXAPARIN SODIUM 40 MG: 40 INJECTION SUBCUTANEOUS at 04:03

## 2024-03-10 RX ADMIN — ASPIRIN 81 MG: 81 TABLET, COATED ORAL at 09:03

## 2024-03-10 RX ADMIN — AZITHROMYCIN MONOHYDRATE 500 MG: 500 INJECTION, POWDER, LYOPHILIZED, FOR SOLUTION INTRAVENOUS at 09:03

## 2024-03-10 RX ADMIN — CITALOPRAM HYDROBROMIDE 20 MG: 10 TABLET ORAL at 09:03

## 2024-03-10 RX ADMIN — Medication 6 MG: at 04:03

## 2024-03-10 RX ADMIN — PIPERACILLIN SODIUM AND TAZOBACTAM SODIUM 4.5 G: 4; .5 INJECTION, POWDER, FOR SOLUTION INTRAVENOUS at 12:03

## 2024-03-10 RX ADMIN — SPIRONOLACTONE 50 MG: 25 TABLET ORAL at 09:03

## 2024-03-10 RX ADMIN — AMLODIPINE BESYLATE 10 MG: 10 TABLET ORAL at 09:03

## 2024-03-10 RX ADMIN — IPRATROPIUM BROMIDE AND ALBUTEROL SULFATE 3 ML: 2.5; .5 SOLUTION RESPIRATORY (INHALATION) at 08:03

## 2024-03-10 RX ADMIN — LEVOTHYROXINE SODIUM 50 MCG: 50 TABLET ORAL at 09:03

## 2024-03-10 RX ADMIN — ALBUTEROL SULFATE 7.5 MG: 2.5 SOLUTION RESPIRATORY (INHALATION) at 12:03

## 2024-03-10 RX ADMIN — ALBUTEROL SULFATE 2.5 MG: 2.5 SOLUTION RESPIRATORY (INHALATION) at 06:03

## 2024-03-10 RX ADMIN — IPRATROPIUM BROMIDE AND ALBUTEROL SULFATE 3 ML: 2.5; .5 SOLUTION RESPIRATORY (INHALATION) at 01:03

## 2024-03-10 RX ADMIN — ALPRAZOLAM 0.5 MG: 0.5 TABLET ORAL at 09:03

## 2024-03-10 RX ADMIN — DEXAMETHASONE SODIUM PHOSPHATE 4 MG: 4 INJECTION, SOLUTION INTRA-ARTICULAR; INTRALESIONAL; INTRAMUSCULAR; INTRAVENOUS; SOFT TISSUE at 09:03

## 2024-03-10 RX ADMIN — FAMOTIDINE 20 MG: 10 INJECTION, SOLUTION INTRAVENOUS at 12:03

## 2024-03-10 RX ADMIN — ALPRAZOLAM 0.5 MG: 0.5 TABLET ORAL at 06:03

## 2024-03-10 RX ADMIN — IPRATROPIUM BROMIDE AND ALBUTEROL SULFATE 3 ML: 2.5; .5 SOLUTION RESPIRATORY (INHALATION) at 07:03

## 2024-03-10 RX ADMIN — FAMOTIDINE 20 MG: 10 INJECTION, SOLUTION INTRAVENOUS at 09:03

## 2024-03-10 RX ADMIN — CEFTRIAXONE 1 G: 1 INJECTION, POWDER, FOR SOLUTION INTRAMUSCULAR; INTRAVENOUS at 09:03

## 2024-03-10 NOTE — ED PROVIDER NOTES
"  History  Chief Complaint   Patient presents with    Shortness of Breath     Pt reports SOB that started two nights ago and seen here, treated for anxiety. Pt states that she is back again from having the same problem still.     64-YEAR-OLD FEMALE PRESENTS FOR EVALUATION OF SHORTNESS OF BREATH HAS BEEN ONGOING FOR THE PAST 3 DAYS.  HISTORY OF COPD.  PATIENT HAS BEEN SEEN MULTIPLE TIMES IN THE ER OVER THE LAST 2 DAYS FOR SIMILAR SYMPTOMS        Past Medical History:   Diagnosis Date    Anxiety     Breast cancer 2010    Cancer     Breast    COPD (chronic obstructive pulmonary disease)     Depression     Diabetes mellitus     GERD (gastroesophageal reflux disease)     Hypertension     Insomnia     Thyroid disease        Past Surgical History:   Procedure Laterality Date    BLADDER SUSPENSION      BREAST LUMPECTOMY      Right breast    CHOLECYSTECTOMY      HYSTERECTOMY      KNEE ARTHROSCOPY      Right knee    OOPHORECTOMY         Family History   Problem Relation Age of Onset    No Known Problems Mother     No Known Problems Father     No Known Problems Sister     No Known Problems Daughter     No Known Problems Maternal Aunt     No Known Problems Maternal Uncle     No Known Problems Paternal Aunt     No Known Problems Paternal Uncle     No Known Problems Maternal Grandmother     No Known Problems Maternal Grandfather     No Known Problems Paternal Grandmother     No Known Problems Paternal Grandfather     Breast cancer Neg Hx     Ovarian cancer Neg Hx     BRCA 1/2 Neg Hx        Social History     Tobacco Use    Smoking status: Every Day     Current packs/day: 0.50     Types: Cigarettes    Smokeless tobacco: Never    Tobacco comments:     quit yesterday 6/12/21   Substance Use Topics    Alcohol use: Not Currently    Drug use: Never       ROS  Review of Systems   Respiratory:  Positive for shortness of breath.        Physical Exam  BP (!) 173/85   Pulse 102   Temp 98.7 °F (37.1 °C)   Resp 18   Ht 5' 3" (1.6 m)   Wt " 110.7 kg (244 lb)   SpO2 96%   Breastfeeding No   BMI 43.22 kg/m²   Physical Exam    Constitutional: She appears well-developed and well-nourished. She is cooperative.   HENT:   Head: Normocephalic and atraumatic.   Eyes: Conjunctivae, EOM and lids are normal. Pupils are equal, round, and reactive to light.   Neck: Phonation normal.   Normal range of motion.  Cardiovascular:  Normal rate, regular rhythm and intact distal pulses.           Pulmonary/Chest: She has wheezes. She has rales.   Abdominal: Abdomen is soft. There is no abdominal tenderness.   Musculoskeletal:      Cervical back: Normal range of motion.     Neurological: She is alert and oriented to person, place, and time.   Skin: Skin is warm and dry.   Psychiatric: She has a normal mood and affect. Her speech is normal and behavior is normal.               Labs Reviewed   CBC W/ AUTO DIFFERENTIAL - Abnormal; Notable for the following components:       Result Value    Immature Granulocytes 0.8 (*)     Gran # (ANC) 9.3 (*)     Immature Grans (Abs) 0.08 (*)     Lymph # 0.6 (*)     Mono # 0.2 (*)     Gran % 90.9 (*)     Lymph % 6.3 (*)     Mono % 1.9 (*)     All other components within normal limits   COMPREHENSIVE METABOLIC PANEL - Abnormal; Notable for the following components:    Glucose 207 (*)     Albumin 3.2 (*)     All other components within normal limits   TROPONIN I HIGH SENSITIVITY   BASIC METABOLIC PANEL   CBC W/ AUTO DIFFERENTIAL     EKG Readings: (Independently Interpreted)   Initial Reading: No STEMI. Rhythm: Normal Sinus Rhythm. Heart Rate: 87. Ectopy: PVCs.        Imaging Results              CTA Chest Non-Coronary (PE Studies) (Final result)  Result time 03/09/24 23:05:13      Final result by Paxton Gilmore MD (03/09/24 23:05:13)                   Impression:      1. No detected CT evidence for pulmonary artery thromboembolism.  2. Patchy regions of increased density within the right upper and middle lobes suspect for mild  pneumonitis/pneumonia.  3. Hepatic steatosis  4. Hiatal hernia      Electronically signed by: Paxton Gilmore MD  Date:    03/09/2024  Time:    23:05               Narrative:    EXAMINATION:  CTA CHEST NON CORONARY (PE STUDIES)    CLINICAL HISTORY:  SOB;    TECHNIQUE:  Axial CT images were obtained from the thoracic inlet to the upper abdomen after intravenous administration of contrast according to CTA pulmonary artery protocol.  Maximum intensity projection images evaluated.  Iterative reconstruction technique was used.  CT/Cardiac nuclear examinations in the past 12 months: 1    COMPARISON:  CT chest 02/16/2024 and chest radiograph 03/08/2024    FINDINGS:  Thyroid is unremarkable.  Aorta is normal in caliber without evidence for aneurysm or dissection.  Pulmonary arterial opacification is sufficient.  No pulmonary artery enlargement.  No detected pulmonary artery filling defects to suggest thromboembolism.  No cardiac chamber enlargement.  No pericardial or pleural effusion.  No detected thoracic adenopathy.  Patchy ground-glass attenuation throughout the right upper lobe.  Mild peripheral atelectasis versus consolidation at the right middle lobe.  Diffuse decreased hepatic density consistent with hepatic steatosis.  Mild hiatal hernia.  No acute osseous change.                                                  Procedures             Medical Decision Making  Patient still with wheezing and no rales.  This is her 3rd ER visit less than 72 hours.  Will obtain labs and give nebulizer treatments will admit for failure of outpatient therapy, see ED course for updates.    Amount and/or Complexity of Data Reviewed  Labs: ordered.  Radiology: ordered.    Risk  OTC drugs.  Prescription drug management.               ED Course as of 03/10/24 0459   Sat Mar 09, 2024   6516 Impression:     1. No detected CT evidence for pulmonary artery thromboembolism.  2. Patchy regions of increased density within the right upper and middle  lobes suspect for mild pneumonitis/pneumonia.  3. Hepatic steatosis  4. Hiatal hernia   [NA]   2316 Given that this is the patient's 3rd ER visit in the last 3 days will admit for continued nebulizer treatments.  Will give antibiotics related to pneumonia [NA]      ED Course User Index  [NA] Kellie Cruz MD       DISCHARGE NOTE:       Vero Allen's  results have been reviewed with her.  She has been counseled regarding her diagnosis, treatment, and plan.  She verbally conveys understanding and agreement of the signs, symptoms, diagnosis, treatment and prognosis and additionally agrees to follow up as discussed.  She also agrees with the care-plan and conveys that all of her questions have been answered.  I have also provided discharge instructions for her that include: educational information regarding their diagnosis and treatment, and list of reasons why they would want to return to the ED prior to their follow-up appointment, should her condition change. She has been provided with education for proper emergency department utilization.      CLINICAL IMPRESSION:         1. COPD exacerbation    2. Pneumonia due to infectious organism, unspecified laterality, unspecified part of lung              PLAN:   1. Discharge  2.   New Prescriptions    No medications on file     3. No follow-up provider specified.        Kellie Cruz MD  03/10/24 0458

## 2024-03-10 NOTE — HPI
ED HPI:  64-year-old female with COPD diabetes hypertension presents the ER with shortness of breath and anterior chest wall that began this morning, seen in the ER yesterday for same, did not  her medications at the pharmacy yet. Oxygen saturation 95% on 2 L. No other issues. Not ill appearing, alert oriented x4, GCS is 15. Took 2 Xanax prior to EMS arrival. No nausea vomiting diarrhea. Did use her nebulizer this morning wants with minimal relief.    IM HPI:  Patient has been in the emergency department for several visits related to dyspnea and has failed treatment.  Patient this morning states she is feeling better but frustrated without getting relief.  Patient's chart has been reviewed she has a history of tobacco use states she quit about a month ago and a history of COPD.  She has been wheezing coughing and her workup included a CT of the chest that shows a pulmonary infiltrate.  We are admitting the patient and treating for a COPD exacerbation with pulmonary infiltrate and giving antibiotics for typical community-acquired pneumonia.  Patient was informed of the damage that tobacco is causing her lungs and the need to quit.

## 2024-03-10 NOTE — H&P
[facility] Emergency Department  [facility] Medicine  History & Physical    Patient Name: Vero Allen  MRN: [id number]  Patient Class: OP- Observation  Admission Date: 3/9/2024  Attending Physician: Molina Sheridan III, MD  Primary Care Provider: Burke Merchant MD         Patient information was obtained from patient, past medical records, and ER records.     Subjective:     Principal Problem:CAP (community acquired pneumonia)    Chief Complaint:   Chief Complaint   Patient presents with    Shortness of Breath     Pt reports SOB that started two nights ago and seen here, treated for anxiety. Pt states that she is back again from having the same problem still.        HPI: ED HPI:  64-year-old female with COPD diabetes hypertension presents the ER with shortness of breath and anterior chest wall that began this morning, seen in the ER yesterday for same, did not  her medications at the pharmacy yet. Oxygen saturation 95% on 2 L. No other issues. Not ill appearing, alert oriented x4, GCS is 15. Took 2 Xanax prior to EMS arrival. No nausea vomiting diarrhea. Did use her nebulizer this morning wants with minimal relief.    IM HPI:  Patient has been in the emergency department for several visits related to dyspnea and has failed treatment.  Patient this morning states she is feeling better but frustrated without getting relief.  Patient's chart has been reviewed she has a history of tobacco use states she quit about a month ago and a history of COPD.  She has been wheezing coughing and her workup included a CT of the chest that shows a pulmonary infiltrate.  We are admitting the patient and treating for a COPD exacerbation with pulmonary infiltrate and giving antibiotics for typical community-acquired pneumonia.  Patient was informed of the damage that tobacco is causing her lungs and the need to quit.    Past Medical History:   Diagnosis Date    Anxiety     Breast cancer 2010    Cancer     Breast     COPD (chronic obstructive pulmonary disease)     Depression     Diabetes mellitus     GERD (gastroesophageal reflux disease)     Hypertension     Insomnia     Thyroid disease        Past Surgical History:   Procedure Laterality Date    BLADDER SUSPENSION      BREAST LUMPECTOMY      Right breast    CHOLECYSTECTOMY      HYSTERECTOMY      KNEE ARTHROSCOPY      Right knee    OOPHORECTOMY         Review of patient's allergies indicates:  No Known Allergies    No current facility-administered medications on file prior to encounter.     Current Outpatient Medications on File Prior to Encounter   Medication Sig    acetaminophen (TYLENOL) 500 MG tablet Take 2 tablets (1,000 mg total) by mouth every 6 (six) hours as needed for Pain or Temperature greater than (101).    albuterol (PROVENTIL) 2.5 mg /3 mL (0.083 %) nebulizer solution Take 3 mLs (2.5 mg total) by nebulization every 4 to 6 hours as needed for Wheezing or Shortness of Breath. Rescue    albuterol (PROVENTIL/VENTOLIN HFA) 90 mcg/actuation inhaler Inhale 1-2 puffs into the lungs every 6 (six) hours as needed for Wheezing or Shortness of Breath. Rescue    albuterol (PROVENTIL/VENTOLIN HFA) 90 mcg/actuation inhaler Inhale 1-2 puffs into the lungs every 4 (four) hours as needed for Wheezing or Shortness of Breath. Rescue    albuterol-ipratropium (DUO-NEB) 2.5 mg-0.5 mg/3 mL nebulizer solution Take 3 mLs by nebulization every 4 (four) hours as needed for Wheezing or Shortness of Breath. Rescue    albuterol-ipratropium (DUO-NEB) 2.5 mg-0.5 mg/3 mL nebulizer solution Take 3 mLs by nebulization every 4 (four) hours as needed for Wheezing. Rescue    ALPRAZolam (XANAX) 0.5 MG tablet TAKE 1/2 TO 1 TABLET BY MOUTH EVERY 8 HOURS AS NEEDED FOR ANXIETY. WARNING: ADDICTIVE    amLODIPine (NORVASC) 10 MG tablet Take 10 mg by mouth.    amoxicillin-clavulanate 875-125mg (AUGMENTIN) 875-125 mg per tablet Take 1 tablet by mouth 2 (two) times daily.    aspirin (ECOTRIN) 81 MG EC tablet  Take 81 mg by mouth once daily.    azithromycin (ZITHROMAX) 500 MG tablet Take 1 tablet (500 mg total) by mouth once daily. for 5 days    benzocaine-menthoL-cetylpyrid (ACTISEP) 2-0.5-0.1 % Soln 2 sprays by Mucous Membrane route every 6 (six) hours as needed (sore throat).    benztropine (COGENTIN) 2 MG Tab Take 1 mg by mouth 2 (two) times daily.    celecoxib (CELEBREX) 200 MG capsule Take 200 mg by mouth 2 (two) times daily as needed.    cetirizine (ZYRTEC) 10 MG tablet Take 1 tablet (10 mg total) by mouth once daily. for 10 days    citalopram (CELEXA) 20 MG tablet Take 20 mg by mouth once daily.    clotrimazole-betamethasone 1-0.05% (LOTRISONE) cream Apply topically 2 (two) times daily.    esomeprazole (NEXIUM) 20 MG capsule Take 20 mg by mouth before breakfast.    hydrOXYzine HCL (ATARAX) 25 MG tablet Take 1 tablet (25 mg total) by mouth every 6 (six) hours.    levothyroxine (SYNTHROID) 50 MCG tablet Take 50 mcg by mouth before breakfast.    metFORMIN (GLUCOPHAGE-XR) 750 MG ER 24hr tablet Take 750 mg by mouth 2 (two) times daily.    ondansetron (ZOFRAN-ODT) 4 MG TbDL Take 1 tablet (4 mg total) by mouth every 8 (eight) hours as needed ([place]).    pantoprazole (PROTONIX) 20 MG tablet Take 1 tablet (20 mg total) by mouth 2 (two) times daily before meals. for 14 days    predniSONE (DELTASONE) 50 MG Tab Take 1 tablet (50 mg total) by mouth once daily. for 5 days    risperiDONE (RISPERDAL) 3 MG Tab Take 3 mg by mouth nightly.    traZODone (DESYREL) 100 MG tablet Take 100 mg by mouth every evening. 2 caps    vitamin D (VITAMIN D3) 1000 units Tab Take 1,000 Units by mouth once daily.    vitamin E 100 UNIT capsule Take 100 Units by mouth once daily.    zolpidem (AMBIEN) 10 mg Tab Take 10 mg by mouth nightly as needed.     Family History       Problem Relation (Age of Onset)    No Known Problems Mother, Father, Sister, Daughter, Maternal Aunt, Maternal Uncle, Paternal Aunt, Paternal Uncle, Maternal Grandmother, Maternal  Grandfather, Paternal Grandmother, Paternal Grandfather          Tobacco Use    Smoking status: Every Day     Current packs/day: 0.50     Types: Cigarettes    Smokeless tobacco: Never    Tobacco comments:     quit yesterday 6/12/21   Substance and Sexual Activity    Alcohol use: Not Currently    Drug use: Never    Sexual activity: Yes     Review of Systems   Constitutional:  Positive for activity change and fatigue. Negative for chills and fever.   HENT:  Negative for ear pain, mouth sores, nosebleeds and sore throat.    Eyes:  Negative for pain, redness and visual disturbance.   Respiratory:  Positive for cough, chest tightness, shortness of breath and wheezing.    Cardiovascular:  Positive for chest pain. Negative for palpitations and leg swelling.   Gastrointestinal:  Negative for abdominal distention, abdominal pain, blood in stool, constipation, diarrhea, nausea and vomiting.   Endocrine: Negative for polyphagia.   Genitourinary:  Negative for difficulty urinating, dysuria, flank pain and frequency.   Musculoskeletal:  Positive for arthralgias and back pain. Negative for myalgias.   Skin:  Negative for rash.   Neurological:  Negative for dizziness, tremors, seizures, syncope, facial asymmetry, speech difficulty and headaches.   Hematological:  Negative for adenopathy.   Psychiatric/Behavioral:  Positive for agitation. Negative for confusion and hallucinations. The patient is nervous/anxious.      Objective:     Vital Signs (Most Recent):  Temp: 98.7 °F (37.1 °C) (03/09/24 1937)  Pulse: 96 (03/10/24 0612)  Resp: 18 (03/10/24 0612)  BP: (!) 151/84 (03/10/24 0612)  SpO2: 96 % (03/10/24 0612) Vital Signs (24h Range):  Temp:  [98.7 °F (37.1 °C)] 98.7 °F (37.1 °C)  Pulse:  [] 96  Resp:  [16-20] 18  SpO2:  [94 %-99 %] 96 %  BP: (119-173)/() 151/84     Weight: 110.7 kg (244 lb)  Body mass index is 43.22 kg/m².     Physical Exam  Constitutional:       General: She is awake. She is in acute distress.       Appearance: She is obese. She is ill-appearing. She is not toxic-appearing or diaphoretic.   HENT:      Head: Normocephalic and atraumatic.      Nose: Nose normal. No congestion or rhinorrhea.      Mouth/Throat:      Mouth: Mucous membranes are moist.      Pharynx: Oropharynx is clear. No oropharyngeal exudate or posterior oropharyngeal erythema.   Eyes:      General: No scleral icterus.        Right eye: No discharge.         Left eye: No discharge.      Extraocular Movements: Extraocular movements intact.      Pupils: Pupils are equal, round, and reactive to light.   Neck:      Thyroid: No thyroid mass or thyromegaly.      Vascular: No carotid bruit.      Meningeal: Brudzinski's sign and Kernig's sign absent.   Cardiovascular:      Rate and Rhythm: Regular rhythm. Tachycardia present.      Chest Wall: PMI is not displaced. No thrill.      Pulses: Normal pulses.      Heart sounds: Murmur heard.      No friction rub. No gallop.   Pulmonary:      Effort: Respiratory distress present. No tachypnea, accessory muscle usage or prolonged expiration.      Breath sounds: No stridor or decreased air movement. Wheezing, rhonchi and rales present.   Chest:      Chest wall: No tenderness.   Abdominal:      General: Bowel sounds are normal. There is no distension.      Palpations: Abdomen is soft. There is no hepatomegaly, splenomegaly or mass.      Tenderness: There is no abdominal tenderness. There is no right CVA tenderness, left CVA tenderness, guarding or rebound.      Hernia: No hernia is present.   Musculoskeletal:         General: No swelling, tenderness, deformity or signs of injury.      Cervical back: Neck supple. No rigidity. No muscular tenderness.      Right lower leg: Edema present.      Left lower leg: Edema present.   Lymphadenopathy:      Cervical: No cervical adenopathy.   Skin:     General: Skin is warm.      Capillary Refill: Capillary refill takes less than 2 seconds.      Coloration: Skin is not cyanotic,  jaundiced or pale.      Findings: No bruising, erythema, lesion, petechiae or rash.   Neurological:      General: No focal deficit present.      Mental Status: She is alert and oriented to person, place, and time. Mental status is at baseline.      Cranial Nerves: No cranial nerve deficit, dysarthria or facial asymmetry.      Motor: No weakness or tremor.   Psychiatric:         Mood and Affect: Mood is anxious. Mood is not depressed. Affect is labile and angry. Affect is not flat.         Speech: Speech is not rapid and pressured or slurred.         Behavior: Behavior is agitated. Behavior is not aggressive or combative.         Thought Content: Thought content normal. Thought content is not paranoid or delusional.         Cognition and Memory: Cognition is not impaired. Memory is not impaired.         Judgment: Judgment normal.              CRANIAL NERVES     CN III, IV, VI   Pupils are equal, round, and reactive to light.       Significant Labs: All pertinent labs within the past 24 hours have been reviewed.    Significant Imaging: I have reviewed all pertinent imaging results/findings within the past 24 hours.  Assessment/Plan:     * CAP (community acquired pneumonia)    Abx, supportive care, moderately s/s, repeat ED visits.    Generalized anxiety disorder  Resume home meds      Primary hypertension  Chronic, uncontrolled. Latest blood pressure and vitals reviewed-     Temp:  [98.7 °F (37.1 °C)]   Pulse:  []   Resp:  [16-20]   BP: (119-173)/()   SpO2:  [94 %-99 %] .   Home meds for hypertension were reviewed and noted below.   Hypertension Medications               amLODIPine (NORVASC) 10 MG tablet Take 10 mg by mouth.            While in the hospital, will manage blood pressure as follows; Continue home antihypertensive regimen    Will utilize p.r.n. blood pressure medication only if patient's blood pressure greater than 140/90 and she develops symptoms such as worsening chest pain or shortness of  breath.    History of tobacco abuse    Counseled    COPD exacerbation  Patient's COPD is with exacerbation noted by continued dyspnea, use of accessory muscles for breathing, paradoxical chest wall movement, and worsening of baseline hypoxia currently.  Patient is currently off COPD Pathway. Continue scheduled inhalers Steroids, Antibiotics, and Supplemental oxygen and monitor respiratory status closely.     Severe obesity (BMI >= 40)  Body mass index is 43.22 kg/m². Morbid obesity complicates all aspects of disease management from diagnostic modalities to treatment. Weight loss encouraged and health benefits explained to patient.           VTE Risk Mitigation (From admission, onward)           Ordered     enoxaparin injection 40 mg  Every 24 hours         03/10/24 0744     IP VTE HIGH RISK PATIENT  Once         03/10/24 0026     Place ESTELA hose  Until discontinued         03/10/24 0026                       On 03/10/2024, patient should be placed in hospital observation services under my care.             Molina Sheridan III, MD  Department of Hospital Medicine  [facility] - Emergency Department

## 2024-03-10 NOTE — ED NOTES
Patient complains of shaking in her body. Educated the patient on the use of xanex and helping with symptoms

## 2024-03-10 NOTE — ASSESSMENT & PLAN NOTE
Chronic, uncontrolled. Latest blood pressure and vitals reviewed-     Temp:  [98.7 °F (37.1 °C)]   Pulse:  []   Resp:  [16-20]   BP: (119-173)/()   SpO2:  [94 %-99 %] .   Home meds for hypertension were reviewed and noted below.   Hypertension Medications               amLODIPine (NORVASC) 10 MG tablet Take 10 mg by mouth.            While in the hospital, will manage blood pressure as follows; Continue home antihypertensive regimen    Will utilize p.r.n. blood pressure medication only if patient's blood pressure greater than 140/90 and she develops symptoms such as worsening chest pain or shortness of breath.

## 2024-03-10 NOTE — ASSESSMENT & PLAN NOTE
Body mass index is 43.22 kg/m². Morbid obesity complicates all aspects of disease management from diagnostic modalities to treatment. Weight loss encouraged and health benefits explained to patient.

## 2024-03-10 NOTE — SUBJECTIVE & OBJECTIVE
Past Medical History:   Diagnosis Date    Anxiety     Breast cancer 2010    Cancer     Breast    COPD (chronic obstructive pulmonary disease)     Depression     Diabetes mellitus     GERD (gastroesophageal reflux disease)     Hypertension     Insomnia     Thyroid disease        Past Surgical History:   Procedure Laterality Date    BLADDER SUSPENSION      BREAST LUMPECTOMY      Right breast    CHOLECYSTECTOMY      HYSTERECTOMY      KNEE ARTHROSCOPY      Right knee    OOPHORECTOMY         Review of patient's allergies indicates:  No Known Allergies    No current facility-administered medications on file prior to encounter.     Current Outpatient Medications on File Prior to Encounter   Medication Sig    acetaminophen (TYLENOL) 500 MG tablet Take 2 tablets (1,000 mg total) by mouth every 6 (six) hours as needed for Pain or Temperature greater than (101).    albuterol (PROVENTIL) 2.5 mg /3 mL (0.083 %) nebulizer solution Take 3 mLs (2.5 mg total) by nebulization every 4 to 6 hours as needed for Wheezing or Shortness of Breath. Rescue    albuterol (PROVENTIL/VENTOLIN HFA) 90 mcg/actuation inhaler Inhale 1-2 puffs into the lungs every 6 (six) hours as needed for Wheezing or Shortness of Breath. Rescue    albuterol (PROVENTIL/VENTOLIN HFA) 90 mcg/actuation inhaler Inhale 1-2 puffs into the lungs every 4 (four) hours as needed for Wheezing or Shortness of Breath. Rescue    albuterol-ipratropium (DUO-NEB) 2.5 mg-0.5 mg/3 mL nebulizer solution Take 3 mLs by nebulization every 4 (four) hours as needed for Wheezing or Shortness of Breath. Rescue    albuterol-ipratropium (DUO-NEB) 2.5 mg-0.5 mg/3 mL nebulizer solution Take 3 mLs by nebulization every 4 (four) hours as needed for Wheezing. Rescue    ALPRAZolam (XANAX) 0.5 MG tablet TAKE 1/2 TO 1 TABLET BY MOUTH EVERY 8 HOURS AS NEEDED FOR ANXIETY. WARNING: ADDICTIVE    amLODIPine (NORVASC) 10 MG tablet Take 10 mg by mouth.    amoxicillin-clavulanate 875-125mg (AUGMENTIN) 875-125  mg per tablet Take 1 tablet by mouth 2 (two) times daily.    aspirin (ECOTRIN) 81 MG EC tablet Take 81 mg by mouth once daily.    azithromycin (ZITHROMAX) 500 MG tablet Take 1 tablet (500 mg total) by mouth once daily. for 5 days    benzocaine-menthoL-cetylpyrid (ACTISEP) 2-0.5-0.1 % Soln 2 sprays by Mucous Membrane route every 6 (six) hours as needed (sore throat).    benztropine (COGENTIN) 2 MG Tab Take 1 mg by mouth 2 (two) times daily.    celecoxib (CELEBREX) 200 MG capsule Take 200 mg by mouth 2 (two) times daily as needed.    cetirizine (ZYRTEC) 10 MG tablet Take 1 tablet (10 mg total) by mouth once daily. for 10 days    citalopram (CELEXA) 20 MG tablet Take 20 mg by mouth once daily.    clotrimazole-betamethasone 1-0.05% (LOTRISONE) cream Apply topically 2 (two) times daily.    esomeprazole (NEXIUM) 20 MG capsule Take 20 mg by mouth before breakfast.    hydrOXYzine HCL (ATARAX) 25 MG tablet Take 1 tablet (25 mg total) by mouth every 6 (six) hours.    levothyroxine (SYNTHROID) 50 MCG tablet Take 50 mcg by mouth before breakfast.    metFORMIN (GLUCOPHAGE-XR) 750 MG ER 24hr tablet Take 750 mg by mouth 2 (two) times daily.    ondansetron (ZOFRAN-ODT) 4 MG TbDL Take 1 tablet (4 mg total) by mouth every 8 (eight) hours as needed ([place]).    pantoprazole (PROTONIX) 20 MG tablet Take 1 tablet (20 mg total) by mouth 2 (two) times daily before meals. for 14 days    predniSONE (DELTASONE) 50 MG Tab Take 1 tablet (50 mg total) by mouth once daily. for 5 days    risperiDONE (RISPERDAL) 3 MG Tab Take 3 mg by mouth nightly.    traZODone (DESYREL) 100 MG tablet Take 100 mg by mouth every evening. 2 caps    vitamin D (VITAMIN D3) 1000 units Tab Take 1,000 Units by mouth once daily.    vitamin E 100 UNIT capsule Take 100 Units by mouth once daily.    zolpidem (AMBIEN) 10 mg Tab Take 10 mg by mouth nightly as needed.     Family History       Problem Relation (Age of Onset)    No Known Problems Mother, Father, Sister, Daughter,  Maternal Aunt, Maternal Uncle, Paternal Aunt, Paternal Uncle, Maternal Grandmother, Maternal Grandfather, Paternal Grandmother, Paternal Grandfather          Tobacco Use    Smoking status: Every Day     Current packs/day: 0.50     Types: Cigarettes    Smokeless tobacco: Never    Tobacco comments:     quit yesterday 6/12/21   Substance and Sexual Activity    Alcohol use: Not Currently    Drug use: Never    Sexual activity: Yes     Review of Systems   Constitutional:  Positive for activity change and fatigue. Negative for chills and fever.   HENT:  Negative for ear pain, mouth sores, nosebleeds and sore throat.    Eyes:  Negative for pain, redness and visual disturbance.   Respiratory:  Positive for cough, chest tightness, shortness of breath and wheezing.    Cardiovascular:  Positive for chest pain. Negative for palpitations and leg swelling.   Gastrointestinal:  Negative for abdominal distention, abdominal pain, blood in stool, constipation, diarrhea, nausea and vomiting.   Endocrine: Negative for polyphagia.   Genitourinary:  Negative for difficulty urinating, dysuria, flank pain and frequency.   Musculoskeletal:  Positive for arthralgias and back pain. Negative for myalgias.   Skin:  Negative for rash.   Neurological:  Negative for dizziness, tremors, seizures, syncope, facial asymmetry, speech difficulty and headaches.   Hematological:  Negative for adenopathy.   Psychiatric/Behavioral:  Positive for agitation. Negative for confusion and hallucinations. The patient is nervous/anxious.      Objective:     Vital Signs (Most Recent):  Temp: 98.7 °F (37.1 °C) (03/09/24 1937)  Pulse: 96 (03/10/24 0612)  Resp: 18 (03/10/24 0612)  BP: (!) 151/84 (03/10/24 0612)  SpO2: 96 % (03/10/24 0612) Vital Signs (24h Range):  Temp:  [98.7 °F (37.1 °C)] 98.7 °F (37.1 °C)  Pulse:  [] 96  Resp:  [16-20] 18  SpO2:  [94 %-99 %] 96 %  BP: (119-173)/() 151/84     Weight: 110.7 kg (244 lb)  Body mass index is 43.22 kg/m².      Physical Exam  Constitutional:       General: She is awake. She is in acute distress.      Appearance: She is obese. She is ill-appearing. She is not toxic-appearing or diaphoretic.   HENT:      Head: Normocephalic and atraumatic.      Nose: Nose normal. No congestion or rhinorrhea.      Mouth/Throat:      Mouth: Mucous membranes are moist.      Pharynx: Oropharynx is clear. No oropharyngeal exudate or posterior oropharyngeal erythema.   Eyes:      General: No scleral icterus.        Right eye: No discharge.         Left eye: No discharge.      Extraocular Movements: Extraocular movements intact.      Pupils: Pupils are equal, round, and reactive to light.   Neck:      Thyroid: No thyroid mass or thyromegaly.      Vascular: No carotid bruit.      Meningeal: Brudzinski's sign and Kernig's sign absent.   Cardiovascular:      Rate and Rhythm: Regular rhythm. Tachycardia present.      Chest Wall: PMI is not displaced. No thrill.      Pulses: Normal pulses.      Heart sounds: Murmur heard.      No friction rub. No gallop.   Pulmonary:      Effort: Respiratory distress present. No tachypnea, accessory muscle usage or prolonged expiration.      Breath sounds: No stridor or decreased air movement. Wheezing, rhonchi and rales present.   Chest:      Chest wall: No tenderness.   Abdominal:      General: Bowel sounds are normal. There is no distension.      Palpations: Abdomen is soft. There is no hepatomegaly, splenomegaly or mass.      Tenderness: There is no abdominal tenderness. There is no right CVA tenderness, left CVA tenderness, guarding or rebound.      Hernia: No hernia is present.   Musculoskeletal:         General: No swelling, tenderness, deformity or signs of injury.      Cervical back: Neck supple. No rigidity. No muscular tenderness.      Right lower leg: Edema present.      Left lower leg: Edema present.   Lymphadenopathy:      Cervical: No cervical adenopathy.   Skin:     General: Skin is warm.       Capillary Refill: Capillary refill takes less than 2 seconds.      Coloration: Skin is not cyanotic, jaundiced or pale.      Findings: No bruising, erythema, lesion, petechiae or rash.   Neurological:      General: No focal deficit present.      Mental Status: She is alert and oriented to person, place, and time. Mental status is at baseline.      Cranial Nerves: No cranial nerve deficit, dysarthria or facial asymmetry.      Motor: No weakness or tremor.   Psychiatric:         Mood and Affect: Mood is anxious. Mood is not depressed. Affect is labile and angry. Affect is not flat.         Speech: Speech is not rapid and pressured or slurred.         Behavior: Behavior is agitated. Behavior is not aggressive or combative.         Thought Content: Thought content normal. Thought content is not paranoid or delusional.         Cognition and Memory: Cognition is not impaired. Memory is not impaired.         Judgment: Judgment normal.              CRANIAL NERVES     CN III, IV, VI   Pupils are equal, round, and reactive to light.       Significant Labs: All pertinent labs within the past 24 hours have been reviewed.    Significant Imaging: I have reviewed all pertinent imaging results/findings within the past 24 hours.

## 2024-03-10 NOTE — ED NOTES
Patient calls 3 times complaining of SOB tried to redirect patient with deep breathing and relaxation techniques that were unsuccessful. Patient demanding oxygen, Spo2 96 percent lungs clear all fields JALYN. Patient then states she is very anxious. PRN xanex will be given.

## 2024-03-10 NOTE — ASSESSMENT & PLAN NOTE
Patient's COPD is with exacerbation noted by continued dyspnea, use of accessory muscles for breathing, paradoxical chest wall movement, and worsening of baseline hypoxia currently.  Patient is currently off COPD Pathway. Continue scheduled inhalers Steroids, Antibiotics, and Supplemental oxygen and monitor respiratory status closely.

## 2024-03-11 NOTE — ED NOTES
"Pt requested to leave AMA, pt reports "I want to go home". Pt educated on risks of leaving hospital prior to treatments being finished. Pt still wanted to AMA after notification. Notified Dr Sheridan of AMA. And Notified Dr Lopez.  "

## 2024-03-20 NOTE — ASSESSMENT & PLAN NOTE
Chronic, uncontrolled. Latest blood pressure and vitals reviewed-      .   Home meds for hypertension were reviewed and noted below.   Hypertension Medications               amLODIPine (NORVASC) 10 MG tablet Take 10 mg by mouth.            While in the hospital, will manage blood pressure as follows; Continue home antihypertensive regimen    Will utilize p.r.n. blood pressure medication only if patient's blood pressure greater than 140/90 and she develops symptoms such as worsening chest pain or shortness of breath.

## 2024-03-20 NOTE — DISCHARGE SUMMARY
[facility] Emergency Department  Hospital Medicine  Discharge Summary      Patient Name: Vero Allen  MRN: [id number]  MUNIR: [id number]  Patient Class: IP- Inpatient  Admission Date: 3/9/2024  Hospital Length of Stay: 1 days  Discharge Date and Time: 3/10/2024  8:12 PM  Attending Physician: No att. providers found   Discharging Provider: Molina Sheridan III, MD  Primary Care Provider: Burke Merchant MD    Primary Care Team: Networked reference to record PCT     HPI:   ED HPI:  64-year-old female with COPD diabetes hypertension presents the ER with shortness of breath and anterior chest wall that began this morning, seen in the ER yesterday for same, did not  her medications at the pharmacy yet. Oxygen saturation 95% on 2 L. No other issues. Not ill appearing, alert oriented x4, GCS is 15. Took 2 Xanax prior to EMS arrival. No nausea vomiting diarrhea. Did use her nebulizer this morning wants with minimal relief.    IM HPI:  Patient has been in the emergency department for several visits related to dyspnea and has failed treatment.  Patient this morning states she is feeling better but frustrated without getting relief.  Patient's chart has been reviewed she has a history of tobacco use states she quit about a month ago and a history of COPD.  She has been wheezing coughing and her workup included a CT of the chest that shows a pulmonary infiltrate.  We are admitting the patient and treating for a COPD exacerbation with pulmonary infiltrate and giving antibiotics for typical community-acquired pneumonia.  Patient was informed of the damage that tobacco is causing her lungs and the need to quit.    * No surgery found *      Hospital Course:   AMA Summary - while awaiting transitioning to the floor patient left against medical advice.  Patient was able to make this decision and she is aware of possibility of organ dysfunction respiratory failure and death.     Goals of Care Treatment  Preferences:  Code Status: Full Code      Consults:     Psychiatric  Generalized anxiety disorder  Resume home meds      Pulmonary  * CAP (community acquired pneumonia)    Abx, supportive care, moderately s/s, repeat ED visits.    COPD exacerbation  Patient's COPD is with exacerbation noted by continued dyspnea, use of accessory muscles for breathing, paradoxical chest wall movement, and worsening of baseline hypoxia currently.  Patient is currently off COPD Pathway. Continue scheduled inhalers Steroids, Antibiotics, and Supplemental oxygen and monitor respiratory status closely.     Cardiac/Vascular  Primary hypertension  Chronic, uncontrolled. Latest blood pressure and vitals reviewed-      .   Home meds for hypertension were reviewed and noted below.   Hypertension Medications               amLODIPine (NORVASC) 10 MG tablet Take 10 mg by mouth.            While in the hospital, will manage blood pressure as follows; Continue home antihypertensive regimen    Will utilize p.r.n. blood pressure medication only if patient's blood pressure greater than 140/90 and she develops symptoms such as worsening chest pain or shortness of breath.    Endocrine  Severe obesity (BMI >= 40)  Body mass index is 43.22 kg/m². Morbid obesity complicates all aspects of disease management from diagnostic modalities to treatment. Weight loss encouraged and health benefits explained to patient.         Other  History of tobacco abuse    Counseled      Final Active Diagnoses:    Diagnosis Date Noted POA    PRINCIPAL PROBLEM:  CAP (community acquired pneumonia) [J18.9] 03/10/2024 Yes    COPD exacerbation [J44.1] 03/10/2024 Yes    History of tobacco abuse [Z87.891] 03/10/2024 Not Applicable    Primary hypertension [I10] 03/10/2024 Yes    Generalized anxiety disorder [F41.1] 03/10/2024 Yes    Severe obesity (BMI >= 40) [E66.01] 12/21/2023 Yes      Problems Resolved During this Admission:       Discharged Condition: against medical  advice    Disposition: Left Against Medical Adv*    Follow Up:    Patient Instructions:   No discharge procedures on file.    Significant Diagnostic Studies: N/A    Pending Diagnostic Studies:       None           Medications:  None    Indwelling Lines/Drains at time of discharge:   Lines/Drains/Airways       None                   Time spent on the discharge of patient: 42 minutes         Molina Sheridan III, MD  Department of Hospital Medicine  [place] - Emergency Department

## 2024-03-20 NOTE — HOSPITAL COURSE
ROSELINE Summary - while awaiting transitioning to the floor patient left against medical advice.  Patient was able to make this decision and she is aware of possibility of organ dysfunction respiratory failure and death.

## 2024-03-21 ENCOUNTER — HOSPITAL ENCOUNTER (EMERGENCY)
Facility: HOSPITAL | Age: 64
Discharge: HOME | End: 2024-03-21
Attending: EMERGENCY MEDICINE
Payer: MEDICARE

## 2024-03-21 VITALS
DIASTOLIC BLOOD PRESSURE: 82 MMHG | RESPIRATION RATE: 18 BRPM | BODY MASS INDEX: 43.23 KG/M2 | HEIGHT: 63 IN | HEART RATE: 89 BPM | TEMPERATURE: 98 F | SYSTOLIC BLOOD PRESSURE: 164 MMHG | OXYGEN SATURATION: 95 % | WEIGHT: 244 LBS

## 2024-03-21 DIAGNOSIS — J44.9 CHRONIC OBSTRUCTIVE PULMONARY DISEASE, UNSPECIFIED COPD TYPE: Primary | ICD-10-CM

## 2024-03-21 DIAGNOSIS — R07.9 CHEST PAIN: ICD-10-CM

## 2024-03-21 DIAGNOSIS — F41.9 ANXIETY: ICD-10-CM

## 2024-03-21 PROCEDURE — 93010 ELECTROCARDIOGRAM REPORT: CPT | Mod: ,,, | Performed by: INTERNAL MEDICINE

## 2024-03-21 PROCEDURE — 93005 ELECTROCARDIOGRAM TRACING: CPT

## 2024-03-21 PROCEDURE — 99284 EMERGENCY DEPT VISIT MOD MDM: CPT | Mod: 25

## 2024-03-21 PROCEDURE — 25000003 PHARM REV CODE 250: Performed by: NURSE PRACTITIONER

## 2024-03-21 RX ORDER — ALPRAZOLAM 0.5 MG/1
0.5 TABLET ORAL
Status: COMPLETED | OUTPATIENT
Start: 2024-03-21 | End: 2024-03-21

## 2024-03-21 RX ADMIN — ALPRAZOLAM 0.5 MG: 0.5 TABLET ORAL at 08:03

## 2024-03-22 LAB
OHS QRS DURATION: 72 MS
OHS QTC CALCULATION: 436 MS

## 2024-03-22 NOTE — DISCHARGE INSTRUCTIONS
Continue home medications as directed.  Plan to follow-up with your primary doctor for further evaluation and management of your anxiety.  Return to the emergency department for worsening condition.

## 2024-03-22 NOTE — ED NOTES
Patient reports her doctor is only prescribing 0.5mg xanax which is not strong enough for her level of anxiety. States she needs something stronger.

## 2024-03-25 ENCOUNTER — HOSPITAL ENCOUNTER (EMERGENCY)
Facility: HOSPITAL | Age: 64
Discharge: HOME | End: 2024-03-25
Attending: EMERGENCY MEDICINE
Payer: MEDICARE

## 2024-03-25 VITALS
TEMPERATURE: 98 F | DIASTOLIC BLOOD PRESSURE: 83 MMHG | RESPIRATION RATE: 20 BRPM | SYSTOLIC BLOOD PRESSURE: 166 MMHG | BODY MASS INDEX: 43.22 KG/M2 | WEIGHT: 244 LBS | HEART RATE: 86 BPM | OXYGEN SATURATION: 98 %

## 2024-03-25 DIAGNOSIS — Z72.0 TOBACCO USE: ICD-10-CM

## 2024-03-25 DIAGNOSIS — R05.3 CHRONIC COUGH: ICD-10-CM

## 2024-03-25 DIAGNOSIS — J44.9 CHRONIC OBSTRUCTIVE PULMONARY DISEASE, UNSPECIFIED COPD TYPE: Primary | ICD-10-CM

## 2024-03-25 PROCEDURE — 94640 AIRWAY INHALATION TREATMENT: CPT

## 2024-03-25 PROCEDURE — 99283 EMERGENCY DEPT VISIT LOW MDM: CPT | Mod: 25

## 2024-03-25 PROCEDURE — 25000003 PHARM REV CODE 250: Performed by: EMERGENCY MEDICINE

## 2024-03-25 PROCEDURE — 25000242 PHARM REV CODE 250 ALT 637 W/ HCPCS: Performed by: EMERGENCY MEDICINE

## 2024-03-25 PROCEDURE — 99900031 HC PATIENT EDUCATION (STAT)

## 2024-03-25 PROCEDURE — 99900035 HC TECH TIME PER 15 MIN (STAT)

## 2024-03-25 RX ORDER — BENZONATATE 100 MG/1
100 CAPSULE ORAL ONCE
Status: COMPLETED | OUTPATIENT
Start: 2024-03-25 | End: 2024-03-25

## 2024-03-25 RX ORDER — IPRATROPIUM BROMIDE AND ALBUTEROL SULFATE 2.5; .5 MG/3ML; MG/3ML
3 SOLUTION RESPIRATORY (INHALATION)
Status: COMPLETED | OUTPATIENT
Start: 2024-03-25 | End: 2024-03-25

## 2024-03-25 RX ORDER — HYDROCODONE BITARTRATE AND ACETAMINOPHEN 7.5; 325 MG/1; MG/1
TABLET ORAL
Status: ON HOLD | COMMUNITY
Start: 2024-03-21 | End: 2024-05-20 | Stop reason: HOSPADM

## 2024-03-25 RX ORDER — FAMOTIDINE 40 MG/1
40 TABLET, FILM COATED ORAL
COMMUNITY
Start: 2024-03-09

## 2024-03-25 RX ORDER — TIRZEPATIDE 5 MG/.5ML
INJECTION, SOLUTION SUBCUTANEOUS
COMMUNITY
Start: 2024-03-20

## 2024-03-25 RX ORDER — TIZANIDINE 4 MG/1
TABLET ORAL
Status: ON HOLD | COMMUNITY
Start: 2024-03-20 | End: 2024-05-20 | Stop reason: HOSPADM

## 2024-03-25 RX ORDER — BENZONATATE 100 MG/1
100 CAPSULE ORAL 3 TIMES DAILY PRN
Qty: 20 CAPSULE | Refills: 0 | Status: SHIPPED | OUTPATIENT
Start: 2024-03-25 | End: 2024-04-04

## 2024-03-25 RX ADMIN — BENZONATATE 100 MG: 100 CAPSULE ORAL at 02:03

## 2024-03-25 RX ADMIN — IPRATROPIUM BROMIDE AND ALBUTEROL SULFATE 3 ML: .5; 3 SOLUTION RESPIRATORY (INHALATION) at 02:03

## 2024-03-25 NOTE — ED PROVIDER NOTES
EMERGENCY DEPARTMENT HISTORY AND PHYSICAL EXAM     This note is dictated on [employer] word recognition program.  There are word recognition mistakes and grammatical errors that are occasionally missed on review.     Date: 3/25/2024   Patient Name: Vero Allen       History of Presenting Illness      Chief Complaint   Patient presents with    Fatigue     Pt to the ER w/ complaints of fatigue, coughing/sob. Pt states she began feeling like she was going to lose consciousness at 0100 while getting out of bed, symptoms have not subsided. Pt also reports worsening cough, hx of copd.            Vero Allen is a 64 y.o. female with PMHX of COPD, anxiety, insomnia, tobacco use who presents to the emergency department C/O cough.      Patient reports feeling like she was going to pass out, worsening cough, wheezing.  Continues to smoke tobacco.  No fever.    PCP: Burke Merchant MD        No current facility-administered medications for this encounter.     Current Outpatient Medications   Medication Sig Dispense Refill    famotidine (PEPCID) 40 MG tablet Take 40 mg by mouth.      HYDROcodone-acetaminophen (NORCO) 7.5-325 mg per tablet Take by mouth.      MOUNJARO 5 mg/0.5 mL PnIj SMARTSI Milligram(s) SUB-Q Once a Week      tiZANidine (ZANAFLEX) 4 MG tablet Take by mouth.      acetaminophen (TYLENOL) 500 MG tablet Take 2 tablets (1,000 mg total) by mouth every 6 (six) hours as needed for Pain or Temperature greater than (101). 30 tablet 0    albuterol (PROVENTIL/VENTOLIN HFA) 90 mcg/actuation inhaler Inhale 1-2 puffs into the lungs every 6 (six) hours as needed for Wheezing or Shortness of Breath. Rescue 8 g 0    albuterol (PROVENTIL/VENTOLIN HFA) 90 mcg/actuation inhaler Inhale 1-2 puffs into the lungs every 4 (four) hours as needed for Wheezing or Shortness of Breath. Rescue 8 g 1    albuterol-ipratropium (DUO-NEB) 2.5 mg-0.5 mg/3 mL nebulizer solution Take 3 mLs by nebulization every 4 (four)  hours as needed for Wheezing or Shortness of Breath. Rescue 75 mL 0    albuterol-ipratropium (DUO-NEB) 2.5 mg-0.5 mg/3 mL nebulizer solution Take 3 mLs by nebulization every 4 (four) hours as needed for Wheezing. Rescue 75 mL 0    ALPRAZolam (XANAX) 0.5 MG tablet TAKE 1/2 TO 1 TABLET BY MOUTH EVERY 8 HOURS AS NEEDED FOR ANXIETY. WARNING: ADDICTIVE      amLODIPine (NORVASC) 10 MG tablet Take 10 mg by mouth.      amoxicillin-clavulanate 875-125mg (AUGMENTIN) 875-125 mg per tablet Take 1 tablet by mouth 2 (two) times daily. 14 tablet 0    aspirin (ECOTRIN) 81 MG EC tablet Take 81 mg by mouth once daily.      benzocaine-menthoL-cetylpyrid (ACTISEP) 2-0.5-0.1 % Soln 2 sprays by Mucous Membrane route every 6 (six) hours as needed (sore throat). 30 mL 0    benztropine (COGENTIN) 2 MG Tab Take 1 mg by mouth 2 (two) times daily.      celecoxib (CELEBREX) 200 MG capsule Take 200 mg by mouth 2 (two) times daily as needed.      cetirizine (ZYRTEC) 10 MG tablet Take 1 tablet (10 mg total) by mouth once daily. for 10 days 10 tablet 0    citalopram (CELEXA) 20 MG tablet Take 20 mg by mouth once daily.      clotrimazole-betamethasone 1-0.05% (LOTRISONE) cream Apply topically 2 (two) times daily. 30 g 0    esomeprazole (NEXIUM) 20 MG capsule Take 20 mg by mouth before breakfast.      hydrOXYzine HCL (ATARAX) 25 MG tablet Take 1 tablet (25 mg total) by mouth every 6 (six) hours. 12 tablet 0    levothyroxine (SYNTHROID) 50 MCG tablet Take 50 mcg by mouth before breakfast.      metFORMIN (GLUCOPHAGE-XR) 750 MG ER 24hr tablet Take 750 mg by mouth 2 (two) times daily.      ondansetron (ZOFRAN-ODT) 4 MG TbDL Take 1 tablet (4 mg total) by mouth every 8 (eight) hours as needed ([place]). 8 tablet 0    pantoprazole (PROTONIX) 20 MG tablet Take 1 tablet (20 mg total) by mouth 2 (two) times daily before meals. for 14 days 28 tablet 0    risperiDONE (RISPERDAL) 3 MG Tab Take 3 mg by mouth nightly.      traZODone (DESYREL) 100 MG tablet Take 100  mg by mouth every evening. 2 caps      vitamin D (VITAMIN D3) 1000 units Tab Take 1,000 Units by mouth once daily.      vitamin E 100 UNIT capsule Take 100 Units by mouth once daily.      zolpidem (AMBIEN) 10 mg Tab Take 10 mg by mouth nightly as needed.             Past History     Past Medical History:   Past Medical History:   Diagnosis Date    Anxiety     Breast cancer 2010    Cancer     Breast    COPD (chronic obstructive pulmonary disease)     Depression     Diabetes mellitus     GERD (gastroesophageal reflux disease)     Hypertension     Insomnia     Thyroid disease         Past Surgical History:   Past Surgical History:   Procedure Laterality Date    BLADDER SUSPENSION      BREAST LUMPECTOMY      Right breast    CHOLECYSTECTOMY      HYSTERECTOMY      KNEE ARTHROSCOPY      Right knee    OOPHORECTOMY          Family History:   Family History   Problem Relation Age of Onset    No Known Problems Mother     No Known Problems Father     No Known Problems Sister     No Known Problems Daughter     No Known Problems Maternal Aunt     No Known Problems Maternal Uncle     No Known Problems Paternal Aunt     No Known Problems Paternal Uncle     No Known Problems Maternal Grandmother     No Known Problems Maternal Grandfather     No Known Problems Paternal Grandmother     No Known Problems Paternal Grandfather     Breast cancer Neg Hx     Ovarian cancer Neg Hx     BRCA 1/2 Neg Hx         Social History:   Social History     Tobacco Use    Smoking status: Every Day     Current packs/day: 0.50     Types: Cigarettes    Smokeless tobacco: Never    Tobacco comments:     quit yesterday 6/12/21   Substance Use Topics    Alcohol use: Not Currently    Drug use: Never        Allergies:   Review of patient's allergies indicates:  No Known Allergies       Review of Systems   Review of Systems   See HPI for pertinent positives and negatives       Physical Exam     Vitals:    03/25/24 0222 03/25/24 0224   BP: (!) 166/83    BP  Location: Left arm    Patient Position: Sitting    Pulse: 88    Resp: 20    Temp: 98.1 °F (36.7 °C)    TempSrc: Oral Oral   SpO2: (!) 93%    Weight:  110.7 kg (244 lb)      Physical Exam  Vitals and nursing note reviewed.   Constitutional:       General: She is not in acute distress.     Appearance: Normal appearance. She is overweight. She is not ill-appearing.   HENT:      Head: Normocephalic and atraumatic.      Right Ear: External ear normal.      Left Ear: External ear normal.      Nose: Nose normal. No congestion or rhinorrhea.      Mouth/Throat:      Mouth: Mucous membranes are moist.   Eyes:      Conjunctiva/sclera: Conjunctivae normal.      Pupils: Pupils are equal, round, and reactive to light.   Pulmonary:      Effort: Pulmonary effort is normal. No respiratory distress.      Breath sounds: No decreased air movement. Wheezing present.   Musculoskeletal:         General: No deformity. Normal range of motion.      Cervical back: Normal range of motion. No rigidity.   Skin:     General: Skin is dry.   Neurological:      General: No focal deficit present.      Mental Status: She is alert and oriented to person, place, and time. Mental status is at baseline.   Psychiatric:         Mood and Affect: Mood normal.         Behavior: Behavior normal. Behavior is cooperative.              Diagnostic Study Results      Labs -   No results found for this or any previous visit (from the past 12 hour(s)).     Radiologic Studies -    No orders to display        Medications given in the ED-   Medications - No data to display        Medical Decision Making    I am the first provider for this patient.     I reviewed the vital signs, available nursing notes, past medical history, past surgical history, family history and social history.     Vital Signs:  Reviewed the patient's vital signs.     Pulse Oximetry Analysis and Interpretation:    93% on Room Air, low normal        External Test Results (Pertinent to  encounter):    Records Reviewed: Nursing Notes, Current Prescription Medications, Old Medical Records, External Medical Records , and Previous Radiology Studies    History Obtained By: Patient    Provider Notes: Vero Allen is a 64 y.o. female with cough, COPD    Co-morbidities Considered:  COPD, multiple recent ED visits    Differential Diagnosis:  COPD, anxiety reaction, pneumonia      ED Course:    Patient presents with mild COPD exacerbation, smells of tobacco smoke.  Satting adequately on room air with somewhat labored breathing.  Not in respiratory distress.  Will give bronchodilator and reassess patient.  Reviewed recent ED visit and radiographs from 4 days ago.  Do not think we need to repeat imaging at this time.  Will hold off on giving patient any empiric steroids as she has had multiple recent ED visits for COPD and concerned about developing adrenal suppression with receiving frequent bursts dosing    SMOKING CESSATION:   2:37 AM     The patient was counseled on the dangers of tobacco use, and was?advised to quit. ?Reviewed strategies to maximize success, including removing cigarettes and smoking materials from environment and written materials. Discussion took?3-5?minutes, and patient expressed understanding.      Re-evaluated patient and she feels much better after nebs.  Reiterated plan of her following up with her PCP that is scheduled today.  Reasons to return to ED discussed.         Problems Addressed:  COPD    Procedures:   Procedures       Diagnosis and Disposition     Critical Care:      DISCHARGE NOTE:       Vero Allen's  results have been reviewed with her.  She has been counseled regarding her diagnosis, treatment, and plan.  She verbally conveys understanding and agreement of the signs, symptoms, diagnosis, treatment and prognosis and additionally agrees to follow up as discussed.  She also agrees with the care-plan and conveys that all of her questions have been  answered.  I have also provided discharge instructions for her that include: educational information regarding their diagnosis and treatment, and list of reasons why they would want to return to the ED prior to their follow-up appointment, should her condition change. She has been provided with education for proper emergency department utilization.         CLINICAL IMPRESSION:         1. Chronic obstructive pulmonary disease, unspecified COPD type    2. Chronic cough    3. Tobacco use              PLAN:   1. Discharge Home  2.      Medication List        ASK your doctor about these medications      acetaminophen 500 MG tablet  Commonly known as: TYLENOL  Take 2 tablets (1,000 mg total) by mouth every 6 (six) hours as needed for Pain or Temperature greater than (101).     ACTISEP 2-0.5-0.1 % Soln  Generic drug: benzocaine-menthoL-cetylpyrid  2 sprays by Mucous Membrane route every 6 (six) hours as needed (sore throat).     * albuterol 90 mcg/actuation inhaler  Commonly known as: PROVENTIL/VENTOLIN HFA  Inhale 1-2 puffs into the lungs every 6 (six) hours as needed for Wheezing or Shortness of Breath. Rescue     * albuterol 90 mcg/actuation inhaler  Commonly known as: PROVENTIL/VENTOLIN HFA  Inhale 1-2 puffs into the lungs every 4 (four) hours as needed for Wheezing or Shortness of Breath. Rescue     * albuterol-ipratropium 2.5 mg-0.5 mg/3 mL nebulizer solution  Commonly known as: DUO-NEB  Take 3 mLs by nebulization every 4 (four) hours as needed for Wheezing or Shortness of Breath. Rescue     * albuterol-ipratropium 2.5 mg-0.5 mg/3 mL nebulizer solution  Commonly known as: DUO-NEB  Take 3 mLs by nebulization every 4 (four) hours as needed for Wheezing. Rescue     ALPRAZolam 0.5 MG tablet  Commonly known as: XANAX     amLODIPine 10 MG tablet  Commonly known as: NORVASC     amoxicillin-clavulanate 875-125mg 875-125 mg per tablet  Commonly known as: AUGMENTIN  Take 1 tablet by mouth 2 (two) times daily.     aspirin 81 MG  EC tablet  Commonly known as: ECOTRIN     benztropine 2 MG Tab  Commonly known as: COGENTIN     celecoxib 200 MG capsule  Commonly known as: CeleBREX     cetirizine 10 MG tablet  Commonly known as: ZYRTEC  Take 1 tablet (10 mg total) by mouth once daily. for 10 days     citalopram 20 MG tablet  Commonly known as: CeleXA     clotrimazole-betamethasone 1-0.05% cream  Commonly known as: LOTRISONE  Apply topically 2 (two) times daily.     esomeprazole 20 MG capsule  Commonly known as: NEXIUM     famotidine 40 MG tablet  Commonly known as: PEPCID     HYDROcodone-acetaminophen 7.5-325 mg per tablet  Commonly known as: NORCO     hydrOXYzine HCL 25 MG tablet  Commonly known as: ATARAX  Take 1 tablet (25 mg total) by mouth every 6 (six) hours.     levothyroxine 50 MCG tablet  Commonly known as: SYNTHROID     metFORMIN 750 MG ER 24hr tablet  Commonly known as: GLUCOPHAGE-XR     MOUNJARO 5 mg/0.5 mL Pnij  Generic drug: tirzepatide     ondansetron 4 MG Tbdl  Commonly known as: ZOFRAN-ODT  Take 1 tablet (4 mg total) by mouth every 8 (eight) hours as needed ([place]).     pantoprazole 20 MG tablet  Commonly known as: PROTONIX  Take 1 tablet (20 mg total) by mouth 2 (two) times daily before meals. for 14 days     risperiDONE 3 MG Tab  Commonly known as: RISPERDAL     tiZANidine 4 MG tablet  Commonly known as: ZANAFLEX     traZODone 100 MG tablet  Commonly known as: DESYREL     vitamin D 1000 units Tab  Commonly known as: VITAMIN D3     vitamin E 100 UNIT capsule     zolpidem 10 mg Tab  Commonly known as: AMBIEN           * This list has 4 medication(s) that are the same as other medications prescribed for you. Read the directions carefully, and ask your doctor or other care provider to review them with you.                 3. No follow-up provider specified.     _______________________________     Please note that this dictation was completed with [employer], the computer voice recognition software.  Quite often unanticipated  grammatical, syntax, homophones, and other interpretive errors are inadvertently transcribed by the computer software.  Please disregard these errors.  Please excuse any errors that have escaped final proofreading.             Henry Kapadia MD  03/25/24 0434

## 2024-04-10 ENCOUNTER — HOSPITAL ENCOUNTER (EMERGENCY)
Facility: HOSPITAL | Age: 64
Discharge: HOME | End: 2024-04-10
Attending: STUDENT IN AN ORGANIZED HEALTH CARE EDUCATION/TRAINING PROGRAM
Payer: MEDICARE

## 2024-04-10 VITALS
DIASTOLIC BLOOD PRESSURE: 84 MMHG | BODY MASS INDEX: 43.23 KG/M2 | TEMPERATURE: 99 F | SYSTOLIC BLOOD PRESSURE: 134 MMHG | HEIGHT: 63 IN | RESPIRATION RATE: 20 BRPM | OXYGEN SATURATION: 98 % | HEART RATE: 93 BPM | WEIGHT: 244 LBS

## 2024-04-10 DIAGNOSIS — J44.1 COPD EXACERBATION: Primary | ICD-10-CM

## 2024-04-10 PROCEDURE — 94640 AIRWAY INHALATION TREATMENT: CPT | Mod: XB

## 2024-04-10 PROCEDURE — 25000242 PHARM REV CODE 250 ALT 637 W/ HCPCS: Performed by: STUDENT IN AN ORGANIZED HEALTH CARE EDUCATION/TRAINING PROGRAM

## 2024-04-10 PROCEDURE — 99900035 HC TECH TIME PER 15 MIN (STAT)

## 2024-04-10 PROCEDURE — 96372 THER/PROPH/DIAG INJ SC/IM: CPT | Performed by: STUDENT IN AN ORGANIZED HEALTH CARE EDUCATION/TRAINING PROGRAM

## 2024-04-10 PROCEDURE — 99900031 HC PATIENT EDUCATION (STAT)

## 2024-04-10 PROCEDURE — 99285 EMERGENCY DEPT VISIT HI MDM: CPT | Mod: 25

## 2024-04-10 PROCEDURE — 63600175 PHARM REV CODE 636 W HCPCS: Performed by: STUDENT IN AN ORGANIZED HEALTH CARE EDUCATION/TRAINING PROGRAM

## 2024-04-10 RX ORDER — IPRATROPIUM BROMIDE AND ALBUTEROL SULFATE 2.5; .5 MG/3ML; MG/3ML
3 SOLUTION RESPIRATORY (INHALATION)
Status: COMPLETED | OUTPATIENT
Start: 2024-04-10 | End: 2024-04-10

## 2024-04-10 RX ORDER — ALBUTEROL SULFATE 0.83 MG/ML
2.5 SOLUTION RESPIRATORY (INHALATION) EVERY 6 HOURS PRN
Qty: 150 ML | Refills: 0 | Status: SHIPPED | OUTPATIENT
Start: 2024-04-10 | End: 2024-04-17

## 2024-04-10 RX ORDER — PREDNISONE 50 MG/1
50 TABLET ORAL DAILY
Qty: 5 TABLET | Refills: 0 | Status: SHIPPED | OUTPATIENT
Start: 2024-04-10 | End: 2024-04-15

## 2024-04-10 RX ORDER — DOXYCYCLINE 100 MG/1
100 CAPSULE ORAL EVERY 12 HOURS
Qty: 14 CAPSULE | Refills: 0 | Status: SHIPPED | OUTPATIENT
Start: 2024-04-10 | End: 2024-04-17

## 2024-04-10 RX ORDER — METHYLPREDNISOLONE SOD SUCC 125 MG
125 VIAL (EA) INJECTION
Status: COMPLETED | OUTPATIENT
Start: 2024-04-10 | End: 2024-04-10

## 2024-04-10 RX ADMIN — IPRATROPIUM BROMIDE AND ALBUTEROL SULFATE 3 ML: .5; 3 SOLUTION RESPIRATORY (INHALATION) at 01:04

## 2024-04-10 RX ADMIN — METHYLPREDNISOLONE SODIUM SUCCINATE 125 MG: 125 INJECTION, POWDER, FOR SOLUTION INTRAMUSCULAR; INTRAVENOUS at 01:04

## 2024-04-10 NOTE — ED PROVIDER NOTES
"  History  Chief Complaint   Patient presents with    Shortness of Breath     Patient reports shortness of breath since 10 pm after waking from sleep. Use her rescue inhaler. Denies doing any home nebulizer tx, she states, "I didn't have any time to do one I hurry up and came here."      64-year-old female with history of COPD, diabetes and hypertension presents for evaluation of shortness of breath.  Patient with history of COPD, reportedly using nebulizer treatments in the outpatient setting but states that they are not working.        Past Medical History:   Diagnosis Date    Anxiety     Breast cancer 2010    Cancer     Breast    COPD (chronic obstructive pulmonary disease)     Depression     Diabetes mellitus     GERD (gastroesophageal reflux disease)     Hypertension     Insomnia     Thyroid disease        Past Surgical History:   Procedure Laterality Date    BLADDER SUSPENSION      BREAST LUMPECTOMY      Right breast    CHOLECYSTECTOMY      HYSTERECTOMY      KNEE ARTHROSCOPY      Right knee    OOPHORECTOMY         Family History   Problem Relation Age of Onset    No Known Problems Mother     No Known Problems Father     No Known Problems Sister     No Known Problems Daughter     No Known Problems Maternal Aunt     No Known Problems Maternal Uncle     No Known Problems Paternal Aunt     No Known Problems Paternal Uncle     No Known Problems Maternal Grandmother     No Known Problems Maternal Grandfather     No Known Problems Paternal Grandmother     No Known Problems Paternal Grandfather     Breast cancer Neg Hx     Ovarian cancer Neg Hx     BRCA 1/2 Neg Hx        Social History     Tobacco Use    Smoking status: Every Day     Current packs/day: 0.50     Types: Cigarettes    Smokeless tobacco: Never    Tobacco comments:     quit yesterday 6/12/21   Substance Use Topics    Alcohol use: Not Currently    Drug use: Never       ROS  Review of Systems   Respiratory:  Positive for shortness of breath.        Physical " "Exam  /84   Pulse 93   Temp 98.9 °F (37.2 °C)   Resp 20   Ht 5' 3" (1.6 m)   Wt 110.7 kg (244 lb)   SpO2 98%   BMI 43.22 kg/m²   Physical Exam    Constitutional: She appears well-developed and well-nourished. She is cooperative.   HENT:   Head: Normocephalic and atraumatic.   Eyes: Conjunctivae, EOM and lids are normal. Pupils are equal, round, and reactive to light.   Neck: Phonation normal.   Normal range of motion.  Cardiovascular:  Normal rate, regular rhythm and intact distal pulses.           Pulmonary/Chest: She has wheezes.   Musculoskeletal:      Cervical back: Normal range of motion.     Neurological: She is alert and oriented to person, place, and time.   Skin: Skin is warm and dry.   Psychiatric: She has a normal mood and affect. Her speech is normal and behavior is normal.               Labs Reviewed - No data to display        Imaging Results    None                     Procedures             Medical Decision Making  Patient given nebulizer treatments as well as steroids for symptom improvement.  Patient feeling better after medication administration.  Will discharged advised continue symptomatic treatment in the outpatient setting.  Return precautions were given    Risk  Prescription drug management.                    DISCHARGE NOTE:       Vero Misty Allen's  results have been reviewed with her.  She has been counseled regarding her diagnosis, treatment, and plan.  She verbally conveys understanding and agreement of the signs, symptoms, diagnosis, treatment and prognosis and additionally agrees to follow up as discussed.  She also agrees with the care-plan and conveys that all of her questions have been answered.  I have also provided discharge instructions for her that include: educational information regarding their diagnosis and treatment, and list of reasons why they would want to return to the ED prior to their follow-up appointment, should her condition change. She has been " provided with education for proper emergency department utilization.      CLINICAL IMPRESSION:         1. COPD exacerbation              PLAN:   1. Discharge  2.   New Prescriptions    ALBUTEROL (PROVENTIL) 2.5 MG /3 ML (0.083 %) NEBULIZER SOLUTION    Take 3 mLs (2.5 mg total) by nebulization every 6 (six) hours as needed for Wheezing. Rescue    DOXYCYCLINE (VIBRAMYCIN) 100 MG CAP    Take 1 capsule (100 mg total) by mouth every 12 (twelve) hours. for 7 days    PREDNISONE (DELTASONE) 50 MG TAB    Take 1 tablet (50 mg total) by mouth once daily. for 5 days     3. Burke Merchant MD  [address] [address]  [phone]    Schedule an appointment as soon as possible for a visit   As needed          Kellie Cruz MD  04/10/24 2149

## 2024-04-12 ENCOUNTER — HOSPITAL ENCOUNTER (OUTPATIENT)
Dept: RADIOLOGY | Facility: HOSPITAL | Age: 64
Discharge: HOME | End: 2024-04-12
Attending: INTERNAL MEDICINE
Payer: MEDICARE

## 2024-04-12 DIAGNOSIS — R55 NEAR SYNCOPE: ICD-10-CM

## 2024-04-12 PROCEDURE — 93880 EXTRACRANIAL BILAT STUDY: CPT | Mod: TC

## 2024-05-14 ENCOUNTER — HOSPITAL ENCOUNTER (INPATIENT)
Facility: HOSPITAL | Age: 64
LOS: 6 days | Discharge: HOME | DRG: 885 | End: 2024-05-20
Attending: EMERGENCY MEDICINE | Admitting: EMERGENCY MEDICINE
Payer: MEDICARE

## 2024-05-14 DIAGNOSIS — F41.9 ANXIETY: ICD-10-CM

## 2024-05-14 DIAGNOSIS — R52 PAIN: ICD-10-CM

## 2024-05-14 DIAGNOSIS — F20.9 SCHIZOPHRENIA, UNSPECIFIED TYPE: ICD-10-CM

## 2024-05-14 DIAGNOSIS — R45.850 HOMICIDAL IDEATION: ICD-10-CM

## 2024-05-14 DIAGNOSIS — F33.3 MDD (MAJOR DEPRESSIVE DISORDER), RECURRENT, SEVERE, WITH PSYCHOSIS: Primary | ICD-10-CM

## 2024-05-14 LAB
ALBUMIN SERPL BCP-MCNC: 3.3 G/DL (ref 3.5–5.2)
ALP SERPL-CCNC: 87 U/L (ref 55–135)
ALT SERPL W/O P-5'-P-CCNC: 23 U/L (ref 10–44)
AMPHET+METHAMPHET UR QL: NEGATIVE
ANION GAP SERPL CALC-SCNC: 7 MMOL/L (ref 3–11)
APAP SERPL-MCNC: <2 UG/ML (ref 10–20)
AST SERPL-CCNC: 13 U/L (ref 10–40)
BARBITURATES UR QL SCN>200 NG/ML: NEGATIVE
BASOPHILS # BLD AUTO: 0.03 K/UL (ref 0–0.2)
BASOPHILS NFR BLD: 0.3 % (ref 0–1.9)
BENZODIAZ UR QL SCN>200 NG/ML: ABNORMAL
BILIRUB SERPL-MCNC: 0.5 MG/DL (ref 0.1–1)
BILIRUB UR QL STRIP: NEGATIVE
BUN SERPL-MCNC: 6 MG/DL (ref 8–23)
BZE UR QL SCN: NEGATIVE
CALCIUM SERPL-MCNC: 9.6 MG/DL (ref 8.7–10.5)
CANNABINOIDS UR QL SCN: NEGATIVE
CHLORIDE SERPL-SCNC: 111 MMOL/L (ref 95–110)
CLARITY UR: CLEAR
CO2 SERPL-SCNC: 23 MMOL/L (ref 23–29)
COLOR UR: YELLOW
CREAT SERPL-MCNC: 0.9 MG/DL (ref 0.5–1.4)
CREAT UR-MCNC: 102 MG/DL (ref 15–325)
DIFFERENTIAL METHOD BLD: ABNORMAL
EOSINOPHIL # BLD AUTO: 0.1 K/UL (ref 0–0.5)
EOSINOPHIL NFR BLD: 1 % (ref 0–8)
ERYTHROCYTE [DISTWIDTH] IN BLOOD BY AUTOMATED COUNT: 15.8 % (ref 11.5–14.5)
EST. GFR  (NO RACE VARIABLE): >60 ML/MIN/1.73 M^2
ETHANOL SERPL-MCNC: 7 MG/DL
GLUCOSE SERPL-MCNC: 105 MG/DL (ref 70–110)
GLUCOSE UR QL STRIP: NEGATIVE
HCT VFR BLD AUTO: 42.2 % (ref 37–48.5)
HGB BLD-MCNC: 13.9 G/DL (ref 12–16)
HGB UR QL STRIP: NEGATIVE
IMM GRANULOCYTES # BLD AUTO: 0.03 K/UL (ref 0–0.04)
IMM GRANULOCYTES NFR BLD AUTO: 0.3 % (ref 0–0.5)
KETONES UR QL STRIP: NEGATIVE
LEUKOCYTE ESTERASE UR QL STRIP: NEGATIVE
LYMPHOCYTES # BLD AUTO: 1.3 K/UL (ref 1–4.8)
LYMPHOCYTES NFR BLD: 13.1 % (ref 18–48)
MCH RBC QN AUTO: 28.8 PG (ref 27–31)
MCHC RBC AUTO-ENTMCNC: 32.9 G/DL (ref 32–36)
MCV RBC AUTO: 88 FL (ref 82–98)
METHADONE UR QL SCN>300 NG/ML: NEGATIVE
MONOCYTES # BLD AUTO: 0.9 K/UL (ref 0.3–1)
MONOCYTES NFR BLD: 9 % (ref 4–15)
NEUTROPHILS # BLD AUTO: 7.6 K/UL (ref 1.8–7.7)
NEUTROPHILS NFR BLD: 76.3 % (ref 38–73)
NITRITE UR QL STRIP: NEGATIVE
NRBC BLD-RTO: 0 /100 WBC
OPIATES UR QL SCN: NEGATIVE
PCP UR QL SCN>25 NG/ML: NEGATIVE
PH UR STRIP: 6 [PH] (ref 5–8)
PLATELET # BLD AUTO: 287 K/UL (ref 150–450)
PMV BLD AUTO: 10.5 FL (ref 9.2–12.9)
POTASSIUM SERPL-SCNC: 3.5 MMOL/L (ref 3.5–5.1)
PROT SERPL-MCNC: 6.6 G/DL (ref 6–8.4)
PROT UR QL STRIP: NEGATIVE
RBC # BLD AUTO: 4.82 M/UL (ref 4–5.4)
SODIUM SERPL-SCNC: 141 MMOL/L (ref 136–145)
SP GR UR STRIP: 1.01 (ref 1–1.03)
TOXICOLOGY INFORMATION: ABNORMAL
TSH SERPL DL<=0.005 MIU/L-ACNC: 1.66 UIU/ML (ref 0.4–4)
URN SPEC COLLECT METH UR: NORMAL
UROBILINOGEN UR STRIP-ACNC: NEGATIVE EU/DL
WBC # BLD AUTO: 9.95 K/UL (ref 3.9–12.7)

## 2024-05-14 PROCEDURE — 11400000 HC PSYCH PRIVATE ROOM

## 2024-05-14 PROCEDURE — 81003 URINALYSIS AUTO W/O SCOPE: CPT | Mod: 59 | Performed by: NURSE PRACTITIONER

## 2024-05-14 PROCEDURE — 85025 COMPLETE CBC W/AUTO DIFF WBC: CPT | Performed by: NURSE PRACTITIONER

## 2024-05-14 PROCEDURE — 80307 DRUG TEST PRSMV CHEM ANLYZR: CPT | Performed by: NURSE PRACTITIONER

## 2024-05-14 PROCEDURE — 82077 ASSAY SPEC XCP UR&BREATH IA: CPT | Performed by: NURSE PRACTITIONER

## 2024-05-14 PROCEDURE — 80143 DRUG ASSAY ACETAMINOPHEN: CPT | Performed by: NURSE PRACTITIONER

## 2024-05-14 PROCEDURE — 25000003 PHARM REV CODE 250: Performed by: STUDENT IN AN ORGANIZED HEALTH CARE EDUCATION/TRAINING PROGRAM

## 2024-05-14 PROCEDURE — 99285 EMERGENCY DEPT VISIT HI MDM: CPT | Mod: 25

## 2024-05-14 PROCEDURE — 84443 ASSAY THYROID STIM HORMONE: CPT | Performed by: NURSE PRACTITIONER

## 2024-05-14 PROCEDURE — 36415 COLL VENOUS BLD VENIPUNCTURE: CPT | Performed by: NURSE PRACTITIONER

## 2024-05-14 PROCEDURE — 80053 COMPREHEN METABOLIC PANEL: CPT | Performed by: NURSE PRACTITIONER

## 2024-05-14 RX ORDER — TRAZODONE HYDROCHLORIDE 100 MG/1
100 TABLET ORAL NIGHTLY
Status: DISCONTINUED | OUTPATIENT
Start: 2024-05-14 | End: 2024-05-15

## 2024-05-14 RX ORDER — TALC
6 POWDER (GRAM) TOPICAL NIGHTLY PRN
Status: DISCONTINUED | OUTPATIENT
Start: 2024-05-14 | End: 2024-05-15

## 2024-05-14 RX ADMIN — TRAZODONE HYDROCHLORIDE 100 MG: 100 TABLET ORAL at 08:05

## 2024-05-14 NOTE — ED NOTES
Left a message for Dr Joseph to call Dr Sheriff back   Message   Recorded as Task   Date: 10/15/2018 03:02 PM, Created By: Scott Cavazos   Task Name: 4. Patient Message   Assigned To: CARLOS ENRIQUE JEAN-BAPTISTE   Regarding Patient: TIFFANY PEPPER, Status: Active   Comment:    Scott Cavazos - CST

## 2024-05-14 NOTE — ED NOTES
"After speaking with the patient she states "the gun is just a pellet gun I dont want to kill these people I just want to feel protected in my home"   "
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
## 2024-05-15 PROBLEM — F33.3 MDD (MAJOR DEPRESSIVE DISORDER), RECURRENT, SEVERE, WITH PSYCHOSIS: Status: ACTIVE | Noted: 2024-05-15

## 2024-05-15 PROCEDURE — 25000003 PHARM REV CODE 250: Performed by: PSYCHIATRY & NEUROLOGY

## 2024-05-15 PROCEDURE — 25000242 PHARM REV CODE 250 ALT 637 W/ HCPCS: Performed by: PSYCHIATRY & NEUROLOGY

## 2024-05-15 PROCEDURE — 90833 PSYTX W PT W E/M 30 MIN: CPT | Mod: ,,, | Performed by: PSYCHIATRY & NEUROLOGY

## 2024-05-15 PROCEDURE — 99223 1ST HOSP IP/OBS HIGH 75: CPT | Mod: ,,, | Performed by: PSYCHIATRY & NEUROLOGY

## 2024-05-15 PROCEDURE — S4991 NICOTINE PATCH NONLEGEND: HCPCS | Performed by: PSYCHIATRY & NEUROLOGY

## 2024-05-15 PROCEDURE — 11400000 HC PSYCH PRIVATE ROOM

## 2024-05-15 RX ORDER — VENLAFAXINE HYDROCHLORIDE 37.5 MG/1
37.5 CAPSULE, EXTENDED RELEASE ORAL DAILY
Status: DISCONTINUED | OUTPATIENT
Start: 2024-05-15 | End: 2024-05-16

## 2024-05-15 RX ORDER — ACETAMINOPHEN 325 MG/1
650 TABLET ORAL EVERY 6 HOURS PRN
Status: DISCONTINUED | OUTPATIENT
Start: 2024-05-15 | End: 2024-05-20 | Stop reason: HOSPADM

## 2024-05-15 RX ORDER — BENZONATATE 100 MG/1
100 CAPSULE ORAL 3 TIMES DAILY PRN
Status: DISCONTINUED | OUTPATIENT
Start: 2024-05-15 | End: 2024-05-20 | Stop reason: HOSPADM

## 2024-05-15 RX ORDER — BENZTROPINE MESYLATE 1 MG/ML
2 INJECTION, SOLUTION INTRAMUSCULAR; INTRAVENOUS EVERY 8 HOURS PRN
Status: DISCONTINUED | OUTPATIENT
Start: 2024-05-15 | End: 2024-05-20 | Stop reason: HOSPADM

## 2024-05-15 RX ORDER — LOPERAMIDE HYDROCHLORIDE 2 MG/1
2 CAPSULE ORAL
Status: DISCONTINUED | OUTPATIENT
Start: 2024-05-15 | End: 2024-05-20 | Stop reason: HOSPADM

## 2024-05-15 RX ORDER — OLANZAPINE 10 MG/1
10 TABLET ORAL EVERY 8 HOURS PRN
Status: DISCONTINUED | OUTPATIENT
Start: 2024-05-15 | End: 2024-05-20 | Stop reason: HOSPADM

## 2024-05-15 RX ORDER — ALUMINUM HYDROXIDE, MAGNESIUM HYDROXIDE, AND SIMETHICONE 1200; 120; 1200 MG/30ML; MG/30ML; MG/30ML
30 SUSPENSION ORAL EVERY 6 HOURS PRN
Status: DISCONTINUED | OUTPATIENT
Start: 2024-05-15 | End: 2024-05-20 | Stop reason: HOSPADM

## 2024-05-15 RX ORDER — OLANZAPINE 10 MG/2ML
10 INJECTION, POWDER, FOR SOLUTION INTRAMUSCULAR EVERY 8 HOURS PRN
Status: DISCONTINUED | OUTPATIENT
Start: 2024-05-15 | End: 2024-05-20 | Stop reason: HOSPADM

## 2024-05-15 RX ORDER — PROMETHAZINE HYDROCHLORIDE 25 MG/1
25 TABLET ORAL EVERY 6 HOURS PRN
Status: DISCONTINUED | OUTPATIENT
Start: 2024-05-15 | End: 2024-05-20 | Stop reason: HOSPADM

## 2024-05-15 RX ORDER — QUETIAPINE FUMARATE 100 MG/1
100 TABLET, FILM COATED ORAL NIGHTLY
Status: DISCONTINUED | OUTPATIENT
Start: 2024-05-15 | End: 2024-05-20 | Stop reason: HOSPADM

## 2024-05-15 RX ORDER — IBUPROFEN 200 MG
1 TABLET ORAL DAILY PRN
Status: DISCONTINUED | OUTPATIENT
Start: 2024-05-15 | End: 2024-05-20 | Stop reason: HOSPADM

## 2024-05-15 RX ORDER — HYDROXYZINE PAMOATE 50 MG/1
50 CAPSULE ORAL EVERY 6 HOURS PRN
Status: DISCONTINUED | OUTPATIENT
Start: 2024-05-15 | End: 2024-05-20 | Stop reason: HOSPADM

## 2024-05-15 RX ORDER — ONDANSETRON 4 MG/1
4 TABLET, ORALLY DISINTEGRATING ORAL EVERY 8 HOURS PRN
Status: DISCONTINUED | OUTPATIENT
Start: 2024-05-15 | End: 2024-05-20 | Stop reason: HOSPADM

## 2024-05-15 RX ORDER — ALBUTEROL SULFATE 90 UG/1
2 AEROSOL, METERED RESPIRATORY (INHALATION) EVERY 6 HOURS PRN
Status: DISCONTINUED | OUTPATIENT
Start: 2024-05-15 | End: 2024-05-20 | Stop reason: HOSPADM

## 2024-05-15 RX ORDER — CLONAZEPAM 0.5 MG/1
0.5 TABLET ORAL 2 TIMES DAILY
Status: DISCONTINUED | OUTPATIENT
Start: 2024-05-15 | End: 2024-05-20 | Stop reason: HOSPADM

## 2024-05-15 RX ADMIN — ALBUTEROL SULFATE 2 PUFF: 90 AEROSOL, METERED RESPIRATORY (INHALATION) at 08:05

## 2024-05-15 RX ADMIN — CLONAZEPAM 0.5 MG: 0.5 TABLET ORAL at 10:05

## 2024-05-15 RX ADMIN — VENLAFAXINE HYDROCHLORIDE 37.5 MG: 37.5 CAPSULE, EXTENDED RELEASE ORAL at 10:05

## 2024-05-15 RX ADMIN — CLONAZEPAM 0.5 MG: 0.5 TABLET ORAL at 08:05

## 2024-05-15 RX ADMIN — QUETIAPINE FUMARATE 100 MG: 100 TABLET ORAL at 08:05

## 2024-05-15 RX ADMIN — NICOTINE 1 PATCH: 14 PATCH, EXTENDED RELEASE TRANSDERMAL at 10:05
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
## 2024-05-15 NOTE — PLAN OF CARE
Collateral:   Jordyn baig, niece, [phone]    Collateral Perception of Problem:   She has a history of schizophrenia which started about 20 years ago when her  passed away she would see and hear things   She allowed the neighbor to clean her home and she left her there for a while and when she returned she said things were missing and she called the police now she's saying she hears the neighbors talking outside her home,  turning the door knobs  (this is the same thing that has occurred years back)   She's unstable for sure   She loss her boyfriend about 1.5- 2 years ago and been having trouble since     Previous Psych History/Hospitalizations:  No inpatient   Her past psychiatrist was located in [place]     Suicide Attempts (how/severity):  Yes years ago 40 years ago  I believe she had taken medication     How long has pt had problems (childhood dx?):  Seems on and off the past 20 years   Recently Within the  past 2 weeks     Impulse issues:  Yea -one time she brought a brand new truck but couldn't afford it, took out multiple payday loans and used her truck for collateral   Always complains about a sickness, its a habit, and goes to the ER    History of violence:   None     Drug Use:  None     Alcohol Use:  In the past she has drunk a lot     Legal Issues:  Bankruptcy case       Other Pertinent Info:   She stopped seeing her psychiatrist about 8 years ago and has been letting her PCP treat her, Dr. Merchant   She takes a lot of medication you wouldn't think nothing would be bothering her   Its hard to tell if its real or not because she lives alone, but I believe it is her illness   Once before she lived with me and she said she was hearing stuff in my attic, time before at her very own home we had to install cameras just to show her that nothing was there   She called yesterday saying she was losing it   Once before she was seeing the walls move and hearing voices   One time she even went slept at a  [place] saying the people wouldn't be able to find her and they found her  If you look at her chart within the past 2 years you would see that she has had several er visit but we believed it was her nerves   She calls me a lot, this is the third time I had to be involve with finances   I asked for the neighbor's number and she refused to give it to me   She calls my uncle a lot to take her places   Baseline:  Hearing voices hasn't been an issue in a while-maybe within the past 2 weeks     Discharge Plan:   Home

## 2024-05-15 NOTE — PLAN OF CARE
POC reviewed this shift and is on going. Patient is withdrawn, depressed, calm, cooperative, quiet, anxious, and sleeping. Denies Suicidal Ideation, Homicidal Ideation, Auditory Hallucinations, Visual Hallucinations, Tactile Hallucinations, Gustatory Hallucinations, and Delusions. Patient stayed on the unit floor for some of the morning. Patient went to her room after lunch and laid down in her bed. Patient's appetite is good. Patient did participate in the group session that was conducted today. Pt continues to be medication compliant and staff will continue to monitor pt closely while on the unit.
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
## 2024-05-15 NOTE — H&P
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 evident by significant psychotic thought disorder and gravely disabled        Philip Mckee MD  Psychiatry

## 2024-05-15 NOTE — PLAN OF CARE
Nutrition Plan of Care:    Recommendations     1. Continue on regular oral diet   2. Monitor labs   3. Collaboration with medical providers     Goals: Patient to continue to consume adequate intake prior to RD follow up  Nutrition Goal Status: goal met  Communication of RD Recs: other (comment) (consult, poc)     Assessment and Plan     Nutrition Problem  Obesity      Related to (etiology):   Excessive oral intake     Signs and Symptoms (as evidenced by):   BMI: 42.87 ( morbid obesity)     Interventions/Recommendations (treatment strategy):  Collaboration with medical providers     Nutrition Diagnosis Status:   Continues         Beatriz Laguna, MS, RDN, LDN

## 2024-05-15 NOTE — PLAN OF CARE
05/15/24 1553   Step 1: Warning Signs   Warning Sign 1 hearing voices   Warning Sign 2 someone breaking in my house & stealing my stuff   Warning Sign 3 n/a   Step 2: Internal coping strategies - Things I can do to take my mind off my problems without contacting another person:   Coping Strategy 1 go for a ride   Coping Strategy 2 go for a walk on the beach   Coping Strategy 3 cooking   Step 3: People and social settings that provide distraction:   1. Name melody cespedes (sister)   2. Name izabela jay (brother)       Phone [phone]   3. Place beach   4. Place outdoors    Step 4: People whom I can ask for help:   1. Person Robel Jay       Phone [phone]   2. Person Jordyn Kumar (niece)       Phone [phone]   3. Person Izabela Quinn       Phone [phone]   Step 5: Professionals or agencies I can contact during a crisis:   1. Clinician Name [facility]       Phone [phone]   3. Suicide Prevention Lifeline: 988 Suicide Prevention Lifeline 988   4. [facility]       911       Emergency Services Phone Warm line  [phone] wed-sun 5pm-10pm   Step 6: Making the environment safer (plan for lethal means safety)   Safe Environment Plan go to the dr, stay on my medication   Safe Environment Optional: What is most important to me and worth living for? my family and my cats

## 2024-05-15 NOTE — CONSULTS
[facility]  Adult Nutrition  Consult Note    SUMMARY     Recommendations    1. Continue on regular oral diet   2. Monitor labs   3. Collaboration with medical providers    Goals: Patient to continue to consume adequate intake prior to RD follow up  Nutrition Goal Status: goal met  Communication of RD Recs: other (comment) (consult, poc)    Assessment and Plan    Nutrition Problem  Obesity     Related to (etiology):   Excessive oral intake    Signs and Symptoms (as evidenced by):   BMI: 42.87 ( morbid obesity)    Interventions/Recommendations (treatment strategy):  Collaboration with medical providers    Nutrition Diagnosis Status:   Continues         Malnutrition Assessment                                       Reason for Assessment    Reason For Assessment: consult    Diagnosis: psychological disorder  Patient Active Problem List   Diagnosis    Severe obesity (BMI >= 40)    Nicotine dependence, uncomplicated    CAP (community acquired pneumonia)    COPD exacerbation    History of tobacco abuse    Primary hypertension    Generalized anxiety disorder    MDD (major depressive disorder), recurrent, severe, with psychosis       Relevant Medical History:   Past Medical History:   Diagnosis Date    Anxiety     Breast cancer 2010    Cancer     Breast    COPD (chronic obstructive pulmonary disease)     Depression     Diabetes mellitus     GERD (gastroesophageal reflux disease)     Hypertension     Insomnia     Thyroid disease        Interdisciplinary Rounds: did not attend (RD remote)    General Information Comments:   5/15/2024: Patient is on a regular oral diet with good appetite and po intake.  No tolerance issues reported or noted. No weight loss found.  No wounds noted. Patient admitted with major depressive disorder.  Labs reviewed.  PRERNA SALESM: COLEMAN.  RD to continue to monitor and follow.    Nutrition Discharge Planning: Patient to continue on a healthy oral diet post  "discharge.    Nutrition Risk Screen    Nutrition Risk Screen: no indicators present  Nutrition Related Social Determinants of Health: SDOH: None Identified    Nutrition/Diet History    Spiritual, Cultural Beliefs, Confucianism Practices, Values that Affect Care: no  Food Allergies: NKFA  Factors Affecting Nutritional Intake: depression    Anthropometrics    Temp: 97.5 °F (36.4 °C)  Height Method: Stated  Height: 5' 3" (160 cm)  Height (inches): 63 in  Weight Method: Standard Scale  Weight: 109.8 kg (242 lb)  Weight (lb): 242 lb  Ideal Body Weight (IBW), Female: 115 lb  % Ideal Body Weight, Female (lb): 210.43 %  BMI (Calculated): 42.9  BMI Grade: greater than 40 - morbid obesity       Lab/Procedures/Meds    Pertinent Labs Reviewed: reviewed  BMP  Lab Results   Component Value Date     05/14/2024    K 3.5 05/14/2024     (H) 05/14/2024    CO2 23 05/14/2024    BUN 6 (L) 05/14/2024    CREATININE 0.9 05/14/2024    CALCIUM 9.6 05/14/2024    ANIONGAP 7 05/14/2024    EGFRNORACEVR >60.0 05/14/2024     Lab Results   Component Value Date    HGBA1C 5.7 (H) 01/12/2024     Lab Results   Component Value Date    CALCIUM 9.6 05/14/2024     Glucose   Date Value Ref Range Status   05/14/2024 105 70 - 110 mg/dL Final       Pertinent Medications Reviewed: reviewed  Scheduled Meds:   clonazePAM  0.5 mg Oral BID    QUEtiapine  100 mg Oral QHS    venlafaxine  37.5 mg Oral Daily     Continuous Infusions:  PRN Meds:.  Current Facility-Administered Medications:     acetaminophen, 650 mg, Oral, Q6H PRN    albuterol, 2 puff, Inhalation, Q6H PRN    aluminum-magnesium hydroxide-simethicone, 30 mL, Oral, Q6H PRN    benzonatate, 100 mg, Oral, TID PRN    benztropine mesylate, 2 mg, Intramuscular, Q8H PRN    hydrOXYzine pamoate, 50 mg, Oral, Q6H PRN    loperamide, 2 mg, Oral, PRN    nicotine, 1 patch, Transdermal, Daily PRN    OLANZapine, 10 mg, Oral, Q8H PRN **AND** OLANZapine, 10 mg, Intramuscular, Q8H PRN    ondansetron, 4 mg, Oral, Q8H " PRN    promethazine, 25 mg, Oral, Q6H PRN    Physical Findings/Assessment         Estimated/Assessed Needs    Weight Used For Calorie Calculations: 109.8 kg (242 lb 1 oz)  Energy Calorie Requirements (kcal): 15-20kcals/day (1647-2196kcals/day)  Energy Need Method: Kcal/kg  Protein Requirements: 0.8g/kg (88g/day)  Weight Used For Protein Calculations: 109.8 kg (242 lb 1 oz)  Fluid Requirements (mL): 1ml/kcal  Estimated Fluid Requirement Method: RDA Method  RDA Method (mL): 15  CHO Requirement: 250      Nutrition Prescription Ordered    Current Diet Order: Regular    Evaluation of Received Nutrient/Fluid Intake    % Kcal Needs: 50-75%  % Protein Needs: 50-75%  Energy Calories Required: meeting needs  Protein Required: meeting needs  Fluid Required: meeting needs  Comments: LBM: N/A  Tolerance: tolerating  % Intake of Estimated Energy Needs: 50 - 75 %  % Meal Intake: 50 - 75 %    Nutrition Risk    Level of Risk/Frequency of Follow-up: low (follow up: 1x per week)       Monitor and Evaluation    Food and Nutrient Intake: energy intake, food and beverage intake  Food and Nutrient Adminstration: diet order  Knowledge/Beliefs/Attitudes: beliefs and attitudes  Physical Activity and Function: nutrition-related ADLs and IADLs, factors affecting access to physical activity  Anthropometric Measurements: height/length, weight, weight change, body mass index  Biochemical Data, Medical Tests and Procedures: electrolyte and renal panel, gastrointestinal profile, glucose/endocrine profile, inflammatory profile, lipid profile       Nutrition Follow-Up    RD Follow-up?: Yes  Beatriz Laguna, MS, RDN, LDN

## 2024-05-16 LAB
CHOLEST SERPL-MCNC: 160 MG/DL (ref 120–199)
CHOLEST/HDLC SERPL: 3.9 {RATIO} (ref 2–5)
ESTIMATED AVG GLUCOSE: 117 MG/DL (ref 68–131)
HBA1C MFR BLD: 5.7 % (ref 4–5.6)
HDLC SERPL-MCNC: 41 MG/DL (ref 40–75)
HDLC SERPL: 25.6 % (ref 20–50)
LDLC SERPL CALC-MCNC: 95.8 MG/DL (ref 63–159)
NONHDLC SERPL-MCNC: 119 MG/DL
TRIGL SERPL-MCNC: 116 MG/DL (ref 30–150)

## 2024-05-16 PROCEDURE — 11400000 HC PSYCH PRIVATE ROOM

## 2024-05-16 PROCEDURE — S4991 NICOTINE PATCH NONLEGEND: HCPCS | Performed by: PSYCHIATRY & NEUROLOGY

## 2024-05-16 PROCEDURE — 99232 SBSQ HOSP IP/OBS MODERATE 35: CPT | Mod: ,,, | Performed by: PSYCHIATRY & NEUROLOGY

## 2024-05-16 PROCEDURE — 90833 PSYTX W PT W E/M 30 MIN: CPT | Mod: ,,, | Performed by: PSYCHIATRY & NEUROLOGY

## 2024-05-16 PROCEDURE — 80061 LIPID PANEL: CPT | Performed by: PSYCHIATRY & NEUROLOGY

## 2024-05-16 PROCEDURE — 25000003 PHARM REV CODE 250: Performed by: PSYCHIATRY & NEUROLOGY

## 2024-05-16 PROCEDURE — 83036 HEMOGLOBIN GLYCOSYLATED A1C: CPT | Performed by: PSYCHIATRY & NEUROLOGY

## 2024-05-16 PROCEDURE — 36415 COLL VENOUS BLD VENIPUNCTURE: CPT | Performed by: PSYCHIATRY & NEUROLOGY

## 2024-05-16 RX ORDER — VENLAFAXINE HYDROCHLORIDE 75 MG/1
75 CAPSULE, EXTENDED RELEASE ORAL DAILY
Status: DISCONTINUED | OUTPATIENT
Start: 2024-05-17 | End: 2024-05-17

## 2024-05-16 RX ADMIN — ALUMINUM HYDROXIDE, MAGNESIUM HYDROXIDE, AND SIMETHICONE 30 ML: 200; 200; 20 SUSPENSION ORAL at 02:05

## 2024-05-16 RX ADMIN — VENLAFAXINE HYDROCHLORIDE 37.5 MG: 37.5 CAPSULE, EXTENDED RELEASE ORAL at 08:05

## 2024-05-16 RX ADMIN — NICOTINE 1 PATCH: 14 PATCH, EXTENDED RELEASE TRANSDERMAL at 08:05

## 2024-05-16 RX ADMIN — CLONAZEPAM 0.5 MG: 0.5 TABLET ORAL at 08:05

## 2024-05-16 RX ADMIN — ALBUTEROL SULFATE 2 PUFF: 90 AEROSOL, METERED RESPIRATORY (INHALATION) at 05:05

## 2024-05-16 RX ADMIN — QUETIAPINE FUMARATE 100 MG: 100 TABLET ORAL at 08:05

## 2024-05-16 NOTE — PLAN OF CARE
Problem: Adult Behavioral Health Plan of Care  Goal: Optimized Coping Skills in Response to Life Stressors  Intervention: Promote Effective Coping Strategies  Flowsheets (Taken 5/15/2024 1115)  Supportive Measures:   active listening utilized   verbalization of feelings encouraged   self-reflection promoted         Behavior: Pt was engaged, oriented, and alert during group             Intervention: In this CBT-focused process group LMSW facilitated a group discussion on gratitude.  Each patient was then asked to identify and discuss things they are grateful for in their lives and things they may take for granted.             Response: pt identified being a live is something she is grateful for. Pt identified smoking cigarettes as what she takes for granted.           Plan: Continue to encourage pt to participate in process groups to verbalize feelings and develop healthy coping skills.

## 2024-05-16 NOTE — PLAN OF CARE
POC reviewed this shift and is on going. Patient is calm, cooperative, quiet, and anxious. Denies Suicidal Ideation, Homicidal Ideation, Auditory Hallucinations, Visual Hallucinations, Tactile Hallucinations, Gustatory Hallucinations, and Delusions. Patient was out on the floor for most of the morning watching TV and talking with her peers. Patient went to her room after lunch and laid down. Patient still states that her neighbors was talking around her trailer saying they wanted to break in and steal some thing inside. Pt continues to be medication compliant and staff will continue to monitor pt closely while on the unit.

## 2024-05-16 NOTE — H&P
[facility] Hospital Medicine  History & Physical    Patient Name: Vero Allen  MRN: [id number]  Patient Class: IP- Psych  Admission Date: 2024  Attending Physician: Molina Sheridan III, MD  Primary Care Provider: Burke Merchant MD         Patient information was obtained from ER records.     Subjective:     Principal Problem:MDD (major depressive disorder), recurrent, severe, with psychosis    Chief Complaint:   Chief Complaint   Patient presents with    Anxiety     PT to er states having anxiety due to neighbors        HPI: Patient is a 64-year-old female with a history of type 2 diabetes admitted to the inpatient psychiatric unit for severe anxiety and mental health issues.  Patient today has no physical complaints.  She denies any pain labs and chart reviewed.    Past Medical History:   Diagnosis Date    Anxiety     Breast cancer     Cancer     Breast    COPD (chronic obstructive pulmonary disease)     Depression     Diabetes mellitus     GERD (gastroesophageal reflux disease)     Hypertension     Insomnia     Thyroid disease        Past Surgical History:   Procedure Laterality Date    BLADDER SUSPENSION      BREAST LUMPECTOMY      Right breast    CHOLECYSTECTOMY      HYSTERECTOMY      KNEE ARTHROSCOPY      Right knee    OOPHORECTOMY         Review of patient's allergies indicates:  No Known Allergies    No current facility-administered medications on file prior to encounter.     Current Outpatient Medications on File Prior to Encounter   Medication Sig    amLODIPine (NORVASC) 10 MG tablet Take 10 mg by mouth.    MOUNJARO 5 mg/0.5 mL PnIj SMARTSI Milligram(s) SUB-Q Once a Week    tiZANidine (ZANAFLEX) 4 MG tablet Take by mouth.    traZODone (DESYREL) 100 MG tablet Take 100 mg by mouth every evening. 2 caps    zolpidem (AMBIEN) 10 mg Tab Take 10 mg by mouth nightly as needed.    acetaminophen (TYLENOL) 500 MG tablet Take 2 tablets (1,000 mg total) by mouth  every 6 (six) hours as needed for Pain or Temperature greater than (101).    albuterol (PROVENTIL/VENTOLIN HFA) 90 mcg/actuation inhaler Inhale 1-2 puffs into the lungs every 6 (six) hours as needed for Wheezing or Shortness of Breath. Rescue    albuterol (PROVENTIL/VENTOLIN HFA) 90 mcg/actuation inhaler Inhale 1-2 puffs into the lungs every 4 (four) hours as needed for Wheezing or Shortness of Breath. Rescue    albuterol-ipratropium (DUO-NEB) 2.5 mg-0.5 mg/3 mL nebulizer solution Take 3 mLs by nebulization every 4 (four) hours as needed for Wheezing or Shortness of Breath. Rescue    albuterol-ipratropium (DUO-NEB) 2.5 mg-0.5 mg/3 mL nebulizer solution Take 3 mLs by nebulization every 4 (four) hours as needed for Wheezing. Rescue    amoxicillin-clavulanate 875-125mg (AUGMENTIN) 875-125 mg per tablet Take 1 tablet by mouth 2 (two) times daily.    aspirin (ECOTRIN) 81 MG EC tablet Take 81 mg by mouth once daily.    benzocaine-menthoL-cetylpyrid (ACTISEP) 2-0.5-0.1 % Soln 2 sprays by Mucous Membrane route every 6 (six) hours as needed (sore throat).    benztropine (COGENTIN) 2 MG Tab Take 1 mg by mouth 2 (two) times daily.    celecoxib (CELEBREX) 200 MG capsule Take 200 mg by mouth 2 (two) times daily as needed.    cetirizine (ZYRTEC) 10 MG tablet Take 1 tablet (10 mg total) by mouth once daily. for 10 days    clotrimazole-betamethasone 1-0.05% (LOTRISONE) cream Apply topically 2 (two) times daily.    esomeprazole (NEXIUM) 20 MG capsule Take 20 mg by mouth before breakfast.    famotidine (PEPCID) 40 MG tablet Take 40 mg by mouth.    HYDROcodone-acetaminophen (NORCO) 7.5-325 mg per tablet Take by mouth.    hydrOXYzine HCL (ATARAX) 25 MG tablet Take 1 tablet (25 mg total) by mouth every 6 (six) hours.    levothyroxine (SYNTHROID) 50 MCG tablet Take 50 mcg by mouth before breakfast.    metFORMIN (GLUCOPHAGE-XR) 750 MG ER 24hr tablet Take 750 mg by mouth 2 (two) times daily.    ondansetron (ZOFRAN-ODT) 4 MG TbDL Take 1  tablet (4 mg total) by mouth every 8 (eight) hours as needed ([place]).    pantoprazole (PROTONIX) 20 MG tablet Take 1 tablet (20 mg total) by mouth 2 (two) times daily before meals. for 14 days    vitamin D (VITAMIN D3) 1000 units Tab Take 1,000 Units by mouth once daily.    vitamin E 100 UNIT capsule Take 100 Units by mouth once daily.     Family History       Problem Relation (Age of Onset)    No Known Problems Mother, Father, Sister, Daughter, Maternal Aunt, Maternal Uncle, Paternal Aunt, Paternal Uncle, Maternal Grandmother, Maternal Grandfather, Paternal Grandmother, Paternal Grandfather          Tobacco Use    Smoking status: Every Day     Current packs/day: 0.50     Average packs/day: 0.5 packs/day for 50.0 years (25.0 ttl pk-yrs)     Types: Cigarettes     Start date: 5/15/1974     Passive exposure: Past    Smokeless tobacco: Never    Tobacco comments:     quit yesterday 6/12/21   Substance and Sexual Activity    Alcohol use: Not Currently    Drug use: Never    Sexual activity: Not Currently     Partners: Male     Birth control/protection: None     Review of Systems   Constitutional:  Negative for activity change, appetite change, chills and fever.   HENT:  Negative for ear pain, mouth sores, nosebleeds and sore throat.    Eyes:  Negative for visual disturbance.   Respiratory:  Negative for cough, shortness of breath and wheezing.    Cardiovascular:  Negative for chest pain, palpitations and leg swelling.   Gastrointestinal:  Negative for abdominal distention, abdominal pain, blood in stool, constipation, diarrhea, nausea and vomiting.   Endocrine: Negative for polyphagia.   Genitourinary:  Negative for difficulty urinating, dysuria, flank pain and frequency.   Musculoskeletal:  Negative for arthralgias, back pain and myalgias.   Skin:  Negative for rash.   Neurological:  Negative for dizziness, tremors, seizures, syncope, facial asymmetry, speech difficulty, weakness and headaches.   Hematological:  Negative  for adenopathy.   Psychiatric/Behavioral:  Positive for dysphoric mood. Negative for agitation, confusion and hallucinations. The patient is nervous/anxious.      Objective:     Vital Signs (Most Recent):  Temp: 97.5 °F (36.4 °C) (05/16/24 0715)  Pulse: 80 (05/16/24 0715)  Resp: 18 (05/16/24 0715)  BP: (!) 131/58 (05/16/24 0715)  SpO2: 96 % (05/16/24 0715) Vital Signs (24h Range):  Temp:  [97.5 °F (36.4 °C)-97.9 °F (36.6 °C)] 97.5 °F (36.4 °C)  Pulse:  [67-92] 80  Resp:  [16-22] 18  SpO2:  [94 %-96 %] 96 %  BP: (131-153)/(58-93) 131/58     Weight: 109.8 kg (242 lb)  Body mass index is 42.87 kg/m².     Physical Exam  Vitals and nursing note reviewed.   Constitutional:       General: She is not in acute distress.     Appearance: She is obese. She is not ill-appearing.   HENT:      Nose: No congestion or rhinorrhea.      Mouth/Throat:      Pharynx: No oropharyngeal exudate.   Eyes:      General: No scleral icterus.  Neck:      Vascular: No carotid bruit.   Cardiovascular:      Rate and Rhythm: Normal rate and regular rhythm.      Heart sounds: No murmur heard.     No friction rub. No gallop.   Pulmonary:      Effort: No respiratory distress.      Breath sounds: No stridor. No wheezing, rhonchi or rales.   Chest:      Chest wall: No tenderness.   Abdominal:      General: There is no distension.      Palpations: Abdomen is soft. There is no mass.      Tenderness: There is no abdominal tenderness. There is no right CVA tenderness, left CVA tenderness, guarding or rebound.      Hernia: No hernia is present.   Musculoskeletal:         General: No swelling, tenderness or deformity.      Cervical back: No rigidity.      Right lower leg: No edema.      Left lower leg: No edema.   Lymphadenopathy:      Cervical: No cervical adenopathy.   Skin:     Capillary Refill: Capillary refill takes less than 2 seconds.      Coloration: Skin is not jaundiced or pale.      Findings: No rash.   Neurological:      Cranial Nerves: No cranial  "nerve deficit.      Sensory: No sensory deficit.      Motor: No weakness.      Coordination: Coordination normal.      Gait: Gait normal.                Significant Labs: All pertinent labs within the past 24 hours have been reviewed.    Significant Imaging: I have reviewed all pertinent imaging results/findings within the past 24 hours.  Assessment/Plan:     * MDD (major depressive disorder), recurrent, severe, with psychosis            VTE Risk Mitigation (From admission, onward)      None                       PSYCHIATRY DAILY INPATIENT PROGRESS NOTE  SUBSEQUENT HOSPITAL VISIT    ENCOUNTER DATE: 5/16/2024  SITE: [facility]    DATE OF ADMISSION: 5/14/2024  4:08 PM  LENGTH OF STAY: 2 days      CHIEF COMPLAINT   Vero Allen is a 64 y.o. female, seen during daily lombardo rounds on the inpatient unit.  Vero Allen presented with the chief complaint of psychosis (paranoia)/anxiety, "I had an issue with my neighbors."       The patient was seen and examined. The chart was reviewed.     Reviewed notes from Rns and MD and labs from the last 24 hours.    The patient's case was discussed with the treatment team/care providers today including Rns and MD    Staff reports no behavioral or management issues.     The patient has been compliant with treatment.      Subjective 05/16/2024       Today the patient reports mood is "really happy", affect is appropriate but superficial, there is suspicion that she is minimizing symptoms. Pt reports reason for hospitalization due to "my neighbors talking about me and planning to steal my property." She verbalizes plans to "get my BB gun from my brother and defend my property if I need to" prior to hospitalization, but states she is no longer having this inclination, and plans to "keep to herself" upon returning home.     Patient does report grief secondary to death of her  and recent death of a boyfriend whom she shared a residence with. Reports " "feeling "depressed sometimes, but that's because I miss him." She is amenable to outpatient counseling.     Denies SE to medication regimen thus far. Reports sleeping "like a baby" last night.       The patient denies any side effects to medications.        Interim/overnight events per report/notes:          Psychiatric ROS (observed, reported, or endorsed/denied):  Depressed mood - fluctuating  Interest/pleasure/anhedonia: fluctuating  Guilt/hopelessness/worthlessness - fluctuating  Changes in Sleep - less  Changes in Appetite - less  Changes in Concentration - No  Changes in Energy - Yes  PMA/R- No  Suicidal- active/passive ideations - fluctuating  Homicidal ideations: active/passive ideations - fluctuating    Hallucinations - No  Delusions - Continuing  Disorganized behavior - No  Disorganized speech - No  Negative symptoms - No    Elevated mood - No  Decreased need for sleep - No  Grandiosity - No  Racing thoughts - No  Impulsivity - fluctuating  Irritability- fluctuating  Increased energy - No  Distractibility - No  Increase in goal-directed activity or PMA- No    Symptoms of ELSY - No  Symptoms of Panic Disorder- No  Symptoms of PTSD - No        Overall progress: Patient is showing mild improvement         Psychotherapy:  Target symptoms: depression, psychosis  Why chosen therapy is appropriate versus another modality: relevant to diagnosis, evidence based practice  Outcome monitoring methods: self-report, observation  Therapeutic intervention type: insight oriented psychotherapy, supportive psychotherapy  Topics discussed/themes: illness/death of a loved one, building skills sets for symptom management, legal stressors  The patient's response to the intervention is accepting. The patient's progress toward treatment goals is limited.   Duration of intervention: 16 minutes.        Medical ROS  Review of Systems   Constitutional: Negative.    HENT: Negative.     Eyes: Negative.    Respiratory: Negative.   "   Cardiovascular: Negative.    Gastrointestinal: Negative.    Genitourinary: Negative.    Musculoskeletal: Negative.    Skin: Negative.    Neurological: Negative.    Endo/Heme/Allergies: Negative.    Psychiatric/Behavioral:  Positive for depression.          PAST MEDICAL HISTORY   Past Medical History:   Diagnosis Date    Anxiety     Breast cancer 2010    Cancer     Breast    COPD (chronic obstructive pulmonary disease)     Depression     Diabetes mellitus     GERD (gastroesophageal reflux disease)     Hypertension     Insomnia     Thyroid disease            PSYCHOTROPIC MEDICATIONS   Scheduled Meds:   clonazePAM  0.5 mg Oral BID    QUEtiapine  100 mg Oral QHS    venlafaxine  37.5 mg Oral Daily     Continuous Infusions:  PRN Meds:.  Current Facility-Administered Medications:     acetaminophen, 650 mg, Oral, Q6H PRN    albuterol, 2 puff, Inhalation, Q6H PRN    aluminum-magnesium hydroxide-simethicone, 30 mL, Oral, Q6H PRN    benzonatate, 100 mg, Oral, TID PRN    benztropine mesylate, 2 mg, Intramuscular, Q8H PRN    hydrOXYzine pamoate, 50 mg, Oral, Q6H PRN    loperamide, 2 mg, Oral, PRN    nicotine, 1 patch, Transdermal, Daily PRN    OLANZapine, 10 mg, Oral, Q8H PRN **AND** OLANZapine, 10 mg, Intramuscular, Q8H PRN    ondansetron, 4 mg, Oral, Q8H PRN    promethazine, 25 mg, Oral, Q6H PRN        EXAMINATION    VITALS   Vitals:    05/15/24 1911 05/15/24 2025 05/16/24 0541 05/16/24 0715   BP: (!) 153/93   (!) 131/58   BP Location: Left arm   Left arm   Patient Position: Sitting   Sitting   Pulse: 67 92 92 80   Resp: 16 (!) 22 20 18   Temp: 97.9 °F (36.6 °C)   97.5 °F (36.4 °C)   TempSrc: Oral   Oral   SpO2: (!) 94%   96%   Weight:       Height:           Body mass index is 42.87 kg/m².        CONSTITUTIONAL  General Appearance: unremarkable, age appropriate    MUSCULOSKELETAL  Muscle Strength and Tone:no tremor, no tic  Abnormal Involuntary Movements: No  Gait and Station: non-ataxic    PSYCHIATRIC   Level of  "Consciousness: awake, alert , and oriented  Orientation: person, place, time, and situation  Grooming: Casually dressed and Disheveled  Psychomotor Behavior: normal, cooperative, friendly and cooperative  Speech: normal tone, normal rate, normal pitch, soft  Language: grossly intact  Mood: "Better"  Affect: Consistent with mood, Congruent with thought, and Constricted  Thought Process: linear, less illogical  Associations: intact   Thought Content: +delusions and less HI  Perceptions: denies AH, denies  VH, no TH, and not RIS  Memory: Able to recall past events, Remote intact, and Recent intact  Attention:Impaired but improving  Fund of Knowledge: Aware of current events and Vocabulary appropriate   Estimate if Intelligence:  Average based on work/education history, vocabulary and mental status exam  Insight: limited awareness of illness  Judgment: behavior is adequate to circumstances        DIAGNOSTIC TESTING   Laboratory Results  Recent Results (from the past 24 hour(s))   Hemoglobin A1c    Collection Time: 05/16/24  6:24 AM   Result Value Ref Range    Hemoglobin A1C 5.7 (H) 4.0 - 5.6 %    Estimated Avg Glucose 117 68 - 131 mg/dL   Lipid Panel    Collection Time: 05/16/24  6:24 AM   Result Value Ref Range    Cholesterol 160 120 - 199 mg/dL    Triglycerides 116 30 - 150 mg/dL    HDL 41 40 - 75 mg/dL    LDL Cholesterol 95.8 63.0 - 159.0 mg/dL    HDL/Cholesterol Ratio 25.6 20.0 - 50.0 %    Total Cholesterol/HDL Ratio 3.9 2.0 - 5.0    Non-HDL Cholesterol 119 mg/dL             MEDICAL DECISION MAKING      ASSESSMENT:   Mdd, recurrent, severe with psychotic features  Unspecified Anxiety Disorder  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      Complicated bereavement     Psychosocial stressors: pt counseled     Nicotine Dependence      COPD: pt counseled  -Med consulted   HTN  DM  Thyroid disease  GERD        PROBLEM LIST AND MANAGEMENT PLANS     Depression with psychosis: pt counseled  -stop celexa and risperdal- " ineffective  -start trial of effexor XR 37.5 mg po q day- Continue, increase to 75 mg/day tomorrow  -start adjunctive Serqouel 100 mg po q HS     Anxiety: pt counseled  -meds as above  -change home xanax to klonopin 0.5 mg po BID     Insomnia: pt counseled  -seroquel off-label  -vistaril prn         Complicated bereavement: pt counseled      Nicotine Dependence: pt counseled  -Continue  nicotine 14 mg patch dermal      Psychosocial stressors: pt counseled  -SW consulted to assist with resources     COPD: pt counseled  -Med consulted      HTN: pt counseled  -Med consulted     DM: pt counseled  -Med consulted     Thyroid disease: pt counseled  -Med consulted     GERD: pt counseled  -Med consulted          Discussed diagnosis, risks and benefits of proposed treatment vs alternative treatments vs no treatment, potential side effects of these treatments and the inherent unpredictability of treatment. The patient expresses understanding of the above and displays the capacity to agree with this treatment given said understanding. Patient also agrees that, currently, the benefits outweigh the risks and would like to pursue/continue treatment at this time.    Any medications being used off-label were discussed with the patient inclusive of the evidence base for the use of the medications and consent was obtained for the off-label use of the medication.       DISCHARGE PLANNING  Expected Disposition Plan: Home or Self Care      NEED FOR CONTINUED HOSPITALIZATION  Psychiatric illness continues to pose a potential threat to life or bodily function, of self or others, thereby requiring the need for continued inpatient psychiatric hospitalization: Yes, due to: significant psychotic thought disorder, as evidenced by:  Ongoing concerns with Active HI/Threatening behavior.    Protective inpatient pyschiatric hospitalization required while a safe disposition plan is enacted: Yes    Patient stabilized and ready for discharge from  inpatient psychiatric unit: No        STAFF:   Enrique Luz NP  Psychiatry      Molina Sheridan III, MD  Department of [facility]  [facility]

## 2024-05-16 NOTE — PLAN OF CARE
Problem: Adult Inpatient Plan of Care  Goal: Plan of Care Review  Outcome: Progressing  Goal: Patient-Specific Goal (Individualized)  Outcome: Progressing  Goal: Absence of Hospital-Acquired Illness or Injury  Outcome: Progressing  Goal: Optimal Comfort and Wellbeing  Outcome: Progressing  Goal: Readiness for Transition of Care  Outcome: Progressing     Problem: Bariatric Environmental Safety  Goal: Safety Maintained with Care  Outcome: Progressing     Problem: Adult Behavioral Health Plan of Care  Goal: Plan of Care Review  Outcome: Progressing  Goal: Patient-Specific Goal (Individualization)  Outcome: Progressing  Goal: Adheres to Safety Considerations for Self and Others  Outcome: Progressing  Goal: Absence of New-Onset Illness or Injury  Outcome: Progressing  Goal: Optimized Coping Skills in Response to Life Stressors  Outcome: Progressing     Problem: Pneumonia  Goal: Fluid Balance  Outcome: Progressing  Goal: Resolution of Infection Signs and Symptoms  Outcome: Progressing  Goal: Effective Oxygenation and Ventilation  Outcome: Progressing     Problem: Anxiety Signs/Symptoms  Goal: Optimized Energy Level (Anxiety Signs/Symptoms)  Outcome: Progressing  Goal: Optimized Cognitive Function (Anxiety Signs/Symptoms)  Outcome: Progressing  Goal: Improved Mood Symptoms (Anxiety Signs/Symptoms)  Outcome: Progressing  Goal: Improved Sleep (Anxiety Signs/Symptoms)  Outcome: Progressing  Goal: Enhanced Social, Occupational or Functional Skills (Anxiety Signs/Symptoms)  Outcome: Progressing  Goal: Improved Somatic Symptoms (Anxiety Signs/Symptoms)  Outcome: Progressing     Problem: Psychotic Signs/Symptoms  Goal: Improved Behavioral Control (Psychotic Signs/Symptoms)  Outcome: Progressing  Goal: Optimal Cognitive Function (Psychotic Signs/Symptoms)  Outcome: Progressing  Goal: Increased Participation and Engagement (Psychotic Signs/Symptoms)  Outcome: Progressing  Goal: Improved Mood Symptoms (Psychotic  Signs/Symptoms)  Outcome: Progressing  Goal: Improved Psychomotor Symptoms (Psychotic Signs/Symptoms)  Outcome: Progressing  Goal: Decreased Sensory Symptoms (Psychotic Signs/Symptoms)  Outcome: Progressing  Goal: Improved Sleep (Psychotic Signs/Symptoms)  Outcome: Progressing  Goal: Enhanced Social, Occupational or Functional Skills (Psychotic Signs/Symptoms)  Outcome: Progressing

## 2024-05-16 NOTE — HPI
Patient is a 64-year-old female with a history of type 2 diabetes admitted to the inpatient psychiatric unit for severe anxiety and mental health issues.  Patient today has no physical complaints.  She denies any pain labs and chart reviewed.  
verbal instruction/written material

## 2024-05-16 NOTE — SUBJECTIVE & OBJECTIVE
Past Medical History:   Diagnosis Date    Anxiety     Breast cancer     Cancer     Breast    COPD (chronic obstructive pulmonary disease)     Depression     Diabetes mellitus     GERD (gastroesophageal reflux disease)     Hypertension     Insomnia     Thyroid disease        Past Surgical History:   Procedure Laterality Date    BLADDER SUSPENSION      BREAST LUMPECTOMY      Right breast    CHOLECYSTECTOMY      HYSTERECTOMY      KNEE ARTHROSCOPY      Right knee    OOPHORECTOMY         Review of patient's allergies indicates:  No Known Allergies    No current facility-administered medications on file prior to encounter.     Current Outpatient Medications on File Prior to Encounter   Medication Sig    amLODIPine (NORVASC) 10 MG tablet Take 10 mg by mouth.    MOUNJARO 5 mg/0.5 mL PnIj SMARTSI Milligram(s) SUB-Q Once a Week    tiZANidine (ZANAFLEX) 4 MG tablet Take by mouth.    traZODone (DESYREL) 100 MG tablet Take 100 mg by mouth every evening. 2 caps    zolpidem (AMBIEN) 10 mg Tab Take 10 mg by mouth nightly as needed.    acetaminophen (TYLENOL) 500 MG tablet Take 2 tablets (1,000 mg total) by mouth every 6 (six) hours as needed for Pain or Temperature greater than (101).    albuterol (PROVENTIL/VENTOLIN HFA) 90 mcg/actuation inhaler Inhale 1-2 puffs into the lungs every 6 (six) hours as needed for Wheezing or Shortness of Breath. Rescue    albuterol (PROVENTIL/VENTOLIN HFA) 90 mcg/actuation inhaler Inhale 1-2 puffs into the lungs every 4 (four) hours as needed for Wheezing or Shortness of Breath. Rescue    albuterol-ipratropium (DUO-NEB) 2.5 mg-0.5 mg/3 mL nebulizer solution Take 3 mLs by nebulization every 4 (four) hours as needed for Wheezing or Shortness of Breath. Rescue    albuterol-ipratropium (DUO-NEB) 2.5 mg-0.5 mg/3 mL nebulizer solution Take 3 mLs by nebulization every 4 (four) hours as needed for Wheezing. Rescue    amoxicillin-clavulanate 875-125mg (AUGMENTIN) 875-125 mg per tablet Take 1 tablet by  mouth 2 (two) times daily.    aspirin (ECOTRIN) 81 MG EC tablet Take 81 mg by mouth once daily.    benzocaine-menthoL-cetylpyrid (ACTISEP) 2-0.5-0.1 % Soln 2 sprays by Mucous Membrane route every 6 (six) hours as needed (sore throat).    benztropine (COGENTIN) 2 MG Tab Take 1 mg by mouth 2 (two) times daily.    celecoxib (CELEBREX) 200 MG capsule Take 200 mg by mouth 2 (two) times daily as needed.    cetirizine (ZYRTEC) 10 MG tablet Take 1 tablet (10 mg total) by mouth once daily. for 10 days    clotrimazole-betamethasone 1-0.05% (LOTRISONE) cream Apply topically 2 (two) times daily.    esomeprazole (NEXIUM) 20 MG capsule Take 20 mg by mouth before breakfast.    famotidine (PEPCID) 40 MG tablet Take 40 mg by mouth.    HYDROcodone-acetaminophen (NORCO) 7.5-325 mg per tablet Take by mouth.    hydrOXYzine HCL (ATARAX) 25 MG tablet Take 1 tablet (25 mg total) by mouth every 6 (six) hours.    levothyroxine (SYNTHROID) 50 MCG tablet Take 50 mcg by mouth before breakfast.    metFORMIN (GLUCOPHAGE-XR) 750 MG ER 24hr tablet Take 750 mg by mouth 2 (two) times daily.    ondansetron (ZOFRAN-ODT) 4 MG TbDL Take 1 tablet (4 mg total) by mouth every 8 (eight) hours as needed ([place]).    pantoprazole (PROTONIX) 20 MG tablet Take 1 tablet (20 mg total) by mouth 2 (two) times daily before meals. for 14 days    vitamin D (VITAMIN D3) 1000 units Tab Take 1,000 Units by mouth once daily.    vitamin E 100 UNIT capsule Take 100 Units by mouth once daily.     Family History       Problem Relation (Age of Onset)    No Known Problems Mother, Father, Sister, Daughter, Maternal Aunt, Maternal Uncle, Paternal Aunt, Paternal Uncle, Maternal Grandmother, Maternal Grandfather, Paternal Grandmother, Paternal Grandfather          Tobacco Use    Smoking status: Every Day     Current packs/day: 0.50     Average packs/day: 0.5 packs/day for 50.0 years (25.0 ttl pk-yrs)     Types: Cigarettes     Start date: 5/15/1974     Passive exposure: Past     Smokeless tobacco: Never    Tobacco comments:     quit yesterday 6/12/21   Substance and Sexual Activity    Alcohol use: Not Currently    Drug use: Never    Sexual activity: Not Currently     Partners: Male     Birth control/protection: None     Review of Systems   Constitutional:  Negative for activity change, appetite change, chills and fever.   HENT:  Negative for ear pain, mouth sores, nosebleeds and sore throat.    Eyes:  Negative for visual disturbance.   Respiratory:  Negative for cough, shortness of breath and wheezing.    Cardiovascular:  Negative for chest pain, palpitations and leg swelling.   Gastrointestinal:  Negative for abdominal distention, abdominal pain, blood in stool, constipation, diarrhea, nausea and vomiting.   Endocrine: Negative for polyphagia.   Genitourinary:  Negative for difficulty urinating, dysuria, flank pain and frequency.   Musculoskeletal:  Negative for arthralgias, back pain and myalgias.   Skin:  Negative for rash.   Neurological:  Negative for dizziness, tremors, seizures, syncope, facial asymmetry, speech difficulty, weakness and headaches.   Hematological:  Negative for adenopathy.   Psychiatric/Behavioral:  Positive for dysphoric mood. Negative for agitation, confusion and hallucinations. The patient is nervous/anxious.      Objective:     Vital Signs (Most Recent):  Temp: 97.5 °F (36.4 °C) (05/16/24 0715)  Pulse: 80 (05/16/24 0715)  Resp: 18 (05/16/24 0715)  BP: (!) 131/58 (05/16/24 0715)  SpO2: 96 % (05/16/24 0715) Vital Signs (24h Range):  Temp:  [97.5 °F (36.4 °C)-97.9 °F (36.6 °C)] 97.5 °F (36.4 °C)  Pulse:  [67-92] 80  Resp:  [16-22] 18  SpO2:  [94 %-96 %] 96 %  BP: (131-153)/(58-93) 131/58     Weight: 109.8 kg (242 lb)  Body mass index is 42.87 kg/m².     Physical Exam  Vitals and nursing note reviewed.   Constitutional:       General: She is not in acute distress.     Appearance: She is obese. She is not ill-appearing.   HENT:      Nose: No congestion or rhinorrhea.       Mouth/Throat:      Pharynx: No oropharyngeal exudate.   Eyes:      General: No scleral icterus.  Neck:      Vascular: No carotid bruit.   Cardiovascular:      Rate and Rhythm: Normal rate and regular rhythm.      Heart sounds: No murmur heard.     No friction rub. No gallop.   Pulmonary:      Effort: No respiratory distress.      Breath sounds: No stridor. No wheezing, rhonchi or rales.   Chest:      Chest wall: No tenderness.   Abdominal:      General: There is no distension.      Palpations: Abdomen is soft. There is no mass.      Tenderness: There is no abdominal tenderness. There is no right CVA tenderness, left CVA tenderness, guarding or rebound.      Hernia: No hernia is present.   Musculoskeletal:         General: No swelling, tenderness or deformity.      Cervical back: No rigidity.      Right lower leg: No edema.      Left lower leg: No edema.   Lymphadenopathy:      Cervical: No cervical adenopathy.   Skin:     Capillary Refill: Capillary refill takes less than 2 seconds.      Coloration: Skin is not jaundiced or pale.      Findings: No rash.   Neurological:      Cranial Nerves: No cranial nerve deficit.      Sensory: No sensory deficit.      Motor: No weakness.      Coordination: Coordination normal.      Gait: Gait normal.                Significant Labs: All pertinent labs within the past 24 hours have been reviewed.    Significant Imaging: I have reviewed all pertinent imaging results/findings within the past 24 hours.

## 2024-05-16 NOTE — NURSING
POC reviewed this shift and is ongoing.  Pt is cooperative on unit and complaint with treatment.  Pt denies SI/HI/AVH, reports some anxiety.  Pt was out in common areas of unit and had appropriate interaction with selected peers.

## 2024-05-16 NOTE — PROGRESS NOTES
"PSYCHIATRY DAILY INPATIENT PROGRESS NOTE  SUBSEQUENT HOSPITAL VISIT    ENCOUNTER DATE: 5/16/2024  SITE: [facility]    DATE OF ADMISSION: 5/14/2024  4:08 PM  LENGTH OF STAY: 2 days      CHIEF COMPLAINT   Vero Allen is a 64 y.o. female, seen during daily lombardo rounds on the inpatient unit.  Vero Allen presented with the chief complaint of psychosis (paranoia)/anxiety, "I had an issue with my neighbors."       The patient was seen and examined. The chart was reviewed.     Reviewed notes from Rns and MD and labs from the last 24 hours.    The patient's case was discussed with the treatment team/care providers today including Rns and MD    Staff reports no behavioral or management issues.     The patient has been compliant with treatment.      Subjective 05/16/2024       Today the patient reports mood is "really happy", affect is appropriate but superficial, there is suspicion that she is minimizing symptoms. Pt reports reason for hospitalization due to "my neighbors talking about me and planning to steal my property." She verbalizes plans to "get my BB gun from my brother and defend my property if I need to" prior to hospitalization, but states she is no longer having this inclination, and plans to "keep to herself" upon returning home.     Patient does report grief secondary to death of her  and recent death of a boyfriend whom she shared a residence with. Reports feeling "depressed sometimes, but that's because I miss him." She is amenable to outpatient counseling.     Denies SE to medication regimen thus far. Reports sleeping "like a baby" last night.       The patient denies any side effects to medications.        Interim/overnight events per report/notes:          Psychiatric ROS (observed, reported, or endorsed/denied):  Depressed mood - fluctuating  Interest/pleasure/anhedonia: fluctuating  Guilt/hopelessness/worthlessness - fluctuating  Changes in Sleep - less  Changes in " Appetite - less  Changes in Concentration - No  Changes in Energy - Yes  PMA/R- No  Suicidal- active/passive ideations - fluctuating  Homicidal ideations: active/passive ideations - fluctuating    Hallucinations - No  Delusions - Continuing  Disorganized behavior - No  Disorganized speech - No  Negative symptoms - No    Elevated mood - No  Decreased need for sleep - No  Grandiosity - No  Racing thoughts - No  Impulsivity - fluctuating  Irritability- fluctuating  Increased energy - No  Distractibility - No  Increase in goal-directed activity or PMA- No    Symptoms of ELSY - No  Symptoms of Panic Disorder- No  Symptoms of PTSD - No        Overall progress: Patient is showing mild improvement         Psychotherapy:  Target symptoms: depression, psychosis  Why chosen therapy is appropriate versus another modality: relevant to diagnosis, evidence based practice  Outcome monitoring methods: self-report, observation  Therapeutic intervention type: insight oriented psychotherapy, supportive psychotherapy  Topics discussed/themes: illness/death of a loved one, building skills sets for symptom management, legal stressors  The patient's response to the intervention is accepting. The patient's progress toward treatment goals is limited.   Duration of intervention: 16 minutes.        Medical ROS  Review of Systems   Constitutional: Negative.    HENT: Negative.     Eyes: Negative.    Respiratory: Negative.     Cardiovascular: Negative.    Gastrointestinal: Negative.    Genitourinary: Negative.    Musculoskeletal: Negative.    Skin: Negative.    Neurological: Negative.    Endo/Heme/Allergies: Negative.    Psychiatric/Behavioral:  Positive for depression.          PAST MEDICAL HISTORY   Past Medical History:   Diagnosis Date    Anxiety     Breast cancer 2010    Cancer     Breast    COPD (chronic obstructive pulmonary disease)     Depression     Diabetes mellitus     GERD (gastroesophageal reflux disease)     Hypertension      "Insomnia     Thyroid disease            PSYCHOTROPIC MEDICATIONS   Scheduled Meds:   clonazePAM  0.5 mg Oral BID    QUEtiapine  100 mg Oral QHS    venlafaxine  37.5 mg Oral Daily     Continuous Infusions:  PRN Meds:.  Current Facility-Administered Medications:     acetaminophen, 650 mg, Oral, Q6H PRN    albuterol, 2 puff, Inhalation, Q6H PRN    aluminum-magnesium hydroxide-simethicone, 30 mL, Oral, Q6H PRN    benzonatate, 100 mg, Oral, TID PRN    benztropine mesylate, 2 mg, Intramuscular, Q8H PRN    hydrOXYzine pamoate, 50 mg, Oral, Q6H PRN    loperamide, 2 mg, Oral, PRN    nicotine, 1 patch, Transdermal, Daily PRN    OLANZapine, 10 mg, Oral, Q8H PRN **AND** OLANZapine, 10 mg, Intramuscular, Q8H PRN    ondansetron, 4 mg, Oral, Q8H PRN    promethazine, 25 mg, Oral, Q6H PRN        EXAMINATION    VITALS   Vitals:    05/15/24 1911 05/15/24 2025 05/16/24 0541 05/16/24 0715   BP: (!) 153/93   (!) 131/58   BP Location: Left arm   Left arm   Patient Position: Sitting   Sitting   Pulse: 67 92 92 80   Resp: 16 (!) 22 20 18   Temp: 97.9 °F (36.6 °C)   97.5 °F (36.4 °C)   TempSrc: Oral   Oral   SpO2: (!) 94%   96%   Weight:       Height:           Body mass index is 42.87 kg/m².        CONSTITUTIONAL  General Appearance: unremarkable, age appropriate    MUSCULOSKELETAL  Muscle Strength and Tone:no tremor, no tic  Abnormal Involuntary Movements: No  Gait and Station: non-ataxic    PSYCHIATRIC   Level of Consciousness: awake, alert , and oriented  Orientation: person, place, time, and situation  Grooming: Casually dressed and Disheveled  Psychomotor Behavior: normal, cooperative, friendly and cooperative  Speech: normal tone, normal rate, normal pitch, soft  Language: grossly intact  Mood: "Better"  Affect: Consistent with mood, Congruent with thought, and Constricted  Thought Process: linear, less illogical  Associations: intact   Thought Content: +delusions and less HI  Perceptions: denies AH, denies  VH, no TH, and not " RIS  Memory: Able to recall past events, Remote intact, and Recent intact  Attention:Impaired but improving  Fund of Knowledge: Aware of current events and Vocabulary appropriate   Estimate if Intelligence:  Average based on work/education history, vocabulary and mental status exam  Insight: limited awareness of illness  Judgment: behavior is adequate to circumstances        DIAGNOSTIC TESTING   Laboratory Results  Recent Results (from the past 24 hour(s))   Hemoglobin A1c    Collection Time: 05/16/24  6:24 AM   Result Value Ref Range    Hemoglobin A1C 5.7 (H) 4.0 - 5.6 %    Estimated Avg Glucose 117 68 - 131 mg/dL   Lipid Panel    Collection Time: 05/16/24  6:24 AM   Result Value Ref Range    Cholesterol 160 120 - 199 mg/dL    Triglycerides 116 30 - 150 mg/dL    HDL 41 40 - 75 mg/dL    LDL Cholesterol 95.8 63.0 - 159.0 mg/dL    HDL/Cholesterol Ratio 25.6 20.0 - 50.0 %    Total Cholesterol/HDL Ratio 3.9 2.0 - 5.0    Non-HDL Cholesterol 119 mg/dL             MEDICAL DECISION MAKING      ASSESSMENT:   Mdd, recurrent, severe with psychotic features  Unspecified Anxiety Disorder  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      Complicated bereavement     Psychosocial stressors: pt counseled     Nicotine Dependence      COPD: pt counseled  -Med consulted   HTN  DM  Thyroid disease  GERD        PROBLEM LIST AND MANAGEMENT PLANS     Depression with psychosis: pt counseled  -stop celexa and risperdal- ineffective  -start trial of effexor XR 37.5 mg po q day- Continue, increase to 75 mg/day tomorrow  -start adjunctive Serqouel 100 mg po q HS     Anxiety: pt counseled  -meds as above  -change home xanax to klonopin 0.5 mg po BID     Insomnia: pt counseled  -seroquel off-label  -vistaril prn         Complicated bereavement: pt counseled      Nicotine Dependence: pt counseled  -Continue  nicotine 14 mg patch dermal      Psychosocial stressors: pt counseled  -SW consulted to assist with resources     COPD: pt counseled  -Med  consulted      HTN: pt counseled  -Med consulted     DM: pt counseled  -Med consulted     Thyroid disease: pt counseled  -Med consulted     GERD: pt counseled  -Med consulted          Discussed diagnosis, risks and benefits of proposed treatment vs alternative treatments vs no treatment, potential side effects of these treatments and the inherent unpredictability of treatment. The patient expresses understanding of the above and displays the capacity to agree with this treatment given said understanding. Patient also agrees that, currently, the benefits outweigh the risks and would like to pursue/continue treatment at this time.    Any medications being used off-label were discussed with the patient inclusive of the evidence base for the use of the medications and consent was obtained for the off-label use of the medication.       DISCHARGE PLANNING  Expected Disposition Plan: Home or Self Care      NEED FOR CONTINUED HOSPITALIZATION  Psychiatric illness continues to pose a potential threat to life or bodily function, of self or others, thereby requiring the need for continued inpatient psychiatric hospitalization: Yes, due to: significant psychotic thought disorder, as evidenced by:  Ongoing concerns with Active HI/Threatening behavior.    Protective inpatient pyschiatric hospitalization required while a safe disposition plan is enacted: Yes    Patient stabilized and ready for discharge from inpatient psychiatric unit: No        STAFF:   Enrique Luz NP  Psychiatry

## 2024-05-16 NOTE — PLAN OF CARE
Problem: Adult Behavioral Health Plan of Care  Goal: Develops/Participates in Therapeutic [place] to Support Successful Transition  Intervention: Mutually Develop Transition Plan  Flowsheets (Taken 5/16/2024 1331)  Current Discharge Risk:   lives alone   psychiatric illness  Readmission Within the Last 30 Days: no previous admission in last 30 days  Outpatient/Agency/Support Group Needs:   outpatient medication management   outpatient psychiatric care (specify)  Transition Support: follow-up care discussed  Anticipated Discharge Disposition: home or self-care  Transportation Concerns: none  Patient/Family Anticipated Services at Transition:   mental health services   outpatient care  Patient/Family Anticipates Transition to: home  Transportation Anticipated: family or friend will provide  Concerns to be Addressed:   home safety   homicidal   mental health   medication

## 2024-05-16 NOTE — PLAN OF CARE
Problem: Adult Behavioral Health Plan of Care  Goal: Optimized Coping Skills in Response to Life Stressors  Intervention: Promote Effective Coping Strategies  Flowsheets (Taken 5/16/2024 7795)  Supportive Measures:   active listening utilized   verbalization of feelings encouraged   self-reflection promoted      Behavior: alert, oriented. Pt stated she was feeling good today.       Intervention: In this CBT-focused process group LMSW facilitated group discussion on moving through fear.  Each patient was asked to identify and discuss fears that are standing in the way of their treatment goals.  Patients were then asked to discuss ways in which they could work to overcome those fears.       Response: Pt stated her plan is to get better and do better. Pt stated she attends [facility] for her mental health. Pt stated she has no fears currently.       Plan: Continue to encourage pt to participate in process groups to verbalize feelings and develop healthy coping skills.

## 2024-05-17 PROCEDURE — S4991 NICOTINE PATCH NONLEGEND: HCPCS | Performed by: PSYCHIATRY & NEUROLOGY

## 2024-05-17 PROCEDURE — 99232 SBSQ HOSP IP/OBS MODERATE 35: CPT | Mod: ,,, | Performed by: PSYCHIATRY & NEUROLOGY

## 2024-05-17 PROCEDURE — 25000003 PHARM REV CODE 250: Performed by: PSYCHIATRY & NEUROLOGY

## 2024-05-17 PROCEDURE — 90833 PSYTX W PT W E/M 30 MIN: CPT | Mod: ,,, | Performed by: PSYCHIATRY & NEUROLOGY

## 2024-05-17 PROCEDURE — 11400000 HC PSYCH PRIVATE ROOM

## 2024-05-17 RX ORDER — ADHESIVE BANDAGE
30 BANDAGE TOPICAL DAILY PRN
Status: DISCONTINUED | OUTPATIENT
Start: 2024-05-17 | End: 2024-05-20 | Stop reason: HOSPADM

## 2024-05-17 RX ADMIN — VENLAFAXINE HYDROCHLORIDE 75 MG: 75 CAPSULE, EXTENDED RELEASE ORAL at 08:05

## 2024-05-17 RX ADMIN — ALBUTEROL SULFATE 2 PUFF: 90 AEROSOL, METERED RESPIRATORY (INHALATION) at 01:05

## 2024-05-17 RX ADMIN — ALUMINUM HYDROXIDE, MAGNESIUM HYDROXIDE, AND SIMETHICONE 30 ML: 200; 200; 20 SUSPENSION ORAL at 02:05

## 2024-05-17 RX ADMIN — MAGNESIUM HYDROXIDE 2400 MG: 400 SUSPENSION ORAL at 02:05

## 2024-05-17 RX ADMIN — CLONAZEPAM 0.5 MG: 0.5 TABLET ORAL at 08:05

## 2024-05-17 RX ADMIN — ALUMINUM HYDROXIDE, MAGNESIUM HYDROXIDE, AND SIMETHICONE 30 ML: 200; 200; 20 SUSPENSION ORAL at 06:05

## 2024-05-17 RX ADMIN — QUETIAPINE FUMARATE 100 MG: 100 TABLET ORAL at 08:05

## 2024-05-17 RX ADMIN — NICOTINE 1 PATCH: 14 PATCH, EXTENDED RELEASE TRANSDERMAL at 08:05

## 2024-05-17 NOTE — PLAN OF CARE
POC reviewed and continued on DOC. Pt is calm, cooperative, and behavior is appropriate to situation. Pt is easily redirected and accepting of diagnosis. Pt denies suicidal ideation, homicidal ideation, auditory hallucinations, and visual hallucinations. Will continue to monitor pt while on unit.  Problem: Adult Inpatient Plan of Care  Goal: Plan of Care Review  Outcome: Progressing  Goal: Patient-Specific Goal (Individualized)  Outcome: Progressing  Goal: Absence of Hospital-Acquired Illness or Injury  Outcome: Progressing  Goal: Optimal Comfort and Wellbeing  Outcome: Progressing  Goal: Readiness for Transition of Care  Outcome: Progressing     Problem: Bariatric Environmental Safety  Goal: Safety Maintained with Care  Outcome: Progressing     Problem: Adult Behavioral Health Plan of Care  Goal: Plan of Care Review  Outcome: Progressing  Goal: Patient-Specific Goal (Individualization)  Outcome: Progressing  Goal: Adheres to Safety Considerations for Self and Others  Outcome: Progressing  Goal: Absence of New-Onset Illness or Injury  Outcome: Progressing  Goal: Optimized Coping Skills in Response to Life Stressors  Outcome: Progressing  Goal: Develops/Participates in Therapeutic [place] to Support Successful Transition  Outcome: Progressing  Goal: Rounds/Family Conference  Outcome: Progressing     Problem: Pneumonia  Goal: Fluid Balance  Outcome: Progressing  Goal: Resolution of Infection Signs and Symptoms  Outcome: Progressing  Goal: Effective Oxygenation and Ventilation  Outcome: Progressing     Problem: Anxiety Signs/Symptoms  Goal: Optimized Energy Level (Anxiety Signs/Symptoms)  Outcome: Progressing  Goal: Optimized Cognitive Function (Anxiety Signs/Symptoms)  Outcome: Progressing  Goal: Improved Mood Symptoms (Anxiety Signs/Symptoms)  Outcome: Progressing  Goal: Improved Sleep (Anxiety Signs/Symptoms)  Outcome: Progressing  Goal: Enhanced Social, Occupational or Functional Skills (Anxiety  Signs/Symptoms)  Outcome: Progressing  Goal: Improved Somatic Symptoms (Anxiety Signs/Symptoms)  Outcome: Progressing     Problem: Psychotic Signs/Symptoms  Goal: Improved Behavioral Control (Psychotic Signs/Symptoms)  Outcome: Progressing  Goal: Optimal Cognitive Function (Psychotic Signs/Symptoms)  Outcome: Progressing  Goal: Increased Participation and Engagement (Psychotic Signs/Symptoms)  Outcome: Progressing  Goal: Improved Mood Symptoms (Psychotic Signs/Symptoms)  Outcome: Progressing  Goal: Improved Psychomotor Symptoms (Psychotic Signs/Symptoms)  Outcome: Progressing  Goal: Decreased Sensory Symptoms (Psychotic Signs/Symptoms)  Outcome: Progressing  Goal: Improved Sleep (Psychotic Signs/Symptoms)  Outcome: Progressing  Goal: Enhanced Social, Occupational or Functional Skills (Psychotic Signs/Symptoms)  Outcome: Progressing

## 2024-05-17 NOTE — PROGRESS NOTES
PSYCHIATRY DAILY INPATIENT PROGRESS NOTE  SUBSEQUENT HOSPITAL VISIT    ENCOUNTER DATE: 5/17/2024  SITE: [facility]    DATE OF ADMISSION: 5/14/2024  4:08 PM  LENGTH OF STAY: 3 days      CHIEF COMPLAINT   Vero Allen is a 64 y.o. female, seen during daily lombardo rounds on the inpatient unit.  Vero Allen presented with the chief complaint of depression, anxiety, paranoia      The patient was seen and examined. The chart was reviewed.     Reviewed notes from Rns and MD and labs from the last 24 hours.    The patient's case was discussed with the treatment team/care providers today including Rns and MD    Staff reports no behavioral or management issues.     The patient has been compliant with treatment.      Subjective 05/17/2024       Today the patient reports mood is better, affect remains somewhat blunted and constricted, there remains some concern that she is minimizing symptoms. Patient appears somewhat less delusional compared to start of care. while she does still feel that her neighbors were attempting to steal her belongings, the patient states she is not as worried about that today, stating because my doors are locked. she once more reports sleeping well well last night. Denies any side effects to current medication regimen thus far.    Patient notes today that she is prescribed a non-formulary weekly medication for hyperglycemia. She states that she will be contacting her brother today to see if he can bring it to her as her next dose is due today. In the meantime, will monitor blood sugars and treat as needed. she is amenable to further titration of Effexorl      The patient denies any side effects to medications.        Interim/overnight events per report/notes:          Psychiatric ROS (observed, reported, or endorsed/denied):  Depressed mood - less  Interest/pleasure/anhedonia: less  Guilt/hopelessness/worthlessness - less  Changes in Sleep - No  Changes in Appetite -  No  Changes in Concentration - less  Changes in Energy - less  PMA/R- less  Suicidal- active/passive ideations - No  Homicidal ideations: active/passive ideations - less    Hallucinations - No  Delusions - fluctuating  Disorganized behavior - No  Disorganized speech - No  Negative symptoms - Yes    Elevated mood - No  Decreased need for sleep - No  Grandiosity - No  Racing thoughts - No  Impulsivity - No  Irritability- No  Increased energy - No  Distractibility - No  Increase in goal-directed activity or PMA- No    Symptoms of ELSY - less  Symptoms of Panic Disorder- No  Symptoms of PTSD - No        Overall progress: Patient is showing mild improvement         Psychotherapy:  Target symptoms: depression, psychosis  Why chosen therapy is appropriate versus another modality: relevant to diagnosis, evidence based practice  Outcome monitoring methods: self-report  Therapeutic intervention type: insight oriented psychotherapy, supportive psychotherapy  Topics discussed/themes: relationships difficulties, building skills sets for symptom management, symptom recognition  The patient's response to the intervention is accepting. The patient's progress toward treatment goals is fair.   Duration of intervention: 17 minutes.        Medical ROS  Review of Systems   Constitutional: Negative.    HENT: Negative.     Eyes: Negative.    Respiratory: Negative.     Cardiovascular: Negative.    Gastrointestinal: Negative.    Genitourinary: Negative.    Musculoskeletal: Negative.    Skin: Negative.    Neurological: Negative.    Endo/Heme/Allergies: Negative.    Psychiatric/Behavioral:  Positive for depression.         Paranoid delusions         PAST MEDICAL HISTORY   Past Medical History:   Diagnosis Date    Anxiety     Breast cancer 2010    Cancer     Breast    COPD (chronic obstructive pulmonary disease)     Depression     Diabetes mellitus     GERD (gastroesophageal reflux disease)     Hypertension     Insomnia     Thyroid disease             PSYCHOTROPIC MEDICATIONS   Scheduled Meds:   clonazePAM  0.5 mg Oral BID    QUEtiapine  100 mg Oral QHS    [START ON 5/18/2024] venlafaxine  112.5 mg Oral Daily     Continuous Infusions:  PRN Meds:.    Current Facility-Administered Medications:     acetaminophen, 650 mg, Oral, Q6H PRN    albuterol, 2 puff, Inhalation, Q6H PRN    aluminum-magnesium hydroxide-simethicone, 30 mL, Oral, Q6H PRN    benzonatate, 100 mg, Oral, TID PRN    benztropine mesylate, 2 mg, Intramuscular, Q8H PRN    hydrOXYzine pamoate, 50 mg, Oral, Q6H PRN    loperamide, 2 mg, Oral, PRN    nicotine, 1 patch, Transdermal, Daily PRN    OLANZapine, 10 mg, Oral, Q8H PRN **AND** OLANZapine, 10 mg, Intramuscular, Q8H PRN    ondansetron, 4 mg, Oral, Q8H PRN    promethazine, 25 mg, Oral, Q6H PRN        EXAMINATION    VITALS   Vitals:    05/16/24 0541 05/16/24 0715 05/16/24 1909 05/17/24 0751   BP:  (!) 131/58 (!) 151/65 136/82   BP Location:  Left arm Left arm Left arm   Patient Position:  Sitting Sitting Sitting   Pulse: 92 80 69 66   Resp: 20 18 16 18   Temp:  97.5 °F (36.4 °C) 97.8 °F (36.6 °C) 97.1 °F (36.2 °C)   TempSrc:  Oral Oral Oral   SpO2:  96% 97% 96%   Weight:       Height:           Body mass index is 42.87 kg/m².        CONSTITUTIONAL  General Appearance: unremarkable, age appropriate, overweight    MUSCULOSKELETAL  Muscle Strength and Tone:no tremor, no tic  Abnormal Involuntary Movements: No  Gait and Station: non-ataxic    PSYCHIATRIC   Level of Consciousness: awake, alert , and oriented  Orientation: person, place, time, and situation  Grooming: Casually dressed and Disheveled  Psychomotor Behavior: normal, cooperative, friendly and cooperative  Speech: normal rate, normal pitch, soft  Language: grossly intact  Mood: better  Affect: Consistent with mood, Blunted, and Constricted  Thought Process: linear, logical  Associations: intact   Thought Content: less HI, less delusions, and denies SI  Perceptions: denies AH, denies  VH,  and not RIS  Memory: Able to recall past events, Remote intact, and Recent intact  Attention:Normal and Attends to interview without distraction  Fund of Knowledge: Aware of current events, Intact, and Vocabulary appropriate   Estimate if Intelligence:  Average based on work/education history, vocabulary and mental status exam  Insight: has awareness of illness  Judgment: behavior is adequate to circumstances        DIAGNOSTIC TESTING   Laboratory Results  No results found for this or any previous visit (from the past 24 hour(s)).          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      Complicated bereavement     Psychosocial stressors: pt counseled     Nicotine Dependence      COPD: pt counseled  -Med consulted   HTN  DM  Thyroid disease  GERD        PROBLEM LIST AND MANAGEMENT PLANS     Depression with psychosis: pt counseled  -stop celexa and risperdal- ineffective  -start trial of effexor XR 37.5 mg po q day- Continue, increase to 75 mg/day -increased to 112.5 mg PO qd tomorrow  -continue adjunctive Serqouel 100 mg po q HS     Anxiety: pt counseled  -meds as above  -change home xanax to klonopin 0.5 mg po BID     Insomnia: pt counseled  -seroquel off-label  -vistaril prn         Complicated bereavement: pt counseled      Nicotine Dependence: pt counseled  -Continue  nicotine 14 mg patch dermal      Psychosocial stressors: pt counseled  -SW consulted to assist with resources     COPD: pt counseled  -Med consulted      HTN: pt counseled  -Med consulted     DM: pt counseled  -Med consulted     Thyroid disease: pt counseled  -Med consulted     GERD: pt counseled  -Med consulted          Discussed diagnosis, risks and benefits of proposed treatment vs alternative treatments vs no treatment, potential side effects of these treatments and the inherent unpredictability of treatment. The  patient expresses understanding of the above and displays the capacity to agree with this treatment given said understanding. Patient also agrees that, currently, the benefits outweigh the risks and would like to pursue/continue treatment at this time.    Any medications being used off-label were discussed with the patient inclusive of the evidence base for the use of the medications and consent was obtained for the off-label use of the medication.       DISCHARGE PLANNING  Expected Disposition Plan: Home or Self Care      NEED FOR CONTINUED HOSPITALIZATION  Psychiatric illness continues to pose a potential threat to life or bodily function, of self or others, thereby requiring the need for continued inpatient psychiatric hospitalization: Yes, due to: significant psychotic thought disorder, as evidenced by:  Ongoing concerns with grave disability with patient unable to perform basic feeding, hygiene and dressing activities without significant constant support.    Protective inpatient pyschiatric hospitalization required while a safe disposition plan is enacted: Yes    Patient stabilized and ready for discharge from inpatient psychiatric unit: No        STAFF:   Enrique Luz NP  Psychiatry

## 2024-05-17 NOTE — PSYCH
Patient isolative to her room this afternoon, ambulates on unit and sits in dining room ad cyndy at intervals. Patient is quiet, cooperative, endorses depression and anxiety but states had a better day today. Patient accepted HS snack and was medication compliant. Denies S/HI, denies A/VH. No negative behaviors noted. Dr. Sheridan visited and medical H&P completed. Patient asleep at present and resting comfortably. Close observations continued and safe environment maintained.

## 2024-05-18 PROCEDURE — 25000003 PHARM REV CODE 250: Performed by: PSYCHIATRY & NEUROLOGY

## 2024-05-18 PROCEDURE — 25000003 PHARM REV CODE 250: Performed by: STUDENT IN AN ORGANIZED HEALTH CARE EDUCATION/TRAINING PROGRAM

## 2024-05-18 PROCEDURE — 99233 SBSQ HOSP IP/OBS HIGH 50: CPT | Mod: ,,, | Performed by: STUDENT IN AN ORGANIZED HEALTH CARE EDUCATION/TRAINING PROGRAM

## 2024-05-18 PROCEDURE — S4991 NICOTINE PATCH NONLEGEND: HCPCS | Performed by: PSYCHIATRY & NEUROLOGY

## 2024-05-18 PROCEDURE — 11400000 HC PSYCH PRIVATE ROOM

## 2024-05-18 RX ORDER — FAMOTIDINE 20 MG/1
20 TABLET, FILM COATED ORAL 2 TIMES DAILY
Status: DISCONTINUED | OUTPATIENT
Start: 2024-05-18 | End: 2024-05-20 | Stop reason: HOSPADM

## 2024-05-18 RX ADMIN — ALUMINUM HYDROXIDE, MAGNESIUM HYDROXIDE, AND SIMETHICONE 30 ML: 200; 200; 20 SUSPENSION ORAL at 01:05

## 2024-05-18 RX ADMIN — FAMOTIDINE 20 MG: 20 TABLET, FILM COATED ORAL at 02:05

## 2024-05-18 RX ADMIN — ACETAMINOPHEN 650 MG: 325 TABLET ORAL at 05:05

## 2024-05-18 RX ADMIN — MAGNESIUM HYDROXIDE 2400 MG: 400 SUSPENSION ORAL at 04:05

## 2024-05-18 RX ADMIN — QUETIAPINE FUMARATE 100 MG: 100 TABLET ORAL at 08:05

## 2024-05-18 RX ADMIN — CLONAZEPAM 0.5 MG: 0.5 TABLET ORAL at 08:05

## 2024-05-18 RX ADMIN — VENLAFAXINE HYDROCHLORIDE 112.5 MG: 37.5 CAPSULE, EXTENDED RELEASE ORAL at 08:05

## 2024-05-18 RX ADMIN — FAMOTIDINE 20 MG: 20 TABLET, FILM COATED ORAL at 08:05

## 2024-05-18 RX ADMIN — NICOTINE 1 PATCH: 14 PATCH, EXTENDED RELEASE TRANSDERMAL at 08:05

## 2024-05-18 NOTE — PLAN OF CARE
Plan of care reviewed and ongoing. Patient awake and alert. Patient cooperative with staff. Compliant with medication. Pt out for meals and snack time, good appetite. Pt quiet, calm, voices some anxiety related to discharge. States she is ready to go home. Patient in room most of the day resting. Declines to participate in group sessions. Denies SI. No distress noted. No behavioral issues at this time. Will continue to monitor for patient safety.     Problem: Adult Inpatient Plan of Care  Goal: Plan of Care Review  Outcome: Progressing  Goal: Patient-Specific Goal (Individualized)  Outcome: Progressing  Goal: Absence of Hospital-Acquired Illness or Injury  Outcome: Progressing  Goal: Optimal Comfort and Wellbeing  Outcome: Progressing  Goal: Readiness for Transition of Care  Outcome: Progressing     Problem: Bariatric Environmental Safety  Goal: Safety Maintained with Care  Outcome: Progressing     Problem: Adult Behavioral Health Plan of Care  Goal: Plan of Care Review  Outcome: Progressing  Goal: Patient-Specific Goal (Individualization)  Outcome: Progressing  Goal: Adheres to Safety Considerations for Self and Others  Outcome: Progressing  Goal: Absence of New-Onset Illness or Injury  Outcome: Progressing  Goal: Optimized Coping Skills in Response to Life Stressors  Outcome: Progressing  Goal: Develops/Participates in Therapeutic [place] to Support Successful Transition  Outcome: Progressing  Goal: Rounds/Family Conference  Outcome: Progressing     Problem: Pneumonia  Goal: Fluid Balance  Outcome: Progressing  Goal: Resolution of Infection Signs and Symptoms  Outcome: Progressing  Goal: Effective Oxygenation and Ventilation  Outcome: Progressing     Problem: Anxiety Signs/Symptoms  Goal: Optimized Energy Level (Anxiety Signs/Symptoms)  Outcome: Progressing  Goal: Optimized Cognitive Function (Anxiety Signs/Symptoms)  Outcome: Progressing  Goal: Improved Mood Symptoms (Anxiety Signs/Symptoms)  Outcome:  Progressing  Goal: Improved Sleep (Anxiety Signs/Symptoms)  Outcome: Progressing  Goal: Enhanced Social, Occupational or Functional Skills (Anxiety Signs/Symptoms)  Outcome: Progressing  Goal: Improved Somatic Symptoms (Anxiety Signs/Symptoms)  Outcome: Progressing     Problem: Psychotic Signs/Symptoms  Goal: Improved Behavioral Control (Psychotic Signs/Symptoms)  Outcome: Progressing  Goal: Optimal Cognitive Function (Psychotic Signs/Symptoms)  Outcome: Progressing  Goal: Increased Participation and Engagement (Psychotic Signs/Symptoms)  Outcome: Progressing  Goal: Improved Mood Symptoms (Psychotic Signs/Symptoms)  Outcome: Progressing  Goal: Improved Psychomotor Symptoms (Psychotic Signs/Symptoms)  Outcome: Progressing  Goal: Decreased Sensory Symptoms (Psychotic Signs/Symptoms)  Outcome: Progressing  Goal: Improved Sleep (Psychotic Signs/Symptoms)  Outcome: Progressing  Goal: Enhanced Social, Occupational or Functional Skills (Psychotic Signs/Symptoms)  Outcome: Progressing

## 2024-05-18 NOTE — PROGRESS NOTES
"PSYCHIATRY DAILY INPATIENT PROGRESS NOTE  SUBSEQUENT HOSPITAL VISIT    ENCOUNTER DATE: 5/18/2024  [facility]    DATE OF ADMISSION: 5/14/2024  4:08 PM  LENGTH OF STAY: 4 days      CHIEF COMPLAINT   Vero Allen is a 64 y.o. female, seen during daily lombardo rounds on the inpatient unit.  Vero Allen presented with the chief complaint of depression and anxiety      The patient was seen and examined. The chart was reviewed.     Reviewed notes from Rns, MD, and NP and labs from the last 24 hours.    The patient's case was discussed with the treatment team/care providers today including Rns    Staff reports no behavioral or management issues.     The patient has been compliant with treatment.      Subjective 05/18/2024       Today the patient reports "I feel good, I want to be outside."    She states her medications are effective.    The patient denies any side effects to medications.    At this time symptoms remains severe, functionally and behaviorally impairing and pt remains in need of continued acute inpatient hospitalization for both safety and stabilization.           Interim/overnight events per report/notes:          Psychiatric ROS (observed, reported, or endorsed/denied):  Depressed mood - less  Interest/pleasure/anhedonia: less  Guilt/hopelessness/worthlessness - less  Changes in Sleep - less  Changes in Appetite - less  Changes in Concentration - No  Changes in Energy - less  PMA/R- No  Suicidal- active/passive ideations - less  Homicidal ideations: active/passive ideations - less    Hallucinations - No  Delusions - less  Disorganized behavior - No  Disorganized speech - No  Negative symptoms - No    Elevated mood - No  Decreased need for sleep - No  Grandiosity - No  Racing thoughts - No  Impulsivity - No  Irritability- less  Increased energy - No  Distractibility - No  Increase in goal-directed activity or PMA- No    Symptoms of ELSY - No  Symptoms of Panic Disorder- " No  Symptoms of PTSD - No        Overall progress: Patient is showing mild improvement         Medical ROS  Review of Systems   Constitutional:  Negative for chills and fever.   HENT:  Negative for hearing loss.    Eyes:  Negative for blurred vision and double vision.   Respiratory:  Negative for shortness of breath.    Cardiovascular:  Negative for chest pain and palpitations.   Gastrointestinal:  Negative for constipation, diarrhea, nausea and vomiting.   Genitourinary:  Negative for dysuria.   Musculoskeletal:  Negative for back pain and neck pain.   Skin:  Negative for rash.   Neurological:  Negative for dizziness and headaches.   Endo/Heme/Allergies:  Negative for environmental allergies.         PAST MEDICAL HISTORY   Past Medical History:   Diagnosis Date    Anxiety     Breast cancer 2010    Cancer     Breast    COPD (chronic obstructive pulmonary disease)     Depression     Diabetes mellitus     GERD (gastroesophageal reflux disease)     Hypertension     Insomnia     Thyroid disease            PSYCHOTROPIC MEDICATIONS   Scheduled Meds:   clonazePAM  0.5 mg Oral BID    QUEtiapine  100 mg Oral QHS    venlafaxine  112.5 mg Oral Daily     Continuous Infusions:  PRN Meds:.  Current Facility-Administered Medications:     acetaminophen, 650 mg, Oral, Q6H PRN    albuterol, 2 puff, Inhalation, Q6H PRN    aluminum-magnesium hydroxide-simethicone, 30 mL, Oral, Q6H PRN    benzonatate, 100 mg, Oral, TID PRN    benztropine mesylate, 2 mg, Intramuscular, Q8H PRN    hydrOXYzine pamoate, 50 mg, Oral, Q6H PRN    loperamide, 2 mg, Oral, PRN    magnesium hydroxide 400 mg/5 ml, 30 mL, Oral, Daily PRN    nicotine, 1 patch, Transdermal, Daily PRN    OLANZapine, 10 mg, Oral, Q8H PRN **AND** OLANZapine, 10 mg, Intramuscular, Q8H PRN    ondansetron, 4 mg, Oral, Q8H PRN    promethazine, 25 mg, Oral, Q6H PRN        EXAMINATION    VITALS   Vitals:    05/17/24 0751 05/17/24 1324 05/17/24 1915 05/18/24 0751   BP: 136/82  (!) 141/79 123/71    BP Location: Left arm  Left arm Left arm   Patient Position: Sitting  Sitting Sitting   Pulse: 66 68 71 80   Resp: 18 20 16 20   Temp: 97.1 °F (36.2 °C)  98.4 °F (36.9 °C) 97.7 °F (36.5 °C)   TempSrc: Oral  Oral Oral   SpO2: 96%  95% 96%   Weight:       Height:           Body mass index is 42.87 kg/m².        CONSTITUTIONAL  General Appearance: unremarkable, age appropriate    MUSCULOSKELETAL  Muscle Strength and Tone:no tremor, no tic  Abnormal Involuntary Movements: No  Gait and Station: non-ataxic    PSYCHIATRIC   Level of Consciousness: awake and alert   Orientation: person, place, and situation  Grooming: Casually dressed and Well groomed  Psychomotor Behavior: normal, cooperative  Speech: normal tone, normal rate, normal pitch, normal volume  Language: grossly intact  Mood: fine  Affect: Consistent with mood  Thought Process: linear, logical  Associations: intact   Thought Content: less SI and less HI  Perceptions: denies AH and denies  VH  Memory: Able to recall past events, Remote intact, and Recent intact  Attention:Attends to interview without distraction  Fund of Knowledge: Aware of current events and Vocabulary appropriate   Estimate if Intelligence:  Average based on work/education history, vocabulary and mental status exam  Insight: has awareness of illness  Judgment: behavior is adequate to circumstances        DIAGNOSTIC TESTING   Laboratory Results  No results found for this or any previous visit (from the past 24 hour(s)).      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      Complicated bereavement     Psychosocial stressors: pt counseled     Nicotine Dependence      COPD: pt counseled  -Med consulted   HTN  DM  Thyroid disease  GERD        PROBLEM LIST AND MANAGEMENT PLANS     Depression with psychosis: pt counseled  -stop celexa and risperdal- ineffective  -start trial of  effexor XR 37.5 mg po q day- Continue, increase to 75 mg/day -increased to 112.5 mg PO qd today  -continue adjunctive Serqouel 100 mg po q HS     Anxiety: pt counseled  -meds as above  -change home xanax to klonopin 0.5 mg po BID     Insomnia: pt counseled  -seroquel off-label  -vistaril prn         Complicated bereavement: pt counseled      Nicotine Dependence: pt counseled  -Continue  nicotine 14 mg patch dermal      Psychosocial stressors: pt counseled  -SW consulted to assist with resources     COPD: pt counseled  -Med consulted      HTN: pt counseled  -Med consulted     DM: pt counseled  -Med consulted     Thyroid disease: pt counseled  -Med consulted     GERD: pt counseled  -Med consulted          Discussed diagnosis, risks and benefits of proposed treatment vs alternative treatments vs no treatment, potential side effects of these treatments and the inherent unpredictability of treatment. The patient expresses understanding of the above and displays the capacity to agree with this treatment given said understanding. Patient also agrees that, currently, the benefits outweigh the risks and would like to pursue/continue treatment at this time.    Any medications being used off-label were discussed with the patient inclusive of the evidence base for the use of the medications and consent was obtained for the off-label use of the medication.       DISCHARGE PLANNING  Expected Disposition Plan: Home or Self Care      NEED FOR CONTINUED HOSPITALIZATION  Psychiatric illness continues to pose a potential threat to life or bodily function, of self or others, thereby requiring the need for continued inpatient psychiatric hospitalization: Yes, due to: danger to self and danger to others, as evidenced by:  Concerns with SI--Fading. and Concerns with HI--Fading.    Protective inpatient pyschiatric hospitalization required while a safe disposition plan is enacted: Yes    Patient stabilized and ready for discharge from  inpatient psychiatric unit: No        STAFF:   Everett Joseph III, MD  Psychiatry

## 2024-05-18 NOTE — PSYCH
"Patient less isolative and spending more time out of her room in common areas. Patient is alert and oriented, quiet , cooperative, with no negative behaviors noted. Patient c/o indigestion at the beginning of the shift with PRN Maalox given as ordered and noted effective with no further complaints. Patient denies depression rating 0/10, denies anxiety rating 0/10, denies S/HI, denies A/VH, and stated sleeping well during the night. Patient states "I had a good day today and I am just ready to go back home". Patient accepted hs snack and was compliant with HS medication. Patient is in her room at this time asleep and resting comfortably with continued staff monitoring. Close observations continued and safe environment maintained.  "

## 2024-05-19 PROCEDURE — 99232 SBSQ HOSP IP/OBS MODERATE 35: CPT | Mod: ,,, | Performed by: STUDENT IN AN ORGANIZED HEALTH CARE EDUCATION/TRAINING PROGRAM

## 2024-05-19 PROCEDURE — 25000003 PHARM REV CODE 250: Performed by: PSYCHIATRY & NEUROLOGY

## 2024-05-19 PROCEDURE — 25000003 PHARM REV CODE 250: Performed by: STUDENT IN AN ORGANIZED HEALTH CARE EDUCATION/TRAINING PROGRAM

## 2024-05-19 PROCEDURE — 11400000 HC PSYCH PRIVATE ROOM

## 2024-05-19 PROCEDURE — S4991 NICOTINE PATCH NONLEGEND: HCPCS | Performed by: PSYCHIATRY & NEUROLOGY

## 2024-05-19 RX ORDER — VENLAFAXINE HYDROCHLORIDE 75 MG/1
150 CAPSULE, EXTENDED RELEASE ORAL DAILY
Status: DISCONTINUED | OUTPATIENT
Start: 2024-05-20 | End: 2024-05-20 | Stop reason: HOSPADM

## 2024-05-19 RX ADMIN — FAMOTIDINE 20 MG: 20 TABLET, FILM COATED ORAL at 08:05

## 2024-05-19 RX ADMIN — NICOTINE 1 PATCH: 14 PATCH, EXTENDED RELEASE TRANSDERMAL at 08:05

## 2024-05-19 RX ADMIN — VENLAFAXINE HYDROCHLORIDE 112.5 MG: 37.5 CAPSULE, EXTENDED RELEASE ORAL at 08:05

## 2024-05-19 RX ADMIN — CLONAZEPAM 0.5 MG: 0.5 TABLET ORAL at 08:05

## 2024-05-19 RX ADMIN — QUETIAPINE FUMARATE 100 MG: 100 TABLET ORAL at 08:05

## 2024-05-19 NOTE — PROGRESS NOTES
"PSYCHIATRY DAILY INPATIENT PROGRESS NOTE  SUBSEQUENT HOSPITAL VISIT    ENCOUNTER DATE: 5/19/2024  SITE: [facility]    DATE OF ADMISSION: 5/14/2024  4:08 PM  LENGTH OF STAY: 5 days      CHIEF COMPLAINT   Vero Allen is a 64 y.o. female, seen during daily lombardo rounds on the inpatient unit.  Vero Allen presented with the chief complaint of depression and anxiety      The patient was seen and examined. The chart was reviewed.     Reviewed notes from Rns and labs from the last 24 hours.    The patient's case was discussed with the treatment team/care providers today including Rns    Staff reports no behavioral or management issues.     The patient has been compliant with treatment.      Subjective 05/19/2024       Today the patient reports "I feel good, I want to be outside."    She states her medications are effective.    The patient denies any side effects to medications.    At this time symptoms remains severe, functionally and behaviorally impairing and pt remains in need of continued acute inpatient hospitalization for both safety and stabilization.           Interim/overnight events per report/notes:              Psychiatric ROS (observed, reported, or endorsed/denied):  Depressed mood - less  Interest/pleasure/anhedonia: less  Guilt/hopelessness/worthlessness - less  Changes in Sleep - less  Changes in Appetite - less  Changes in Concentration - No  Changes in Energy - less  PMA/R- No  Suicidal- active/passive ideations - less  Homicidal ideations: active/passive ideations - less    Hallucinations - No  Delusions - less  Disorganized behavior - No  Disorganized speech - No  Negative symptoms - No    Elevated mood - No  Decreased need for sleep - No  Grandiosity - No  Racing thoughts - No  Impulsivity - No  Irritability- less  Increased energy - No  Distractibility - No  Increase in goal-directed activity or PMA- No    Symptoms of ELSY - No  Symptoms of Panic Disorder- No  Symptoms " of PTSD - No        Overall progress: Patient is showing mild improvement         Medical ROS  Review of Systems   Constitutional:  Negative for chills and fever.   HENT:  Negative for hearing loss.    Eyes:  Negative for blurred vision and double vision.   Respiratory:  Negative for shortness of breath.    Cardiovascular:  Negative for chest pain and palpitations.   Gastrointestinal:  Negative for constipation, diarrhea, nausea and vomiting.   Genitourinary:  Negative for dysuria.   Musculoskeletal:  Negative for back pain and neck pain.   Skin:  Negative for rash.   Neurological:  Negative for dizziness and headaches.   Endo/Heme/Allergies:  Negative for environmental allergies.         PAST MEDICAL HISTORY   Past Medical History:   Diagnosis Date    Anxiety     Breast cancer 2010    Cancer     Breast    COPD (chronic obstructive pulmonary disease)     Depression     Diabetes mellitus     GERD (gastroesophageal reflux disease)     Hypertension     Insomnia     Thyroid disease            PSYCHOTROPIC MEDICATIONS   Scheduled Meds:   clonazePAM  0.5 mg Oral BID    famotidine  20 mg Oral BID    QUEtiapine  100 mg Oral QHS    venlafaxine  112.5 mg Oral Daily     Continuous Infusions:  PRN Meds:.  Current Facility-Administered Medications:     acetaminophen, 650 mg, Oral, Q6H PRN    albuterol, 2 puff, Inhalation, Q6H PRN    aluminum-magnesium hydroxide-simethicone, 30 mL, Oral, Q6H PRN    benzonatate, 100 mg, Oral, TID PRN    benztropine mesylate, 2 mg, Intramuscular, Q8H PRN    hydrOXYzine pamoate, 50 mg, Oral, Q6H PRN    loperamide, 2 mg, Oral, PRN    magnesium hydroxide 400 mg/5 ml, 30 mL, Oral, Daily PRN    nicotine, 1 patch, Transdermal, Daily PRN    OLANZapine, 10 mg, Oral, Q8H PRN **AND** OLANZapine, 10 mg, Intramuscular, Q8H PRN    ondansetron, 4 mg, Oral, Q8H PRN    promethazine, 25 mg, Oral, Q6H PRN        EXAMINATION    VITALS   Vitals:    05/17/24 1915 05/18/24 0751 05/18/24 1902 05/19/24 0750   BP: (!)  141/79 123/71 (!) 144/72 (!) 141/70   BP Location: Left arm Left arm Left arm Left arm   Patient Position: Sitting Sitting Sitting Sitting   Pulse: 71 80 75 75   Resp: 16 20 16 20   Temp: 98.4 °F (36.9 °C) 97.7 °F (36.5 °C) 98.2 °F (36.8 °C) 98.2 °F (36.8 °C)   TempSrc: Oral Oral Oral Oral   SpO2: 95% 96% (!) 92% 96%   Weight:       Height:           Body mass index is 42.87 kg/m².          CONSTITUTIONAL  General Appearance: unremarkable, age appropriate    MUSCULOSKELETAL  Muscle Strength and Tone:no tremor, no tic  Abnormal Involuntary Movements: No  Gait and Station: non-ataxic    PSYCHIATRIC   Level of Consciousness: awake and alert   Orientation: person, place, and situation  Grooming: Casually dressed and Well groomed  Psychomotor Behavior: normal, cooperative  Speech: normal tone, normal rate, normal pitch, normal volume  Language: grossly intact  Mood: fine  Affect: Consistent with mood  Thought Process: linear, logical  Associations: intact   Thought Content: less SI and less HI  Perceptions: denies AH and denies  VH  Memory: Able to recall past events, Remote intact, and Recent intact  Attention:Attends to interview without distraction  Fund of Knowledge: Aware of current events and Vocabulary appropriate   Estimate if Intelligence:  Average based on work/education history, vocabulary and mental status exam  Insight: has awareness of illness  Judgment: behavior is adequate to circumstances        DIAGNOSTIC TESTING   Laboratory Results  No results found for this or any previous visit (from the past 24 hour(s)).      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      Complicated bereavement     Psychosocial stressors: pt counseled     Nicotine Dependence      COPD: pt counseled  -Med consulted   HTN  DM  Thyroid disease  GERD        PROBLEM LIST AND MANAGEMENT PLANS     Depression with  psychosis: pt counseled  -stop celexa and risperdal- ineffective  -start trial of effexor XR 37.5 mg po q day- Continue, increase to 75 mg/day -increased to 112.5 mg PO qd  -increase to 150 mg PO qd tomorrow  -continue adjunctive Serqouel 100 mg po q HS     Anxiety: pt counseled  -meds as above  -change home xanax to klonopin 0.5 mg po BID     Insomnia: pt counseled  -seroquel off-label  -vistaril prn         Complicated bereavement: pt counseled      Nicotine Dependence: pt counseled  -Continue  nicotine 14 mg patch dermal      Psychosocial stressors: pt counseled  -SW consulted to assist with resources     COPD: pt counseled  -Med consulted      HTN: pt counseled  -Med consulted     DM: pt counseled  -Med consulted     Thyroid disease: pt counseled  -Med consulted     GERD: pt counseled  -Med consulted          Discussed diagnosis, risks and benefits of proposed treatment vs alternative treatments vs no treatment, potential side effects of these treatments and the inherent unpredictability of treatment. The patient expresses understanding of the above and displays the capacity to agree with this treatment given said understanding. Patient also agrees that, currently, the benefits outweigh the risks and would like to pursue/continue treatment at this time.    Any medications being used off-label were discussed with the patient inclusive of the evidence base for the use of the medications and consent was obtained for the off-label use of the medication.       DISCHARGE PLANNING  Expected Disposition Plan: Home or Self Care      NEED FOR CONTINUED HOSPITALIZATION  Psychiatric illness continues to pose a potential threat to life or bodily function, of self or others, thereby requiring the need for continued inpatient psychiatric hospitalization: Yes, due to: danger to self and danger to others, as evidenced by:  Concerns with SI--Fading. and Concerns with HI--Fading.    Protective inpatient pyschiatric hospitalization  required while a safe disposition plan is enacted: Yes    Patient stabilized and ready for discharge from inpatient psychiatric unit: No        STAFF:   Everett Joseph III, MD  Psychiatry

## 2024-05-19 NOTE — PSYCH
Patient at the beginning of the shift noted out of room sitting in the dining room watching tv. Patient is alert and oriented, quiet, and calm. Patient endorses depression and anxiety has improved and is still focused on discharge. No negative behaviors noted. Patient accepted HS snack and compliant with HS medication. Patient voiced no complaints of indigestion at this time and noted Dr. Joseph ordered routine administration of pepcid at HS. Patient is in her room sleeping and resting comfortably. Close observations continued and safe environment maintained.

## 2024-05-19 NOTE — PLAN OF CARE
Plan of care reviewed and ongoing. Patient awake and alert. Patient cooperative with staff. Compliant with medication. Pt out for meals and snack time, good appetite. Participates in group sessions. Patient out in dining room most of the day. Reports some anxiety and no depression at this time. Denies any suicidal thoughts.  Pt states she is ready to go home. No distress noted. No behavioral issues at this time. Will continue to monitor for patient safety.     Problem: Adult Inpatient Plan of Care  Goal: Plan of Care Review  Outcome: Progressing  Goal: Patient-Specific Goal (Individualized)  Outcome: Progressing  Goal: Absence of Hospital-Acquired Illness or Injury  Outcome: Progressing  Goal: Optimal Comfort and Wellbeing  Outcome: Progressing  Goal: Readiness for Transition of Care  Outcome: Progressing     Problem: Bariatric Environmental Safety  Goal: Safety Maintained with Care  Outcome: Progressing     Problem: Adult Behavioral Health Plan of Care  Goal: Plan of Care Review  Outcome: Progressing  Goal: Patient-Specific Goal (Individualization)  Outcome: Progressing  Goal: Adheres to Safety Considerations for Self and Others  Outcome: Progressing  Goal: Absence of New-Onset Illness or Injury  Outcome: Progressing  Goal: Optimized Coping Skills in Response to Life Stressors  Outcome: Progressing  Goal: Develops/Participates in Therapeutic [place] to Support Successful Transition  Outcome: Progressing  Goal: Rounds/Family Conference  Outcome: Progressing     Problem: Pneumonia  Goal: Fluid Balance  Outcome: Progressing  Goal: Resolution of Infection Signs and Symptoms  Outcome: Progressing  Goal: Effective Oxygenation and Ventilation  Outcome: Progressing     Problem: Anxiety Signs/Symptoms  Goal: Optimized Energy Level (Anxiety Signs/Symptoms)  Outcome: Progressing  Goal: Optimized Cognitive Function (Anxiety Signs/Symptoms)  Outcome: Progressing  Goal: Improved Mood Symptoms (Anxiety Signs/Symptoms)  Outcome:  Progressing  Goal: Improved Sleep (Anxiety Signs/Symptoms)  Outcome: Progressing  Goal: Enhanced Social, Occupational or Functional Skills (Anxiety Signs/Symptoms)  Outcome: Progressing  Goal: Improved Somatic Symptoms (Anxiety Signs/Symptoms)  Outcome: Progressing     Problem: Psychotic Signs/Symptoms  Goal: Improved Behavioral Control (Psychotic Signs/Symptoms)  Outcome: Progressing  Goal: Optimal Cognitive Function (Psychotic Signs/Symptoms)  Outcome: Progressing  Goal: Increased Participation and Engagement (Psychotic Signs/Symptoms)  Outcome: Progressing  Goal: Improved Mood Symptoms (Psychotic Signs/Symptoms)  Outcome: Progressing  Goal: Improved Psychomotor Symptoms (Psychotic Signs/Symptoms)  Outcome: Progressing  Goal: Decreased Sensory Symptoms (Psychotic Signs/Symptoms)  Outcome: Progressing  Goal: Improved Sleep (Psychotic Signs/Symptoms)  Outcome: Progressing  Goal: Enhanced Social, Occupational or Functional Skills (Psychotic Signs/Symptoms)  Outcome: Progressing     Problem: Adult Inpatient Plan of Care  Goal: Plan of Care Review  Outcome: Progressing  Goal: Patient-Specific Goal (Individualized)  Outcome: Progressing  Goal: Absence of Hospital-Acquired Illness or Injury  Outcome: Progressing  Goal: Optimal Comfort and Wellbeing  Outcome: Progressing  Goal: Readiness for Transition of Care  Outcome: Progressing     Problem: Bariatric Environmental Safety  Goal: Safety Maintained with Care  Outcome: Progressing     Problem: Adult Behavioral Health Plan of Care  Goal: Plan of Care Review  Outcome: Progressing  Goal: Patient-Specific Goal (Individualization)  Outcome: Progressing  Goal: Adheres to Safety Considerations for Self and Others  Outcome: Progressing  Goal: Absence of New-Onset Illness or Injury  Outcome: Progressing  Goal: Optimized Coping Skills in Response to Life Stressors  Outcome: Progressing  Goal: Develops/Participates in Therapeutic [place] to Support Successful Transition  Outcome:  Progressing  Goal: Rounds/Family Conference  Outcome: Progressing     Problem: Pneumonia  Goal: Fluid Balance  Outcome: Progressing  Goal: Resolution of Infection Signs and Symptoms  Outcome: Progressing  Goal: Effective Oxygenation and Ventilation  Outcome: Progressing     Problem: Anxiety Signs/Symptoms  Goal: Optimized Energy Level (Anxiety Signs/Symptoms)  Outcome: Progressing  Goal: Optimized Cognitive Function (Anxiety Signs/Symptoms)  Outcome: Progressing  Goal: Improved Mood Symptoms (Anxiety Signs/Symptoms)  Outcome: Progressing  Goal: Improved Sleep (Anxiety Signs/Symptoms)  Outcome: Progressing  Goal: Enhanced Social, Occupational or Functional Skills (Anxiety Signs/Symptoms)  Outcome: Progressing  Goal: Improved Somatic Symptoms (Anxiety Signs/Symptoms)  Outcome: Progressing     Problem: Psychotic Signs/Symptoms  Goal: Improved Behavioral Control (Psychotic Signs/Symptoms)  Outcome: Progressing  Goal: Optimal Cognitive Function (Psychotic Signs/Symptoms)  Outcome: Progressing  Goal: Increased Participation and Engagement (Psychotic Signs/Symptoms)  Outcome: Progressing  Goal: Improved Mood Symptoms (Psychotic Signs/Symptoms)  Outcome: Progressing  Goal: Improved Psychomotor Symptoms (Psychotic Signs/Symptoms)  Outcome: Progressing  Goal: Decreased Sensory Symptoms (Psychotic Signs/Symptoms)  Outcome: Progressing  Goal: Improved Sleep (Psychotic Signs/Symptoms)  Outcome: Progressing  Goal: Enhanced Social, Occupational or Functional Skills (Psychotic Signs/Symptoms)  Outcome: Progressing

## 2024-05-20 VITALS
WEIGHT: 242 LBS | DIASTOLIC BLOOD PRESSURE: 90 MMHG | HEIGHT: 63 IN | TEMPERATURE: 98 F | OXYGEN SATURATION: 95 % | SYSTOLIC BLOOD PRESSURE: 159 MMHG | HEART RATE: 71 BPM | RESPIRATION RATE: 18 BRPM | BODY MASS INDEX: 42.88 KG/M2

## 2024-05-20 PROCEDURE — 99239 HOSP IP/OBS DSCHRG MGMT >30: CPT | Mod: ,,, | Performed by: STUDENT IN AN ORGANIZED HEALTH CARE EDUCATION/TRAINING PROGRAM

## 2024-05-20 PROCEDURE — 25000003 PHARM REV CODE 250: Performed by: STUDENT IN AN ORGANIZED HEALTH CARE EDUCATION/TRAINING PROGRAM

## 2024-05-20 PROCEDURE — 25000003 PHARM REV CODE 250: Performed by: PSYCHIATRY & NEUROLOGY

## 2024-05-20 RX ORDER — CLONAZEPAM 0.5 MG/1
0.5 TABLET ORAL 2 TIMES DAILY
Qty: 60 TABLET | Refills: 0 | Status: SHIPPED | OUTPATIENT
Start: 2024-05-20 | End: 2025-05-20

## 2024-05-20 RX ORDER — IBUPROFEN 200 MG
1 TABLET ORAL DAILY PRN
Qty: 28 PATCH | Refills: 0 | Status: SHIPPED | OUTPATIENT
Start: 2024-05-20

## 2024-05-20 RX ORDER — QUETIAPINE FUMARATE 100 MG/1
100 TABLET, FILM COATED ORAL NIGHTLY
Qty: 30 TABLET | Refills: 0 | Status: SHIPPED | OUTPATIENT
Start: 2024-05-20 | End: 2025-05-20

## 2024-05-20 RX ORDER — VENLAFAXINE HYDROCHLORIDE 150 MG/1
150 CAPSULE, EXTENDED RELEASE ORAL DAILY
Qty: 30 CAPSULE | Refills: 0 | Status: SHIPPED | OUTPATIENT
Start: 2024-05-21 | End: 2025-05-21

## 2024-05-20 RX ADMIN — FAMOTIDINE 20 MG: 20 TABLET, FILM COATED ORAL at 08:05

## 2024-05-20 RX ADMIN — VENLAFAXINE HYDROCHLORIDE 150 MG: 75 CAPSULE, EXTENDED RELEASE ORAL at 08:05

## 2024-05-20 RX ADMIN — CLONAZEPAM 0.5 MG: 0.5 TABLET ORAL at 08:05

## 2024-05-20 NOTE — PLAN OF CARE
Problem: Adult Inpatient Plan of Care  Goal: Plan of Care Review  Outcome: Progressing  Goal: Patient-Specific Goal (Individualized)  Outcome: Progressing  Goal: Absence of Hospital-Acquired Illness or Injury  Outcome: Progressing  Goal: Optimal Comfort and Wellbeing  Outcome: Progressing  Goal: Readiness for Transition of Care  Outcome: Progressing     Problem: Bariatric Environmental Safety  Goal: Safety Maintained with Care  Outcome: Progressing     Problem: Adult Behavioral Health Plan of Care  Goal: Plan of Care Review  Outcome: Progressing  Goal: Patient-Specific Goal (Individualization)  Outcome: Progressing  Goal: Adheres to Safety Considerations for Self and Others  Outcome: Progressing  Goal: Absence of New-Onset Illness or Injury  Outcome: Progressing  Goal: Optimized Coping Skills in Response to Life Stressors  Outcome: Progressing  Goal: Develops/Participates in Therapeutic [place] to Support Successful Transition  Outcome: Progressing  Goal: Rounds/Family Conference  Outcome: Progressing     Problem: Pneumonia  Goal: Fluid Balance  Outcome: Progressing  Goal: Resolution of Infection Signs and Symptoms  Outcome: Progressing  Goal: Effective Oxygenation and Ventilation  Outcome: Progressing     Problem: Anxiety Signs/Symptoms  Goal: Optimized Energy Level (Anxiety Signs/Symptoms)  Outcome: Progressing  Goal: Optimized Cognitive Function (Anxiety Signs/Symptoms)  Outcome: Progressing  Goal: Improved Mood Symptoms (Anxiety Signs/Symptoms)  Outcome: Progressing  Goal: Improved Sleep (Anxiety Signs/Symptoms)  Outcome: Progressing  Goal: Enhanced Social, Occupational or Functional Skills (Anxiety Signs/Symptoms)  Outcome: Progressing  Goal: Improved Somatic Symptoms (Anxiety Signs/Symptoms)  Outcome: Progressing     Problem: Psychotic Signs/Symptoms  Goal: Improved Behavioral Control (Psychotic Signs/Symptoms)  Outcome: Progressing  Goal: Optimal Cognitive Function (Psychotic Signs/Symptoms)  Outcome:  Progressing  Goal: Increased Participation and Engagement (Psychotic Signs/Symptoms)  Outcome: Progressing  Goal: Improved Mood Symptoms (Psychotic Signs/Symptoms)  Outcome: Progressing  Goal: Improved Psychomotor Symptoms (Psychotic Signs/Symptoms)  Outcome: Progressing  Goal: Decreased Sensory Symptoms (Psychotic Signs/Symptoms)  Outcome: Progressing  Goal: Improved Sleep (Psychotic Signs/Symptoms)  Outcome: Progressing  Goal: Enhanced Social, Occupational or Functional Skills (Psychotic Signs/Symptoms)  Outcome: Progressing

## 2024-05-20 NOTE — PLAN OF CARE
05/20/24 1235   Medicare Message   Important Message from Medicare regarding Discharge Appeal Rights Given to patient/caregiver;Explained to patient/caregiver;Signed/date by patient/caregiver;Other (comments)   Date IMM was signed 05/20/24   Time IMM was signed [id number]

## 2024-05-20 NOTE — PLAN OF CARE
Goals ongoing.  Patient denies ideations.  No PRNs requested to sleep.  Patent slept through the night.

## 2024-05-20 NOTE — NURSING
Pt met all criteria for discharge.  Discharge instructions reviewed, all questions answered, verbalized understanding.  Pt belongings returned and signed for.  Pt escorted off the unit in stable condition with U staff x 1.  Per discharge plan pt was discharged home and was picked up by her brother.
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
## 2024-06-10 PROBLEM — J18.9 CAP (COMMUNITY ACQUIRED PNEUMONIA): Status: RESOLVED | Noted: 2024-03-10 | Resolved: 2024-06-10

## 2024-06-22 ENCOUNTER — HOSPITAL ENCOUNTER (EMERGENCY)
Facility: HOSPITAL | Age: 64
Discharge: HOME | End: 2024-06-22
Attending: EMERGENCY MEDICINE
Payer: MEDICARE

## 2024-06-22 VITALS
RESPIRATION RATE: 18 BRPM | BODY MASS INDEX: 42.35 KG/M2 | HEART RATE: 84 BPM | DIASTOLIC BLOOD PRESSURE: 94 MMHG | SYSTOLIC BLOOD PRESSURE: 169 MMHG | OXYGEN SATURATION: 96 % | WEIGHT: 239 LBS | TEMPERATURE: 98 F | HEIGHT: 63 IN

## 2024-06-22 DIAGNOSIS — S39.012A STRAIN OF LUMBAR REGION, INITIAL ENCOUNTER: Primary | ICD-10-CM

## 2024-06-22 LAB
BILIRUB UR QL STRIP: NEGATIVE
CLARITY UR: CLEAR
COLOR UR: YELLOW
GLUCOSE UR QL STRIP: NEGATIVE
HGB UR QL STRIP: NEGATIVE
KETONES UR QL STRIP: ABNORMAL
LEUKOCYTE ESTERASE UR QL STRIP: NEGATIVE
NITRITE UR QL STRIP: NEGATIVE
PH UR STRIP: 6 [PH] (ref 5–8)
PROT UR QL STRIP: ABNORMAL
SP GR UR STRIP: >=1.03 (ref 1–1.03)
URN SPEC COLLECT METH UR: ABNORMAL
UROBILINOGEN UR STRIP-ACNC: 1 EU/DL

## 2024-06-22 PROCEDURE — 99284 EMERGENCY DEPT VISIT MOD MDM: CPT | Mod: 25

## 2024-06-22 PROCEDURE — 25000003 PHARM REV CODE 250: Performed by: NURSE PRACTITIONER

## 2024-06-22 PROCEDURE — 63600175 PHARM REV CODE 636 W HCPCS: Performed by: NURSE PRACTITIONER

## 2024-06-22 PROCEDURE — 96372 THER/PROPH/DIAG INJ SC/IM: CPT | Performed by: NURSE PRACTITIONER

## 2024-06-22 PROCEDURE — 81003 URINALYSIS AUTO W/O SCOPE: CPT | Performed by: NURSE PRACTITIONER

## 2024-06-22 RX ORDER — MELOXICAM 7.5 MG/1
7.5 TABLET ORAL DAILY
Qty: 14 TABLET | Refills: 0 | Status: SHIPPED | OUTPATIENT
Start: 2024-06-22 | End: 2024-07-06

## 2024-06-22 RX ORDER — DEXAMETHASONE SODIUM PHOSPHATE 4 MG/ML
4 INJECTION, SOLUTION INTRA-ARTICULAR; INTRALESIONAL; INTRAMUSCULAR; INTRAVENOUS; SOFT TISSUE
Status: COMPLETED | OUTPATIENT
Start: 2024-06-22 | End: 2024-06-22

## 2024-06-22 RX ORDER — METHOCARBAMOL 500 MG/1
500 TABLET, FILM COATED ORAL
Status: COMPLETED | OUTPATIENT
Start: 2024-06-22 | End: 2024-06-22

## 2024-06-22 RX ORDER — KETOROLAC TROMETHAMINE 30 MG/ML
30 INJECTION, SOLUTION INTRAMUSCULAR; INTRAVENOUS
Status: COMPLETED | OUTPATIENT
Start: 2024-06-22 | End: 2024-06-22

## 2024-06-22 RX ORDER — METHOCARBAMOL 500 MG/1
1000 TABLET, FILM COATED ORAL 3 TIMES DAILY
Qty: 30 TABLET | Refills: 0 | Status: SHIPPED | OUTPATIENT
Start: 2024-06-22 | End: 2024-06-27

## 2024-06-22 RX ADMIN — DEXAMETHASONE SODIUM PHOSPHATE 4 MG: 4 INJECTION INTRA-ARTICULAR; INTRALESIONAL; INTRAMUSCULAR; INTRAVENOUS; SOFT TISSUE at 02:06

## 2024-06-22 RX ADMIN — KETOROLAC TROMETHAMINE 30 MG: 30 INJECTION, SOLUTION INTRAMUSCULAR; INTRAVENOUS at 02:06

## 2024-06-22 RX ADMIN — METHOCARBAMOL 500 MG: 500 TABLET ORAL at 02:06

## 2024-06-22 NOTE — ED PROVIDER NOTES
Encounter Date: 6/22/2024       History     Chief Complaint   Patient presents with    Back Pain     Pt reports back pain that started this morning.      64-year-old female with complaints of low back pain.  Patient states she woke up this morning having back pain.  Denies any trauma to the back.  Denies any urinary symptoms, fever.      Review of patient's allergies indicates:  No Known Allergies  Past Medical History:   Diagnosis Date    Anxiety     Breast cancer 2010    Cancer     Breast    COPD (chronic obstructive pulmonary disease)     Depression     Diabetes mellitus     GERD (gastroesophageal reflux disease)     Hypertension     Insomnia     Thyroid disease      Past Surgical History:   Procedure Laterality Date    BLADDER SUSPENSION      BREAST LUMPECTOMY      Right breast    CHOLECYSTECTOMY      HYSTERECTOMY      KNEE ARTHROSCOPY      Right knee    OOPHORECTOMY       Family History   Problem Relation Name Age of Onset    No Known Problems Mother      No Known Problems Father      No Known Problems Sister      No Known Problems Daughter      No Known Problems Maternal Aunt      No Known Problems Maternal Uncle      No Known Problems Paternal Aunt      No Known Problems Paternal Uncle      No Known Problems Maternal Grandmother      No Known Problems Maternal Grandfather      No Known Problems Paternal Grandmother      No Known Problems Paternal Grandfather      Breast cancer Neg Hx      Ovarian cancer Neg Hx      BRCA 1/2 Neg Hx       Social History     Tobacco Use    Smoking status: Every Day     Current packs/day: 0.50     Average packs/day: 0.5 packs/day for 50.1 years (25.1 ttl pk-yrs)     Types: Cigarettes     Start date: 5/15/1974     Passive exposure: Past    Smokeless tobacco: Never    Tobacco comments:     quit yesterday 6/12/21   Substance Use Topics    Alcohol use: Not Currently    Drug use: Never     Review of Systems   Constitutional:  Negative for fever.   HENT:  Negative for sore throat.     Respiratory:  Negative for shortness of breath.    Cardiovascular:  Negative for chest pain.   Gastrointestinal:  Negative for nausea.   Genitourinary:  Negative for dysuria, frequency and hematuria.   Musculoskeletal:  Positive for back pain.   Skin:  Negative for rash.   Neurological:  Negative for weakness.   Hematological:  Does not bruise/bleed easily.       Physical Exam     Initial Vitals [06/22/24 1359]   BP Pulse Resp Temp SpO2   (!) 169/94 84 18 97.8 °F (36.6 °C) 96 %      MAP       --         Physical Exam    Nursing note and vitals reviewed.  Constitutional: Vital signs are normal. She appears well-developed and well-nourished. She is cooperative.   HENT:   Head: Normocephalic and atraumatic.   Right Ear: Hearing and external ear normal.   Left Ear: Hearing and external ear normal.   Nose: Nose normal.   Mouth/Throat: Uvula is midline, oropharynx is clear and moist and mucous membranes are normal.   Eyes: Conjunctivae, EOM and lids are normal. Pupils are equal, round, and reactive to light.   Neck: Trachea normal. Neck supple.   Normal range of motion.   Full passive range of motion without pain.     Cardiovascular:  Normal rate, regular rhythm, S1 normal, S2 normal, normal heart sounds and normal pulses.           Pulmonary/Chest: Effort normal and breath sounds normal.   Musculoskeletal:      Cervical back: Normal, full passive range of motion without pain, normal range of motion and neck supple.      Thoracic back: Normal.      Lumbar back: No swelling, spasms, tenderness or bony tenderness. Decreased range of motion. Negative right straight leg raise test and negative left straight leg raise test.     Neurological: She is alert.         ED Course   Procedures  Labs Reviewed   URINALYSIS, REFLEX TO URINE CULTURE - Abnormal; Notable for the following components:       Result Value    Specific Gravity, UA >=1.030 (*)     Protein, UA Trace (*)     Ketones, UA Trace (*)     All other components within  normal limits    Narrative:     Preferred Collection Type->Urine, Clean Catch  Specimen Source->Urine          Imaging Results    None          Medications   ketorolac injection 30 mg (30 mg Intramuscular Given 6/22/24 1432)   dexAMETHasone injection 4 mg (4 mg Intramuscular Given 6/22/24 1432)   methocarbamoL tablet 500 mg (500 mg Oral Given 6/22/24 1432)     Medical Decision Making  After history and physical exam urinalysis was negative for UTI.  Discussed with patient that lower back strain.  Will send home some muscle relaxant and anti-inflammatory medication    Risk  Prescription drug management.                                      Clinical Impression:  Final diagnoses:  [S39.012A] Strain of lumbar region, initial encounter (Primary)                 Rambo Rodriguez III, NP  06/22/24 [id number]

## 2024-06-25 ENCOUNTER — HOSPITAL ENCOUNTER (OUTPATIENT)
Dept: RADIOLOGY | Facility: HOSPITAL | Age: 64
Discharge: HOME | End: 2024-06-25
Attending: INTERNAL MEDICINE
Payer: MEDICARE

## 2024-06-25 DIAGNOSIS — M54.50 LOW BACK PAIN: ICD-10-CM

## 2024-06-25 DIAGNOSIS — M54.50 LOW BACK PAIN: Primary | ICD-10-CM

## 2024-06-25 PROCEDURE — 72114 X-RAY EXAM L-S SPINE BENDING: CPT | Mod: TC

## 2024-07-01 ENCOUNTER — HOSPITAL ENCOUNTER (EMERGENCY)
Facility: HOSPITAL | Age: 64
Discharge: HOME | End: 2024-07-01
Attending: EMERGENCY MEDICINE
Payer: MEDICARE

## 2024-07-01 VITALS
BODY MASS INDEX: 42.35 KG/M2 | WEIGHT: 239 LBS | SYSTOLIC BLOOD PRESSURE: 147 MMHG | HEART RATE: 92 BPM | DIASTOLIC BLOOD PRESSURE: 88 MMHG | HEIGHT: 63 IN | TEMPERATURE: 99 F | OXYGEN SATURATION: 96 % | RESPIRATION RATE: 18 BRPM

## 2024-07-01 DIAGNOSIS — M54.50 ACUTE BILATERAL LOW BACK PAIN WITHOUT SCIATICA: Primary | ICD-10-CM

## 2024-07-01 PROCEDURE — 63600175 PHARM REV CODE 636 W HCPCS: Performed by: NURSE PRACTITIONER

## 2024-07-01 PROCEDURE — 96372 THER/PROPH/DIAG INJ SC/IM: CPT | Performed by: NURSE PRACTITIONER

## 2024-07-01 PROCEDURE — 99284 EMERGENCY DEPT VISIT MOD MDM: CPT | Mod: 25

## 2024-07-01 RX ORDER — METHOCARBAMOL 500 MG/1
1000 TABLET, FILM COATED ORAL 2 TIMES DAILY PRN
Qty: 20 TABLET | Refills: 0 | Status: SHIPPED | OUTPATIENT
Start: 2024-07-01 | End: 2024-07-06

## 2024-07-01 RX ORDER — KETOROLAC TROMETHAMINE 30 MG/ML
30 INJECTION, SOLUTION INTRAMUSCULAR; INTRAVENOUS
Status: COMPLETED | OUTPATIENT
Start: 2024-07-01 | End: 2024-07-01

## 2024-07-01 RX ORDER — DEXAMETHASONE SODIUM PHOSPHATE 4 MG/ML
4 INJECTION, SOLUTION INTRA-ARTICULAR; INTRALESIONAL; INTRAMUSCULAR; INTRAVENOUS; SOFT TISSUE
Status: COMPLETED | OUTPATIENT
Start: 2024-07-01 | End: 2024-07-01

## 2024-07-01 RX ORDER — LIDOCAINE 50 MG/G
1 PATCH TOPICAL DAILY
Qty: 5 PATCH | Refills: 0 | Status: SHIPPED | OUTPATIENT
Start: 2024-07-01 | End: 2024-07-06

## 2024-07-01 RX ORDER — DICLOFENAC SODIUM 75 MG/1
75 TABLET, DELAYED RELEASE ORAL 2 TIMES DAILY
Qty: 10 TABLET | Refills: 0 | Status: SHIPPED | OUTPATIENT
Start: 2024-07-01 | End: 2024-07-06

## 2024-07-01 RX ADMIN — DEXAMETHASONE SODIUM PHOSPHATE 4 MG: 4 INJECTION INTRA-ARTICULAR; INTRALESIONAL; INTRAMUSCULAR; INTRAVENOUS; SOFT TISSUE at 03:07

## 2024-07-01 RX ADMIN — KETOROLAC TROMETHAMINE 30 MG: 30 INJECTION, SOLUTION INTRAMUSCULAR at 03:07

## 2024-07-01 NOTE — DISCHARGE INSTRUCTIONS
Take medications as directed.  Avoid heavy lifting and prolonged inactivity.  Plan to follow-up with your primary doctor in the next week and return to the emergency department for worsening condition.

## 2024-07-01 NOTE — ED PROVIDER NOTES
Encounter Date: 7/1/2024       History     Chief Complaint   Patient presents with    Back Pain     Pt reports having back pain that started about a week and a half ago. Pt saw Dr. Merchant and was prescribed Hydrocodone for pain. Pt reports trying a heating pad and took a hydrocodone this morning with no relief.     This is a 64-year-old white female with a history of anxiety and low back degenerative changes who presents to the emergency department with complaints of low back pain.  Patient reports bilateral low back aching pain intermittently over the last 3 weeks after straining it while cleaning her home.  She was evaluated by her PCP, had plain films that revealed degenerative changes, and has been taking hydrocodone but continues to experience intermittent pain.  She denies radiation of pain into lower extremities, numbness/tingling, or bladder/bowel dysfunction.      Review of patient's allergies indicates:  No Known Allergies  Past Medical History:   Diagnosis Date    Anxiety     Breast cancer 2010    Cancer     Breast    COPD (chronic obstructive pulmonary disease)     Depression     Diabetes mellitus     GERD (gastroesophageal reflux disease)     Hypertension     Insomnia     Thyroid disease      Past Surgical History:   Procedure Laterality Date    BLADDER SUSPENSION      BREAST LUMPECTOMY      Right breast    CHOLECYSTECTOMY      HYSTERECTOMY      KNEE ARTHROSCOPY      Right knee    OOPHORECTOMY       Family History   Problem Relation Name Age of Onset    No Known Problems Mother      No Known Problems Father      No Known Problems Sister      No Known Problems Daughter      No Known Problems Maternal Aunt      No Known Problems Maternal Uncle      No Known Problems Paternal Aunt      No Known Problems Paternal Uncle      No Known Problems Maternal Grandmother      No Known Problems Maternal Grandfather      No Known Problems Paternal Grandmother      No Known Problems Paternal Grandfather      Breast  cancer Neg Hx      Ovarian cancer Neg Hx      BRCA 1/2 Neg Hx       Social History     Tobacco Use    Smoking status: Every Day     Current packs/day: 0.50     Average packs/day: 0.5 packs/day for 50.1 years (25.1 ttl pk-yrs)     Types: Cigarettes     Start date: 5/15/1974     Passive exposure: Past    Smokeless tobacco: Never    Tobacco comments:     quit yesterday 6/12/21   Substance Use Topics    Alcohol use: Not Currently    Drug use: Never     Review of Systems   Constitutional:  Negative for fever.   Genitourinary:  Negative for difficulty urinating and dysuria.   Musculoskeletal:  Positive for back pain.   Neurological:  Negative for numbness.       Physical Exam     Initial Vitals [07/01/24 1443]   BP Pulse Resp Temp SpO2   (!) 147/88 92 18 98.6 °F (37 °C) 96 %      MAP       --         Physical Exam    Nursing note and vitals reviewed.  Constitutional: She appears well-developed and well-nourished. She is active. No distress.   HENT:   Head: Normocephalic and atraumatic.   Eyes: EOM are normal. Pupils are equal, round, and reactive to light.   Neck: Neck supple.   Normal range of motion.  Cardiovascular:  Normal rate, regular rhythm and normal heart sounds.           Musculoskeletal:         General: No tenderness.      Cervical back: Normal range of motion and neck supple.      Comments: The low back appears normal upon inspection, no rash or discoloration noted.  Patient is nontender to palpation.  Muscle strength to lower extremities is 5/5.     Neurological: She is alert and oriented to person, place, and time. GCS score is 15. GCS eye subscore is 4. GCS verbal subscore is 5. GCS motor subscore is 6.   Skin: Skin is warm and dry. Capillary refill takes less than 2 seconds.   Psychiatric: She has a normal mood and affect. Her behavior is normal. Thought content normal.         ED Course   Procedures  Labs Reviewed - No data to display       Imaging Results    None          Medications   dexAMETHasone  injection 4 mg (has no administration in time range)   ketorolac injection 30 mg (has no administration in time range)     Medical Decision Making  Risk  Prescription drug management.                                      Clinical Impression:  Final diagnoses:  [M54.50] Acute bilateral low back pain without sciatica (Primary)          ED Disposition Condition    Discharge Stable          ED Prescriptions       Medication Sig Dispense Start Date End Date Auth. Provider    diclofenac (VOLTAREN) 75 MG EC tablet Take 1 tablet (75 mg total) by mouth 2 (two) times daily. for 5 days 10 tablet 7/1/2024 7/6/2024 Leila Saenz NP    methocarbamoL (ROBAXIN) 500 MG Tab Take 2 tablets (1,000 mg total) by mouth 2 (two) times daily as needed. 20 tablet 7/1/2024 7/6/2024 Leila Saenz NP    LIDOcaine (LIDODERM) 5 % Place 1 patch onto the skin once daily. Remove & Discard patch within 12 hours or as directed by MD for 5 days 5 patch 7/1/2024 7/6/2024 Leila Saenz NP          Follow-up Information       Follow up With Specialties Details Why Contact Info    PCP Follow UP  Schedule an appointment as soon as possible for a visit in 2 days for follow-up, for re-evaluation of today's complaint              Leila Saenz NP  07/01/24 [id number]

## 2024-07-05 ENCOUNTER — HOSPITAL ENCOUNTER (OUTPATIENT)
Dept: RADIOLOGY | Facility: HOSPITAL | Age: 64
Discharge: HOME | End: 2024-07-05
Attending: INTERNAL MEDICINE
Payer: MEDICARE

## 2024-07-05 DIAGNOSIS — M54.50 LOW BACK PAIN: ICD-10-CM

## 2024-07-05 DIAGNOSIS — M54.50 LOW BACK PAIN: Primary | ICD-10-CM

## 2024-07-10 ENCOUNTER — HOSPITAL ENCOUNTER (EMERGENCY)
Facility: HOSPITAL | Age: 64
Discharge: HOME | End: 2024-07-10
Payer: MEDICARE

## 2024-07-10 VITALS
HEIGHT: 63 IN | WEIGHT: 241 LBS | TEMPERATURE: 98 F | RESPIRATION RATE: 18 BRPM | SYSTOLIC BLOOD PRESSURE: 138 MMHG | BODY MASS INDEX: 42.7 KG/M2 | HEART RATE: 72 BPM | DIASTOLIC BLOOD PRESSURE: 87 MMHG | OXYGEN SATURATION: 97 %

## 2024-07-10 DIAGNOSIS — M54.50 ACUTE LEFT-SIDED LOW BACK PAIN WITHOUT SCIATICA: Primary | ICD-10-CM

## 2024-07-10 PROCEDURE — 25000003 PHARM REV CODE 250

## 2024-07-10 PROCEDURE — 99284 EMERGENCY DEPT VISIT MOD MDM: CPT

## 2024-07-10 RX ORDER — METHOCARBAMOL 500 MG/1
1000 TABLET, FILM COATED ORAL
Status: COMPLETED | OUTPATIENT
Start: 2024-07-10 | End: 2024-07-10

## 2024-07-10 RX ORDER — KETOROLAC TROMETHAMINE 10 MG/1
10 TABLET, FILM COATED ORAL
Status: COMPLETED | OUTPATIENT
Start: 2024-07-10 | End: 2024-07-10

## 2024-07-10 RX ORDER — DICLOFENAC SODIUM 10 MG/G
1 GEL TOPICAL 2 TIMES DAILY
Qty: 20 G | Refills: 0 | Status: SHIPPED | OUTPATIENT
Start: 2024-07-10 | End: 2024-08-02

## 2024-07-10 RX ORDER — METHOCARBAMOL 500 MG/1
1000 TABLET, FILM COATED ORAL 3 TIMES DAILY
Qty: 30 TABLET | Refills: 0 | Status: SHIPPED | OUTPATIENT
Start: 2024-07-10 | End: 2024-07-15

## 2024-07-10 RX ADMIN — METHOCARBAMOL 1000 MG: 500 TABLET ORAL at 09:07

## 2024-07-10 RX ADMIN — KETOROLAC TROMETHAMINE 10 MG: 10 TABLET, FILM COATED ORAL at 09:07

## 2024-07-10 NOTE — DISCHARGE INSTRUCTIONS
Please follow-up with your PCP.  I have also placed a referral for pain management.  The referral will go to [facility].

## 2024-07-10 NOTE — ED PROVIDER NOTES
Encounter Date: 7/10/2024       History     Chief Complaint   Patient presents with    Back Pain     Complains of left lower back pain x 3 weeks. Denies injury. Saw PCP and was prescribed norco. States its not helping. Denies urinary symptoms.     This note is dictated on M*Modal word recognition program.  There are word recognition mistakes and grammatical errors that are occasionally missed on review.     Vero Allen is a 64 y.o. female presents to ER today with complaints of left lower back pain for the past few weeks now.  Patient reports she thinks she pulled something in her back while she was cleaning house.  She was seen by her PCP on July 5th and prescribe Jacob.  Patient reports Norco is not helping the pain much.  Patient denies any bowel or bladder dysfunction denies saddle anesthesia.  Rates pain 7/10 to left lower back.  X-ray of lumbar spine within epic system reviewed from June 25th.  No acute fracture or dislocations.      The history is provided by the patient.     Review of patient's allergies indicates:  No Known Allergies  Past Medical History:   Diagnosis Date    Anxiety     Breast cancer 2010    Cancer     Breast    COPD (chronic obstructive pulmonary disease)     Depression     Diabetes mellitus     GERD (gastroesophageal reflux disease)     Hypertension     Insomnia     Thyroid disease      Past Surgical History:   Procedure Laterality Date    BLADDER SUSPENSION      BREAST LUMPECTOMY      Right breast    CHOLECYSTECTOMY      HYSTERECTOMY      KNEE ARTHROSCOPY      Right knee    OOPHORECTOMY       Family History   Problem Relation Name Age of Onset    No Known Problems Mother      No Known Problems Father      No Known Problems Sister      No Known Problems Daughter      No Known Problems Maternal Aunt      No Known Problems Maternal Uncle      No Known Problems Paternal Aunt      No Known Problems Paternal Uncle      No Known Problems Maternal Grandmother      No Known Problems  Maternal Grandfather      No Known Problems Paternal Grandmother      No Known Problems Paternal Grandfather      Breast cancer Neg Hx      Ovarian cancer Neg Hx      BRCA 1/2 Neg Hx       Social History     Tobacco Use    Smoking status: Every Day     Current packs/day: 0.50     Average packs/day: 0.5 packs/day for 50.2 years (25.1 ttl pk-yrs)     Types: Cigarettes     Start date: 5/15/1974     Passive exposure: Past    Smokeless tobacco: Never    Tobacco comments:     quit yesterday 6/12/21   Substance Use Topics    Alcohol use: Not Currently    Drug use: Never     Review of Systems   Musculoskeletal:  Positive for arthralgias and back pain.   All other systems reviewed and are negative.      Physical Exam     Initial Vitals [07/10/24 0911]   BP Pulse Resp Temp SpO2   (!) 169/98 84 18 97.6 °F (36.4 °C) 96 %      MAP       --         Physical Exam    Constitutional: She appears well-developed.   HENT:   Head: Normocephalic and atraumatic.   Eyes: Pupils are equal, round, and reactive to light.   Neck:   Normal range of motion.  Cardiovascular:  Normal rate and regular rhythm.           No murmur heard.  Pulmonary/Chest: Breath sounds normal. No respiratory distress.   Abdominal: Abdomen is soft.   Musculoskeletal:         General: Tenderness (patient has tenderness to left lumbar region.) present.      Cervical back: Normal range of motion.     Neurological: She is oriented to person, place, and time. GCS score is 15. GCS eye subscore is 4. GCS verbal subscore is 5. GCS motor subscore is 6.   Skin: Capillary refill takes less than 2 seconds.   Psychiatric: Her behavior is normal. Thought content normal.         ED Course   Procedures  Labs Reviewed - No data to display       Imaging Results    None          Medications   methocarbamoL tablet 1,000 mg (1,000 mg Oral Given 7/10/24 0927)   ketorolac tablet 10 mg (10 mg Oral Given 7/10/24 0926)     Medical Decision Making  Differential diagnosis includes lumbar strain,  sciatica, muscle spasms, osteoarthritis of back    Patient's symptoms most consistent with lumbar strain/muscle spasm of lower back.  Will place patient on Robaxin/Voltaren gel  Patient currently has Silvis prescription from PCP.  Physical exam reveals no saddle anesthesia no bowel or bladder dysfunction.  No signs of cauda equina syndrome.  Placed a ambulatory outpatient referral for pain management for patient  Patient stable at time of discharge in no acute distress.  No life-threatening illnesses were found during ER visit today.  Patient was instructed to follow-up with PCP or other recommended specialist within the next 48-72 hours.  Patient was instructed to return to ER immediately for any worsening or concerning symptoms.  All discharge instructions discussed with patient, and patient agrees to comply with discharge instructions given today.       Risk  Prescription drug management.                                      Clinical Impression:  Final diagnoses:  [M54.50] Acute left-sided low back pain without sciatica (Primary)          ED Disposition Condition    Discharge Stable          ED Prescriptions       Medication Sig Dispense Start Date End Date Auth. Provider    methocarbamoL (ROBAXIN) 500 MG Tab Take 2 tablets (1,000 mg total) by mouth 3 (three) times daily. for 5 days 30 tablet 7/10/2024 7/15/2024 Tyler Sanz NP    diclofenac sodium (VOLTAREN ARTHRITIS PAIN) 1 % Gel Apply 1 g topically 2 (two) times daily. Applied to left lower back where pain is present. for 7 days 20 g 7/10/2024 7/17/2024 Tyler Sanz NP          Follow-up Information       Follow up With Specialties Details Why Contact Info    [facility] Schedule an appointment as soon as possible for a visit in 1 week  [facility] [facility] [address]  [phone]             Tyler Sanz NP  07/10/24 0963

## 2024-07-31 ENCOUNTER — HOSPITAL ENCOUNTER (OUTPATIENT)
Dept: RADIOLOGY | Facility: HOSPITAL | Age: 64
Discharge: HOME | End: 2024-07-31
Attending: INTERNAL MEDICINE
Payer: MEDICARE

## 2024-07-31 DIAGNOSIS — M25.561 PAIN IN RIGHT KNEE: ICD-10-CM

## 2024-07-31 DIAGNOSIS — M25.561 PAIN IN RIGHT KNEE: Primary | ICD-10-CM

## 2024-07-31 PROCEDURE — 73562 X-RAY EXAM OF KNEE 3: CPT | Mod: TC,RT

## 2024-08-02 ENCOUNTER — OFFICE VISIT (OUTPATIENT)
Dept: OBSTETRICS AND GYNECOLOGY | Facility: CLINIC | Age: 64
End: 2024-08-02
Payer: MEDICARE

## 2024-08-02 VITALS
DIASTOLIC BLOOD PRESSURE: 71 MMHG | HEIGHT: 63 IN | WEIGHT: 230 LBS | HEART RATE: 86 BPM | SYSTOLIC BLOOD PRESSURE: 139 MMHG | BODY MASS INDEX: 40.75 KG/M2

## 2024-08-02 DIAGNOSIS — Z90.710 HISTORY OF HYSTERECTOMY: ICD-10-CM

## 2024-08-02 DIAGNOSIS — E11.69 TYPE 2 DIABETES MELLITUS WITH OTHER SPECIFIED COMPLICATION, WITHOUT LONG-TERM CURRENT USE OF INSULIN: ICD-10-CM

## 2024-08-02 DIAGNOSIS — N95.1 MENOPAUSAL SYMPTOMS: Primary | ICD-10-CM

## 2024-08-02 DIAGNOSIS — I10 PRIMARY HYPERTENSION: ICD-10-CM

## 2024-08-02 PROCEDURE — 3078F DIAST BP <80 MM HG: CPT | Mod: CPTII,S$GLB,, | Performed by: OBSTETRICS & GYNECOLOGY

## 2024-08-02 PROCEDURE — 3044F HG A1C LEVEL LT 7.0%: CPT | Mod: CPTII,S$GLB,, | Performed by: OBSTETRICS & GYNECOLOGY

## 2024-08-02 PROCEDURE — 3008F BODY MASS INDEX DOCD: CPT | Mod: CPTII,S$GLB,, | Performed by: OBSTETRICS & GYNECOLOGY

## 2024-08-02 PROCEDURE — 99213 OFFICE O/P EST LOW 20 MIN: CPT | Mod: S$GLB,,, | Performed by: OBSTETRICS & GYNECOLOGY

## 2024-08-02 PROCEDURE — 99999 PR PBB SHADOW E&M-EST. PATIENT-LVL III: CPT | Mod: PBBFAC,,, | Performed by: OBSTETRICS & GYNECOLOGY

## 2024-08-02 PROCEDURE — 3075F SYST BP GE 130 - 139MM HG: CPT | Mod: CPTII,S$GLB,, | Performed by: OBSTETRICS & GYNECOLOGY

## 2024-08-02 RX ORDER — ALPRAZOLAM 0.5 MG/1
TABLET ORAL
Status: ON HOLD | COMMUNITY
Start: 2024-07-22

## 2024-08-02 RX ORDER — ESTRADIOL 0.5 MG/1
0.5 TABLET ORAL DAILY
Qty: 30 TABLET | Refills: 11 | Status: SHIPPED | OUTPATIENT
Start: 2024-08-02 | End: 2024-08-26

## 2024-08-02 RX ORDER — METHOCARBAMOL 750 MG/1
750 TABLET, FILM COATED ORAL
Status: ON HOLD | COMMUNITY
Start: 2024-08-01

## 2024-08-02 RX ORDER — BUDESONIDE, GLYCOPYRROLATE, AND FORMOTEROL FUMARATE 160; 9; 4.8 UG/1; UG/1; UG/1
AEROSOL, METERED RESPIRATORY (INHALATION)
Status: ON HOLD | COMMUNITY
Start: 2024-03-05

## 2024-08-02 RX ORDER — CITALOPRAM 40 MG/1
TABLET, FILM COATED ORAL
Status: ON HOLD | COMMUNITY
Start: 2024-07-06

## 2024-08-02 RX ORDER — RISPERIDONE 3 MG/1
TABLET ORAL
Status: ON HOLD | COMMUNITY
Start: 2024-05-29

## 2024-08-02 NOTE — PROGRESS NOTES
Subjective:    Patient ID: Vero Allen is a 64 y.o. y.o. female    Chief Complaint:   Chief Complaint   Patient presents with    gyn f/u     Hormone issues. C/o night sweats and hot flashes.        History of Present Illness:  Vero presents today for discussion of hormone issues.  She complains of night sweats and hot flashes.  She is interested in hormone replacement therapy to help with her symptoms.  She has a hysterectomy      Review of Systems   Constitutional:  Negative for chills and fever.   Respiratory:  Negative for shortness of breath.    Cardiovascular:  Negative for chest pain.   Gastrointestinal:  Negative for constipation.   Genitourinary:  Positive for hot flashes.   Neurological:  Negative for headaches.         Objective:    Vital Signs:  Vitals:    08/02/24 1048   BP: 139/71   Pulse: 86     Wt Readings from Last 1 Encounters:   08/02/24 104.3 kg (230 lb)     Body mass index is 40.74 kg/m².    Physical Exam:  General:  alert, no distress   Skin:  Skin color, texture, turgor normal. No rashes or lesions   Abdomen:  Soft, nontender   Extremities: No cyanosis, clubbing, edema         Use and side effects of replacement therapy discussed with patient and she desires to proceed.  All questions answered.    I spent a total of 20 minutes on the day of the visit.  This includes face to face time and non-face to face time preparing to see the patient (eg, review of tests), obtaining and/or reviewing separately obtained history, documenting clinical information in the electronic or other health record, independently interpreting results and communicating results to the patient/family/caregiver, or care coordinator.         Assessment:      1. Menopausal symptoms    2. History of hysterectomy    3. Primary hypertension    4. Type 2 diabetes mellitus with other specified complication, without long-term current use of insulin          Plan:      Menopausal symptoms  -     estradioL (ESTRACE) 0.5  MG tablet; Take 1 tablet (0.5 mg total) by mouth once daily.  Dispense: 30 tablet; Refill: 11    History of hysterectomy  -     estradioL (ESTRACE) 0.5 MG tablet; Take 1 tablet (0.5 mg total) by mouth once daily.  Dispense: 30 tablet; Refill: 11    Primary hypertension    Type 2 diabetes mellitus with other specified complication, without long-term current use of insulin           Sagrario Carrera MD, FACOG   08/02/2024 11:08 AM

## 2024-08-04 ENCOUNTER — HOSPITAL ENCOUNTER (EMERGENCY)
Facility: HOSPITAL | Age: 64
Discharge: HOME | End: 2024-08-04
Attending: EMERGENCY MEDICINE
Payer: MEDICARE

## 2024-08-04 VITALS
BODY MASS INDEX: 40.75 KG/M2 | OXYGEN SATURATION: 98 % | HEIGHT: 63 IN | TEMPERATURE: 97 F | RESPIRATION RATE: 20 BRPM | HEART RATE: 81 BPM | DIASTOLIC BLOOD PRESSURE: 51 MMHG | SYSTOLIC BLOOD PRESSURE: 111 MMHG | WEIGHT: 230 LBS

## 2024-08-04 DIAGNOSIS — M25.561 CHRONIC PAIN OF RIGHT KNEE: Primary | ICD-10-CM

## 2024-08-04 DIAGNOSIS — G89.29 CHRONIC PAIN OF RIGHT KNEE: Primary | ICD-10-CM

## 2024-08-04 PROCEDURE — 63600175 PHARM REV CODE 636 W HCPCS: Performed by: CLINICAL NURSE SPECIALIST

## 2024-08-04 PROCEDURE — 99284 EMERGENCY DEPT VISIT MOD MDM: CPT | Mod: 25

## 2024-08-04 PROCEDURE — 96372 THER/PROPH/DIAG INJ SC/IM: CPT | Performed by: CLINICAL NURSE SPECIALIST

## 2024-08-04 PROCEDURE — 25000003 PHARM REV CODE 250: Performed by: CLINICAL NURSE SPECIALIST

## 2024-08-04 RX ORDER — CYCLOBENZAPRINE HCL 10 MG
10 TABLET ORAL
Status: COMPLETED | OUTPATIENT
Start: 2024-08-04 | End: 2024-08-04

## 2024-08-04 RX ORDER — KETOROLAC TROMETHAMINE 10 MG/1
10 TABLET, FILM COATED ORAL EVERY 6 HOURS
Qty: 20 TABLET | Refills: 0 | Status: SHIPPED | OUTPATIENT
Start: 2024-08-04 | End: 2024-08-09

## 2024-08-04 RX ORDER — KETOROLAC TROMETHAMINE 30 MG/ML
30 INJECTION, SOLUTION INTRAMUSCULAR; INTRAVENOUS ONCE
Status: COMPLETED | OUTPATIENT
Start: 2024-08-04 | End: 2024-08-04

## 2024-08-04 RX ORDER — CYCLOBENZAPRINE HCL 10 MG
10 TABLET ORAL 3 TIMES DAILY PRN
Qty: 15 TABLET | Refills: 0 | Status: SHIPPED | OUTPATIENT
Start: 2024-08-04 | End: 2024-08-09

## 2024-08-04 RX ORDER — DEXAMETHASONE SODIUM PHOSPHATE 4 MG/ML
8 INJECTION, SOLUTION INTRA-ARTICULAR; INTRALESIONAL; INTRAMUSCULAR; INTRAVENOUS; SOFT TISSUE
Status: DISCONTINUED | OUTPATIENT
Start: 2024-08-04 | End: 2024-08-04

## 2024-08-04 RX ADMIN — CYCLOBENZAPRINE HYDROCHLORIDE 10 MG: 10 TABLET, FILM COATED ORAL at 09:08

## 2024-08-04 RX ADMIN — KETOROLAC TROMETHAMINE 30 MG: 30 INJECTION, SOLUTION INTRAMUSCULAR at 09:08

## 2024-08-24 DIAGNOSIS — N95.1 MENOPAUSAL SYMPTOMS: ICD-10-CM

## 2024-08-24 DIAGNOSIS — Z90.710 HISTORY OF HYSTERECTOMY: ICD-10-CM

## 2024-08-26 RX ORDER — ESTRADIOL 0.5 MG/1
0.5 TABLET ORAL
Qty: 90 TABLET | Refills: 4 | Status: SHIPPED | OUTPATIENT
Start: 2024-08-26

## 2024-09-20 ENCOUNTER — HOSPITAL ENCOUNTER (EMERGENCY)
Facility: HOSPITAL | Age: 64
Discharge: HOME | End: 2024-09-20
Attending: EMERGENCY MEDICINE
Payer: MEDICARE

## 2024-09-20 VITALS
OXYGEN SATURATION: 98 % | WEIGHT: 235.19 LBS | RESPIRATION RATE: 18 BRPM | TEMPERATURE: 98 F | HEART RATE: 69 BPM | DIASTOLIC BLOOD PRESSURE: 74 MMHG | SYSTOLIC BLOOD PRESSURE: 141 MMHG | BODY MASS INDEX: 41.67 KG/M2 | HEIGHT: 63 IN

## 2024-09-20 DIAGNOSIS — Z72.0 TOBACCO USE: ICD-10-CM

## 2024-09-20 DIAGNOSIS — J44.1 COPD EXACERBATION: Primary | ICD-10-CM

## 2024-09-20 PROCEDURE — 99284 EMERGENCY DEPT VISIT MOD MDM: CPT | Mod: 25

## 2024-09-20 PROCEDURE — 94640 AIRWAY INHALATION TREATMENT: CPT | Mod: XB

## 2024-09-20 PROCEDURE — 94761 N-INVAS EAR/PLS OXIMETRY MLT: CPT

## 2024-09-20 PROCEDURE — 99900031 HC PATIENT EDUCATION (STAT)

## 2024-09-20 PROCEDURE — 63600175 PHARM REV CODE 636 W HCPCS: Performed by: EMERGENCY MEDICINE

## 2024-09-20 PROCEDURE — 99900035 HC TECH TIME PER 15 MIN (STAT)

## 2024-09-20 PROCEDURE — 25000242 PHARM REV CODE 250 ALT 637 W/ HCPCS: Performed by: EMERGENCY MEDICINE

## 2024-09-20 RX ORDER — PREDNISONE 50 MG/1
50 TABLET ORAL DAILY
Qty: 4 TABLET | Refills: 0 | Status: SHIPPED | OUTPATIENT
Start: 2024-09-21 | End: 2024-09-25

## 2024-09-20 RX ORDER — IPRATROPIUM BROMIDE AND ALBUTEROL SULFATE 2.5; .5 MG/3ML; MG/3ML
3 SOLUTION RESPIRATORY (INHALATION)
Status: COMPLETED | OUTPATIENT
Start: 2024-09-20 | End: 2024-09-20

## 2024-09-20 RX ADMIN — IPRATROPIUM BROMIDE AND ALBUTEROL SULFATE 3 ML: 2.5; .5 SOLUTION RESPIRATORY (INHALATION) at 03:09

## 2024-09-20 RX ADMIN — PREDNISONE 50 MG: 20 TABLET ORAL at 03:09

## 2024-09-20 NOTE — ED PROVIDER NOTES
EMERGENCY DEPARTMENT HISTORY AND PHYSICAL EXAM     This note is dictated on M*Modal word recognition program.  There are word recognition mistakes and grammatical errors that are occasionally missed on review.     Date: 9/20/2024   Patient Name: Vero Allen       History of Presenting Illness      Chief Complaint   Patient presents with    Shortness of Breath     Pt arrived POV reporting SOB and cough, x 1 day. Denies being exposed to anyone sick. pt reports typically smoking half a pack of cigarettes a day. hx COPD. Reports having neb treatments at home, but denies using any prior to arrival        0322   Vero Allen is a 64 y.o. female with PMHX of COPD, tobacco use who presents to the emergency department C/O shortness of breath.    Patient reports shortness of breath that began tonight.  She reports she is missing the mouth pec to her nebulizer so did not use it tonight.  No fever.  No productive cough.  No sick contacts.  No chest pain.      PCP: Burke Merchant MD        Current Facility-Administered Medications   Medication Dose Route Frequency Provider Last Rate Last Admin    albuterol-ipratropium 2.5 mg-0.5 mg/3 mL nebulizer solution 3 mL  3 mL Nebulization Q5 Min Henry Kapadia MD        predniSONE tablet 50 mg  50 mg Oral ED 1 Time Henry Kapadia MD         Current Outpatient Medications   Medication Sig Dispense Refill    albuterol (PROVENTIL/VENTOLIN HFA) 90 mcg/actuation inhaler Inhale 1-2 puffs into the lungs every 4 (four) hours as needed for Wheezing or Shortness of Breath. Rescue 8 g 1    albuterol-ipratropium (DUO-NEB) 2.5 mg-0.5 mg/3 mL nebulizer solution Take 3 mLs by nebulization every 4 (four) hours as needed for Wheezing or Shortness of Breath. Rescue 75 mL 0    albuterol-ipratropium (DUO-NEB) 2.5 mg-0.5 mg/3 mL nebulizer solution Take 3 mLs by nebulization every 4 (four) hours as needed for Wheezing. Rescue 75 mL 0    ALPRAZolam (XANAX) 0.5 MG  tablet MAY FILL 7/20/2024. TAKE 1 TABLET BY MOUTH EVERY 8 HOURS      amLODIPine (NORVASC) 10 MG tablet Take 10 mg by mouth.      aspirin (ECOTRIN) 81 MG EC tablet Take 81 mg by mouth once daily.      BREZTRI AEROSPHERE 160-9-4.8 mcg/actuation HFAA Inhale into the lungs.      citalopram (CELEXA) 40 MG tablet Take by mouth.      esomeprazole (NEXIUM) 20 MG capsule Take 20 mg by mouth before breakfast.      estradioL (ESTRACE) 0.5 MG tablet TAKE 1 TABLET BY MOUTH ONCE DAILY. 90 tablet 4    levothyroxine (SYNTHROID) 50 MCG tablet Take 50 mcg by mouth before breakfast.      methocarbamoL (ROBAXIN) 750 MG Tab Take 750 mg by mouth.      [START ON 9/21/2024] predniSONE (DELTASONE) 50 MG Tab Take 1 tablet (50 mg total) by mouth once daily. for 4 days 4 tablet 0    risperiDONE (RISPERDAL) 3 MG Tab              Past History     Past Medical History:   Past Medical History:   Diagnosis Date    Anxiety     Breast cancer 2010    Cancer     Breast    COPD (chronic obstructive pulmonary disease)     Depression     Diabetes mellitus     GERD (gastroesophageal reflux disease)     Hypertension     Insomnia     Thyroid disease         Past Surgical History:   Past Surgical History:   Procedure Laterality Date    BLADDER SUSPENSION      BREAST LUMPECTOMY      Right breast    CHOLECYSTECTOMY      HYSTERECTOMY      KNEE ARTHROSCOPY      Right knee    OOPHORECTOMY          Family History:   Family History   Problem Relation Name Age of Onset    No Known Problems Mother      No Known Problems Father      No Known Problems Sister      No Known Problems Daughter      No Known Problems Maternal Aunt      No Known Problems Maternal Uncle      No Known Problems Paternal Aunt      No Known Problems Paternal Uncle      No Known Problems Maternal Grandmother      No Known Problems Maternal Grandfather      No Known Problems Paternal Grandmother      No Known Problems Paternal Grandfather      Breast cancer Neg  "Hx      Ovarian cancer Neg Hx      BRCA 1/2 Neg Hx          Social History:   Social History     Tobacco Use    Smoking status: Every Day     Current packs/day: 0.50     Average packs/day: 0.5 packs/day for 50.4 years (25.2 ttl pk-yrs)     Types: Cigarettes     Start date: 5/15/1974     Passive exposure: Past    Smokeless tobacco: Never    Tobacco comments:     quit yesterday 6/12/21   Substance Use Topics    Alcohol use: Not Currently    Drug use: Never        Allergies:   Review of patient's allergies indicates:  No Known Allergies       Review of Systems   Review of Systems   See HPI for pertinent positives and negatives       Physical Exam     Vitals:    09/20/24 0310   BP: 136/78   BP Location: Left arm   Patient Position: Sitting   Pulse: 81   Resp: 18   Temp: 97.6 °F (36.4 °C)   TempSrc: Axillary   SpO2: 96%   Weight: 106.7 kg (235 lb 3.2 oz)   Height: 5' 3" (1.6 m)      Physical Exam  Vitals and nursing note reviewed.   Constitutional:       General: She is not in acute distress.     Appearance: Normal appearance. She is not ill-appearing.   HENT:      Head: Normocephalic and atraumatic.      Right Ear: External ear normal.      Left Ear: External ear normal.      Nose: Nose normal. No congestion or rhinorrhea.      Mouth/Throat:      Mouth: Mucous membranes are moist.   Eyes:      Conjunctiva/sclera: Conjunctivae normal.      Pupils: Pupils are equal, round, and reactive to light.   Pulmonary:      Effort: Pulmonary effort is normal. No respiratory distress.      Breath sounds: Decreased breath sounds present.   Musculoskeletal:         General: No deformity. Normal range of motion.      Cervical back: Normal range of motion. No rigidity.   Skin:     General: Skin is dry.   Neurological:      General: No focal deficit present.      Mental Status: She is alert and oriented to person, place, and time. Mental status is at baseline.   Psychiatric:         Mood and Affect: Mood normal.         Behavior: " Behavior normal.              Diagnostic Study Results      Labs -   No results found for this or any previous visit (from the past 12 hour(s)).     Radiologic Studies -    No orders to display        Medications given in the ED-   Medications   albuterol-ipratropium 2.5 mg-0.5 mg/3 mL nebulizer solution 3 mL (has no administration in time range)   predniSONE tablet 50 mg (has no administration in time range)           Medical Decision Making    I am the first provider for this patient.     I reviewed the vital signs, available nursing notes, past medical history, past surgical history, family history and social history.     Vital Signs:  Reviewed the patient's vital signs.     Pulse Oximetry Analysis and Interpretation:    96% on Room Air, normal        External Test Results (Pertinent to encounter):    Records Reviewed: Nursing Notes, Current Prescription Medications, Old Medical Records, and Previous Radiology Studies    History Obtained By: Patient    Provider Notes: Vero Allen is a 64 y.o. female with shortness of breath    Co-morbidities Considered:  COPD, tobacco use    Differential Diagnosis:  COPD exacerbation, URI, pneumonia      ED Course:    Patient presents with shortness of breath.  Well known to emergency department for this problem.  Not in respiratory distress in the ED.  Out of equipment at home.  Diminished on exam but satting adequately on room air with normal work of breathing.  Vital signs within normal limits.  Evaluation not suspicious for acute bacterial infection/pneumonia.  Will treat symptomatically.    SMOKING CESSATION:   3:25 AM     The patient was counseled on the dangers of tobacco use, and was?advised to quit. ?Reviewed strategies to maximize success, including removing cigarettes and smoking materials from environment and written materials. Discussion took?3-5?minutes, and patient expressed understanding.           Problems Addressed:  COPD exacerbation    Procedures:    Procedures       Diagnosis and Disposition     Critical Care:      DISCHARGE NOTE:       Vero Allen's  results have been reviewed with her.  She has been counseled regarding her diagnosis, treatment, and plan.  She verbally conveys understanding and agreement of the signs, symptoms, diagnosis, treatment and prognosis and additionally agrees to follow up as discussed.  She also agrees with the care-plan and conveys that all of her questions have been answered.  I have also provided discharge instructions for her that include: educational information regarding their diagnosis and treatment, and list of reasons why they would want to return to the ED prior to their follow-up appointment, should her condition change. She has been provided with education for proper emergency department utilization.         CLINICAL IMPRESSION:         1. COPD exacerbation    2. Tobacco use              PLAN:   1. Discharge Home  2.      Medication List        START taking these medications      predniSONE 50 MG Tab  Commonly known as: DELTASONE  Take 1 tablet (50 mg total) by mouth once daily. for 4 days  Start taking on: September 21, 2024            ASK your doctor about these medications      albuterol 90 mcg/actuation inhaler  Commonly known as: PROVENTIL/VENTOLIN HFA  Inhale 1-2 puffs into the lungs every 4 (four) hours as needed for Wheezing or Shortness of Breath. Rescue     * albuterol-ipratropium 2.5 mg-0.5 mg/3 mL nebulizer solution  Commonly known as: DUO-NEB  Take 3 mLs by nebulization every 4 (four) hours as needed for Wheezing or Shortness of Breath. Rescue     * albuterol-ipratropium 2.5 mg-0.5 mg/3 mL nebulizer solution  Commonly known as: DUO-NEB  Take 3 mLs by nebulization every 4 (four) hours as needed for Wheezing. Rescue     ALPRAZolam 0.5 MG tablet  Commonly known as: XANAX     amLODIPine 10 MG tablet  Commonly known as: NORVASC     aspirin 81 MG EC tablet  Commonly known as: ECOTRIN     BREZTRI  AEROSPHERE 160-9-4.8 mcg/actuation [facility]  Generic drug: budesonide-glycopyr-formoterol     citalopram 40 MG tablet  Commonly known as: CeleXA     esomeprazole 20 MG capsule  Commonly known as: NEXIUM     estradioL 0.5 MG tablet  Commonly known as: ESTRACE  TAKE 1 TABLET BY MOUTH ONCE DAILY.     levothyroxine 50 MCG tablet  Commonly known as: SYNTHROID     methocarbamoL 750 MG Tab  Commonly known as: ROBAXIN     risperiDONE 3 MG Tab  Commonly known as: RISPERDAL           * This list has 2 medication(s) that are the same as other medications prescribed for you. Read the directions carefully, and ask your doctor or other care provider to review them with you.                   Where to Get Your Medications        These medications were sent to [address]  [address] [facility] [address] [address]      Phone: [phone]   predniSONE 50 MG Tab        3. Burke Merchant MD  [address] [address]  [phone]    Schedule an appointment as soon as possible for a visit   Primary care follow up    [facility] Emergency Department  [address] [facility] [address] [phone]  Go to   If symptoms worsen       _______________________________     Please note that this dictation was completed with M*Handup, the computer voice recognition software.  Quite often unanticipated grammatical, syntax, homophones, and other interpretive errors are inadvertently transcribed by the computer software.  Please disregard these errors.  Please excuse any errors that have escaped final proofreading.             Henry Kapadia MD  09/20/24 0325

## 2024-09-24 ENCOUNTER — HOSPITAL ENCOUNTER (OUTPATIENT)
Dept: RADIOLOGY | Facility: HOSPITAL | Age: 64
Discharge: HOME | End: 2024-09-24
Attending: INTERNAL MEDICINE
Payer: MEDICARE

## 2024-09-24 DIAGNOSIS — J44.1 CHRONIC OBSTRUCTIVE PULMONARY DISEASE WITH ACUTE EXACERBATION: ICD-10-CM

## 2024-09-24 DIAGNOSIS — J44.1 CHRONIC OBSTRUCTIVE PULMONARY DISEASE WITH ACUTE EXACERBATION: Primary | ICD-10-CM

## 2024-09-24 PROCEDURE — 71046 X-RAY EXAM CHEST 2 VIEWS: CPT | Mod: TC

## 2024-10-02 ENCOUNTER — OFFICE VISIT (OUTPATIENT)
Dept: OBSTETRICS AND GYNECOLOGY | Facility: CLINIC | Age: 64
End: 2024-10-02
Payer: MEDICARE

## 2024-10-02 VITALS
BODY MASS INDEX: 42.35 KG/M2 | HEIGHT: 63 IN | DIASTOLIC BLOOD PRESSURE: 58 MMHG | HEART RATE: 72 BPM | SYSTOLIC BLOOD PRESSURE: 119 MMHG | WEIGHT: 239 LBS

## 2024-10-02 DIAGNOSIS — N95.1 MENOPAUSAL SYMPTOMS: Primary | ICD-10-CM

## 2024-10-02 DIAGNOSIS — E11.69 TYPE 2 DIABETES MELLITUS WITH OTHER SPECIFIED COMPLICATION, WITHOUT LONG-TERM CURRENT USE OF INSULIN: ICD-10-CM

## 2024-10-02 DIAGNOSIS — I10 PRIMARY HYPERTENSION: ICD-10-CM

## 2024-10-02 DIAGNOSIS — Z12.31 SCREENING MAMMOGRAM, ENCOUNTER FOR: ICD-10-CM

## 2024-10-02 DIAGNOSIS — Z90.710 HISTORY OF HYSTERECTOMY: ICD-10-CM

## 2024-10-02 DIAGNOSIS — Z85.3 HISTORY OF BREAST CANCER: ICD-10-CM

## 2024-10-02 PROCEDURE — 1159F MED LIST DOCD IN RCRD: CPT | Mod: CPTII,S$GLB,, | Performed by: OBSTETRICS & GYNECOLOGY

## 2024-10-02 PROCEDURE — 99999 PR PBB SHADOW E&M-EST. PATIENT-LVL III: CPT | Mod: PBBFAC,,, | Performed by: OBSTETRICS & GYNECOLOGY

## 2024-10-02 PROCEDURE — 99213 OFFICE O/P EST LOW 20 MIN: CPT | Mod: S$GLB,,, | Performed by: OBSTETRICS & GYNECOLOGY

## 2024-10-02 PROCEDURE — 1160F RVW MEDS BY RX/DR IN RCRD: CPT | Mod: CPTII,S$GLB,, | Performed by: OBSTETRICS & GYNECOLOGY

## 2024-10-02 PROCEDURE — 3008F BODY MASS INDEX DOCD: CPT | Mod: CPTII,S$GLB,, | Performed by: OBSTETRICS & GYNECOLOGY

## 2024-10-02 PROCEDURE — 3074F SYST BP LT 130 MM HG: CPT | Mod: CPTII,S$GLB,, | Performed by: OBSTETRICS & GYNECOLOGY

## 2024-10-02 PROCEDURE — 3044F HG A1C LEVEL LT 7.0%: CPT | Mod: CPTII,S$GLB,, | Performed by: OBSTETRICS & GYNECOLOGY

## 2024-10-02 PROCEDURE — 3078F DIAST BP <80 MM HG: CPT | Mod: CPTII,S$GLB,, | Performed by: OBSTETRICS & GYNECOLOGY

## 2024-10-02 RX ORDER — METFORMIN HYDROCHLORIDE 750 MG/1
TABLET, EXTENDED RELEASE ORAL
Status: ON HOLD | COMMUNITY
Start: 2024-08-09

## 2024-10-02 RX ORDER — CELECOXIB 200 MG/1
200 CAPSULE ORAL
Status: ON HOLD | COMMUNITY
Start: 2024-09-06

## 2024-10-02 RX ORDER — ESTRADIOL 1 MG/1
1 TABLET ORAL DAILY
Qty: 90 TABLET | Refills: 3 | Status: ON HOLD | OUTPATIENT
Start: 2024-10-02 | End: 2025-10-02

## 2024-10-02 RX ORDER — CLONIDINE HYDROCHLORIDE 0.1 MG/1
0.1 TABLET ORAL NIGHTLY
Status: ON HOLD | COMMUNITY
Start: 2024-09-18

## 2024-10-02 NOTE — PROGRESS NOTES
Subjective:    Patient ID: Vero Allen is a 64 y.o. y.o. female    Chief Complaint:   Chief Complaint   Patient presents with    Follow-up     HRT f/u. States she still has hot flashes        History of Present Illness:  Vero presents today for follow up of hormone replacement therapy.  Patient states that the medication is helping some but she still has hot flashes.  She would like an increase of the dosage.    Patient is also asking for order for mammogram.  She states that her doctor in [place] was supposed to order it, but she has not gotten the paperwork yet.  She wants to have the mammogram performed at [facility].      Review of Systems   Constitutional:  Negative for chills and fever.   Respiratory:  Negative for shortness of breath.    Cardiovascular:  Negative for chest pain.   Gastrointestinal:  Negative for constipation.   Genitourinary:  Positive for hot flashes.   Neurological:  Negative for headaches.         Objective:    Vital Signs:  Vitals:    10/02/24 1014   BP: (!) 119/58   Pulse: 72     Wt Readings from Last 1 Encounters:   10/02/24 108.4 kg (239 lb)     Body mass index is 42.34 kg/m².    Physical Exam:  General:  alert, no distress   Skin:  Skin color, texture, turgor normal. No rashes or lesions   Abdomen:  Soft, nontender   Extremities: No cyanosis, clubbing, edema       Use and side effects of hormone replacement therapy reiterated and all questions answered.  Will order mammogram per her request as she is anxious to have it completed    I spent a total of 20 minutes on the day of the visit.  This includes face to face time and non-face to face time preparing to see the patient (eg, review of tests), obtaining and/or reviewing separately obtained history, documenting clinical information in the electronic or other health record, independently interpreting results and communicating results to the patient/family/caregiver, or care coordinator.         Assessment:       1. Menopausal symptoms    2. History of hysterectomy    3. Primary hypertension    4. Type 2 diabetes mellitus with other specified complication, without long-term current use of insulin    5. History of breast cancer    6. Screening mammogram, encounter for          Plan:      Menopausal symptoms  -     estradioL (ESTRACE) 1 MG tablet; Take 1 tablet (1 mg total) by mouth once daily.  Dispense: 90 tablet; Refill: 3    History of hysterectomy  -     estradioL (ESTRACE) 1 MG tablet; Take 1 tablet (1 mg total) by mouth once daily.  Dispense: 90 tablet; Refill: 3    Primary hypertension    Type 2 diabetes mellitus with other specified complication, without long-term current use of insulin    History of breast cancer  -     Mammo Digital Screening Bilat w/ Silviano; Future; Expected date: 10/02/2024    Screening mammogram, encounter for  -     Mammo Digital Screening Bilat w/ Silviano; Future; Expected date: 10/02/2024           Sagrario Carrera MD, FACOG   10/02/2024 10:27 AM

## 2024-10-03 ENCOUNTER — HOSPITAL ENCOUNTER (EMERGENCY)
Facility: HOSPITAL | Age: 64
Discharge: HOME | End: 2024-10-04
Attending: STUDENT IN AN ORGANIZED HEALTH CARE EDUCATION/TRAINING PROGRAM
Payer: MEDICARE

## 2024-10-03 ENCOUNTER — HOSPITAL ENCOUNTER (OUTPATIENT)
Dept: RADIOLOGY | Facility: HOSPITAL | Age: 64
Discharge: HOME | End: 2024-10-03
Attending: OBSTETRICS & GYNECOLOGY
Payer: MEDICARE

## 2024-10-03 VITALS — BODY MASS INDEX: 42.35 KG/M2 | HEIGHT: 63 IN | WEIGHT: 239 LBS

## 2024-10-03 DIAGNOSIS — Z12.31 SCREENING MAMMOGRAM, ENCOUNTER FOR: ICD-10-CM

## 2024-10-03 DIAGNOSIS — R06.02 SHORTNESS OF BREATH: ICD-10-CM

## 2024-10-03 DIAGNOSIS — Z85.3 HISTORY OF BREAST CANCER: ICD-10-CM

## 2024-10-03 DIAGNOSIS — I50.9 CONGESTIVE HEART FAILURE, UNSPECIFIED HF CHRONICITY, UNSPECIFIED HEART FAILURE TYPE: ICD-10-CM

## 2024-10-03 DIAGNOSIS — J44.1 COPD EXACERBATION: Primary | ICD-10-CM

## 2024-10-03 LAB
BASOPHILS # BLD AUTO: 0.04 K/UL (ref 0–0.2)
BASOPHILS NFR BLD: 0.4 % (ref 0–1.9)
DIFFERENTIAL METHOD BLD: ABNORMAL
EOSINOPHIL # BLD AUTO: 0.1 K/UL (ref 0–0.5)
EOSINOPHIL NFR BLD: 1.2 % (ref 0–8)
ERYTHROCYTE [DISTWIDTH] IN BLOOD BY AUTOMATED COUNT: 15.4 % (ref 11.5–14.5)
HCT VFR BLD AUTO: 35.4 % (ref 37–48.5)
HGB BLD-MCNC: 11.4 G/DL (ref 12–16)
IMM GRANULOCYTES # BLD AUTO: 0.04 K/UL (ref 0–0.04)
IMM GRANULOCYTES NFR BLD AUTO: 0.4 % (ref 0–0.5)
LYMPHOCYTES # BLD AUTO: 2.1 K/UL (ref 1–4.8)
LYMPHOCYTES NFR BLD: 19.2 % (ref 18–48)
MCH RBC QN AUTO: 29 PG (ref 27–31)
MCHC RBC AUTO-ENTMCNC: 32.2 G/DL (ref 32–36)
MCV RBC AUTO: 90 FL (ref 82–98)
MONOCYTES # BLD AUTO: 1 K/UL (ref 0.3–1)
MONOCYTES NFR BLD: 9.4 % (ref 4–15)
NEUTROPHILS # BLD AUTO: 7.5 K/UL (ref 1.8–7.7)
NEUTROPHILS NFR BLD: 69.4 % (ref 38–73)
NRBC BLD-RTO: 0 /100 WBC
PLATELET # BLD AUTO: 241 K/UL (ref 150–450)
PMV BLD AUTO: 11.6 FL (ref 9.2–12.9)
RBC # BLD AUTO: 3.93 M/UL (ref 4–5.4)
WBC # BLD AUTO: 10.73 K/UL (ref 3.9–12.7)

## 2024-10-03 PROCEDURE — 96374 THER/PROPH/DIAG INJ IV PUSH: CPT

## 2024-10-03 PROCEDURE — 99285 EMERGENCY DEPT VISIT HI MDM: CPT | Mod: 25

## 2024-10-03 PROCEDURE — 63600175 PHARM REV CODE 636 W HCPCS: Performed by: STUDENT IN AN ORGANIZED HEALTH CARE EDUCATION/TRAINING PROGRAM

## 2024-10-03 PROCEDURE — 36415 COLL VENOUS BLD VENIPUNCTURE: CPT | Performed by: STUDENT IN AN ORGANIZED HEALTH CARE EDUCATION/TRAINING PROGRAM

## 2024-10-03 PROCEDURE — 94761 N-INVAS EAR/PLS OXIMETRY MLT: CPT

## 2024-10-03 PROCEDURE — 93010 ELECTROCARDIOGRAM REPORT: CPT | Mod: ,,, | Performed by: INTERNAL MEDICINE

## 2024-10-03 PROCEDURE — 93005 ELECTROCARDIOGRAM TRACING: CPT

## 2024-10-03 PROCEDURE — 80053 COMPREHEN METABOLIC PANEL: CPT | Performed by: STUDENT IN AN ORGANIZED HEALTH CARE EDUCATION/TRAINING PROGRAM

## 2024-10-03 PROCEDURE — 94640 AIRWAY INHALATION TREATMENT: CPT

## 2024-10-03 PROCEDURE — 84484 ASSAY OF TROPONIN QUANT: CPT | Performed by: STUDENT IN AN ORGANIZED HEALTH CARE EDUCATION/TRAINING PROGRAM

## 2024-10-03 PROCEDURE — 99900035 HC TECH TIME PER 15 MIN (STAT)

## 2024-10-03 PROCEDURE — 85025 COMPLETE CBC W/AUTO DIFF WBC: CPT | Performed by: STUDENT IN AN ORGANIZED HEALTH CARE EDUCATION/TRAINING PROGRAM

## 2024-10-03 PROCEDURE — 25000242 PHARM REV CODE 250 ALT 637 W/ HCPCS: Performed by: STUDENT IN AN ORGANIZED HEALTH CARE EDUCATION/TRAINING PROGRAM

## 2024-10-03 PROCEDURE — 83880 ASSAY OF NATRIURETIC PEPTIDE: CPT | Performed by: STUDENT IN AN ORGANIZED HEALTH CARE EDUCATION/TRAINING PROGRAM

## 2024-10-03 PROCEDURE — 99900031 HC PATIENT EDUCATION (STAT)

## 2024-10-03 PROCEDURE — 77067 SCR MAMMO BI INCL CAD: CPT | Mod: TC

## 2024-10-03 RX ORDER — METHYLPREDNISOLONE SOD SUCC 125 MG
125 VIAL (EA) INJECTION
Status: COMPLETED | OUTPATIENT
Start: 2024-10-03 | End: 2024-10-03

## 2024-10-03 RX ORDER — IPRATROPIUM BROMIDE AND ALBUTEROL SULFATE 2.5; .5 MG/3ML; MG/3ML
3 SOLUTION RESPIRATORY (INHALATION)
Status: COMPLETED | OUTPATIENT
Start: 2024-10-03 | End: 2024-10-03

## 2024-10-03 RX ADMIN — IPRATROPIUM BROMIDE AND ALBUTEROL SULFATE 3 ML: 2.5; .5 SOLUTION RESPIRATORY (INHALATION) at 11:10

## 2024-10-03 RX ADMIN — METHYLPREDNISOLONE SODIUM SUCCINATE 125 MG: 125 INJECTION, POWDER, FOR SOLUTION INTRAMUSCULAR; INTRAVENOUS at 11:10

## 2024-10-04 VITALS
RESPIRATION RATE: 18 BRPM | HEART RATE: 93 BPM | BODY MASS INDEX: 42.17 KG/M2 | OXYGEN SATURATION: 98 % | WEIGHT: 238 LBS | DIASTOLIC BLOOD PRESSURE: 55 MMHG | HEIGHT: 63 IN | TEMPERATURE: 97 F | SYSTOLIC BLOOD PRESSURE: 113 MMHG

## 2024-10-04 LAB
ALBUMIN SERPL BCP-MCNC: 2.8 G/DL (ref 3.5–5.2)
ALP SERPL-CCNC: 100 U/L (ref 55–135)
ALT SERPL W/O P-5'-P-CCNC: 24 U/L (ref 10–44)
ANION GAP SERPL CALC-SCNC: 10 MMOL/L (ref 3–11)
AST SERPL-CCNC: 6 U/L (ref 10–40)
BILIRUB SERPL-MCNC: 0.3 MG/DL (ref 0.1–1)
BUN SERPL-MCNC: 12 MG/DL (ref 8–23)
CALCIUM SERPL-MCNC: 8.8 MG/DL (ref 8.7–10.5)
CHLORIDE SERPL-SCNC: 104 MMOL/L (ref 95–110)
CO2 SERPL-SCNC: 24 MMOL/L (ref 23–29)
CREAT SERPL-MCNC: 1.3 MG/DL (ref 0.5–1.4)
EST. GFR  (NO RACE VARIABLE): 45.6 ML/MIN/1.73 M^2
GLUCOSE SERPL-MCNC: 138 MG/DL (ref 70–110)
NT-PROBNP SERPL-MCNC: 274 PG/ML (ref 5–900)
OHS QRS DURATION: 84 MS
OHS QTC CALCULATION: 488 MS
POTASSIUM SERPL-SCNC: 3.3 MMOL/L (ref 3.5–5.1)
PROT SERPL-MCNC: 5.8 G/DL (ref 6–8.4)
SODIUM SERPL-SCNC: 138 MMOL/L (ref 136–145)
TROPONIN I SERPL DL<=0.01 NG/ML-MCNC: 11 PG/ML (ref 0–60)

## 2024-10-04 PROCEDURE — 96375 TX/PRO/DX INJ NEW DRUG ADDON: CPT

## 2024-10-04 PROCEDURE — 63600175 PHARM REV CODE 636 W HCPCS: Performed by: STUDENT IN AN ORGANIZED HEALTH CARE EDUCATION/TRAINING PROGRAM

## 2024-10-04 RX ORDER — PREDNISONE 20 MG/1
40 TABLET ORAL DAILY
Qty: 10 TABLET | Refills: 0 | Status: ON HOLD | OUTPATIENT
Start: 2024-10-04 | End: 2024-10-09

## 2024-10-04 RX ORDER — FUROSEMIDE 10 MG/ML
40 INJECTION INTRAMUSCULAR; INTRAVENOUS
Status: COMPLETED | OUTPATIENT
Start: 2024-10-04 | End: 2024-10-04

## 2024-10-04 RX ADMIN — FUROSEMIDE 40 MG: 10 INJECTION, SOLUTION INTRAVENOUS at 12:10

## 2024-10-04 NOTE — ED PROVIDER NOTES
"Encounter Date: 10/3/2024       History     Chief Complaint   Patient presents with    Shortness of Breath     Chronic shortness of breath. Pt reports admitted at [facility] last week. "It never really got better." Did a home neb tx today, but did not finish it "because the machine takes too long."     64-year-old female history of COPD, CHF, diabetes presents to ED for complaints of shortness of breath.  Patient reports shortness of breath has been present for the past several weeks.  Denies any chest pain, fevers, chills.  Denies any abdominal pain.  Does endorse mild lower extremity swelling.  Reports that she was recently hospitalized at outside hospital for COPD.  Reports she had started taking a breathing treatment at home but stopped because it was taking too long.        Review of patient's allergies indicates:  No Known Allergies  Past Medical History:   Diagnosis Date    Anxiety     Breast cancer 2010    Cancer     Breast    COPD (chronic obstructive pulmonary disease)     Depression     Diabetes mellitus     GERD (gastroesophageal reflux disease)     Hypertension     Insomnia     Thyroid disease      Past Surgical History:   Procedure Laterality Date    BLADDER SUSPENSION      BREAST LUMPECTOMY      Right breast    CHOLECYSTECTOMY      HYSTERECTOMY      KNEE ARTHROSCOPY      Right knee    OOPHORECTOMY       Family History   Problem Relation Name Age of Onset    No Known Problems Mother      No Known Problems Father      No Known Problems Sister      No Known Problems Daughter      No Known Problems Maternal Aunt      No Known Problems Maternal Uncle      No Known Problems Paternal Aunt      No Known Problems Paternal Uncle      No Known Problems Maternal Grandmother      No Known Problems Maternal Grandfather      No Known Problems Paternal Grandmother      No Known Problems Paternal Grandfather      No Known Problems Other      Breast cancer Neg Hx      Ovarian cancer Neg Hx      BRCA 1/2 Neg Hx  "      Social History     Tobacco Use    Smoking status: Every Day     Current packs/day: 0.50     Average packs/day: 0.5 packs/day for 50.4 years (25.2 ttl pk-yrs)     Types: Cigarettes     Start date: 5/15/1974     Passive exposure: Past    Smokeless tobacco: Never    Tobacco comments:     quit yesterday 6/12/21   Substance Use Topics    Alcohol use: Not Currently    Drug use: Never     Review of Systems   Respiratory:  Positive for shortness of breath. Negative for chest tightness.    Cardiovascular:  Negative for chest pain.       Physical Exam     Initial Vitals [10/03/24 2236]   BP Pulse Resp Temp SpO2   (!) 138/53 84 18 97 °F (36.1 °C) 95 %      MAP       --         Physical Exam    Vitals reviewed.  Constitutional: She appears well-developed and well-nourished.   HENT:   Head: Normocephalic and atraumatic.   Eyes: EOM are normal.   Neck:   Normal range of motion.  Cardiovascular:  Normal rate and regular rhythm.           Pulmonary/Chest: She has wheezes.   Abdominal: Abdomen is soft.   Musculoskeletal:         General: Normal range of motion.      Cervical back: Normal range of motion.     Neurological: She is alert and oriented to person, place, and time. GCS score is 15. GCS eye subscore is 4. GCS verbal subscore is 5. GCS motor subscore is 6.   Skin: Skin is warm.         ED Course   Procedures  Labs Reviewed   CBC W/ AUTO DIFFERENTIAL - Abnormal       Result Value    WBC 10.73      RBC 3.93 (*)     Hemoglobin 11.4 (*)     Hematocrit 35.4 (*)     MCV 90      MCH 29.0      MCHC 32.2      RDW 15.4 (*)     Platelets 241      MPV 11.6      Immature Granulocytes 0.4      Gran # (ANC) 7.5      Immature Grans (Abs) 0.04      Lymph # 2.1      Mono # 1.0      Eos # 0.1      Baso # 0.04      nRBC 0      Gran % 69.4      Lymph % 19.2      Mono % 9.4      Eosinophil % 1.2      Basophil % 0.4      Differential Method Automated     COMPREHENSIVE METABOLIC PANEL - Abnormal    Sodium 138      Potassium 3.3 (*)      Chloride 104      CO2 24      Glucose 138 (*)     BUN 12      Creatinine 1.3      Calcium 8.8      Total Protein 5.8 (*)     Albumin 2.8 (*)     Total Bilirubin 0.3      Alkaline Phosphatase 100      AST 6 (*)     ALT 24      eGFR 45.6 (*)     Anion Gap 10     TROPONIN I HIGH SENSITIVITY    Troponin I High Sensitivity 11.0     NT-PRO NATRIURETIC PEPTIDE    NT-proBNP 274            Imaging Results              X-Ray Chest AP Portable (Final result)  Result time 10/04/24 00:05:58      Final result by Paxton Gilmore MD (10/04/24 00:05:58)                   Impression:      Cardiomegaly similar to prior exam.  No interval acute radiographic change is detected.      Electronically signed by: Paxton Gilmore MD  Date:    10/04/2024  Time:    00:05               Narrative:    EXAMINATION:  XR CHEST AP PORTABLE    CLINICAL HISTORY:  Shortness of breath    TECHNIQUE:  Frontal view obtained of the chest    COMPARISON:  09/24/2024    FINDINGS:  Cardiac silhouette is enlarged with unchanged cardiomediastinal contours.  No detected consolidation or acute osseous change.                                       Medications   albuterol-ipratropium 2.5 mg-0.5 mg/3 mL nebulizer solution 3 mL (3 mLs Nebulization Given 10/3/24 2316)   methylPREDNISolone sodium succinate injection 125 mg (125 mg Intravenous Given 10/3/24 2300)   furosemide injection 40 mg (40 mg Intravenous Given 10/4/24 0029)     Medical Decision Making  Afebrile, nontoxic, in no acute distress.  Speaking in full sentences.  Patient does have mild expiratory wheezing.  Has 2+ lower extremity pitting edema otherwise patient appears well.  Patient was given DuoNebs, steroids here in the ED. labs show no evidence of leukocytosis.  Troponin was negative.  BNP was slightly elevated.  Patient was given IV Lasix here in the emergency department with improvement.  Patient feeling better.  Will be discharged home with close follow with PCP.  Return precautions given.  Patient  understands and agrees with plan.    Amount and/or Complexity of Data Reviewed  Labs: ordered.  Radiology: ordered.    Risk  Prescription drug management.                                      Clinical Impression:  Final diagnoses:  [R06.02] Shortness of breath                 Riana Soto MD  10/04/24 0041

## 2024-10-04 NOTE — ED NOTES
"Pt reports SOB, hx of COPD, recently admitted to [facility]. Reports being dx with "fluid around lungs", denies being put on lasix upon discharge.   "

## 2024-10-05 ENCOUNTER — HOSPITAL ENCOUNTER (INPATIENT)
Facility: HOSPITAL | Age: 64
LOS: 2 days | Discharge: HOME HEALTH | DRG: 191 | End: 2024-10-07
Attending: FAMILY MEDICINE | Admitting: INTERNAL MEDICINE
Payer: MEDICARE

## 2024-10-05 DIAGNOSIS — R65.20 SEVERE SEPSIS: ICD-10-CM

## 2024-10-05 DIAGNOSIS — A41.9 SEVERE SEPSIS: ICD-10-CM

## 2024-10-05 DIAGNOSIS — J06.9 UPPER RESPIRATORY TRACT INFECTION, UNSPECIFIED TYPE: ICD-10-CM

## 2024-10-05 DIAGNOSIS — E66.01 SEVERE OBESITY (BMI >= 40): ICD-10-CM

## 2024-10-05 DIAGNOSIS — R00.0 TACHYCARDIA: ICD-10-CM

## 2024-10-05 DIAGNOSIS — R06.02 SOB (SHORTNESS OF BREATH): ICD-10-CM

## 2024-10-05 DIAGNOSIS — J44.1 COPD EXACERBATION: Primary | ICD-10-CM

## 2024-10-05 LAB
ALBUMIN SERPL BCP-MCNC: 3 G/DL (ref 3.5–5.2)
ALP SERPL-CCNC: 104 U/L (ref 55–135)
ALT SERPL W/O P-5'-P-CCNC: 24 U/L (ref 10–44)
ANION GAP SERPL CALC-SCNC: 11 MMOL/L (ref 3–11)
AST SERPL-CCNC: 6 U/L (ref 10–40)
BASOPHILS # BLD AUTO: 0.03 K/UL (ref 0–0.2)
BASOPHILS NFR BLD: 0.2 % (ref 0–1.9)
BILIRUB SERPL-MCNC: 0.3 MG/DL (ref 0.1–1)
BILIRUB UR QL STRIP: NEGATIVE
BUN SERPL-MCNC: 19 MG/DL (ref 8–23)
CALCIUM SERPL-MCNC: 9.3 MG/DL (ref 8.7–10.5)
CHLORIDE SERPL-SCNC: 102 MMOL/L (ref 95–110)
CLARITY UR: CLEAR
CO2 SERPL-SCNC: 27 MMOL/L (ref 23–29)
COLOR UR: YELLOW
CREAT SERPL-MCNC: 1.8 MG/DL (ref 0.5–1.4)
CTP QC/QA: YES
CTP QC/QA: YES
DIFFERENTIAL METHOD BLD: ABNORMAL
EOSINOPHIL # BLD AUTO: 0 K/UL (ref 0–0.5)
EOSINOPHIL NFR BLD: 0.1 % (ref 0–8)
ERYTHROCYTE [DISTWIDTH] IN BLOOD BY AUTOMATED COUNT: 16 % (ref 11.5–14.5)
EST. GFR  (NO RACE VARIABLE): 30.9 ML/MIN/1.73 M^2
GLUCOSE SERPL-MCNC: 97 MG/DL (ref 70–110)
GLUCOSE UR QL STRIP: NEGATIVE
HCT VFR BLD AUTO: 35.9 % (ref 37–48.5)
HGB BLD-MCNC: 12 G/DL (ref 12–16)
HGB UR QL STRIP: NEGATIVE
IMM GRANULOCYTES # BLD AUTO: 0.09 K/UL (ref 0–0.04)
IMM GRANULOCYTES NFR BLD AUTO: 0.6 % (ref 0–0.5)
KETONES UR QL STRIP: NEGATIVE
LACTATE SERPL-SCNC: 2.6 MMOL/L (ref 0.5–2.2)
LEUKOCYTE ESTERASE UR QL STRIP: NEGATIVE
LYMPHOCYTES # BLD AUTO: 1.9 K/UL (ref 1–4.8)
LYMPHOCYTES NFR BLD: 12.8 % (ref 18–48)
MAGNESIUM SERPL-MCNC: 1.6 MG/DL (ref 1.6–2.6)
MCH RBC QN AUTO: 29.6 PG (ref 27–31)
MCHC RBC AUTO-ENTMCNC: 33.4 G/DL (ref 32–36)
MCV RBC AUTO: 88 FL (ref 82–98)
MONOCYTES # BLD AUTO: 1.3 K/UL (ref 0.3–1)
MONOCYTES NFR BLD: 8.6 % (ref 4–15)
NEUTROPHILS # BLD AUTO: 11.5 K/UL (ref 1.8–7.7)
NEUTROPHILS NFR BLD: 77.7 % (ref 38–73)
NITRITE UR QL STRIP: NEGATIVE
NRBC BLD-RTO: 0 /100 WBC
PH UR STRIP: 7 [PH] (ref 5–8)
PLATELET # BLD AUTO: 265 K/UL (ref 150–450)
PMV BLD AUTO: 10.8 FL (ref 9.2–12.9)
POC MOLECULAR INFLUENZA A AGN: NEGATIVE
POC MOLECULAR INFLUENZA B AGN: NEGATIVE
POTASSIUM SERPL-SCNC: 3.5 MMOL/L (ref 3.5–5.1)
PROT SERPL-MCNC: 6.2 G/DL (ref 6–8.4)
PROT UR QL STRIP: NEGATIVE
RBC # BLD AUTO: 4.06 M/UL (ref 4–5.4)
SARS-COV-2 RDRP RESP QL NAA+PROBE: NEGATIVE
SODIUM SERPL-SCNC: 140 MMOL/L (ref 136–145)
SP GR UR STRIP: 1.01 (ref 1–1.03)
TROPONIN I SERPL DL<=0.01 NG/ML-MCNC: 48.5 PG/ML (ref 0–60)
URN SPEC COLLECT METH UR: NORMAL
UROBILINOGEN UR STRIP-ACNC: NEGATIVE EU/DL
WBC # BLD AUTO: 14.8 K/UL (ref 3.9–12.7)

## 2024-10-05 PROCEDURE — 83735 ASSAY OF MAGNESIUM: CPT | Performed by: FAMILY MEDICINE

## 2024-10-05 PROCEDURE — 83605 ASSAY OF LACTIC ACID: CPT | Performed by: FAMILY MEDICINE

## 2024-10-05 PROCEDURE — 93010 ELECTROCARDIOGRAM REPORT: CPT | Mod: ,,, | Performed by: INTERNAL MEDICINE

## 2024-10-05 PROCEDURE — 80053 COMPREHEN METABOLIC PANEL: CPT | Performed by: FAMILY MEDICINE

## 2024-10-05 PROCEDURE — 85025 COMPLETE CBC W/AUTO DIFF WBC: CPT | Performed by: FAMILY MEDICINE

## 2024-10-05 PROCEDURE — 36415 COLL VENOUS BLD VENIPUNCTURE: CPT | Performed by: FAMILY MEDICINE

## 2024-10-05 PROCEDURE — 11000001 HC ACUTE MED/SURG PRIVATE ROOM

## 2024-10-05 PROCEDURE — 93005 ELECTROCARDIOGRAM TRACING: CPT

## 2024-10-05 PROCEDURE — 99291 CRITICAL CARE FIRST HOUR: CPT

## 2024-10-05 PROCEDURE — 84484 ASSAY OF TROPONIN QUANT: CPT | Performed by: FAMILY MEDICINE

## 2024-10-05 PROCEDURE — 87502 INFLUENZA DNA AMP PROBE: CPT

## 2024-10-05 PROCEDURE — 96375 TX/PRO/DX INJ NEW DRUG ADDON: CPT

## 2024-10-05 PROCEDURE — 63600175 PHARM REV CODE 636 W HCPCS: Performed by: FAMILY MEDICINE

## 2024-10-05 PROCEDURE — 21400001 HC TELEMETRY ROOM

## 2024-10-05 PROCEDURE — 25500020 PHARM REV CODE 255: Performed by: FAMILY MEDICINE

## 2024-10-05 PROCEDURE — 96365 THER/PROPH/DIAG IV INF INIT: CPT

## 2024-10-05 PROCEDURE — 81003 URINALYSIS AUTO W/O SCOPE: CPT | Performed by: FAMILY MEDICINE

## 2024-10-05 PROCEDURE — 87635 SARS-COV-2 COVID-19 AMP PRB: CPT | Performed by: FAMILY MEDICINE

## 2024-10-05 PROCEDURE — 25000003 PHARM REV CODE 250: Performed by: FAMILY MEDICINE

## 2024-10-05 PROCEDURE — 87040 BLOOD CULTURE FOR BACTERIA: CPT | Mod: 59 | Performed by: FAMILY MEDICINE

## 2024-10-05 RX ORDER — ALPRAZOLAM 0.25 MG/1
0.25 TABLET ORAL 4 TIMES DAILY PRN
Status: DISCONTINUED | OUTPATIENT
Start: 2024-10-06 | End: 2024-10-06

## 2024-10-05 RX ORDER — METOPROLOL TARTRATE 50 MG/1
50 TABLET ORAL 2 TIMES DAILY
Status: DISCONTINUED | OUTPATIENT
Start: 2024-10-06 | End: 2024-10-07 | Stop reason: HOSPADM

## 2024-10-05 RX ORDER — LEVOTHYROXINE SODIUM 50 UG/1
50 TABLET ORAL
Status: DISCONTINUED | OUTPATIENT
Start: 2024-10-06 | End: 2024-10-07 | Stop reason: HOSPADM

## 2024-10-05 RX ORDER — FUROSEMIDE 10 MG/ML
40 INJECTION INTRAMUSCULAR; INTRAVENOUS
Status: COMPLETED | OUTPATIENT
Start: 2024-10-05 | End: 2024-10-05

## 2024-10-05 RX ORDER — METOPROLOL TARTRATE 1 MG/ML
5 INJECTION, SOLUTION INTRAVENOUS
Status: COMPLETED | OUTPATIENT
Start: 2024-10-05 | End: 2024-10-05

## 2024-10-05 RX ORDER — RISPERIDONE 1 MG/1
3 TABLET ORAL DAILY
Status: DISCONTINUED | OUTPATIENT
Start: 2024-10-06 | End: 2024-10-07 | Stop reason: HOSPADM

## 2024-10-05 RX ORDER — DILTIAZEM HYDROCHLORIDE 5 MG/ML
20 INJECTION INTRAVENOUS
Status: DISCONTINUED | OUTPATIENT
Start: 2024-10-05 | End: 2024-10-05

## 2024-10-05 RX ORDER — ASPIRIN 81 MG/1
81 TABLET ORAL DAILY
Status: DISCONTINUED | OUTPATIENT
Start: 2024-10-06 | End: 2024-10-07 | Stop reason: HOSPADM

## 2024-10-05 RX ORDER — AMLODIPINE BESYLATE 10 MG/1
10 TABLET ORAL DAILY
Status: DISCONTINUED | OUTPATIENT
Start: 2024-10-06 | End: 2024-10-07 | Stop reason: HOSPADM

## 2024-10-05 RX ADMIN — CEFTRIAXONE 1 G: 1 INJECTION, POWDER, FOR SOLUTION INTRAMUSCULAR; INTRAVENOUS at 10:10

## 2024-10-05 RX ADMIN — IOHEXOL 80 ML: 350 INJECTION, SOLUTION INTRAVENOUS at 10:10

## 2024-10-05 RX ADMIN — AZITHROMYCIN MONOHYDRATE 500 MG: 500 INJECTION, POWDER, LYOPHILIZED, FOR SOLUTION INTRAVENOUS at 11:10

## 2024-10-05 RX ADMIN — METOROPROLOL TARTRATE 5 MG: 5 INJECTION, SOLUTION INTRAVENOUS at 09:10

## 2024-10-05 RX ADMIN — FUROSEMIDE 40 MG: 10 INJECTION, SOLUTION INTRAVENOUS at 09:10

## 2024-10-06 PROBLEM — R65.20 SEVERE SEPSIS: Status: ACTIVE | Noted: 2024-10-06

## 2024-10-06 PROBLEM — E87.6 HYPOKALEMIA: Status: ACTIVE | Noted: 2024-10-06

## 2024-10-06 PROBLEM — A41.9 SEVERE SEPSIS: Status: ACTIVE | Noted: 2024-10-06

## 2024-10-06 LAB
ALBUMIN SERPL BCP-MCNC: 2.8 G/DL (ref 3.5–5.2)
ALP SERPL-CCNC: 95 U/L (ref 55–135)
ALT SERPL W/O P-5'-P-CCNC: 22 U/L (ref 10–44)
ANION GAP SERPL CALC-SCNC: 7 MMOL/L (ref 3–11)
AST SERPL-CCNC: 8 U/L (ref 10–40)
BASOPHILS # BLD AUTO: 0.03 K/UL (ref 0–0.2)
BASOPHILS NFR BLD: 0.2 % (ref 0–1.9)
BILIRUB SERPL-MCNC: 0.3 MG/DL (ref 0.1–1)
BUN SERPL-MCNC: 19 MG/DL (ref 8–23)
CALCIUM SERPL-MCNC: 9 MG/DL (ref 8.7–10.5)
CHLORIDE SERPL-SCNC: 105 MMOL/L (ref 95–110)
CO2 SERPL-SCNC: 32 MMOL/L (ref 23–29)
CREAT SERPL-MCNC: 1.5 MG/DL (ref 0.5–1.4)
DIFFERENTIAL METHOD BLD: ABNORMAL
EOSINOPHIL # BLD AUTO: 0.1 K/UL (ref 0–0.5)
EOSINOPHIL NFR BLD: 0.5 % (ref 0–8)
ERYTHROCYTE [DISTWIDTH] IN BLOOD BY AUTOMATED COUNT: 16.3 % (ref 11.5–14.5)
EST. GFR  (NO RACE VARIABLE): 38.4 ML/MIN/1.73 M^2
GLUCOSE SERPL-MCNC: 93 MG/DL (ref 70–110)
HCT VFR BLD AUTO: 38.3 % (ref 37–48.5)
HGB BLD-MCNC: 12.4 G/DL (ref 12–16)
IMM GRANULOCYTES # BLD AUTO: 0.07 K/UL (ref 0–0.04)
IMM GRANULOCYTES NFR BLD AUTO: 0.5 % (ref 0–0.5)
LACTATE SERPL-SCNC: 1.9 MMOL/L (ref 0.5–2.2)
LYMPHOCYTES # BLD AUTO: 2.5 K/UL (ref 1–4.8)
LYMPHOCYTES NFR BLD: 18.6 % (ref 18–48)
MCH RBC QN AUTO: 29 PG (ref 27–31)
MCHC RBC AUTO-ENTMCNC: 32.4 G/DL (ref 32–36)
MCV RBC AUTO: 90 FL (ref 82–98)
MONOCYTES # BLD AUTO: 1.3 K/UL (ref 0.3–1)
MONOCYTES NFR BLD: 10 % (ref 4–15)
NEUTROPHILS # BLD AUTO: 9.2 K/UL (ref 1.8–7.7)
NEUTROPHILS NFR BLD: 70.2 % (ref 38–73)
NRBC BLD-RTO: 0 /100 WBC
OHS QRS DURATION: 92 MS
OHS QTC CALCULATION: 459 MS
PLATELET # BLD AUTO: 262 K/UL (ref 150–450)
PMV BLD AUTO: 11.7 FL (ref 9.2–12.9)
POCT GLUCOSE: 100 MG/DL (ref 70–110)
POCT GLUCOSE: 188 MG/DL (ref 70–110)
POCT GLUCOSE: 202 MG/DL (ref 70–110)
POCT GLUCOSE: 215 MG/DL (ref 70–110)
POTASSIUM SERPL-SCNC: 3.2 MMOL/L (ref 3.5–5.1)
PROT SERPL-MCNC: 6.1 G/DL (ref 6–8.4)
RBC # BLD AUTO: 4.28 M/UL (ref 4–5.4)
SODIUM SERPL-SCNC: 144 MMOL/L (ref 136–145)
WBC # BLD AUTO: 13.14 K/UL (ref 3.9–12.7)

## 2024-10-06 PROCEDURE — 27000221 HC OXYGEN, UP TO 24 HOURS

## 2024-10-06 PROCEDURE — 25000242 PHARM REV CODE 250 ALT 637 W/ HCPCS: Performed by: NURSE PRACTITIONER

## 2024-10-06 PROCEDURE — 99900031 HC PATIENT EDUCATION (STAT)

## 2024-10-06 PROCEDURE — S4991 NICOTINE PATCH NONLEGEND: HCPCS | Performed by: NURSE PRACTITIONER

## 2024-10-06 PROCEDURE — 94761 N-INVAS EAR/PLS OXIMETRY MLT: CPT

## 2024-10-06 PROCEDURE — 21400001 HC TELEMETRY ROOM

## 2024-10-06 PROCEDURE — 94640 AIRWAY INHALATION TREATMENT: CPT

## 2024-10-06 PROCEDURE — 99900035 HC TECH TIME PER 15 MIN (STAT)

## 2024-10-06 PROCEDURE — 25000003 PHARM REV CODE 250: Performed by: NURSE PRACTITIONER

## 2024-10-06 PROCEDURE — 85025 COMPLETE CBC W/AUTO DIFF WBC: CPT | Performed by: FAMILY MEDICINE

## 2024-10-06 PROCEDURE — 36415 COLL VENOUS BLD VENIPUNCTURE: CPT | Performed by: FAMILY MEDICINE

## 2024-10-06 PROCEDURE — 94799 UNLISTED PULMONARY SVC/PX: CPT

## 2024-10-06 PROCEDURE — 63600175 PHARM REV CODE 636 W HCPCS: Performed by: NURSE PRACTITIONER

## 2024-10-06 PROCEDURE — 25000242 PHARM REV CODE 250 ALT 637 W/ HCPCS: Performed by: INTERNAL MEDICINE

## 2024-10-06 PROCEDURE — 83605 ASSAY OF LACTIC ACID: CPT | Performed by: FAMILY MEDICINE

## 2024-10-06 PROCEDURE — 25000242 PHARM REV CODE 250 ALT 637 W/ HCPCS: Performed by: FAMILY MEDICINE

## 2024-10-06 PROCEDURE — 25000003 PHARM REV CODE 250: Performed by: FAMILY MEDICINE

## 2024-10-06 PROCEDURE — 25000003 PHARM REV CODE 250: Performed by: INTERNAL MEDICINE

## 2024-10-06 PROCEDURE — 80053 COMPREHEN METABOLIC PANEL: CPT | Performed by: FAMILY MEDICINE

## 2024-10-06 PROCEDURE — 63600175 PHARM REV CODE 636 W HCPCS: Performed by: FAMILY MEDICINE

## 2024-10-06 RX ORDER — PANTOPRAZOLE SODIUM 40 MG/1
40 TABLET, DELAYED RELEASE ORAL DAILY
Status: DISCONTINUED | OUTPATIENT
Start: 2024-10-07 | End: 2024-10-07 | Stop reason: HOSPADM

## 2024-10-06 RX ORDER — INSULIN ASPART 100 [IU]/ML
0-5 INJECTION, SOLUTION INTRAVENOUS; SUBCUTANEOUS
Status: DISCONTINUED | OUTPATIENT
Start: 2024-10-06 | End: 2024-10-07 | Stop reason: HOSPADM

## 2024-10-06 RX ORDER — ACETAMINOPHEN 325 MG/1
650 TABLET ORAL EVERY 8 HOURS PRN
Status: DISCONTINUED | OUTPATIENT
Start: 2024-10-06 | End: 2024-10-07 | Stop reason: HOSPADM

## 2024-10-06 RX ORDER — METHYLPREDNISOLONE SOD SUCC 125 MG
125 VIAL (EA) INJECTION EVERY 8 HOURS
Status: DISCONTINUED | OUTPATIENT
Start: 2024-10-06 | End: 2024-10-07 | Stop reason: HOSPADM

## 2024-10-06 RX ORDER — CITALOPRAM 10 MG/1
40 TABLET ORAL NIGHTLY
Status: DISCONTINUED | OUTPATIENT
Start: 2024-10-06 | End: 2024-10-07 | Stop reason: HOSPADM

## 2024-10-06 RX ORDER — ALPRAZOLAM 0.5 MG/1
0.5 TABLET ORAL 3 TIMES DAILY PRN
Status: DISCONTINUED | OUTPATIENT
Start: 2024-10-06 | End: 2024-10-07 | Stop reason: HOSPADM

## 2024-10-06 RX ORDER — MORPHINE SULFATE 2 MG/ML
2 INJECTION, SOLUTION INTRAMUSCULAR; INTRAVENOUS EVERY 4 HOURS PRN
Status: DISCONTINUED | OUTPATIENT
Start: 2024-10-06 | End: 2024-10-07 | Stop reason: HOSPADM

## 2024-10-06 RX ORDER — ONDANSETRON HYDROCHLORIDE 2 MG/ML
4 INJECTION, SOLUTION INTRAVENOUS EVERY 8 HOURS PRN
Status: DISCONTINUED | OUTPATIENT
Start: 2024-10-06 | End: 2024-10-07 | Stop reason: HOSPADM

## 2024-10-06 RX ORDER — IBUPROFEN 200 MG
16 TABLET ORAL
Status: DISCONTINUED | OUTPATIENT
Start: 2024-10-06 | End: 2024-10-07 | Stop reason: HOSPADM

## 2024-10-06 RX ORDER — MORPHINE SULFATE 4 MG/ML
4 INJECTION, SOLUTION INTRAMUSCULAR; INTRAVENOUS EVERY 4 HOURS PRN
Status: DISCONTINUED | OUTPATIENT
Start: 2024-10-06 | End: 2024-10-07 | Stop reason: HOSPADM

## 2024-10-06 RX ORDER — IPRATROPIUM BROMIDE AND ALBUTEROL SULFATE 2.5; .5 MG/3ML; MG/3ML
3 SOLUTION RESPIRATORY (INHALATION) EVERY 4 HOURS PRN
Status: DISCONTINUED | OUTPATIENT
Start: 2024-10-06 | End: 2024-10-07 | Stop reason: HOSPADM

## 2024-10-06 RX ORDER — LOPERAMIDE HYDROCHLORIDE 2 MG/1
4 CAPSULE ORAL ONCE
Status: COMPLETED | OUTPATIENT
Start: 2024-10-06 | End: 2024-10-06

## 2024-10-06 RX ORDER — IPRATROPIUM BROMIDE AND ALBUTEROL SULFATE 2.5; .5 MG/3ML; MG/3ML
3 SOLUTION RESPIRATORY (INHALATION)
Status: DISCONTINUED | OUTPATIENT
Start: 2024-10-06 | End: 2024-10-07 | Stop reason: HOSPADM

## 2024-10-06 RX ORDER — CITALOPRAM 10 MG/1
40 TABLET ORAL DAILY
Status: DISCONTINUED | OUTPATIENT
Start: 2024-10-06 | End: 2024-10-06

## 2024-10-06 RX ORDER — POTASSIUM CHLORIDE 20 MEQ/1
20 TABLET, EXTENDED RELEASE ORAL ONCE
Status: COMPLETED | OUTPATIENT
Start: 2024-10-06 | End: 2024-10-06

## 2024-10-06 RX ORDER — SODIUM CHLORIDE AND POTASSIUM CHLORIDE 150; 900 MG/100ML; MG/100ML
INJECTION, SOLUTION INTRAVENOUS CONTINUOUS
Status: DISCONTINUED | OUTPATIENT
Start: 2024-10-06 | End: 2024-10-07 | Stop reason: HOSPADM

## 2024-10-06 RX ORDER — SODIUM CHLORIDE 0.9 % (FLUSH) 0.9 %
10 SYRINGE (ML) INJECTION
Status: DISCONTINUED | OUTPATIENT
Start: 2024-10-06 | End: 2024-10-07 | Stop reason: HOSPADM

## 2024-10-06 RX ORDER — TALC
6 POWDER (GRAM) TOPICAL NIGHTLY PRN
Status: DISCONTINUED | OUTPATIENT
Start: 2024-10-06 | End: 2024-10-07 | Stop reason: HOSPADM

## 2024-10-06 RX ORDER — IBUPROFEN 200 MG
24 TABLET ORAL
Status: DISCONTINUED | OUTPATIENT
Start: 2024-10-06 | End: 2024-10-07 | Stop reason: HOSPADM

## 2024-10-06 RX ORDER — IPRATROPIUM BROMIDE AND ALBUTEROL SULFATE 2.5; .5 MG/3ML; MG/3ML
3 SOLUTION RESPIRATORY (INHALATION) 3 TIMES DAILY
Status: DISCONTINUED | OUTPATIENT
Start: 2024-10-06 | End: 2024-10-06

## 2024-10-06 RX ORDER — METOPROLOL TARTRATE 1 MG/ML
5 INJECTION, SOLUTION INTRAVENOUS ONCE
Status: COMPLETED | OUTPATIENT
Start: 2024-10-06 | End: 2024-10-06

## 2024-10-06 RX ORDER — POLYETHYLENE GLYCOL 3350 17 G/17G
17 POWDER, FOR SOLUTION ORAL DAILY
Status: DISCONTINUED | OUTPATIENT
Start: 2024-10-06 | End: 2024-10-07 | Stop reason: HOSPADM

## 2024-10-06 RX ORDER — IBUPROFEN 200 MG
1 TABLET ORAL DAILY
Status: DISCONTINUED | OUTPATIENT
Start: 2024-10-06 | End: 2024-10-07 | Stop reason: HOSPADM

## 2024-10-06 RX ORDER — GLUCAGON 1 MG
1 KIT INJECTION
Status: DISCONTINUED | OUTPATIENT
Start: 2024-10-06 | End: 2024-10-07 | Stop reason: HOSPADM

## 2024-10-06 RX ORDER — IBUPROFEN 400 MG/1
400 TABLET ORAL EVERY 6 HOURS PRN
Status: DISCONTINUED | OUTPATIENT
Start: 2024-10-06 | End: 2024-10-07 | Stop reason: HOSPADM

## 2024-10-06 RX ADMIN — IPRATROPIUM BROMIDE AND ALBUTEROL SULFATE 3 ML: 2.5; .5 SOLUTION RESPIRATORY (INHALATION) at 10:10

## 2024-10-06 RX ADMIN — ALPRAZOLAM 0.25 MG: 0.25 TABLET ORAL at 09:10

## 2024-10-06 RX ADMIN — POLYETHYLENE GLYCOL (3350) 17 G: 17 POWDER, FOR SOLUTION ORAL at 09:10

## 2024-10-06 RX ADMIN — METHYLPREDNISOLONE SODIUM SUCCINATE 125 MG: 125 INJECTION, POWDER, FOR SOLUTION INTRAMUSCULAR; INTRAVENOUS at 06:10

## 2024-10-06 RX ADMIN — IPRATROPIUM BROMIDE AND ALBUTEROL SULFATE 3 ML: 2.5; .5 SOLUTION RESPIRATORY (INHALATION) at 07:10

## 2024-10-06 RX ADMIN — Medication 6 MG: at 08:10

## 2024-10-06 RX ADMIN — CITALOPRAM HYDROBROMIDE 40 MG: 10 TABLET ORAL at 08:10

## 2024-10-06 RX ADMIN — METOPROLOL TARTRATE 50 MG: 50 TABLET, FILM COATED ORAL at 09:10

## 2024-10-06 RX ADMIN — AMLODIPINE BESYLATE 10 MG: 10 TABLET ORAL at 09:10

## 2024-10-06 RX ADMIN — METOPROLOL TARTRATE 50 MG: 50 TABLET, FILM COATED ORAL at 08:10

## 2024-10-06 RX ADMIN — SODIUM CHLORIDE AND POTASSIUM CHLORIDE: .9; .15 SOLUTION INTRAVENOUS at 12:10

## 2024-10-06 RX ADMIN — METOROPROLOL TARTRATE 5 MG: 5 INJECTION, SOLUTION INTRAVENOUS at 03:10

## 2024-10-06 RX ADMIN — AZITHROMYCIN MONOHYDRATE 500 MG: 500 INJECTION, POWDER, LYOPHILIZED, FOR SOLUTION INTRAVENOUS at 11:10

## 2024-10-06 RX ADMIN — INSULIN ASPART 2 UNITS: 100 INJECTION, SOLUTION INTRAVENOUS; SUBCUTANEOUS at 10:10

## 2024-10-06 RX ADMIN — ALPRAZOLAM 0.25 MG: 0.25 TABLET ORAL at 12:10

## 2024-10-06 RX ADMIN — ALPRAZOLAM 0.5 MG: 0.5 TABLET ORAL at 04:10

## 2024-10-06 RX ADMIN — METHYLPREDNISOLONE SODIUM SUCCINATE 125 MG: 125 INJECTION, POWDER, FOR SOLUTION INTRAMUSCULAR; INTRAVENOUS at 02:10

## 2024-10-06 RX ADMIN — METHYLPREDNISOLONE SODIUM SUCCINATE 125 MG: 125 INJECTION, POWDER, FOR SOLUTION INTRAMUSCULAR; INTRAVENOUS at 10:10

## 2024-10-06 RX ADMIN — CEFTRIAXONE SODIUM 1 G: 1 INJECTION, POWDER, FOR SOLUTION INTRAMUSCULAR; INTRAVENOUS at 10:10

## 2024-10-06 RX ADMIN — LOPERAMIDE HYDROCHLORIDE 4 MG: 2 CAPSULE ORAL at 12:10

## 2024-10-06 RX ADMIN — SODIUM CHLORIDE AND POTASSIUM CHLORIDE: .9; .15 SOLUTION INTRAVENOUS at 07:10

## 2024-10-06 RX ADMIN — LEVOTHYROXINE SODIUM 50 MCG: 50 TABLET ORAL at 06:10

## 2024-10-06 RX ADMIN — MORPHINE SULFATE 4 MG: 4 INJECTION, SOLUTION INTRAMUSCULAR; INTRAVENOUS at 04:10

## 2024-10-06 RX ADMIN — SODIUM CHLORIDE AND POTASSIUM CHLORIDE: .9; .15 SOLUTION INTRAVENOUS at 09:10

## 2024-10-06 RX ADMIN — MORPHINE SULFATE 4 MG: 4 INJECTION, SOLUTION INTRAMUSCULAR; INTRAVENOUS at 09:10

## 2024-10-06 RX ADMIN — ASPIRIN 81 MG: 81 TABLET, COATED ORAL at 09:10

## 2024-10-06 RX ADMIN — IPRATROPIUM BROMIDE AND ALBUTEROL SULFATE 3 ML: 2.5; .5 SOLUTION RESPIRATORY (INHALATION) at 06:10

## 2024-10-06 RX ADMIN — ALPRAZOLAM 0.5 MG: 0.5 TABLET ORAL at 10:10

## 2024-10-06 RX ADMIN — IPRATROPIUM BROMIDE AND ALBUTEROL SULFATE 3 ML: 2.5; .5 SOLUTION RESPIRATORY (INHALATION) at 02:10

## 2024-10-06 RX ADMIN — NICOTINE 1 PATCH: 14 PATCH, EXTENDED RELEASE TRANSDERMAL at 10:10

## 2024-10-06 RX ADMIN — POTASSIUM CHLORIDE 20 MEQ: 1500 TABLET, EXTENDED RELEASE ORAL at 10:10

## 2024-10-06 RX ADMIN — RISPERIDONE 3 MG: 1 TABLET, FILM COATED ORAL at 09:10

## 2024-10-06 NOTE — CARE UPDATE
10/06/24 1447   Home Oxygen Qualification   $ Home O2 Qualification Tech time 15 minutes   Room Air SpO2 At Rest 92 %   Room Air SpO2 During Ambulation 94 %   SpO2 Post Ambulation 97 %   Post Ambulation Heart Rate 76 bpm

## 2024-10-06 NOTE — ASSESSMENT & PLAN NOTE
This patient does have evidence of infective focus  My overall impression is sepsis.  Source: Respiratory  Antibiotics given-   Antibiotics (72h ago, onward)      Start     Stop Route Frequency Ordered    10/06/24 2300  azithromycin (ZITHROMAX) 500 mg in D5W 250 mL  IVPB (admixture device)         -- IV Every 24 hours (non-standard times) 10/06/24 0028    10/06/24 2200  cefTRIAXone (Rocephin) 1 g in D5W 100 mL IVPB (MB+)         -- IV Every 24 hours (non-standard times) 10/06/24 0028          Latest lactate reviewed-  Recent Labs   Lab 10/06/24  0048   LACTATE 1.9         Will Not start Pressors- Levophed for MAP of 65    Continue IV rocephin and zithromax.

## 2024-10-06 NOTE — NURSING
Patient received from the ED via stretcher at 2306, transferred to bed without difficulty. IV antibiotics azithromycin infusing via peripheral IV in left forearm, observed to be patent with no redness or swelling noted to site. Initial vital signs stable, and patient denies any acute discomfort at this time. Plan of care continued.

## 2024-10-06 NOTE — ED PROVIDER NOTES
Encounter Date: 10/5/2024       History     Chief Complaint   Patient presents with    COPD     Reports exacerbation of COPD symptoms while lying down approx 1 hr. Took home breathing treatment without relief.      64-year-old female past medical history of hypertension diabetes COPD his ED reports of worsening shortness of breath down.  Patient reports she was seen ED 2 days ago with similar symptoms diagnosis COPD exacerbation.  Patient reports no audible wheezing she has been using inhaler as instructed.  Patient noted be tachycardic ED no difficulty breathing.  She reports her doctor is .         Review of patient's allergies indicates:  No Known Allergies  Past Medical History:   Diagnosis Date    Anxiety     Breast cancer 2010    Cancer     Breast    COPD (chronic obstructive pulmonary disease)     Depression     Diabetes mellitus     GERD (gastroesophageal reflux disease)     Hypertension     Insomnia     Thyroid disease      Past Surgical History:   Procedure Laterality Date    BLADDER SUSPENSION      BREAST LUMPECTOMY      Right breast    CHOLECYSTECTOMY      HYSTERECTOMY      KNEE ARTHROSCOPY      Right knee    OOPHORECTOMY       Family History   Problem Relation Name Age of Onset    No Known Problems Mother      No Known Problems Father      No Known Problems Sister      No Known Problems Daughter      No Known Problems Maternal Aunt      No Known Problems Maternal Uncle      No Known Problems Paternal Aunt      No Known Problems Paternal Uncle      No Known Problems Maternal Grandmother      No Known Problems Maternal Grandfather      No Known Problems Paternal Grandmother      No Known Problems Paternal Grandfather      No Known Problems Other      Breast cancer Neg Hx      Ovarian cancer Neg Hx      BRCA 1/2 Neg Hx       Social History     Tobacco Use    Smoking status: Every Day     Current packs/day: 0.50     Average packs/day: 0.5 packs/day for 50.4 years (25.2 ttl pk-yrs)     Types:  Cigarettes     Start date: 5/15/1974     Passive exposure: Past    Smokeless tobacco: Never    Tobacco comments:     quit yesterday 6/12/21   Substance Use Topics    Alcohol use: Not Currently    Drug use: Never     Review of Systems   Respiratory:  Positive for shortness of breath.    All other systems reviewed and are negative.      Physical Exam     Initial Vitals [10/05/24 2057]   BP Pulse Resp Temp SpO2   124/75 96 20 98 °F (36.7 °C) 99 %      MAP       --         Physical Exam    Nursing note and vitals reviewed.  Constitutional: Vital signs are normal. She appears well-developed and well-nourished. She is not diaphoretic.  Non-toxic appearance. She does not have a sickly appearance.   Morbid obesity   HENT:   Head: Normocephalic and atraumatic.   Right Ear: Hearing, tympanic membrane, external ear and ear canal normal.   Left Ear: Tympanic membrane, external ear and ear canal normal. Mouth/Throat: Uvula is midline, oropharynx is clear and moist and mucous membranes are normal.   Eyes: Conjunctivae, EOM and lids are normal. Pupils are equal, round, and reactive to light. Lids are everted and swept, no foreign bodies found.   Neck: Trachea normal and phonation normal. Neck supple.   Normal range of motion.   Full passive range of motion without pain.     Cardiovascular:  Regular rhythm, normal heart sounds, intact distal pulses and normal pulses.           Tachycardia 125   Pulmonary/Chest: Breath sounds normal. No respiratory distress. She has no wheezes. She has no rhonchi. She has no rales. She exhibits no tenderness.   Patient talking in full complete sentences distress   Abdominal: Abdomen is soft. Bowel sounds are normal. She exhibits no distension and no mass. There is no abdominal tenderness. There is no rebound and no guarding.   Musculoskeletal:      Cervical back: Normal, full passive range of motion without pain, normal range of motion and neck supple.      Thoracic back: Normal.      Lumbar back:  Normal.     Neurological: She is alert and oriented to person, place, and time. She has normal strength. No cranial nerve deficit or sensory deficit.   Skin: Skin is intact.   Psychiatric: She has a normal mood and affect. Her speech is normal and behavior is normal.         ED Course   Critical Care    Date/Time: 10/5/2024 11:30 PM    Performed by: Gilbert Sherman Jr., MD  Authorized by: Jh Peterson Jr., MD  Direct patient critical care time: 15 minutes  Additional history critical care time: 15 minutes  Ordering / reviewing critical care time: 15 minutes  Documentation critical care time: 15 minutes  Consulting other physicians critical care time: 15 minutes  Total critical care time (exclusive of procedural time) : 75 minutes  Critical care was necessary to treat or prevent imminent or life-threatening deterioration of the following conditions: cardiac failure, dehydration, metabolic crisis, sepsis, trauma, renal failure, endocrine crisis, circulatory failure, CNS failure or compromise, hepatic failure, respiratory failure, shock and toxidrome.  Critical care was time spent personally by me on the following activities: discussions with primary provider, interpretation of cardiac output measurements, evaluation of patient's response to treatment, examination of patient, obtaining history from patient or surrogate, ordering and performing treatments and interventions, ordering and review of laboratory studies, ordering and review of radiographic studies, pulse oximetry, re-evaluation of patient's condition and review of old charts.        Labs Reviewed   CBC W/ AUTO DIFFERENTIAL - Abnormal       Result Value    WBC 14.80 (*)     RBC 4.06      Hemoglobin 12.0      Hematocrit 35.9 (*)     MCV 88      MCH 29.6      MCHC 33.4      RDW 16.0 (*)     Platelets 265      MPV 10.8      Immature Granulocytes 0.6 (*)     Gran # (ANC) 11.5 (*)     Immature Grans (Abs) 0.09 (*)     Lymph # 1.9      Mono # 1.3 (*)     Eos #  0.0      Baso # 0.03      nRBC 0      Gran % 77.7 (*)     Lymph % 12.8 (*)     Mono % 8.6      Eosinophil % 0.1      Basophil % 0.2      Differential Method Automated      Narrative:     Recoll. [id number] by NLA at 10/05/2024 21:33, reason: clotted   COMPREHENSIVE METABOLIC PANEL - Abnormal    Sodium 140      Potassium 3.5      Chloride 102      CO2 27      Glucose 97      BUN 19      Creatinine 1.8 (*)     Calcium 9.3      Total Protein 6.2      Albumin 3.0 (*)     Total Bilirubin 0.3      Alkaline Phosphatase 104      AST 6 (*)     ALT 24      eGFR 30.9 (*)     Anion Gap 11     LACTIC ACID, PLASMA - Abnormal    Lactate (Lactic Acid) 2.6 (*)    CULTURE, BLOOD   CULTURE, BLOOD   URINALYSIS, REFLEX TO URINE CULTURE    Specimen UA Urine, Clean Catch      Color, UA Yellow      Appearance, UA Clear      pH, UA 7.0      Specific Gravity, UA 1.010      Protein, UA Negative      Glucose, UA Negative      Ketones, UA Negative      Bilirubin (UA) Negative      Occult Blood UA Negative      Nitrite, UA Negative      Urobilinogen, UA Negative      Leukocytes, UA Negative      Narrative:     Preferred Collection Type->Urine, Clean Catch  Specimen Source->Urine   TROPONIN I HIGH SENSITIVITY    Troponin I High Sensitivity 48.5     MAGNESIUM    Magnesium 1.6     LACTIC ACID, PLASMA   SARS-COV-2 RDRP GENE    POC Rapid COVID Negative       Acceptable Yes     POCT INFLUENZA A/B MOLECULAR    POC Molecular Influenza A Ag Negative      POC Molecular Influenza B Ag Negative       Acceptable Yes       EKG Readings: (Independently Interpreted)   Rhythm: Normal Sinus Rhythm. Heart Rate: 98. Ectopy: No Ectopy. Conduction: Normal. ST Segments: Normal ST Segments. T Waves: Normal. Clinical Impression: Normal Sinus Rhythm       Imaging Results              CTA Chest Non-Coronary (PE Studies) (In process)                      X-Ray Chest AP Portable (In process)                      Medications   azithromycin  "(ZITHROMAX) 500 mg in D5W 250 mL  IVPB (admixture device) (500 mg Intravenous New Bag 10/5/24 2305)   ALPRAZolam tablet 0.25 mg (has no administration in time range)   amLODIPine tablet 10 mg (has no administration in time range)   aspirin EC tablet 81 mg (has no administration in time range)   levothyroxine tablet 50 mcg (has no administration in time range)   risperiDONE tablet 3 mg (has no administration in time range)   metoprolol tartrate (LOPRESSOR) tablet 50 mg (has no administration in time range)   sodium chloride 0.9% bolus 500 mL 500 mL (0 mLs Intravenous Stopped 10/5/24 2202)   furosemide injection 40 mg (40 mg Intravenous Given 10/5/24 2134)   metoprolol injection 5 mg (5 mg Intravenous Given 10/5/24 2128)   cefTRIAXone (Rocephin) 1 g in D5W 100 mL IVPB (MB+) (0 g Intravenous Stopped 10/5/24 2255)   sodium chloride 0.9% bolus 1,000 mL 1,000 mL (0 mLs Intravenous Stopped 10/5/24 2308)   iohexoL (OMNIPAQUE 350) injection 80 mL (80 mLs Intravenous Given 10/5/24 2242)     Medical Decision Making             ED Course as of 10/05/24 2330   Sat Oct 05, 2024   2154 Ideal body weight is 52 kg.  Will give 30 ml/kg bolus for a total of 1560 ml  [BM]      ED Course User Index  [BM] Gilbert Sherman Jr., MD               Medical Decision Making:   History:   Old Records Summarized: records from clinic visits and other records.  Initial Assessment:   Patient has tachycardia no distress will complete sepsis workup  Differential Diagnosis:   P, CP, pneumonia, sepsis, anxiety, arrhythmia  Clinical Tests:   Lab Tests: Ordered and Reviewed  The following lab test(s) were unremarkable: CBC, CMP, Urinalysis and Lactate  Radiological Study: Ordered and Reviewed  Medical Tests: Ordered and Reviewed  Sepsis Perfusion Assessment: "I attest a sepsis perfusion exam was performed within 6 hours of sepsis, severe sepsis, or septic shock presentation, following fluid resuscitation."    Sepsis Perfusion Assessment Complete: 10/5/2024 " 11:29 PM    ED Management:  Patient has 2/4 sirs criteria with elevated lactic acid.  Patient given 30 mg kg bolus of 1560 ml normal saline.  Repeat exam shows vital signs are improved.  Will consult on-call hospitalist for admission secondary to severe sepsis.             Clinical Impression:  Final diagnoses:  [R06.02] SOB (shortness of breath)  [R00.0] Tachycardia  [A41.9, R65.20] Severe sepsis (Primary)  [J06.9] Upper respiratory tract infection, unspecified type          ED Disposition Condition    Admit Stable                Gilbert Sherman Jr., MD  10/05/24 6770

## 2024-10-06 NOTE — ASSESSMENT & PLAN NOTE
Patients blood pressure range in the last 24 hours was: BP  Min: 113/55  Max: 169/88.The patient's inpatient anti-hypertensive regimen is listed below:  Current Antihypertensives  amLODIPine tablet 10 mg, Daily, Oral  metoprolol tartrate (LOPRESSOR) tablet 50 mg, 2 times daily, Oral    Plan  - BP is controlled, no changes needed to their regimen    BP Readings from Last 3 Encounters:   10/06/24 (!) 149/85   10/04/24 (!) 113/55   10/02/24 (!) 119/58

## 2024-10-06 NOTE — SUBJECTIVE & OBJECTIVE
Past Medical History:   Diagnosis Date    Anxiety     Breast cancer 2010    Cancer     Breast    COPD (chronic obstructive pulmonary disease)     Depression     Diabetes mellitus     GERD (gastroesophageal reflux disease)     Hypertension     Insomnia     Thyroid disease        Past Surgical History:   Procedure Laterality Date    BLADDER SUSPENSION      BREAST LUMPECTOMY      Right breast    CHOLECYSTECTOMY      HYSTERECTOMY      KNEE ARTHROSCOPY      Right knee    OOPHORECTOMY         Review of patient's allergies indicates:  No Known Allergies    No current facility-administered medications on file prior to encounter.     Current Outpatient Medications on File Prior to Encounter   Medication Sig    albuterol (PROVENTIL/VENTOLIN HFA) 90 mcg/actuation inhaler Inhale 1-2 puffs into the lungs every 4 (four) hours as needed for Wheezing or Shortness of Breath. Rescue    albuterol-ipratropium (DUO-NEB) 2.5 mg-0.5 mg/3 mL nebulizer solution Take 3 mLs by nebulization every 4 (four) hours as needed for Wheezing or Shortness of Breath. Rescue    albuterol-ipratropium (DUO-NEB) 2.5 mg-0.5 mg/3 mL nebulizer solution Take 3 mLs by nebulization every 4 (four) hours as needed for Wheezing. Rescue    ALPRAZolam (XANAX) 0.5 MG tablet MAY FILL 7/20/2024. TAKE 1 TABLET BY MOUTH EVERY 8 HOURS    amLODIPine (NORVASC) 10 MG tablet Take 10 mg by mouth.    aspirin (ECOTRIN) 81 MG EC tablet Take 81 mg by mouth once daily.    BREZTRI AEROSPHERE 160-9-4.8 mcg/actuation HFAA Inhale into the lungs.    celecoxib (CELEBREX) 200 MG capsule Take 200 mg by mouth.    citalopram (CELEXA) 40 MG tablet Take by mouth.    cloNIDine (CATAPRES) 0.1 MG tablet Take 0.1 mg by mouth every evening.    esomeprazole (NEXIUM) 20 MG capsule Take 20 mg by mouth before breakfast.    estradioL (ESTRACE) 1 MG tablet Take 1 tablet (1 mg total) by mouth once daily.    levothyroxine (SYNTHROID) 50 MCG tablet Take 50 mcg by mouth before breakfast.    metFORMIN  (GLUCOPHAGE-XR) 750 MG ER 24hr tablet Take by mouth.    methocarbamoL (ROBAXIN) 750 MG Tab Take 750 mg by mouth.    predniSONE (DELTASONE) 20 MG tablet Take 2 tablets (40 mg total) by mouth once daily. for 5 days    risperiDONE (RISPERDAL) 3 MG Tab      Family History       Problem Relation (Age of Onset)    No Known Problems Mother, Father, Sister, Daughter, Maternal Aunt, Maternal Uncle, Paternal Aunt, Paternal Uncle, Maternal Grandmother, Maternal Grandfather, Paternal Grandmother, Paternal Grandfather, Other          Tobacco Use    Smoking status: Every Day     Current packs/day: 0.50     Average packs/day: 0.5 packs/day for 50.4 years (25.2 ttl pk-yrs)     Types: Cigarettes     Start date: 5/15/1974     Passive exposure: Past    Smokeless tobacco: Never    Tobacco comments:     quit yesterday 6/12/21   Substance and Sexual Activity    Alcohol use: Not Currently    Drug use: Never    Sexual activity: Not Currently     Partners: Male     Birth control/protection: None     Review of Systems   Constitutional:  Negative for chills, diaphoresis, fatigue and fever.   HENT:  Negative for congestion, facial swelling and trouble swallowing.    Eyes: Negative.    Respiratory:  Positive for shortness of breath and wheezing. Negative for cough and chest tightness.    Cardiovascular:  Negative for chest pain, palpitations and leg swelling.   Gastrointestinal:  Negative for abdominal distention, abdominal pain, blood in stool, diarrhea, nausea and vomiting.   Endocrine: Negative.    Genitourinary: Negative.    Musculoskeletal:  Negative for arthralgias and gait problem.   Allergic/Immunologic: Negative.    Neurological:  Negative for dizziness, syncope and light-headedness.   Hematological: Negative.    Psychiatric/Behavioral: Negative.       Objective:     Vital Signs (Most Recent):  Temp: 97.7 °F (36.5 °C) (10/06/24 0805)  Pulse: (!) 128 (10/06/24 0806)  Resp: (!) 22 (10/06/24 0900)  BP: (!) 149/85 (10/06/24 0805)  SpO2:  (!) 92 % (10/06/24 0806) Vital Signs (24h Range):  Temp:  [97.7 °F (36.5 °C)-98 °F (36.7 °C)] 97.7 °F (36.5 °C)  Pulse:  [] 128  Resp:  [14-22] 22  SpO2:  [90 %-100 %] 92 %  BP: (115-169)/(63-94) 149/85     Weight: 108 kg (238 lb)  Body mass index is 42.16 kg/m².     Physical Exam  Vitals and nursing note reviewed.   Constitutional:       General: She is not in acute distress.     Appearance: Normal appearance. She is normal weight.   HENT:      Head: Normocephalic and atraumatic.   Eyes:      Extraocular Movements: Extraocular movements intact.      Pupils: Pupils are equal, round, and reactive to light.   Cardiovascular:      Rate and Rhythm: Normal rate and regular rhythm.      Heart sounds: Normal heart sounds, S1 normal and S2 normal. No murmur heard.  Pulmonary:      Effort: Pulmonary effort is normal. No accessory muscle usage or respiratory distress.      Breath sounds: Wheezing present. No rhonchi or rales.      Comments: Faint wheezing to bases   Abdominal:      General: Abdomen is flat. Bowel sounds are normal. There is no distension.      Palpations: Abdomen is soft.      Tenderness: There is no abdominal tenderness.   Musculoskeletal:         General: Normal range of motion.      Cervical back: Normal range of motion and neck supple.      Right lower leg: No edema.      Left lower leg: No edema.   Skin:     General: Skin is warm and dry.   Neurological:      General: No focal deficit present.      Mental Status: She is alert and oriented to person, place, and time. Mental status is at baseline.      Motor: No weakness.   Psychiatric:         Mood and Affect: Mood normal.         Behavior: Behavior normal.         Thought Content: Thought content normal.         Judgment: Judgment normal.              CRANIAL NERVES     CN III, IV, VI   Pupils are equal, round, and reactive to light.       Significant Labs: All pertinent labs within the past 24 hours have been reviewed.    Significant Imaging: I  have reviewed all pertinent imaging results/findings within the past 24 hours.

## 2024-10-06 NOTE — PLAN OF CARE
10/06/24 0904   Medicare Message   Important Message from Medicare regarding Discharge Appeal Rights Given to patient/caregiver;Explained to patient/caregiver;Signed/date by patient/caregiver;Other (comments)  (obtained by registration)   Date IMM was signed 10/06/24   Time IMM was signed 0810

## 2024-10-06 NOTE — H&P
[facility] Medicine  History & Physical    Patient Name: Vero Allen  MRN: [id number]  Patient Class: IP- Inpatient  Admission Date: 10/5/2024  Attending Physician: Jh Peterson Jr., MD   Primary Care Provider: Burke Merchant MD         Patient information was obtained from patient, past medical records, and ER records.     Subjective:     Principal Problem:Severe sepsis    Chief Complaint:   Chief Complaint   Patient presents with    COPD     Reports exacerbation of COPD symptoms while lying down approx 1 hr. Took home breathing treatment without relief.         HPI:        Chief Complaint   Patient presents with    COPD       Reports exacerbation of COPD symptoms while lying down approx 1 hr. Took home breathing treatment without relief.       64-year-old female past medical history of hypertension diabetes COPD his ED reports of worsening shortness of breath down.  Patient reports she was seen ED 2 days ago with similar symptoms diagnosis COPD exacerbation.  Patient reports no audible wheezing she has been using inhaler as instructed.  Patient noted be tachycardic ED no difficulty breathing.  She reports her doctor is .      10/6/24:  Above reviewed.  Patient seen and examined this morning.  States her shortness of breath is improving overall.  Potassium is low this morning and will need supplementation.      Past Medical History:   Diagnosis Date    Anxiety     Breast cancer 2010    Cancer     Breast    COPD (chronic obstructive pulmonary disease)     Depression     Diabetes mellitus     GERD (gastroesophageal reflux disease)     Hypertension     Insomnia     Thyroid disease        Past Surgical History:   Procedure Laterality Date    BLADDER SUSPENSION      BREAST LUMPECTOMY      Right breast    CHOLECYSTECTOMY      HYSTERECTOMY      KNEE ARTHROSCOPY      Right knee    OOPHORECTOMY         Review of patient's allergies indicates:  No Known Allergies    No  current facility-administered medications on file prior to encounter.     Current Outpatient Medications on File Prior to Encounter   Medication Sig    albuterol (PROVENTIL/VENTOLIN HFA) 90 mcg/actuation inhaler Inhale 1-2 puffs into the lungs every 4 (four) hours as needed for Wheezing or Shortness of Breath. Rescue    albuterol-ipratropium (DUO-NEB) 2.5 mg-0.5 mg/3 mL nebulizer solution Take 3 mLs by nebulization every 4 (four) hours as needed for Wheezing or Shortness of Breath. Rescue    albuterol-ipratropium (DUO-NEB) 2.5 mg-0.5 mg/3 mL nebulizer solution Take 3 mLs by nebulization every 4 (four) hours as needed for Wheezing. Rescue    ALPRAZolam (XANAX) 0.5 MG tablet MAY FILL 7/20/2024. TAKE 1 TABLET BY MOUTH EVERY 8 HOURS    amLODIPine (NORVASC) 10 MG tablet Take 10 mg by mouth.    aspirin (ECOTRIN) 81 MG EC tablet Take 81 mg by mouth once daily.    BREZTRI AEROSPHERE 160-9-4.8 mcg/actuation HFAA Inhale into the lungs.    celecoxib (CELEBREX) 200 MG capsule Take 200 mg by mouth.    citalopram (CELEXA) 40 MG tablet Take by mouth.    cloNIDine (CATAPRES) 0.1 MG tablet Take 0.1 mg by mouth every evening.    esomeprazole (NEXIUM) 20 MG capsule Take 20 mg by mouth before breakfast.    estradioL (ESTRACE) 1 MG tablet Take 1 tablet (1 mg total) by mouth once daily.    levothyroxine (SYNTHROID) 50 MCG tablet Take 50 mcg by mouth before breakfast.    metFORMIN (GLUCOPHAGE-XR) 750 MG ER 24hr tablet Take by mouth.    methocarbamoL (ROBAXIN) 750 MG Tab Take 750 mg by mouth.    predniSONE (DELTASONE) 20 MG tablet Take 2 tablets (40 mg total) by mouth once daily. for 5 days    risperiDONE (RISPERDAL) 3 MG Tab      Family History       Problem Relation (Age of Onset)    No Known Problems Mother, Father, Sister, Daughter, Maternal Aunt, Maternal Uncle, Paternal Aunt, Paternal Uncle, Maternal Grandmother, Maternal Grandfather, Paternal Grandmother, Paternal Grandfather, Other          Tobacco Use    Smoking status: Every Day      Current packs/day: 0.50     Average packs/day: 0.5 packs/day for 50.4 years (25.2 ttl pk-yrs)     Types: Cigarettes     Start date: 5/15/1974     Passive exposure: Past    Smokeless tobacco: Never    Tobacco comments:     quit yesterday 6/12/21   Substance and Sexual Activity    Alcohol use: Not Currently    Drug use: Never    Sexual activity: Not Currently     Partners: Male     Birth control/protection: None     Review of Systems   Constitutional:  Negative for chills, diaphoresis, fatigue and fever.   HENT:  Negative for congestion, facial swelling and trouble swallowing.    Eyes: Negative.    Respiratory:  Positive for shortness of breath and wheezing. Negative for cough and chest tightness.    Cardiovascular:  Negative for chest pain, palpitations and leg swelling.   Gastrointestinal:  Negative for abdominal distention, abdominal pain, blood in stool, diarrhea, nausea and vomiting.   Endocrine: Negative.    Genitourinary: Negative.    Musculoskeletal:  Negative for arthralgias and gait problem.   Allergic/Immunologic: Negative.    Neurological:  Negative for dizziness, syncope and light-headedness.   Hematological: Negative.    Psychiatric/Behavioral: Negative.       Objective:     Vital Signs (Most Recent):  Temp: 97.7 °F (36.5 °C) (10/06/24 0805)  Pulse: (!) 128 (10/06/24 0806)  Resp: (!) 22 (10/06/24 0900)  BP: (!) 149/85 (10/06/24 0805)  SpO2: (!) 92 % (10/06/24 0806) Vital Signs (24h Range):  Temp:  [97.7 °F (36.5 °C)-98 °F (36.7 °C)] 97.7 °F (36.5 °C)  Pulse:  [] 128  Resp:  [14-22] 22  SpO2:  [90 %-100 %] 92 %  BP: (115-169)/(63-94) 149/85     Weight: 108 kg (238 lb)  Body mass index is 42.16 kg/m².     Physical Exam  Vitals and nursing note reviewed.   Constitutional:       General: She is not in acute distress.     Appearance: Normal appearance. She is normal weight.   HENT:      Head: Normocephalic and atraumatic.   Eyes:      Extraocular Movements: Extraocular movements intact.      Pupils:  Pupils are equal, round, and reactive to light.   Cardiovascular:      Rate and Rhythm: Normal rate and regular rhythm.      Heart sounds: Normal heart sounds, S1 normal and S2 normal. No murmur heard.  Pulmonary:      Effort: Pulmonary effort is normal. No accessory muscle usage or respiratory distress.      Breath sounds: Wheezing present. No rhonchi or rales.      Comments: Faint wheezing to bases   Abdominal:      General: Abdomen is flat. Bowel sounds are normal. There is no distension.      Palpations: Abdomen is soft.      Tenderness: There is no abdominal tenderness.   Musculoskeletal:         General: Normal range of motion.      Cervical back: Normal range of motion and neck supple.      Right lower leg: No edema.      Left lower leg: No edema.   Skin:     General: Skin is warm and dry.   Neurological:      General: No focal deficit present.      Mental Status: She is alert and oriented to person, place, and time. Mental status is at baseline.      Motor: No weakness.   Psychiatric:         Mood and Affect: Mood normal.         Behavior: Behavior normal.         Thought Content: Thought content normal.         Judgment: Judgment normal.              CRANIAL NERVES     CN III, IV, VI   Pupils are equal, round, and reactive to light.       Significant Labs: All pertinent labs within the past 24 hours have been reviewed.    Significant Imaging: I have reviewed all pertinent imaging results/findings within the past 24 hours.  Assessment/Plan:     * Severe sepsis  This patient does have evidence of infective focus  My overall impression is sepsis.  Source: Respiratory  Antibiotics given-   Antibiotics (72h ago, onward)      Start     Stop Route Frequency Ordered    10/06/24 2300  azithromycin (ZITHROMAX) 500 mg in D5W 250 mL  IVPB (admixture device)         -- IV Every 24 hours (non-standard times) 10/06/24 0028    10/06/24 2200  cefTRIAXone (Rocephin) 1 g in D5W 100 mL IVPB (MB+)         -- IV Every 24 hours  (non-standard times) 10/06/24 0028          Latest lactate reviewed-  Recent Labs   Lab 10/06/24  0048   LACTATE 1.9         Will Not start Pressors- Levophed for MAP of 65    Continue IV rocephin and zithromax.     Hypokalemia  Patient's most recent potassium results are listed below.   Recent Labs     10/03/24  2319 10/05/24  2125 10/06/24  0526   K 3.3* 3.5 3.2*     Plan  - Replete potassium per protocol  - Monitor potassium Daily    MDD (major depressive disorder), recurrent, severe, with psychosis    Continue home regimen.     Generalized anxiety disorder  Continue home regimen       Primary hypertension  Patients blood pressure range in the last 24 hours was: BP  Min: 113/55  Max: 169/88.The patient's inpatient anti-hypertensive regimen is listed below:  Current Antihypertensives  amLODIPine tablet 10 mg, Daily, Oral  metoprolol tartrate (LOPRESSOR) tablet 50 mg, 2 times daily, Oral    Plan  - BP is controlled, no changes needed to their regimen    BP Readings from Last 3 Encounters:   10/06/24 (!) 149/85   10/04/24 (!) 113/55   10/02/24 (!) 119/58        COPD exacerbation  Will continue IV solumedrol plus rocephin/zithromax.  Will schedule duonebs TID and use PRN for breakthrough symptoms.     Nicotine dependence, uncomplicated  Nicotine patch daily as needed.     Severe obesity (BMI >= 40)  Body mass index is 42.16 kg/m². Morbid obesity complicates all aspects of disease management from diagnostic modalities to treatment. Weight loss encouraged and health benefits explained to patient.     Recommendations: Stay Active. As tolerated alternate resistance training with stretching and cardio. Goal of 150 minutes per week or 7,500 - 10,000 steps per day. Follow the Mediterranean Diet. Include fruit, vegetables, olive oil, seeds, nuts, whole grains, cold water fish and lean cuts of meat.  Do not drink beverages with any caloric content.  Decrease portion sizes slightly which will result in an approximately  500-calorie deficit.  Consider substituting one meal a day with a meal replacement such as Slim fast, lean cuisine, or weight watcher's.  Avoid fast or fried food.           VTE Risk Mitigation (From admission, onward)           Ordered     IP VTE HIGH RISK PATIENT  Once         10/06/24 0028     Place sequential compression device  Until discontinued         10/06/24 0028                                    ELIANA Navarro  Department of Hospital Medicine  [facility]

## 2024-10-06 NOTE — HPI
Chief Complaint   Patient presents with    COPD       Reports exacerbation of COPD symptoms while lying down approx 1 hr. Took home breathing treatment without relief.       64-year-old female past medical history of hypertension diabetes COPD his ED reports of worsening shortness of breath down.  Patient reports she was seen ED 2 days ago with similar symptoms diagnosis COPD exacerbation.  Patient reports no audible wheezing she has been using inhaler as instructed.  Patient noted be tachycardic ED no difficulty breathing.  She reports her doctor is .      10/6/24:  Above reviewed.  Patient seen and examined this morning.  States her shortness of breath is improving overall.  Potassium is low this morning and will need supplementation.

## 2024-10-06 NOTE — ASSESSMENT & PLAN NOTE
Patient's most recent potassium results are listed below.   Recent Labs     10/03/24  2319 10/05/24  2125 10/06/24  0526   K 3.3* 3.5 3.2*     Plan  - Replete potassium per protocol  - Monitor potassium Daily

## 2024-10-06 NOTE — ASSESSMENT & PLAN NOTE
Body mass index is 42.16 kg/m². Morbid obesity complicates all aspects of disease management from diagnostic modalities to treatment. Weight loss encouraged and health benefits explained to patient.     Recommendations: Stay Active. As tolerated alternate resistance training with stretching and cardio. Goal of 150 minutes per week or 7,500 - 10,000 steps per day. Follow the Mediterranean Diet. Include fruit, vegetables, olive oil, seeds, nuts, whole grains, cold water fish and lean cuts of meat.  Do not drink beverages with any caloric content.  Decrease portion sizes slightly which will result in an approximately 500-calorie deficit.  Consider substituting one meal a day with a meal replacement such as Slim fast, lean cuisine, or weight watcher's.  Avoid fast or fried food.

## 2024-10-06 NOTE — PLAN OF CARE
Problem: Adult Inpatient Plan of Care  Goal: Optimal Comfort and Wellbeing  Outcome: Not Progressing  Goal: Readiness for Transition of Care  Outcome: Not Progressing     Problem: Pneumonia  Goal: Fluid Balance  Outcome: Not Progressing  Goal: Resolution of Infection Signs and Symptoms  Outcome: Not Progressing         Problem: Adult Inpatient Plan of Care  Goal: Plan of Care Review  Outcome: Progressing  Goal: Patient-Specific Goal (Individualized)  Outcome: Progressing  Goal: Absence of Hospital-Acquired Illness or Injury  Outcome: Progressing     Problem: Bariatric Environmental Safety  Goal: Safety Maintained with Care  Outcome: Progressing     Problem: Pneumonia  Goal: Effective Oxygenation and Ventilation  Outcome: Progressing     Problem: Fall Injury Risk  Goal: Absence of Fall and Fall-Related Injury  Outcome: Progressing

## 2024-10-06 NOTE — ASSESSMENT & PLAN NOTE
Will continue IV solumedrol plus rocephin/zithromax.  Will schedule duonebs TID and use PRN for breakthrough symptoms.

## 2024-10-07 VITALS
BODY MASS INDEX: 42.17 KG/M2 | TEMPERATURE: 98 F | WEIGHT: 238 LBS | SYSTOLIC BLOOD PRESSURE: 124 MMHG | OXYGEN SATURATION: 92 % | HEIGHT: 63 IN | HEART RATE: 69 BPM | DIASTOLIC BLOOD PRESSURE: 63 MMHG | RESPIRATION RATE: 18 BRPM

## 2024-10-07 LAB
ALBUMIN SERPL BCP-MCNC: 2.7 G/DL (ref 3.5–5.2)
ALP SERPL-CCNC: 92 U/L (ref 55–135)
ALT SERPL W/O P-5'-P-CCNC: 26 U/L (ref 10–44)
ANION GAP SERPL CALC-SCNC: 8 MMOL/L (ref 3–11)
AST SERPL-CCNC: 11 U/L (ref 10–40)
BASOPHILS # BLD AUTO: 0 K/UL (ref 0–0.2)
BASOPHILS NFR BLD: 0 % (ref 0–1.9)
BILIRUB SERPL-MCNC: 0.3 MG/DL (ref 0.1–1)
BUN SERPL-MCNC: 21 MG/DL (ref 8–23)
CALCIUM SERPL-MCNC: 9.3 MG/DL (ref 8.7–10.5)
CHLORIDE SERPL-SCNC: 106 MMOL/L (ref 95–110)
CO2 SERPL-SCNC: 27 MMOL/L (ref 23–29)
CREAT SERPL-MCNC: 1.3 MG/DL (ref 0.5–1.4)
DIFFERENTIAL METHOD BLD: ABNORMAL
EOSINOPHIL # BLD AUTO: 0 K/UL (ref 0–0.5)
EOSINOPHIL NFR BLD: 0 % (ref 0–8)
ERYTHROCYTE [DISTWIDTH] IN BLOOD BY AUTOMATED COUNT: 15.8 % (ref 11.5–14.5)
EST. GFR  (NO RACE VARIABLE): 45.6 ML/MIN/1.73 M^2
GLUCOSE SERPL-MCNC: 163 MG/DL (ref 70–110)
HCT VFR BLD AUTO: 36.3 % (ref 37–48.5)
HGB BLD-MCNC: 11.7 G/DL (ref 12–16)
IMM GRANULOCYTES # BLD AUTO: 0.05 K/UL (ref 0–0.04)
IMM GRANULOCYTES NFR BLD AUTO: 0.5 % (ref 0–0.5)
LYMPHOCYTES # BLD AUTO: 0.7 K/UL (ref 1–4.8)
LYMPHOCYTES NFR BLD: 6 % (ref 18–48)
MAGNESIUM SERPL-MCNC: 1.9 MG/DL (ref 1.6–2.6)
MCH RBC QN AUTO: 29.3 PG (ref 27–31)
MCHC RBC AUTO-ENTMCNC: 32.2 G/DL (ref 32–36)
MCV RBC AUTO: 91 FL (ref 82–98)
MONOCYTES # BLD AUTO: 0.3 K/UL (ref 0.3–1)
MONOCYTES NFR BLD: 2.4 % (ref 4–15)
NEUTROPHILS # BLD AUTO: 9.9 K/UL (ref 1.8–7.7)
NEUTROPHILS NFR BLD: 91.1 % (ref 38–73)
NRBC BLD-RTO: 0 /100 WBC
PLATELET # BLD AUTO: 242 K/UL (ref 150–450)
PMV BLD AUTO: 11.5 FL (ref 9.2–12.9)
POCT GLUCOSE: 160 MG/DL (ref 70–110)
POCT GLUCOSE: 234 MG/DL (ref 70–110)
POTASSIUM SERPL-SCNC: 4.2 MMOL/L (ref 3.5–5.1)
PROT SERPL-MCNC: 6.1 G/DL (ref 6–8.4)
RBC # BLD AUTO: 4 M/UL (ref 4–5.4)
SODIUM SERPL-SCNC: 141 MMOL/L (ref 136–145)
WBC # BLD AUTO: 10.81 K/UL (ref 3.9–12.7)

## 2024-10-07 PROCEDURE — 97116 GAIT TRAINING THERAPY: CPT

## 2024-10-07 PROCEDURE — 25000003 PHARM REV CODE 250: Performed by: NURSE PRACTITIONER

## 2024-10-07 PROCEDURE — 80053 COMPREHEN METABOLIC PANEL: CPT | Performed by: NURSE PRACTITIONER

## 2024-10-07 PROCEDURE — 36415 COLL VENOUS BLD VENIPUNCTURE: CPT | Performed by: NURSE PRACTITIONER

## 2024-10-07 PROCEDURE — 63600175 PHARM REV CODE 636 W HCPCS: Performed by: NURSE PRACTITIONER

## 2024-10-07 PROCEDURE — 85025 COMPLETE CBC W/AUTO DIFF WBC: CPT | Performed by: NURSE PRACTITIONER

## 2024-10-07 PROCEDURE — 99900035 HC TECH TIME PER 15 MIN (STAT)

## 2024-10-07 PROCEDURE — 25000242 PHARM REV CODE 250 ALT 637 W/ HCPCS: Performed by: INTERNAL MEDICINE

## 2024-10-07 PROCEDURE — 83735 ASSAY OF MAGNESIUM: CPT | Performed by: NURSE PRACTITIONER

## 2024-10-07 PROCEDURE — 97166 OT EVAL MOD COMPLEX 45 MIN: CPT

## 2024-10-07 PROCEDURE — S4991 NICOTINE PATCH NONLEGEND: HCPCS | Performed by: NURSE PRACTITIONER

## 2024-10-07 PROCEDURE — 27000221 HC OXYGEN, UP TO 24 HOURS

## 2024-10-07 PROCEDURE — 25000003 PHARM REV CODE 250: Performed by: INTERNAL MEDICINE

## 2024-10-07 PROCEDURE — 94761 N-INVAS EAR/PLS OXIMETRY MLT: CPT

## 2024-10-07 PROCEDURE — 94640 AIRWAY INHALATION TREATMENT: CPT

## 2024-10-07 PROCEDURE — 97162 PT EVAL MOD COMPLEX 30 MIN: CPT

## 2024-10-07 PROCEDURE — 99900031 HC PATIENT EDUCATION (STAT)

## 2024-10-07 RX ORDER — CEFUROXIME AXETIL 500 MG/1
500 TABLET ORAL 2 TIMES DAILY
Qty: 10 TABLET | Refills: 0 | Status: ON HOLD | OUTPATIENT
Start: 2024-10-07 | End: 2024-10-17 | Stop reason: HOSPADM

## 2024-10-07 RX ORDER — AZITHROMYCIN 250 MG/1
TABLET, FILM COATED ORAL
Qty: 3 TABLET | Refills: 0 | Status: ON HOLD | OUTPATIENT
Start: 2024-10-07 | End: 2024-10-17 | Stop reason: HOSPADM

## 2024-10-07 RX ADMIN — METHYLPREDNISOLONE SODIUM SUCCINATE 125 MG: 125 INJECTION, POWDER, FOR SOLUTION INTRAMUSCULAR; INTRAVENOUS at 05:10

## 2024-10-07 RX ADMIN — LEVOTHYROXINE SODIUM 50 MCG: 50 TABLET ORAL at 05:10

## 2024-10-07 RX ADMIN — ASPIRIN 81 MG: 81 TABLET, COATED ORAL at 10:10

## 2024-10-07 RX ADMIN — SODIUM CHLORIDE AND POTASSIUM CHLORIDE: .9; .15 SOLUTION INTRAVENOUS at 05:10

## 2024-10-07 RX ADMIN — NICOTINE 1 PATCH: 14 PATCH, EXTENDED RELEASE TRANSDERMAL at 10:10

## 2024-10-07 RX ADMIN — IPRATROPIUM BROMIDE AND ALBUTEROL SULFATE 3 ML: 2.5; .5 SOLUTION RESPIRATORY (INHALATION) at 07:10

## 2024-10-07 RX ADMIN — AMLODIPINE BESYLATE 10 MG: 10 TABLET ORAL at 10:10

## 2024-10-07 RX ADMIN — RISPERIDONE 3 MG: 1 TABLET, FILM COATED ORAL at 10:10

## 2024-10-07 RX ADMIN — METOPROLOL TARTRATE 50 MG: 50 TABLET, FILM COATED ORAL at 10:10

## 2024-10-07 RX ADMIN — PANTOPRAZOLE SODIUM 40 MG: 40 TABLET, DELAYED RELEASE ORAL at 10:10

## 2024-10-07 RX ADMIN — ALPRAZOLAM 0.5 MG: 0.5 TABLET ORAL at 09:10

## 2024-10-07 RX ADMIN — INSULIN ASPART 2 UNITS: 100 INJECTION, SOLUTION INTRAVENOUS; SUBCUTANEOUS at 11:10

## 2024-10-07 RX ADMIN — POLYETHYLENE GLYCOL (3350) 17 G: 17 POWDER, FOR SOLUTION ORAL at 10:10

## 2024-10-07 NOTE — ASSESSMENT & PLAN NOTE
Patient's most recent potassium results are listed below.   Recent Labs     10/05/24  2125 10/06/24  0526 10/07/24  0520   K 3.5 3.2* 4.2       Plan  - Replete potassium per protocol  - Monitor potassium Daily

## 2024-10-07 NOTE — ASSESSMENT & PLAN NOTE
Patients blood pressure range in the last 24 hours was: BP  Min: 101/52  Max: 169/88.The patient's inpatient anti-hypertensive regimen is listed below:  Current Antihypertensives  amLODIPine tablet 10 mg, Daily, Oral  metoprolol tartrate (LOPRESSOR) tablet 50 mg, 2 times daily, Oral    Plan  - BP is controlled, no changes needed to their regimen    BP Readings from Last 3 Encounters:   10/07/24 (!) 149/76   10/04/24 (!) 113/55   10/02/24 (!) 119/58

## 2024-10-07 NOTE — ASSESSMENT & PLAN NOTE
Patient's most recent potassium results are listed below.   Recent Labs     10/05/24  2125 10/06/24  0526   K 3.5 3.2*       Plan  - Replete potassium per protocol  - Monitor potassium Daily

## 2024-10-07 NOTE — PT/OT/SLP EVAL
Occupational Therapy   Evaluation and Discharge Note    Name: Vero Allen  MRN: [id number]  Admitting Diagnosis: Severe sepsis  Recent Surgery: * No surgery found *      Recommendations:     Discharge Recommendations: Low Intensity Therapy  Discharge Equipment Recommendations: walker, rolling  Barriers to discharge:  Other (Comment) (Medical status)    Assessment:     Vero Allen is a 64 y.o. female with a medical diagnosis of Severe sepsis. At this time, patient discharging home, but would benefit from OT services in home health setting.     Plan:     During this hospitalization, patient does not require further acute OT services.  Please re-consult if situation changes.    Plan of Care Reviewed with: patient    Subjective     Chief Complaint: SOB and weakness  Patient/Family Comments/goals: Pt would like to reduce SOB with activity.    Occupational Profile:  Living Environment: Pt lives alone in a mobile home with 4 steps and handrail on (L).   Previous level of function: Independent  Roles and Routines: ADL's and IADL's  Equipment Used at home: shower chair  Assistance upon Discharge: Brother    Pain/Comfort:  Pain Rating 1: 6/10  Location - Orientation 1: medial  Location 1: chest  Pain Addressed 1: Reposition, Distraction, Nurse notified  Pain Rating Post-Intervention 1: 6/10    Patients cultural, spiritual, Advent conflicts given the current situation: no    Objective:     Communicated with: nurse prior to session.  Patient found sitting edge of bed with Other (comments) (none) upon OT entry to room.    General Precautions: Standard, fall  Orthopedic Precautions: N/A  Braces: N/A  Respiratory Status: Room air     Occupational Performance:    Bed Mobility:    Patient completed Rolling/Turning to Left with  independence  Patient completed Rolling/Turning to Right with independence  Patient completed Scooting/Bridging with independence  Patient completed Supine to Sit with  independence  Patient completed Sit to Supine with independence    Functional Mobility/Transfers:  Patient completed Sit <> Stand Transfer with modified independence  with  rolling walker   Patient completed Bed <> Chair Transfer using Step Transfer technique with modified independence with rolling walker  Patient completed Toilet Transfer Step Transfer technique with setup assistance with  grab bars  Functional Mobility: Pt ambulated 135' between surfaces requiring supervision utilizing RW.     Activities of Daily Living:  Feeding:  independence    Grooming: modified independence    Bathing: supervision    Upper Body Dressing: modified independence    Lower Body Dressing: setup assistance    Toileting: setup assistance      Cognitive/Visual Perceptual:  Cognitive/Psychosocial Skills:  -       Oriented to: Person, Place, Time, and Situation   -       Follows Commands/attention:Follows multistep  commands  -       Communication: clear/fluent  -       Memory: No Deficits noted  -       Safety awareness/insight to disability: intact   -       Mood/Affect/Coping skills/emotional control: Cooperative, Anxious, and Pleasant    Physical Exam:  Postural examination/scapula alignment: -       Rounded shoulders  Skin integrity: Visible skin intact  Sensation: -       Intact  bilateral UE's  Dominant hand: -       Right  Upper Extremity Range of Motion:  -       Right Upper Extremity: WFL  -       Left Upper Extremity: WFL  Upper Extremity Strength: -       Right Upper Extremity: grossly 4/5  -       Left Upper Extremity: grossly 4/5  Fine Motor Coordination: -       Intact  Gross motor coordination: WFL    AMPAC 6 Click ADL:  AMPAC Total Score: 22    Treatment & Education:  Pt to be discharged home following evaluation, but would benefit from OT services in home health setting.    Patient left HOB elevated with all lines intact, call button in reach, and nurse notified    GOALS:   Evaluation then discharge home.        History:     Past Medical History:   Diagnosis Date    Anxiety     Breast cancer 2010    Cancer     Breast    COPD (chronic obstructive pulmonary disease)     Depression     Diabetes mellitus     GERD (gastroesophageal reflux disease)     Hypertension     Insomnia     Thyroid disease          Past Surgical History:   Procedure Laterality Date    BLADDER SUSPENSION      BREAST LUMPECTOMY      Right breast    CHOLECYSTECTOMY      HYSTERECTOMY      KNEE ARTHROSCOPY      Right knee    OOPHORECTOMY         Time Tracking:     OT Date of Treatment: 10/07/24  OT Start Time: 1304  OT Stop Time: 1328  OT Total Time (min): 24 min    Billable Minutes:Evaluation 24    10/7/2024

## 2024-10-07 NOTE — ASSESSMENT & PLAN NOTE
Will continue IV solumedrol plus rocephin/zithromax.  Will schedule duonebs TID and use PRN for breakthrough symptoms.     10/7/24: Patient clinically improved.  Will continue with cefuroxime and zithromax at time of discharge.  Continue oral prednisone, duonebs, and breztri inhaler. F/u with PCP in one week

## 2024-10-07 NOTE — PT/OT/SLP PROGRESS
Physical Therapy      Patient Name:  Vero Allen   MRN:  [id number]    Patient not seen this morning secondary to patient is  Off the floor for procedure, Testing CT scan. Will follow-up later today. .

## 2024-10-07 NOTE — PT/OT/SLP EVAL
"Physical Therapy Evaluation    Patient Name:  Vero Allen   MRN:  [id number]    Recommendations:     Discharge Recommendations: Low Intensity Therapy   Discharge Equipment Recommendations: walker, rolling   Barriers to discharge: None    Assessment:     Vero Allen is a 64 y.o. female admitted with a medical diagnosis of Severe sepsis.  She presents with the following impairments/functional limitations: weakness, impaired endurance, impaired self care skills, impaired functional mobility, gait instability, impaired balance, decreased lower extremity function, impaired cardiopulmonary response to activity. These limitations are causing decreased functional mobility and independence.     Patient able to ambulate this visit with modified independence utilizing RW for ~125 ft needing multiple standing rest breaks secondary to reports of "shortness of breath." Respiratory therapy present for a walking assessment of SpO2. Patient's SpO2 remained between 93%-95% the entire evaluation. VSS throughout treatment and patient's SOB subsided quickly after short rest breaks. Patient needs RW to go home with, case management aware of this. She is being discharged today from acute hospital care to home.     Rehab Prognosis: Fair; patient would benefit from acute skilled PT services to address these deficits and reach maximum level of function.    Recent Surgery: * No surgery found *      Plan:     Patient discharged from hospital today    Subjective     Chief Complaint: shortness of breath  Patient/Family Comments/goals: "I get dizzy when I am short of breath"  Pain/Comfort:  Pain Rating 1: 0/10    Patients cultural, spiritual, Yazidism conflicts given the current situation: no    Living Environment:  Patient lives alone in a mobile home with 4 steps to enter and L sided handrail.   Prior to admission, patients level of function was independent.  Equipment used at home: shower chair.  DME owned (not " currently used): none.  Upon discharge, patient will have assistance from self.    Objective:     Communicated with nursing, respiratory, case management, and patient prior to session.  Patient found supine with peripheral IV  upon PT entry to room.    General Precautions: Standard, fall  Orthopedic Precautions:N/A   Braces: N/A  Respiratory Status: Room air    Vitals:   Prior to treatment (sitting EOB) During treatment (standing)   /69 mmHg    HR 87 bpm    SpO2 (room air) 93% 95%         Exams:  RLE ROM: WFL  RLE Strength: WFL  LLE ROM: WFL  LLE Strength: WFL    Functional Mobility:  Bed Mobility:     Rolling Left:  modified independence  Rolling Right: modified independence  Scooting: modified independence  Bridging: modified independence  Supine to Sit: modified independence  Sit to Supine: modified independence  Transfers:     Sit to Stand:  modified independence with rolling walker  Gait: ~125 ft with RW, modified independence, and multiple standing rest breaks  Balance: modified independent with RW, sitting EOB independent       AM-PAC 6 CLICK MOBILITY  Total Score:22       Treatment & Education:  Evaluation and Treatment   Gait training with RW   Balance    Sitting EOB    Standing with RW   Bed mobility   Transfers  Education   Patient educated on proper AD management, discharge disposition, role of acute care PT, POC, importance of OOB mobility, transfer safety, and call bell usage.    Patient left supine with all lines intact and call button in reach and nurse notified.    GOALS:   Multidisciplinary Problems       Physical Therapy Goals       Not on file                    History:     Past Medical History:   Diagnosis Date    Anxiety     Breast cancer 2010    Cancer     Breast    COPD (chronic obstructive pulmonary disease)     Depression     Diabetes mellitus     GERD (gastroesophageal reflux disease)     Hypertension     Insomnia     Thyroid disease        Past Surgical History:   Procedure  Laterality Date    BLADDER SUSPENSION      BREAST LUMPECTOMY      Right breast    CHOLECYSTECTOMY      HYSTERECTOMY      KNEE ARTHROSCOPY      Right knee    OOPHORECTOMY         Time Tracking:     PT Received On: 10/07/24  PT Start Time: 1225     PT Stop Time: 1248  PT Total Time (min): 23 min     Billable Minutes: Evaluation 8 and Gait Training 15      10/07/2024

## 2024-10-07 NOTE — RESPIRATORY THERAPY
10/07/24 0749   Patient Assessment/Suction   Level of Consciousness (AVPU) alert   Respiratory Effort Unlabored   Expansion/Accessory Muscles/Retractions no use of accessory muscles;no retractions   All Lung Fields Breath Sounds clear;diminished   Rhythm/Pattern, Respiratory unlabored;pattern regular   Cough Frequency no cough   PRE-TX-O2   Device (Oxygen Therapy) room air   $ Is the patient on Low Flow Oxygen? Yes   SpO2 99 %   Pulse Oximetry Type Intermittent   $ Pulse Oximetry - Multiple Charge Pulse Oximetry - Multiple   Pulse 76   Resp 18   Positioning HOB elevated 90 degrees   Aerosol Therapy   $ Aerosol Therapy Charges Aerosol Treatment   Daily Review of Necessity (SVN) completed   Respiratory Treatment Status (SVN) given   Treatment Route (SVN) mask;oxygen   Patient Position HOB elevated   Post Treatment Assessment (SVN) breath sounds unchanged   Signs of Intolerance (SVN) none   Breath Sounds Post-Respiratory Treatment   Throughout All Fields Post-Treatment All Fields   Throughout All Fields Post-Treatment no change   Post-treatment Heart Rate (beats/min) 79   Post-treatment Resp Rate (breaths/min) 18   Respiratory Interventions   Cough And Deep Breathing done independently per patient   Education   $ Education 15 min   Respiratory Evaluation   $ Care Plan Tech Time 15 min   Home Oxygen   Has Home Oxygen? No     Patient weaned to room air at this time.  Rosi CASTANEDA notified.

## 2024-10-07 NOTE — PLAN OF CARE
10/07/24 1048   Post-Acute Status   Post-Acute Authorization Home Health   Home Health Status Referrals Sent   Coverage HU[employer] MANAGED MEDICARE - HU[employer]NA MEDICARE HMO   Discharge Plan   Discharge Plan A Home;Home Health   Discharge Plan B Home;Home Health     New referral. The patient agreed to home health services. A CMS list was provided to the patient, but she did not have a preference. The patient was open to any local agency that is willing to accept her insurance.

## 2024-10-07 NOTE — PLAN OF CARE
[facility]  Initial [facility]       Primary Care Provider: Burke Merchant MD    Admission Diagnosis: SOB (shortness of breath) [R06.02]  Tachycardia [R00.0]  Severe sepsis [A41.9, R65.20]  Upper respiratory tract infection, unspecified type [J06.9]    Admission Date: 10/5/2024  Expected Discharge Date: 10/7/2024    Transition of Care Barriers: None    Payor: HUMANA MANAGED MEDICARE / Plan: HUMANA MEDICARE HMO / Product Type: Capitation /     Extended Emergency Contact Information  Primary Emergency Contact: sim baig  Mobile Phone: [phone]  Relation: Friend  Preferred language: English   needed? No  Secondary Emergency Contact: Robel Jay  Mobile Phone: [phone]  Relation: Brother   needed? No    Discharge Plan A: Home, Home Health  Discharge Plan B: Other (TBD)      [facility] [address] [place], [place] - [address]  [address]  [facility] [address]  Phone: [phone] Fax: [phone]    [id number] - [place], [place] - [address]  [address]  [facility] [address]  Phone: [phone] Fax: [phone]    [facility] [address] [place], [place] - [address] [facility]  [address] [facility] [address]  Phone: [phone] Fax: [phone]      Initial Assessment (most recent)       Adult Discharge Assessment - 10/07/24 0903          Discharge Assessment    Assessment Type Discharge Planning Assessment     Confirmed/corrected address, phone number and insurance Yes     Confirmed Demographics Correct on Facesheet     Source of Information patient     When was your last doctors appointment? --   "Last week."    Reason For Admission Severe sepsis     People in Home alone     Do you expect to return to your current living situation? Yes     Do you have help at home or someone to help you manage your care at home? No     Prior to hospitilization cognitive status: Alert/Oriented     Current cognitive status: Alert/Oriented     " Walking or Climbing Stairs Difficulty no     Walking or Climbing Stairs --   The patient expressed that she was not using any DME to ambulate with prior to being admitted.    Dressing/Bathing Difficulty yes     Dressing/Bathing bathing difficulty, requires equipment     Dressing/Bathing Management shower chair     Do you have any problems with: Errands/Grocery;Needs other help     Specify other help The patient expressed that she has a brother who is able to assist her with transporation because she does not have a car.     Home Accessibility stairs to enter home     Number of Stairs, Main Entrance three;four     Stair Railings, Main Entrance railing on left side (ascending)     Home Layout Able to live on 1st floor     Equipment Currently Used at Home nebulizer     Readmission within 30 days? No     Patient currently being followed by outpatient case management? No     Do you currently have service(s) that help you manage your care at home? No     Do you take prescription medications? Yes     Do you have prescription coverage? Yes     Coverage HU[employer]NA MANAGED MEDICARE - HU[employer]NA MEDICARE HMO     Do you have any problems affording any of your prescribed medications? TBD     Is the patient taking medications as prescribed? yes     Who is going to help you get home at discharge? Robel Jay  Brother  [phone]     How do you get to doctors appointments? family or friend will provide     Are you on dialysis? No     Do you take coumadin? No     Discharge Plan A Home;Home Health     Discharge Plan B Other   TBD    Discharge Plan discussed with: Patient     Transition of Care Barriers None        Physical Activity    On average, how many days per week do you engage in moderate to strenuous exercise (like a brisk walk)? 0 days     On average, how many minutes do you engage in exercise at this level? 0 min        Financial Resource Strain    How hard is it for you to pay for the very basics like food, housing, medical  "care, and heating? Somewhat hard        Housing Stability    In the last 12 months, was there a time when you were not able to pay the mortgage or rent on time? No     At any time in the past 12 months, were you homeless or living in a shelter (including now)? No        Transportation Needs    Has the lack of transportation kept you from medical appointments, meetings, work or from getting things needed for daily living? No        Food Insecurity    Within the past 12 months, you worried that your food would run out before you got the money to buy more. Sometimes true     Within the past 12 months, the food you bought just didn't last and you didn't have money to get more. Sometimes true        Stress    Do you feel stress - tense, restless, nervous, or anxious, or unable to sleep at night because your mind is troubled all the time - these days? Not at all        Social Isolation    How often do you feel lonely or isolated from those around you?  Often   "I don't have a vehicle."       Alcohol Use    Q1: How often do you have a drink containing alcohol? Never     Q2: How many drinks containing alcohol do you have on a typical day when you are drinking? Patient does not drink     Q3: How often do you have six or more drinks on one occasion? Never        Utilities    In the past 12 months has the electric, gas, oil, or water company threatened to shut off services in your home? No        Health Literacy    How often do you need to have someone help you when you read instructions, pamphlets, or other written material from your doctor or pharmacy? Sometimes                 Discharge assessment completed with patient in person. The patient lives alone. She reports that her brother assist her with transportation as needed because she does not have a vehicle. The patient expressed that she currently does not use anything to ambulate with. She is open to home health upon discharge if the physician is in agreement.     The " patient does present with several social determinants of health. She was open to resources for mental health and food pantries. The patient agreed to a new referral  being sent to [facility] for an outpatient follow-up. The patient plans to return home upon discharge.     Discharge planning checklist given to patient with instructions to contact  for any needs.  SW will follow as needed.

## 2024-10-07 NOTE — ASSESSMENT & PLAN NOTE
This patient does have evidence of infective focus  My overall impression is sepsis.  Source: Respiratory  Antibiotics given-   Antibiotics (72h ago, onward)      Start     Stop Route Frequency Ordered    10/06/24 2300  azithromycin (ZITHROMAX) 500 mg in D5W 250 mL  IVPB (admixture device)         -- IV Every 24 hours (non-standard times) 10/06/24 0028    10/06/24 2200  cefTRIAXone (Rocephin) 1 g in D5W 100 mL IVPB (MB+)         -- IV Every 24 hours (non-standard times) 10/06/24 0028          Latest lactate reviewed-  Recent Labs   Lab 10/06/24  0048   LACTATE 1.9           Will Not start Pressors- Levophed for MAP of 65    Continue IV rocephin and zithromax.     10/7/24:  Continue zithromax and cefuroxime at discharge.

## 2024-10-07 NOTE — DISCHARGE SUMMARY
[facility] Medicine  Discharge Summary      Patient Name: Vero Allen  MRN: [id number]  MUNIR: [id number]  Patient Class: IP- Inpatient  Admission Date: 10/5/2024  Hospital Length of Stay: 2 days  Discharge Date and Time:  10/07/2024 9:19 AM  Attending Physician: Jh Peterson Jr., MD   Discharging Provider: ELIANA Navarro  Primary Care Provider: Burke Merchant MD    Primary Care Team: Networked reference to record PCT     HPI:        Chief Complaint   Patient presents with    COPD       Reports exacerbation of COPD symptoms while lying down approx 1 hr. Took home breathing treatment without relief.       64-year-old female past medical history of hypertension diabetes COPD his ED reports of worsening shortness of breath down.  Patient reports she was seen ED 2 days ago with similar symptoms diagnosis COPD exacerbation.  Patient reports no audible wheezing she has been using inhaler as instructed.  Patient noted be tachycardic ED no difficulty breathing.  She reports her doctor is .      10/6/24:  Above reviewed.  Patient seen and examined this morning.  States her shortness of breath is improving overall.  Potassium is low this morning and will need supplementation.      * No surgery found *      Hospital Course:   10/07/2024: Patient endorses clinical improvement.  Blood cultures negative thus far.  Labs overall appeared to be improved.  Did not qualify for home O2 per screening by respiratory therapy.  Will send zithromax and cefuroxime to pharmacy to complete course of abx.  Continue oral prednisone after discharge.  F/u With primary care provider in 1 week.       Goals of Care Treatment Preferences:  Code Status: Full Code      SDOH Screening:  The patient was screened for food insecurity, housing instability, transportation needs, utility difficulties, and interpersonal safety. The social determinant(s) of health identified as a concern this admission are:  Food  insecurity        Social Drivers of Health with Concerns     Food Insecurity: Food Insecurity Present (10/7/2024)        Consults:     Pulmonary  COPD exacerbation  Will continue IV solumedrol plus rocephin/zithromax.  Will schedule duonebs TID and use PRN for breakthrough symptoms.     10/7/24: Patient clinically improved.  Will continue with cefuroxime and zithromax at time of discharge.  Continue oral prednisone, duonebs, and breztri inhaler. F/u with PCP in one week     Cardiac/Vascular  Primary hypertension  Patients blood pressure range in the last 24 hours was: BP  Min: 101/52  Max: 169/88.The patient's inpatient anti-hypertensive regimen is listed below:  Current Antihypertensives  amLODIPine tablet 10 mg, Daily, Oral  metoprolol tartrate (LOPRESSOR) tablet 50 mg, 2 times daily, Oral    Plan  - BP is controlled, no changes needed to their regimen    BP Readings from Last 3 Encounters:   10/07/24 (!) 149/76   10/04/24 (!) 113/55   10/02/24 (!) 119/58        Renal/  Hypokalemia  Patient's most recent potassium results are listed below.   Recent Labs     10/05/24  2125 10/06/24  0526 10/07/24  0520   K 3.5 3.2* 4.2       Plan  - Replete potassium per protocol  - Monitor potassium Daily    ID  * Severe sepsis  This patient does have evidence of infective focus  My overall impression is sepsis.  Source: Respiratory  Antibiotics given-   Antibiotics (72h ago, onward)      Start     Stop Route Frequency Ordered    10/06/24 2300  azithromycin (ZITHROMAX) 500 mg in D5W 250 mL  IVPB (admixture device)         -- IV Every 24 hours (non-standard times) 10/06/24 0028    10/06/24 2200  cefTRIAXone (Rocephin) 1 g in D5W 100 mL IVPB (MB+)         -- IV Every 24 hours (non-standard times) 10/06/24 0028          Latest lactate reviewed-  Recent Labs   Lab 10/06/24  0048   LACTATE 1.9           Will Not start Pressors- Levophed for MAP of 65    Continue IV rocephin and zithromax.     10/7/24:  Continue zithromax and  cefuroxime at discharge.       Final Active Diagnoses:    Diagnosis Date Noted POA    PRINCIPAL PROBLEM:  Severe sepsis [A41.9, R65.20] 10/06/2024 Unknown    Hypokalemia [E87.6] 10/06/2024 Yes    History of breast cancer [Z85.3] 10/02/2024 Not Applicable    MDD (major depressive disorder), recurrent, severe, with psychosis [F33.3] 05/15/2024 Yes    COPD exacerbation [J44.1] 03/10/2024 Yes    Primary hypertension [I10] 03/10/2024 Yes    Generalized anxiety disorder [F41.1] 03/10/2024 Yes    History of tobacco abuse [Z87.891] 03/10/2024 Not Applicable    Severe obesity (BMI >= 40) [E66.01] 12/21/2023 Yes    Nicotine dependence, uncomplicated [F17.200] 12/21/2023 Yes      Problems Resolved During this Admission:       Discharged Condition: fair    Disposition:     Follow Up:   Follow-up Information       Burke Merchant MD Follow up in 1 week(s).    Specialty: Internal Medicine  Contact information:  [address] [place] [address]  [phone]                           Patient Instructions:   No discharge procedures on file.    Significant Diagnostic Studies: Labs: All labs within the past 24 hours have been reviewed    Pending Diagnostic Studies:       None           Medications:  Reconciled Home Medications:      Medication List        START taking these medications      azithromycin 250 MG tablet  Commonly known as: Z-PAOLA  Take 2 tablets by mouth on day 1; Take 1 tablet by mouth on days 2-5     cefUROXime 500 MG tablet  Commonly known as: CEFTIN  Take 1 tablet (500 mg total) by mouth 2 (two) times daily. for 5 days            CONTINUE taking these medications      albuterol 90 mcg/actuation inhaler  Commonly known as: PROVENTIL/VENTOLIN HFA  Inhale 1-2 puffs into the lungs every 4 (four) hours as needed for Wheezing or Shortness of Breath. Rescue     * albuterol-ipratropium 2.5 mg-0.5 mg/3 mL nebulizer solution  Commonly known as: DUO-NEB  Take 3 mLs by nebulization every 4 (four) hours as needed for  Wheezing or Shortness of Breath. Rescue     * albuterol-ipratropium 2.5 mg-0.5 mg/3 mL nebulizer solution  Commonly known as: DUO-NEB  Take 3 mLs by nebulization every 4 (four) hours as needed for Wheezing. Rescue     ALPRAZolam 0.5 MG tablet  Commonly known as: XANAX  MAY FILL 7/20/2024. TAKE 1 TABLET BY MOUTH EVERY 8 HOURS     amLODIPine 10 MG tablet  Commonly known as: NORVASC  Take 10 mg by mouth.     aspirin 81 MG EC tablet  Commonly known as: ECOTRIN  Take 81 mg by mouth once daily.     BREZTRI AEROSPHERE 160-9-4.8 mcg/actuation Hf  Generic drug: budesonide-glycopyr-formoterol  Inhale into the lungs.     celecoxib 200 MG capsule  Commonly known as: CeleBREX  Take 200 mg by mouth.     citalopram 40 MG tablet  Commonly known as: CeleXA  Take by mouth.     cloNIDine 0.1 MG tablet  Commonly known as: CATAPRES  Take 0.1 mg by mouth every evening.     esomeprazole 20 MG capsule  Commonly known as: NEXIUM  Take 20 mg by mouth before breakfast.     estradioL 1 MG tablet  Commonly known as: ESTRACE  Take 1 tablet (1 mg total) by mouth once daily.     levothyroxine 50 MCG tablet  Commonly known as: SYNTHROID  Take 50 mcg by mouth before breakfast.     metFORMIN 750 MG ER 24hr tablet  Commonly known as: GLUCOPHAGE-XR  Take by mouth.     methocarbamoL 750 MG Tab  Commonly known as: ROBAXIN  Take 750 mg by mouth.     predniSONE 20 MG tablet  Commonly known as: DELTASONE  Take 2 tablets (40 mg total) by mouth once daily. for 5 days     risperiDONE 3 MG Tab  Commonly known as: RISPERDAL           * This list has 2 medication(s) that are the same as other medications prescribed for you. Read the directions carefully, and ask your doctor or other care provider to review them with you.                  Indwelling Lines/Drains at time of discharge:   Lines/Drains/Airways       None                   Time spent on the discharge of patient: 35 minutes         ELIANA Navarro  Department of Hospital Medicine  [facility]  Surg

## 2024-10-07 NOTE — ASSESSMENT & PLAN NOTE
Patients blood pressure range in the last 24 hours was: BP  Min: 101/52  Max: 169/88.The patient's inpatient anti-hypertensive regimen is listed below:  Current Antihypertensives  amLODIPine tablet 10 mg, Daily, Oral  metoprolol tartrate (LOPRESSOR) tablet 50 mg, 2 times daily, Oral    Plan  - BP is controlled, no changes needed to their regimen    BP Readings from Last 3 Encounters:   10/07/24 (!) 159/84   10/04/24 (!) 113/55   10/02/24 (!) 119/58

## 2024-10-07 NOTE — RESPIRATORY THERAPY
10/07/24 1155   Home Oxygen Qualification   $ Home O2 Qualification Tech time 15 minutes   Room Air SpO2 At Rest 92 %   Room Air SpO2 During Ambulation   (Patient states that she does not walk. At home she states she walks a no more that a couple feet at a time. She states that she does not use or have a walker.  Esthela CASTANEDA notified.  Corin CASTANEDA notified.)

## 2024-10-07 NOTE — PLAN OF CARE
[place] [facility]  Discharge Final Note    Primary Care Provider: Burke Merchant MD    Expected Discharge Date: 10/7/2024    Final Discharge Note (most recent)       Final Note - 10/07/24 1023          Final Note    Assessment Type Final Discharge Note     Anticipated Discharge Disposition Home or Self Care     Hospital Resources/Appts/Education Provided Appointments scheduled and added to AVS        Post-Acute Status    Discharge Delays None known at this time                     Important Message from Medicare  Important Message from Medicare regarding Discharge Appeal Rights: Given to patient/caregiver, Explained to patient/caregiver, Signed/date by patient/caregiver, Other (comments) (obtained by registration)     Date IMM was signed: 10/06/24  Time IMM was signed: 0810    Contact Info       Burke Merchant MD   Specialty: Internal Medicine   Relationship: PCP - General    [address] [place] [address]   Phone: [phone]       Next Steps: Go on 10/10/2024    Instructions: 11:00

## 2024-10-07 NOTE — PLAN OF CARE
Problem: Adult Inpatient Plan of Care  Goal: Readiness for Transition of Care  Outcome: Not Progressing     Problem: Pneumonia  Goal: Fluid Balance  Outcome: Not Progressing  Goal: Effective Oxygenation and Ventilation  Outcome: Not Progressing         Problem: Adult Inpatient Plan of Care  Goal: Plan of Care Review  Outcome: Progressing  Goal: Patient-Specific Goal (Individualized)  Outcome: Progressing  Goal: Absence of Hospital-Acquired Illness or Injury  Outcome: Progressing  Goal: Optimal Comfort and Wellbeing  Outcome: Progressing     Problem: Bariatric Environmental Safety  Goal: Safety Maintained with Care  Outcome: Progressing     Problem: Pneumonia  Goal: Resolution of Infection Signs and Symptoms  Outcome: Progressing     Problem: Fall Injury Risk  Goal: Absence of Fall and Fall-Related Injury  Outcome: Progressing     Problem: Sepsis/Septic Shock  Goal: Optimal Coping  Outcome: Progressing  Goal: Absence of Bleeding  Outcome: Progressing  Goal: Blood Glucose Level Within Targeted Range  Outcome: Progressing  Goal: Absence of Infection Signs and Symptoms  Outcome: Progressing  Goal: Optimal Nutrition Intake  Outcome: Progressing     Problem: Skin Injury Risk Increased  Goal: Skin Health and Integrity  Outcome: Progressing

## 2024-10-07 NOTE — SUBJECTIVE & OBJECTIVE
Past Medical History:   Diagnosis Date    Anxiety     Breast cancer 2010    Cancer     Breast    COPD (chronic obstructive pulmonary disease)     Depression     Diabetes mellitus     GERD (gastroesophageal reflux disease)     Hypertension     Insomnia     Thyroid disease        Past Surgical History:   Procedure Laterality Date    BLADDER SUSPENSION      BREAST LUMPECTOMY      Right breast    CHOLECYSTECTOMY      HYSTERECTOMY      KNEE ARTHROSCOPY      Right knee    OOPHORECTOMY         Review of patient's allergies indicates:  No Known Allergies    No current facility-administered medications on file prior to encounter.     Current Outpatient Medications on File Prior to Encounter   Medication Sig    albuterol (PROVENTIL/VENTOLIN HFA) 90 mcg/actuation inhaler Inhale 1-2 puffs into the lungs every 4 (four) hours as needed for Wheezing or Shortness of Breath. Rescue    albuterol-ipratropium (DUO-NEB) 2.5 mg-0.5 mg/3 mL nebulizer solution Take 3 mLs by nebulization every 4 (four) hours as needed for Wheezing or Shortness of Breath. Rescue    albuterol-ipratropium (DUO-NEB) 2.5 mg-0.5 mg/3 mL nebulizer solution Take 3 mLs by nebulization every 4 (four) hours as needed for Wheezing. Rescue    ALPRAZolam (XANAX) 0.5 MG tablet MAY FILL 7/20/2024. TAKE 1 TABLET BY MOUTH EVERY 8 HOURS    amLODIPine (NORVASC) 10 MG tablet Take 10 mg by mouth.    aspirin (ECOTRIN) 81 MG EC tablet Take 81 mg by mouth once daily.    BREZTRI AEROSPHERE 160-9-4.8 mcg/actuation HFAA Inhale into the lungs.    celecoxib (CELEBREX) 200 MG capsule Take 200 mg by mouth.    citalopram (CELEXA) 40 MG tablet Take by mouth.    cloNIDine (CATAPRES) 0.1 MG tablet Take 0.1 mg by mouth every evening.    esomeprazole (NEXIUM) 20 MG capsule Take 20 mg by mouth before breakfast.    estradioL (ESTRACE) 1 MG tablet Take 1 tablet (1 mg total) by mouth once daily.    levothyroxine (SYNTHROID) 50 MCG tablet Take 50 mcg by mouth before breakfast.    metFORMIN  (GLUCOPHAGE-XR) 750 MG ER 24hr tablet Take by mouth.    methocarbamoL (ROBAXIN) 750 MG Tab Take 750 mg by mouth.    predniSONE (DELTASONE) 20 MG tablet Take 2 tablets (40 mg total) by mouth once daily. for 5 days    risperiDONE (RISPERDAL) 3 MG Tab      Family History       Problem Relation (Age of Onset)    No Known Problems Mother, Father, Sister, Daughter, Maternal Aunt, Maternal Uncle, Paternal Aunt, Paternal Uncle, Maternal Grandmother, Maternal Grandfather, Paternal Grandmother, Paternal Grandfather, Other          Tobacco Use    Smoking status: Every Day     Current packs/day: 0.50     Average packs/day: 0.5 packs/day for 50.4 years (25.2 ttl pk-yrs)     Types: Cigarettes     Start date: 5/15/1974     Passive exposure: Past    Smokeless tobacco: Never    Tobacco comments:     quit yesterday 6/12/21   Substance and Sexual Activity    Alcohol use: Not Currently    Drug use: Never    Sexual activity: Not Currently     Partners: Male     Birth control/protection: None     Review of Systems   Constitutional:  Negative for chills, diaphoresis, fatigue and fever.   HENT:  Negative for congestion, facial swelling and trouble swallowing.    Eyes: Negative.    Respiratory:  Positive for shortness of breath and wheezing. Negative for cough and chest tightness.    Cardiovascular:  Negative for chest pain, palpitations and leg swelling.   Gastrointestinal:  Negative for abdominal distention, abdominal pain, blood in stool, diarrhea, nausea and vomiting.   Endocrine: Negative.    Genitourinary: Negative.    Musculoskeletal:  Negative for arthralgias and gait problem.   Allergic/Immunologic: Negative.    Neurological:  Negative for dizziness, syncope and light-headedness.   Hematological: Negative.    Psychiatric/Behavioral: Negative.       Objective:     Vital Signs (Most Recent):  Temp: 97.5 °F (36.4 °C) (10/07/24 0430)  Pulse: 70 (10/07/24 0430)  Resp: 18 (10/07/24 0430)  BP: (!) 159/84 (10/07/24 0430)  SpO2: 96 %  (10/07/24 3360) Vital Signs (24h Range):  Temp:  [97.3 °F (36.3 °C)-98.5 °F (36.9 °C)] 97.5 °F (36.4 °C)  Pulse:  [] 70  Resp:  [18-30] 18  SpO2:  [90 %-98 %] 96 %  BP: (101-159)/(52-85) 159/84     Weight: 108 kg (238 lb)  Body mass index is 42.16 kg/m².     Physical Exam  Vitals and nursing note reviewed.   Constitutional:       General: She is not in acute distress.     Appearance: Normal appearance. She is normal weight.   HENT:      Head: Normocephalic and atraumatic.   Eyes:      Extraocular Movements: Extraocular movements intact.      Pupils: Pupils are equal, round, and reactive to light.   Cardiovascular:      Rate and Rhythm: Normal rate and regular rhythm.      Heart sounds: Normal heart sounds, S1 normal and S2 normal. No murmur heard.  Pulmonary:      Effort: Pulmonary effort is normal. No accessory muscle usage or respiratory distress.      Breath sounds: Wheezing present. No rhonchi or rales.      Comments: Faint wheezing to bases   Abdominal:      General: Abdomen is flat. Bowel sounds are normal. There is no distension.      Palpations: Abdomen is soft.      Tenderness: There is no abdominal tenderness.   Musculoskeletal:         General: Normal range of motion.      Cervical back: Normal range of motion and neck supple.      Right lower leg: No edema.      Left lower leg: No edema.   Skin:     General: Skin is warm and dry.   Neurological:      General: No focal deficit present.      Mental Status: She is alert and oriented to person, place, and time. Mental status is at baseline.      Motor: No weakness.   Psychiatric:         Mood and Affect: Mood normal.         Behavior: Behavior normal.         Thought Content: Thought content normal.         Judgment: Judgment normal.              CRANIAL NERVES     CN III, IV, VI   Pupils are equal, round, and reactive to light.       Significant Labs: All pertinent labs within the past 24 hours have been reviewed.    Significant Imaging: I have  reviewed all pertinent imaging results/findings within the past 24 hours.

## 2024-10-07 NOTE — HOSPITAL COURSE
10/07/2024: Patient endorses clinical improvement.  Blood cultures negative thus far.  Labs overall appeared to be improved.  Did not qualify for home O2 per screening by respiratory therapy.  Will send zithromax and cefuroxime to pharmacy to complete course of abx.  Continue oral prednisone after discharge.  F/u With primary care provider in 1 week.

## 2024-10-07 NOTE — RESPIRATORY THERAPY
10/07/24 1235   Home Oxygen Qualification   $ Home O2 Qualification Tech time 15 minutes   Room Air SpO2 At Rest 93 %   Room Air SpO2 During Ambulation 95 %  (Bob PT assist with patient ambulating. Esthela CASTANEDA notified.)   SpO2 Post Ambulation 95 %   Post Ambulation Heart Rate 81 bpm

## 2024-10-07 NOTE — PROGRESS NOTES
[facility] Medicine  Progress Note    Patient Name: Vero Allen  MRN: [id number]  Patient Class: IP- Inpatient   Admission Date: 10/5/2024  Length of Stay: 2 days  Attending Physician: Jh Peterson Jr., MD  Primary Care Provider: Burke Merchant MD        Subjective:     Principal Problem:Severe sepsis        HPI:       Chief Complaint   Patient presents with    COPD       Reports exacerbation of COPD symptoms while lying down approx 1 hr. Took home breathing treatment without relief.       64-year-old female past medical history of hypertension diabetes COPD his ED reports of worsening shortness of breath down.  Patient reports she was seen ED 2 days ago with similar symptoms diagnosis COPD exacerbation.  Patient reports no audible wheezing she has been using inhaler as instructed.  Patient noted be tachycardic ED no difficulty breathing.  She reports her doctor is .      10/6/24:  Above reviewed.  Patient seen and examined this morning.  States her shortness of breath is improving overall.  Potassium is low this morning and will need supplementation.      Overview/Hospital Course:  10/07/2024: Patient endorses clinical improvement.  Blood cultures negative thus far.  Labs overall appeared to be improved.  Blood sent to coordinate home oxygen prior to potential discharge.    Past Medical History:   Diagnosis Date    Anxiety     Breast cancer 2010    Cancer     Breast    COPD (chronic obstructive pulmonary disease)     Depression     Diabetes mellitus     GERD (gastroesophageal reflux disease)     Hypertension     Insomnia     Thyroid disease        Past Surgical History:   Procedure Laterality Date    BLADDER SUSPENSION      BREAST LUMPECTOMY      Right breast    CHOLECYSTECTOMY      HYSTERECTOMY      KNEE ARTHROSCOPY      Right knee    OOPHORECTOMY         Review of patient's allergies indicates:  No Known Allergies    No current facility-administered medications on  file prior to encounter.     Current Outpatient Medications on File Prior to Encounter   Medication Sig    albuterol (PROVENTIL/VENTOLIN HFA) 90 mcg/actuation inhaler Inhale 1-2 puffs into the lungs every 4 (four) hours as needed for Wheezing or Shortness of Breath. Rescue    albuterol-ipratropium (DUO-NEB) 2.5 mg-0.5 mg/3 mL nebulizer solution Take 3 mLs by nebulization every 4 (four) hours as needed for Wheezing or Shortness of Breath. Rescue    albuterol-ipratropium (DUO-NEB) 2.5 mg-0.5 mg/3 mL nebulizer solution Take 3 mLs by nebulization every 4 (four) hours as needed for Wheezing. Rescue    ALPRAZolam (XANAX) 0.5 MG tablet MAY FILL 7/20/2024. TAKE 1 TABLET BY MOUTH EVERY 8 HOURS    amLODIPine (NORVASC) 10 MG tablet Take 10 mg by mouth.    aspirin (ECOTRIN) 81 MG EC tablet Take 81 mg by mouth once daily.    BREZTRI AEROSPHERE 160-9-4.8 mcg/actuation HFAA Inhale into the lungs.    celecoxib (CELEBREX) 200 MG capsule Take 200 mg by mouth.    citalopram (CELEXA) 40 MG tablet Take by mouth.    cloNIDine (CATAPRES) 0.1 MG tablet Take 0.1 mg by mouth every evening.    esomeprazole (NEXIUM) 20 MG capsule Take 20 mg by mouth before breakfast.    estradioL (ESTRACE) 1 MG tablet Take 1 tablet (1 mg total) by mouth once daily.    levothyroxine (SYNTHROID) 50 MCG tablet Take 50 mcg by mouth before breakfast.    metFORMIN (GLUCOPHAGE-XR) 750 MG ER 24hr tablet Take by mouth.    methocarbamoL (ROBAXIN) 750 MG Tab Take 750 mg by mouth.    predniSONE (DELTASONE) 20 MG tablet Take 2 tablets (40 mg total) by mouth once daily. for 5 days    risperiDONE (RISPERDAL) 3 MG Tab      Family History       Problem Relation (Age of Onset)    No Known Problems Mother, Father, Sister, Daughter, Maternal Aunt, Maternal Uncle, Paternal Aunt, Paternal Uncle, Maternal Grandmother, Maternal Grandfather, Paternal Grandmother, Paternal Grandfather, Other          Tobacco Use    Smoking status: Every Day     Current packs/day: 0.50     Average  packs/day: 0.5 packs/day for 50.4 years (25.2 ttl pk-yrs)     Types: Cigarettes     Start date: 5/15/1974     Passive exposure: Past    Smokeless tobacco: Never    Tobacco comments:     quit yesterday 6/12/21   Substance and Sexual Activity    Alcohol use: Not Currently    Drug use: Never    Sexual activity: Not Currently     Partners: Male     Birth control/protection: None     Review of Systems   Constitutional:  Negative for chills, diaphoresis, fatigue and fever.   HENT:  Negative for congestion, facial swelling and trouble swallowing.    Eyes: Negative.    Respiratory:  Positive for shortness of breath and wheezing. Negative for cough and chest tightness.    Cardiovascular:  Negative for chest pain, palpitations and leg swelling.   Gastrointestinal:  Negative for abdominal distention, abdominal pain, blood in stool, diarrhea, nausea and vomiting.   Endocrine: Negative.    Genitourinary: Negative.    Musculoskeletal:  Negative for arthralgias and gait problem.   Allergic/Immunologic: Negative.    Neurological:  Negative for dizziness, syncope and light-headedness.   Hematological: Negative.    Psychiatric/Behavioral: Negative.       Objective:     Vital Signs (Most Recent):  Temp: 97.5 °F (36.4 °C) (10/07/24 0430)  Pulse: 70 (10/07/24 0430)  Resp: 18 (10/07/24 0430)  BP: (!) 159/84 (10/07/24 0430)  SpO2: 96 % (10/07/24 0430) Vital Signs (24h Range):  Temp:  [97.3 °F (36.3 °C)-98.5 °F (36.9 °C)] 97.5 °F (36.4 °C)  Pulse:  [] 70  Resp:  [18-30] 18  SpO2:  [90 %-98 %] 96 %  BP: (101-159)/(52-85) 159/84     Weight: 108 kg (238 lb)  Body mass index is 42.16 kg/m².     Physical Exam  Vitals and nursing note reviewed.   Constitutional:       General: She is not in acute distress.     Appearance: Normal appearance. She is normal weight.   HENT:      Head: Normocephalic and atraumatic.   Eyes:      Extraocular Movements: Extraocular movements intact.      Pupils: Pupils are equal, round, and reactive to light.    Cardiovascular:      Rate and Rhythm: Normal rate and regular rhythm.      Heart sounds: Normal heart sounds, S1 normal and S2 normal. No murmur heard.  Pulmonary:      Effort: Pulmonary effort is normal. No accessory muscle usage or respiratory distress.      Breath sounds: Wheezing present. No rhonchi or rales.      Comments: Faint wheezing to bases   Abdominal:      General: Abdomen is flat. Bowel sounds are normal. There is no distension.      Palpations: Abdomen is soft.      Tenderness: There is no abdominal tenderness.   Musculoskeletal:         General: Normal range of motion.      Cervical back: Normal range of motion and neck supple.      Right lower leg: No edema.      Left lower leg: No edema.   Skin:     General: Skin is warm and dry.   Neurological:      General: No focal deficit present.      Mental Status: She is alert and oriented to person, place, and time. Mental status is at baseline.      Motor: No weakness.   Psychiatric:         Mood and Affect: Mood normal.         Behavior: Behavior normal.         Thought Content: Thought content normal.         Judgment: Judgment normal.              CRANIAL NERVES     CN III, IV, VI   Pupils are equal, round, and reactive to light.       Significant Labs: All pertinent labs within the past 24 hours have been reviewed.    Significant Imaging: I have reviewed all pertinent imaging results/findings within the past 24 hours.    Assessment/Plan:      * Severe sepsis  This patient does have evidence of infective focus  My overall impression is sepsis.  Source: Respiratory  Antibiotics given-   Antibiotics (72h ago, onward)      Start     Stop Route Frequency Ordered    10/06/24 2300  azithromycin (ZITHROMAX) 500 mg in D5W 250 mL  IVPB (admixture device)         -- IV Every 24 hours (non-standard times) 10/06/24 0028    10/06/24 2200  cefTRIAXone (Rocephin) 1 g in D5W 100 mL IVPB (MB+)         -- IV Every 24 hours (non-standard times) 10/06/24 0028           Latest lactate reviewed-  Recent Labs   Lab 10/06/24  0048   LACTATE 1.9           Will Not start Pressors- Levophed for MAP of 65    Continue IV rocephin and zithromax.     Hypokalemia  Patient's most recent potassium results are listed below.   Recent Labs     10/05/24  2125 10/06/24  0526   K 3.5 3.2*       Plan  - Replete potassium per protocol  - Monitor potassium Daily    MDD (major depressive disorder), recurrent, severe, with psychosis    Continue home regimen.     Generalized anxiety disorder  Continue home regimen       Primary hypertension  Patients blood pressure range in the last 24 hours was: BP  Min: 101/52  Max: 169/88.The patient's inpatient anti-hypertensive regimen is listed below:  Current Antihypertensives  amLODIPine tablet 10 mg, Daily, Oral  metoprolol tartrate (LOPRESSOR) tablet 50 mg, 2 times daily, Oral    Plan  - BP is controlled, no changes needed to their regimen    BP Readings from Last 3 Encounters:   10/07/24 (!) 159/84   10/04/24 (!) 113/55   10/02/24 (!) 119/58        COPD exacerbation  Will continue IV solumedrol plus rocephin/zithromax.  Will schedule duonebs TID and use PRN for breakthrough symptoms.     Nicotine dependence, uncomplicated  Nicotine patch daily as needed.     Severe obesity (BMI >= 40)  Body mass index is 42.16 kg/m². Morbid obesity complicates all aspects of disease management from diagnostic modalities to treatment. Weight loss encouraged and health benefits explained to patient.     Recommendations: Stay Active. As tolerated alternate resistance training with stretching and cardio. Goal of 150 minutes per week or 7,500 - 10,000 steps per day. Follow the Mediterranean Diet. Include fruit, vegetables, olive oil, seeds, nuts, whole grains, cold water fish and lean cuts of meat.  Do not drink beverages with any caloric content.  Decrease portion sizes slightly which will result in an approximately 500-calorie deficit.  Consider substituting one meal a day with  a meal replacement such as Slim fast, lean cuisine, or weight watcher's.  Avoid fast or fried food.           VTE Risk Mitigation (From admission, onward)           Ordered     IP VTE HIGH RISK PATIENT  Once         10/06/24 0028     Place sequential compression device  Until discontinued         10/06/24 0028                    Discharge Planning   TOMASA:      Code Status: Full Code   Is the patient medically ready for discharge?:     Reason for patient still in hospital (select all that apply): Treatment                     Jh Peterson Jr, MD  Department of Hospital Medicine   [facility]

## 2024-10-07 NOTE — PLAN OF CARE
Patient was ambulated with PT and RT present, did not qualify for home oxygen,  Waiting RT note.  PT noted patient in need of walker, furniture walking.  Reached out to Dr. Peterson, noted order to obtain walker.  Faxed to [employer] company to obtain walker to be delivered to patient's home.    Reached out to Ilia at [facility] and made her aware of referral for walker.

## 2024-10-08 ENCOUNTER — HOSPITAL ENCOUNTER (EMERGENCY)
Facility: HOSPITAL | Age: 64
Discharge: HOME | End: 2024-10-08
Attending: EMERGENCY MEDICINE
Payer: MEDICARE

## 2024-10-08 VITALS
TEMPERATURE: 98 F | DIASTOLIC BLOOD PRESSURE: 71 MMHG | RESPIRATION RATE: 20 BRPM | WEIGHT: 244 LBS | OXYGEN SATURATION: 95 % | HEART RATE: 106 BPM | SYSTOLIC BLOOD PRESSURE: 141 MMHG | HEIGHT: 63 IN | BODY MASS INDEX: 43.23 KG/M2

## 2024-10-08 VITALS
BODY MASS INDEX: 43.23 KG/M2 | DIASTOLIC BLOOD PRESSURE: 79 MMHG | HEIGHT: 63 IN | RESPIRATION RATE: 20 BRPM | HEART RATE: 85 BPM | SYSTOLIC BLOOD PRESSURE: 133 MMHG | TEMPERATURE: 98 F | WEIGHT: 244 LBS | OXYGEN SATURATION: 96 %

## 2024-10-08 DIAGNOSIS — R07.89 CHEST WALL PAIN: ICD-10-CM

## 2024-10-08 DIAGNOSIS — F41.1 ANXIETY REACTION: Primary | ICD-10-CM

## 2024-10-08 DIAGNOSIS — J44.1 COPD EXACERBATION: ICD-10-CM

## 2024-10-08 LAB — TROPONIN I SERPL DL<=0.01 NG/ML-MCNC: 22.6 PG/ML (ref 0–60)

## 2024-10-08 PROCEDURE — 94640 AIRWAY INHALATION TREATMENT: CPT

## 2024-10-08 PROCEDURE — 93010 ELECTROCARDIOGRAM REPORT: CPT | Mod: ,,, | Performed by: INTERNAL MEDICINE

## 2024-10-08 PROCEDURE — 94761 N-INVAS EAR/PLS OXIMETRY MLT: CPT

## 2024-10-08 PROCEDURE — 96372 THER/PROPH/DIAG INJ SC/IM: CPT | Performed by: EMERGENCY MEDICINE

## 2024-10-08 PROCEDURE — 99900035 HC TECH TIME PER 15 MIN (STAT)

## 2024-10-08 PROCEDURE — 25000003 PHARM REV CODE 250: Performed by: EMERGENCY MEDICINE

## 2024-10-08 PROCEDURE — 36415 COLL VENOUS BLD VENIPUNCTURE: CPT | Performed by: EMERGENCY MEDICINE

## 2024-10-08 PROCEDURE — 99284 EMERGENCY DEPT VISIT MOD MDM: CPT | Mod: 25,27

## 2024-10-08 PROCEDURE — 84484 ASSAY OF TROPONIN QUANT: CPT | Performed by: EMERGENCY MEDICINE

## 2024-10-08 PROCEDURE — 63600175 PHARM REV CODE 636 W HCPCS: Performed by: EMERGENCY MEDICINE

## 2024-10-08 PROCEDURE — 99900031 HC PATIENT EDUCATION (STAT)

## 2024-10-08 PROCEDURE — 25000242 PHARM REV CODE 250 ALT 637 W/ HCPCS: Performed by: EMERGENCY MEDICINE

## 2024-10-08 PROCEDURE — 99284 EMERGENCY DEPT VISIT MOD MDM: CPT | Mod: 25

## 2024-10-08 RX ORDER — DIAZEPAM 5 MG/1
5 TABLET ORAL EVERY 12 HOURS PRN
Qty: 9 TABLET | Refills: 0 | Status: ON HOLD | OUTPATIENT
Start: 2024-10-08 | End: 2024-10-17 | Stop reason: HOSPADM

## 2024-10-08 RX ORDER — DIAZEPAM 5 MG/1
5 TABLET ORAL
Status: COMPLETED | OUTPATIENT
Start: 2024-10-08 | End: 2024-10-08

## 2024-10-08 RX ORDER — IPRATROPIUM BROMIDE AND ALBUTEROL SULFATE 2.5; .5 MG/3ML; MG/3ML
3 SOLUTION RESPIRATORY (INHALATION)
Status: COMPLETED | OUTPATIENT
Start: 2024-10-08 | End: 2024-10-08

## 2024-10-08 RX ORDER — DIAZEPAM 10 MG/2ML
5 INJECTION INTRAMUSCULAR
Status: COMPLETED | OUTPATIENT
Start: 2024-10-08 | End: 2024-10-08

## 2024-10-08 RX ORDER — KETOROLAC TROMETHAMINE 30 MG/ML
30 INJECTION, SOLUTION INTRAMUSCULAR; INTRAVENOUS
Status: COMPLETED | OUTPATIENT
Start: 2024-10-08 | End: 2024-10-08

## 2024-10-08 RX ADMIN — IPRATROPIUM BROMIDE AND ALBUTEROL SULFATE 3 ML: 2.5; .5 SOLUTION RESPIRATORY (INHALATION) at 02:10

## 2024-10-08 RX ADMIN — KETOROLAC TROMETHAMINE 30 MG: 30 INJECTION, SOLUTION INTRAMUSCULAR at 04:10

## 2024-10-08 RX ADMIN — DIAZEPAM 5 MG: 5 TABLET ORAL at 01:10

## 2024-10-08 RX ADMIN — DIAZEPAM 5 MG: 10 INJECTION, SOLUTION INTRAMUSCULAR; INTRAVENOUS at 02:10

## 2024-10-08 NOTE — ED PROVIDER NOTES
Encounter Date: 10/8/2024       History     Chief Complaint   Patient presents with    Chest Pain     Pt was recently discharged and went to pharmacy to get meds filled and stated she started with CP that has been constant. Pt reports SOB and tearful during triage.     64-year-old female with severe anxiety, COPD, recent released from the hospital for COPD exacerbation, just seen here in the emergency department for COPD and anxiety.  Checked back into the ER due to chest pain.  Pain is reproducible, worse with palpation and movement.  History of this in the past.  No nausea vomiting.  She is not ill appearing.  She appears very anxious      Review of patient's allergies indicates:  No Known Allergies  Past Medical History:   Diagnosis Date    Anxiety     Breast cancer 2010    Cancer     Breast    COPD (chronic obstructive pulmonary disease)     Depression     Diabetes mellitus     GERD (gastroesophageal reflux disease)     Hypertension     Insomnia     Thyroid disease      Past Surgical History:   Procedure Laterality Date    BLADDER SUSPENSION      BREAST LUMPECTOMY      Right breast    CHOLECYSTECTOMY      HYSTERECTOMY      KNEE ARTHROSCOPY      Right knee    OOPHORECTOMY       Family History   Problem Relation Name Age of Onset    No Known Problems Mother      No Known Problems Father      No Known Problems Sister      No Known Problems Daughter      No Known Problems Maternal Aunt      No Known Problems Maternal Uncle      No Known Problems Paternal Aunt      No Known Problems Paternal Uncle      No Known Problems Maternal Grandmother      No Known Problems Maternal Grandfather      No Known Problems Paternal Grandmother      No Known Problems Paternal Grandfather      No Known Problems Other      Breast cancer Neg Hx      Ovarian cancer Neg Hx      BRCA 1/2 Neg Hx       Social History     Tobacco Use    Smoking status: Every Day     Current packs/day: 0.50     Average packs/day: 0.5 packs/day for 50.4 years  (25.2 ttl pk-yrs)     Types: Cigarettes     Start date: 5/15/1974     Passive exposure: Past    Smokeless tobacco: Never    Tobacco comments:     quit yesterday 6/12/21   Substance Use Topics    Alcohol use: Not Currently    Drug use: Never     Review of Systems   Constitutional:  Negative for fever.   HENT:  Negative for sore throat.    Respiratory:  Negative for shortness of breath.    Cardiovascular:  Positive for chest pain.   Gastrointestinal:  Negative for nausea.   Genitourinary:  Negative for dysuria.   Musculoskeletal:  Negative for back pain.   Skin:  Negative for rash.   Neurological:  Negative for weakness.   Hematological:  Does not bruise/bleed easily.   All other systems reviewed and are negative.      Physical Exam     Initial Vitals [10/08/24 1555]   BP Pulse Resp Temp SpO2   133/79 85 20 97.7 °F (36.5 °C) 96 %      MAP       --         Physical Exam    Nursing note and vitals reviewed.  Constitutional: She appears well-developed and well-nourished. She is not diaphoretic. No distress.   HENT:   Head: Normocephalic and atraumatic.   Eyes: Conjunctivae and EOM are normal. Pupils are equal, round, and reactive to light.   Neck: Neck supple.   Normal range of motion.  Cardiovascular:  Normal rate, regular rhythm, normal heart sounds and intact distal pulses.           No murmur heard.  Pulmonary/Chest: Breath sounds normal. No respiratory distress. She has no wheezes. She has no rhonchi. She has no rales. She exhibits tenderness.   Completely reproducible anterior chest wall tenderness to light palpation, no crepitance, no rash   Abdominal: Abdomen is soft. Bowel sounds are normal.   Musculoskeletal:         General: No tenderness or edema. Normal range of motion.      Cervical back: Normal range of motion and neck supple.     Neurological: She is alert and oriented to person, place, and time. She has normal strength. No cranial nerve deficit. GCS score is 15. GCS eye subscore is 4. GCS verbal subscore  is 5. GCS motor subscore is 6.   Skin: Skin is warm and dry. Capillary refill takes less than 2 seconds.         ED Course   Procedures  Labs Reviewed   TROPONIN I HIGH SENSITIVITY       Result Value    Troponin I High Sensitivity 22.6       EKG Readings: (Independently Interpreted)   Initial Reading: No STEMI. Rhythm: Sinus Arrhythmia. Heart Rate: 90. Ectopy: No Ectopy. Conduction: Normal. ST Segments: Normal ST Segments. T Waves: Normal. Clinical Impression: Normal Sinus Rhythm and Sinus Arrhythmia       Imaging Results    None          Medications   ketorolac injection 30 mg (30 mg Intramuscular Given 10/8/24 1654)     Medical Decision Making             ED Course as of 10/08/24 1716   Tue Oct 08, 2024   1715 Troponin is negative.  Exam is consistent with chest wall pain.  Stable for discharge and follow up [SD]      ED Course User Index  [SD] Kunal Saunders MD               Medical Decision Making:   Differential Diagnosis:   Chest wall pain, costochondritis, anxiety, musculoskeletal pain             Clinical Impression:  Final diagnoses:  [R07.89] Chest wall pain  [F41.1] Anxiety reaction (Primary)          ED Disposition Condition    Discharge Stable          ED Prescriptions    None       Follow-up Information       Follow up With Specialties Details Why Contact Info Additional Information    Primary care physician  In 2 days       [facility] Emergency Department Emergency Medicine  As needed [facility] [facility] [address] [phone] Floor [address]             Kunal Saunders MD  10/08/24 1716

## 2024-10-08 NOTE — ED PROVIDER NOTES
"Encounter Date: 10/8/2024       History     Chief Complaint   Patient presents with    Shortness of Breath     Patient reports shortness of breath since her last admit. Patient reports "it got a little better and then it didn't". Patient also endorses chest pain.      64-year-old female with a history of severe anxiety and COPD presents the ER stating she is still short of breath, discharged yesterday from the hospital for a COPD exacerbation.  She appears more anxious than anything.  Oxygen saturation is 98% on room air here in the emergency department.  Denies chest pain.  She is speaking in full sentences without issue.  Afebrile.  Alert oriented x4, GCS is 15      Review of patient's allergies indicates:  No Known Allergies  Past Medical History:   Diagnosis Date    Anxiety     Breast cancer 2010    Cancer     Breast    COPD (chronic obstructive pulmonary disease)     Depression     Diabetes mellitus     GERD (gastroesophageal reflux disease)     Hypertension     Insomnia     Thyroid disease      Past Surgical History:   Procedure Laterality Date    BLADDER SUSPENSION      BREAST LUMPECTOMY      Right breast    CHOLECYSTECTOMY      HYSTERECTOMY      KNEE ARTHROSCOPY      Right knee    OOPHORECTOMY       Family History   Problem Relation Name Age of Onset    No Known Problems Mother      No Known Problems Father      No Known Problems Sister      No Known Problems Daughter      No Known Problems Maternal Aunt      No Known Problems Maternal Uncle      No Known Problems Paternal Aunt      No Known Problems Paternal Uncle      No Known Problems Maternal Grandmother      No Known Problems Maternal Grandfather      No Known Problems Paternal Grandmother      No Known Problems Paternal Grandfather      No Known Problems Other      Breast cancer Neg Hx      Ovarian cancer Neg Hx      BRCA 1/2 Neg Hx       Social History     Tobacco Use    Smoking status: Every Day     Current packs/day: 0.50     Average packs/day: 0.5 " packs/day for 50.4 years (25.2 ttl pk-yrs)     Types: Cigarettes     Start date: 5/15/1974     Passive exposure: Past    Smokeless tobacco: Never    Tobacco comments:     quit yesterday 6/12/21   Substance Use Topics    Alcohol use: Not Currently    Drug use: Never     Review of Systems   Constitutional:  Negative for fever.   HENT:  Negative for sore throat.    Respiratory:  Positive for shortness of breath.    Cardiovascular:  Negative for chest pain.   Gastrointestinal:  Negative for nausea.   Genitourinary:  Negative for dysuria.   Musculoskeletal:  Negative for back pain.   Skin:  Negative for rash.   Neurological:  Negative for weakness.   Hematological:  Does not bruise/bleed easily.   Psychiatric/Behavioral:  The patient is nervous/anxious.    All other systems reviewed and are negative.      Physical Exam     Initial Vitals [10/08/24 1343]   BP Pulse Resp Temp SpO2   132/89 95 (!) 24 97.8 °F (36.6 °C) 96 %      MAP       --         Physical Exam    Nursing note and vitals reviewed.  Constitutional: She appears well-developed and well-nourished. She is not diaphoretic. No distress.   Anxious female   HENT:   Head: Normocephalic and atraumatic.   Eyes: Conjunctivae and EOM are normal. Pupils are equal, round, and reactive to light.   Neck: Neck supple.   Normal range of motion.  Cardiovascular:  Normal rate, regular rhythm, normal heart sounds and intact distal pulses.           No murmur heard.  Pulmonary/Chest: Breath sounds normal. No respiratory distress. She has no wheezes. She has no rhonchi. She has no rales. She exhibits no tenderness.   Lungs appear clear throughout   Abdominal: Abdomen is soft. Bowel sounds are normal.   Musculoskeletal:         General: No tenderness or edema. Normal range of motion.      Cervical back: Normal range of motion and neck supple.     Neurological: She is alert and oriented to person, place, and time. She has normal strength. No cranial nerve deficit. GCS score is 15.  GCS eye subscore is 4. GCS verbal subscore is 5. GCS motor subscore is 6.   Skin: Skin is warm and dry. Capillary refill takes less than 2 seconds.         ED Course   Procedures  Labs Reviewed - No data to display       Imaging Results              X-Ray Chest 1 View (In process)                      Medications   albuterol-ipratropium 2.5 mg-0.5 mg/3 mL nebulizer solution 3 mL (3 mLs Nebulization Given 10/8/24 1406)   diazePAM tablet 5 mg (5 mg Oral Given 10/8/24 1353)     Medical Decision Making             ED Course as of 10/08/24 1420   Tue Oct 08, 2024   1406 Chest x-ray without acute changes [SD]      ED Course User Index  [SD] Kunal Saunders MD               Medical Decision Making:   Differential Diagnosis:   Anxiety, COPD, shortness of breath             Clinical Impression:  Final diagnoses:  [J44.1] COPD exacerbation  [F41.1] Anxiety reaction (Primary)          ED Disposition Condition    Discharge Stable          ED Prescriptions    None       Follow-up Information       Follow up With Specialties Details Why Contact Info Additional Information    Primary care physician  In 2 days       [facility] Emergency Medicine  As needed [address] [place] [address] [id number] [phone] Floor 1             Kunal Saunders MD  10/08/24 1425

## 2024-10-09 ENCOUNTER — HOSPITAL ENCOUNTER (EMERGENCY)
Facility: HOSPITAL | Age: 64
Discharge: HOME | End: 2024-10-09
Attending: EMERGENCY MEDICINE
Payer: MEDICARE

## 2024-10-09 VITALS
HEART RATE: 96 BPM | TEMPERATURE: 98 F | WEIGHT: 244 LBS | DIASTOLIC BLOOD PRESSURE: 80 MMHG | HEIGHT: 63 IN | RESPIRATION RATE: 18 BRPM | SYSTOLIC BLOOD PRESSURE: 142 MMHG | OXYGEN SATURATION: 95 % | BODY MASS INDEX: 43.23 KG/M2

## 2024-10-09 VITALS
HEART RATE: 78 BPM | RESPIRATION RATE: 16 BRPM | TEMPERATURE: 98 F | HEIGHT: 63 IN | WEIGHT: 244 LBS | SYSTOLIC BLOOD PRESSURE: 112 MMHG | OXYGEN SATURATION: 97 % | BODY MASS INDEX: 43.23 KG/M2 | DIASTOLIC BLOOD PRESSURE: 65 MMHG

## 2024-10-09 DIAGNOSIS — F13.20 BENZODIAZEPINE DEPENDENCE: Primary | ICD-10-CM

## 2024-10-09 DIAGNOSIS — F41.1 ANXIETY REACTION: Primary | ICD-10-CM

## 2024-10-09 DIAGNOSIS — J44.9 CHRONIC OBSTRUCTIVE PULMONARY DISEASE, UNSPECIFIED COPD TYPE: Primary | ICD-10-CM

## 2024-10-09 DIAGNOSIS — F41.9 ANXIETY: ICD-10-CM

## 2024-10-09 DIAGNOSIS — Z65.3 ALLEGED DRUG DIVERSION: ICD-10-CM

## 2024-10-09 LAB
OHS QRS DURATION: 84 MS
OHS QTC CALCULATION: 442 MS

## 2024-10-09 PROCEDURE — 99283 EMERGENCY DEPT VISIT LOW MDM: CPT | Mod: 25

## 2024-10-09 PROCEDURE — 25000003 PHARM REV CODE 250: Performed by: EMERGENCY MEDICINE

## 2024-10-09 PROCEDURE — 99283 EMERGENCY DEPT VISIT LOW MDM: CPT | Mod: 25,27

## 2024-10-09 RX ORDER — DIAZEPAM 5 MG/1
10 TABLET ORAL
Status: COMPLETED | OUTPATIENT
Start: 2024-10-09 | End: 2024-10-09

## 2024-10-09 RX ORDER — CLONAZEPAM 0.5 MG/1
1 TABLET ORAL
Status: COMPLETED | OUTPATIENT
Start: 2024-10-09 | End: 2024-10-09

## 2024-10-09 RX ADMIN — DIAZEPAM 10 MG: 5 TABLET ORAL at 01:10

## 2024-10-09 RX ADMIN — CLONAZEPAM 1 MG: 0.5 TABLET ORAL at 06:10

## 2024-10-09 NOTE — ED PROVIDER NOTES
"EMERGENCY DEPARTMENT HISTORY AND PHYSICAL EXAM     This note is dictated on M*Modal word recognition program.  There are word recognition mistakes and grammatical errors that are occasionally missed on review.     Date: 10/9/2024   Patient Name: Vero Allen       History of Presenting Illness      Chief Complaint   Patient presents with    Addiction Problem     Patient returning to ER demanding a refill on her xanax or valium. States she is out because she gave some to her neighbor. States she "NEEDS IT"           Vero Allen is a 64 y.o. female with PMHX of anxiety, COPD who presents to the emergency department C/O anxiety.    Patient reports she was having anxiety.  She is requesting benzodiazepine.  She states that she ran out of her home Xanax.  When reviewing patient's  aware database she had 30 day supply of Xanax filled 19 days ago 0.5 mg TID.  Patient states she ran out.  When asked why patient states she shares some with her neighbor and her neighbors sometimes she was some with her when they need it.  She says she has a doctor's appointment tomorrow.  Patient was seen in ED 3 times over the past 2 days for this problem.      PCP: Burke Merchant MD        No current facility-administered medications for this encounter.     Current Outpatient Medications   Medication Sig Dispense Refill    albuterol (PROVENTIL/VENTOLIN HFA) 90 mcg/actuation inhaler Inhale 1-2 puffs into the lungs every 4 (four) hours as needed for Wheezing or Shortness of Breath. Rescue 8 g 1    albuterol-ipratropium (DUO-NEB) 2.5 mg-0.5 mg/3 mL nebulizer solution Take 3 mLs by nebulization every 4 (four) hours as needed for Wheezing or Shortness of Breath. Rescue 75 mL 0    albuterol-ipratropium (DUO-NEB) 2.5 mg-0.5 mg/3 mL nebulizer solution Take 3 mLs by nebulization every 4 (four) hours as needed for Wheezing. Rescue 75 mL 0    ALPRAZolam (XANAX) 0.5 MG tablet MAY FILL 7/20/2024. TAKE 1 TABLET BY " MOUTH EVERY 8 HOURS      amLODIPine (NORVASC) 10 MG tablet Take 10 mg by mouth.      aspirin (ECOTRIN) 81 MG EC tablet Take 81 mg by mouth once daily.      azithromycin (Z-POALA) 250 MG tablet Take 2 tablets by mouth on day 1; Take 1 tablet by mouth on days 2-5 3 tablet 0    BREZTRI AEROSPHERE 160-9-4.8 mcg/actuation HFAA Inhale into the lungs.      cefUROXime (CEFTIN) 500 MG tablet Take 1 tablet (500 mg total) by mouth 2 (two) times daily. for 5 days 10 tablet 0    celecoxib (CELEBREX) 200 MG capsule Take 200 mg by mouth.      citalopram (CELEXA) 40 MG tablet Take by mouth.      cloNIDine (CATAPRES) 0.1 MG tablet Take 0.1 mg by mouth every evening.      diazePAM (VALIUM) 5 MG tablet Take 1 tablet (5 mg total) by mouth every 12 (twelve) hours as needed for Anxiety. 9 tablet 0    esomeprazole (NEXIUM) 20 MG capsule Take 20 mg by mouth before breakfast.      estradioL (ESTRACE) 1 MG tablet Take 1 tablet (1 mg total) by mouth once daily. 90 tablet 3    levothyroxine (SYNTHROID) 50 MCG tablet Take 50 mcg by mouth before breakfast.      metFORMIN (GLUCOPHAGE-XR) 750 MG ER 24hr tablet Take by mouth.      methocarbamoL (ROBAXIN) 750 MG Tab Take 750 mg by mouth.      predniSONE (DELTASONE) 20 MG tablet Take 2 tablets (40 mg total) by mouth once daily. for 5 days 10 tablet 0    risperiDONE (RISPERDAL) 3 MG Tab              Past History     Past Medical History:   Past Medical History:   Diagnosis Date    Anxiety     Breast cancer 2010    Cancer     Breast    COPD (chronic obstructive pulmonary disease)     Depression     Diabetes mellitus     GERD (gastroesophageal reflux disease)     Hypertension     Insomnia     Thyroid disease         Past Surgical History:   Past Surgical History:   Procedure Laterality Date    BLADDER SUSPENSION      BREAST LUMPECTOMY      Right breast    CHOLECYSTECTOMY      HYSTERECTOMY      KNEE ARTHROSCOPY      Right knee    OOPHORECTOMY          Family History:  "  Family History   Problem Relation Name Age of Onset    No Known Problems Mother      No Known Problems Father      No Known Problems Sister      No Known Problems Daughter      No Known Problems Maternal Aunt      No Known Problems Maternal Uncle      No Known Problems Paternal Aunt      No Known Problems Paternal Uncle      No Known Problems Maternal Grandmother      No Known Problems Maternal Grandfather      No Known Problems Paternal Grandmother      No Known Problems Paternal Grandfather      No Known Problems Other      Breast cancer Neg Hx      Ovarian cancer Neg Hx      BRCA 1/2 Neg Hx          Social History:   Social History     Tobacco Use    Smoking status: Every Day     Current packs/day: 0.50     Average packs/day: 0.5 packs/day for 50.4 years (25.2 ttl pk-yrs)     Types: Cigarettes     Start date: 5/15/1974     Passive exposure: Past    Smokeless tobacco: Never    Tobacco comments:     quit yesterday 6/12/21   Substance Use Topics    Alcohol use: Not Currently    Drug use: Never        Allergies:   Review of patient's allergies indicates:  No Known Allergies       Review of Systems   Review of Systems   See HPI for pertinent positives and negatives       Physical Exam     Vitals:    10/09/24 1810   BP: 112/65   Pulse: 78   Resp: 16   Temp: 98.2 °F (36.8 °C)   TempSrc: Oral   SpO2: 97%   Weight: 110.7 kg (244 lb)   Height: 5' 3" (1.6 m)      Physical Exam  Vitals and nursing note reviewed.   Constitutional:       General: She is not in acute distress.     Appearance: Normal appearance. She is not ill-appearing.   HENT:      Head: Normocephalic and atraumatic.      Right Ear: External ear normal.      Left Ear: External ear normal.      Nose: Nose normal. No congestion or rhinorrhea.      Mouth/Throat:      Mouth: Mucous membranes are moist.   Eyes:      Conjunctiva/sclera: Conjunctivae normal.      Pupils: Pupils are equal, round, and reactive to light.   Pulmonary:      Effort: " Pulmonary effort is normal. No respiratory distress.   Musculoskeletal:         General: No deformity. Normal range of motion.      Cervical back: Normal range of motion. No rigidity.   Skin:     General: Skin is dry.   Neurological:      General: No focal deficit present.      Mental Status: She is alert and oriented to person, place, and time. Mental status is at baseline.   Psychiatric:         Mood and Affect: Mood is anxious.         Speech: Speech normal.         Behavior: Behavior normal.         Thought Content: Thought content is not paranoid or delusional. Thought content does not include homicidal or suicidal ideation. Thought content does not include homicidal or suicidal plan.         Cognition and Memory: Cognition normal.              Diagnostic Study Results      Labs -   No results found for this or any previous visit (from the past 12 hours).     Radiologic Studies -    No orders to display        Medications given in the ED-   Medications   clonazePAM tablet 1 mg (1 mg Oral Given 10/9/24 1830)           Medical Decision Making    I am the first provider for this patient.     I reviewed the vital signs, available nursing notes, past medical history, past surgical history, family history and social history.     Vital Signs:  Reviewed the patient's vital signs.     Pulse Oximetry Analysis and Interpretation:    97% on Room Air, normal        External Test Results (Pertinent to encounter):    Records Reviewed: Nursing Notes, Current Prescription Medications, and PMPAware     History Obtained By: Patient    Provider Notes: Vero Allen is a 64 y.o. female with benzodiazepine dependence    Co-morbidities Considered:  Benzodiazepine chronic use    Differential Diagnosis:  Anxiety, benzodiazepine dependence      ED Course:    Discussed with patient counseled to cease diverting her prescription medications, to follow up with her physician.  Reviewed  aware database.  Will give single dose  benzodiazepine here due to chronic use and discussed with her would not be providing any sort of prescriptions for her.  Patient was seen earlier today received benzodiazepine as well as a refill prescription but she says she was unable to fill it because it was too soon to her current 30 day prescription.         Problems Addressed:  Benzodiazepine dependence    Procedures:   Procedures       Diagnosis and Disposition     Critical Care:      DISCHARGE NOTE:       Vero Allen's  results have been reviewed with her.  She has been counseled regarding her diagnosis, treatment, and plan.  She verbally conveys understanding and agreement of the signs, symptoms, diagnosis, treatment and prognosis and additionally agrees to follow up as discussed.  She also agrees with the care-plan and conveys that all of her questions have been answered.  I have also provided discharge instructions for her that include: educational information regarding their diagnosis and treatment, and list of reasons why they would want to return to the ED prior to their follow-up appointment, should her condition change. She has been provided with education for proper emergency department utilization.         CLINICAL IMPRESSION:         1. Benzodiazepine dependence    2. Alleged drug diversion              PLAN:   1. Discharge Home  2.      Medication List        ASK your doctor about these medications      albuterol 90 mcg/actuation inhaler  Commonly known as: PROVENTIL/VENTOLIN HFA  Inhale 1-2 puffs into the lungs every 4 (four) hours as needed for Wheezing or Shortness of Breath. Rescue     * albuterol-ipratropium 2.5 mg-0.5 mg/3 mL nebulizer solution  Commonly known as: DUO-NEB  Take 3 mLs by nebulization every 4 (four) hours as needed for Wheezing or Shortness of Breath. Rescue     * albuterol-ipratropium 2.5 mg-0.5 mg/3 mL nebulizer solution  Commonly known as: DUO-NEB  Take 3 mLs by nebulization every 4 (four) hours as needed for  Wheezing. Rescue     ALPRAZolam 0.5 MG tablet  Commonly known as: XANAX     amLODIPine 10 MG tablet  Commonly known as: NORVASC     aspirin 81 MG EC tablet  Commonly known as: ECOTRIN     azithromycin 250 MG tablet  Commonly known as: Z-PAOLA  Take 2 tablets by mouth on day 1; Take 1 tablet by mouth on days 2-5     BREZTRI AEROSPHERE 160-9-4.8 mcg/actuation Hfaa  Generic drug: budesonide-glycopyr-formoterol     cefUROXime 500 MG tablet  Commonly known as: CEFTIN  Take 1 tablet (500 mg total) by mouth 2 (two) times daily. for 5 days     celecoxib 200 MG capsule  Commonly known as: CeleBREX     citalopram 40 MG tablet  Commonly known as: CeleXA     cloNIDine 0.1 MG tablet  Commonly known as: CATAPRES     diazePAM 5 MG tablet  Commonly known as: VALIUM  Take 1 tablet (5 mg total) by mouth every 12 (twelve) hours as needed for Anxiety.     esomeprazole 20 MG capsule  Commonly known as: NEXIUM     estradioL 1 MG tablet  Commonly known as: ESTRACE  Take 1 tablet (1 mg total) by mouth once daily.     levothyroxine 50 MCG tablet  Commonly known as: SYNTHROID     metFORMIN 750 MG ER 24hr tablet  Commonly known as: GLUCOPHAGE-XR     methocarbamoL 750 MG Tab  Commonly known as: ROBAXIN     predniSONE 20 MG tablet  Commonly known as: DELTASONE  Take 2 tablets (40 mg total) by mouth once daily. for 5 days     risperiDONE 3 MG Tab  Commonly known as: RISPERDAL           * This list has 2 medication(s) that are the same as other medications prescribed for you. Read the directions carefully, and ask your doctor or other care provider to review them with you.                 3. No follow-up provider specified.     _______________________________     Please note that this dictation was completed with [employer], the computer voice recognition software.  Quite often unanticipated grammatical, syntax, homophones, and other interpretive errors are inadvertently transcribed by the computer software.  Please disregard these errors.  Please  excuse any errors that have escaped final proofreading.               Henry Kapadia MD  10/09/24 6010

## 2024-10-09 NOTE — ED NOTES
Contacted pt's Niece,  Jordyn, who lives in [facility], 45 minutes away, Pt's  Emergency contact on Pt's FACE Sheet, she will try to call pt's brother. Jordyn reports to nurse she will be the one taking her to her Dr's apt tomorrow and hopefully trying to get her in the Nursing Home.

## 2024-10-09 NOTE — ED NOTES
Collaborative effort with ER Manager, ER MD, NRP, and Case Management--OP referral to case management for possible nursing home placement. Patient will also discuss with her PCP tomorrow at clinic visit.

## 2024-10-09 NOTE — ED NOTES
Was advised by other nursing staff that patients brother, Robel, was here to transport her home. He was seen leaving the patient's room a few minutes prior. I advised patient that her brother was here and she acknowledged. D/C vitals assessed and patient escorted by NRP to ER ramp via wheelchair. Patients brother was not found. Patient attempted to call several times with no answer. Returned patient to ER waiting to see attempt to locate brother on the property. Patient then states that her brother left because he thought she needed to stay.     Made multiple attempts to contact brother via ER phone. All attempts unsuccessful.    Patient returned to ER Bed 3 pending transportation plan.

## 2024-10-09 NOTE — ED PROVIDER NOTES
Encounter Date: 10/9/2024       History     Chief Complaint   Patient presents with    Anxiety     Patient to ER via EMS, reports anxiety and sob due to living alone.      64-year-old female with severe anxiety, COPD in by EMS once again for the 3rd time in 2 days for shortness of breath.  Oxygen saturation is 98% on room air, anterior chest wall tenderness with palpation.  History of this multiple times in the past.  No nausea vomiting, no diaphoresis.  Vital signs are all stable.  Given a prescription for Valium yesterday      Review of patient's allergies indicates:  No Known Allergies  Past Medical History:   Diagnosis Date    Anxiety     Breast cancer 2010    Cancer     Breast    COPD (chronic obstructive pulmonary disease)     Depression     Diabetes mellitus     GERD (gastroesophageal reflux disease)     Hypertension     Insomnia     Thyroid disease      Past Surgical History:   Procedure Laterality Date    BLADDER SUSPENSION      BREAST LUMPECTOMY      Right breast    CHOLECYSTECTOMY      HYSTERECTOMY      KNEE ARTHROSCOPY      Right knee    OOPHORECTOMY       Family History   Problem Relation Name Age of Onset    No Known Problems Mother      No Known Problems Father      No Known Problems Sister      No Known Problems Daughter      No Known Problems Maternal Aunt      No Known Problems Maternal Uncle      No Known Problems Paternal Aunt      No Known Problems Paternal Uncle      No Known Problems Maternal Grandmother      No Known Problems Maternal Grandfather      No Known Problems Paternal Grandmother      No Known Problems Paternal Grandfather      No Known Problems Other      Breast cancer Neg Hx      Ovarian cancer Neg Hx      BRCA 1/2 Neg Hx       Social History     Tobacco Use    Smoking status: Every Day     Current packs/day: 0.50     Average packs/day: 0.5 packs/day for 50.4 years (25.2 ttl pk-yrs)     Types: Cigarettes     Start date: 5/15/1974     Passive exposure: Past    Smokeless tobacco:  Never    Tobacco comments:     quit yesterday 6/12/21   Substance Use Topics    Alcohol use: Not Currently    Drug use: Never     Review of Systems   Constitutional:  Negative for fever.   HENT:  Negative for sore throat.    Respiratory:  Negative for shortness of breath.    Cardiovascular:  Negative for chest pain.   Gastrointestinal:  Negative for nausea.   Genitourinary:  Negative for dysuria.   Musculoskeletal:  Negative for back pain.   Skin:  Negative for rash.   Neurological:  Negative for weakness.   Hematological:  Does not bruise/bleed easily.   Psychiatric/Behavioral:  The patient is nervous/anxious.    All other systems reviewed and are negative.      Physical Exam     Initial Vitals [10/09/24 1122]   BP Pulse Resp Temp SpO2   119/75 86 18 98 °F (36.7 °C) 97 %      MAP       --         Physical Exam    Nursing note and vitals reviewed.  Constitutional: She appears well-developed and well-nourished. She is not diaphoretic. No distress.   HENT:   Head: Normocephalic and atraumatic.   Eyes: Conjunctivae and EOM are normal. Pupils are equal, round, and reactive to light.   Neck: Neck supple.   Normal range of motion.  Cardiovascular:  Normal rate, regular rhythm, normal heart sounds and intact distal pulses.           No murmur heard.  Pulmonary/Chest: Breath sounds normal. No respiratory distress. She has no wheezes. She has no rhonchi. She has no rales. She exhibits tenderness.   Lungs are once again clear bilaterally.  Completely reproducible right-sided chest wall tenderness with light palpation, no crepitance, no rash   Abdominal: Abdomen is soft. Bowel sounds are normal.   Musculoskeletal:         General: No tenderness or edema. Normal range of motion.      Cervical back: Normal range of motion and neck supple.     Neurological: She is alert and oriented to person, place, and time. She has normal strength. No cranial nerve deficit. GCS score is 15. GCS eye subscore is 4. GCS verbal subscore is 5. GCS  motor subscore is 6.   Skin: Skin is warm and dry. Capillary refill takes less than 2 seconds.   Psychiatric:   Very anxious female         ED Course   Procedures  Labs Reviewed - No data to display       Imaging Results              X-Ray Chest 1 View (In process)                      Medications - No data to display  Medical Decision Making             ED Course as of 10/09/24 1128   Wed Oct 09, 2024   1126 Chest x-ray without acute changes [SD]      ED Course User Index  [SD] Kunal Saunders MD               Medical Decision Making:   Differential Diagnosis:   Anxiety reaction, COPD             Clinical Impression:  Final diagnoses:  [F41.1] Anxiety reaction (Primary)          ED Disposition Condition    Discharge Stable          ED Prescriptions    None       Follow-up Information       Follow up With Specialties Details Why Contact Info Additional Information    Burke Merchant MD Internal Medicine Today  [address] [address]  [phone]       [place] - Emergency Department Emergency Medicine  As needed [address] [facility] [id number]  [phone] Floor 1             Kunal Saunders MD  10/09/24 1128

## 2024-10-10 ENCOUNTER — HOSPITAL ENCOUNTER (INPATIENT)
Facility: HOSPITAL | Age: 64
LOS: 7 days | Discharge: HOME | DRG: 880 | End: 2024-10-17
Attending: EMERGENCY MEDICINE | Admitting: STUDENT IN AN ORGANIZED HEALTH CARE EDUCATION/TRAINING PROGRAM
Payer: MEDICARE

## 2024-10-10 DIAGNOSIS — F41.9 ANXIETY: Primary | ICD-10-CM

## 2024-10-10 LAB
ALBUMIN SERPL BCP-MCNC: 2.8 G/DL (ref 3.5–5.2)
ALP SERPL-CCNC: 87 U/L (ref 55–135)
ALT SERPL W/O P-5'-P-CCNC: 47 U/L (ref 10–44)
AMPHET+METHAMPHET UR QL: NEGATIVE
ANION GAP SERPL CALC-SCNC: 4 MMOL/L (ref 3–11)
APAP SERPL-MCNC: <2 UG/ML (ref 10–20)
AST SERPL-CCNC: 5 U/L (ref 10–40)
BARBITURATES UR QL SCN>200 NG/ML: NEGATIVE
BASOPHILS # BLD AUTO: 0.01 K/UL (ref 0–0.2)
BASOPHILS NFR BLD: 0.1 % (ref 0–1.9)
BENZODIAZ UR QL SCN>200 NG/ML: ABNORMAL
BILIRUB SERPL-MCNC: 0.5 MG/DL (ref 0.1–1)
BILIRUB UR QL STRIP: NEGATIVE
BUN SERPL-MCNC: 19 MG/DL (ref 8–23)
BZE UR QL SCN: NEGATIVE
CALCIUM SERPL-MCNC: 9.1 MG/DL (ref 8.7–10.5)
CANNABINOIDS UR QL SCN: NEGATIVE
CHLORIDE SERPL-SCNC: 107 MMOL/L (ref 95–110)
CLARITY UR: CLEAR
CO2 SERPL-SCNC: 30 MMOL/L (ref 23–29)
COLOR UR: YELLOW
CREAT SERPL-MCNC: 1.4 MG/DL (ref 0.5–1.4)
CREAT UR-MCNC: 213 MG/DL (ref 15–325)
DIFFERENTIAL METHOD BLD: ABNORMAL
EOSINOPHIL # BLD AUTO: 0.2 K/UL (ref 0–0.5)
EOSINOPHIL NFR BLD: 1.4 % (ref 0–8)
ERYTHROCYTE [DISTWIDTH] IN BLOOD BY AUTOMATED COUNT: 15.7 % (ref 11.5–14.5)
EST. GFR  (NO RACE VARIABLE): 41.8 ML/MIN/1.73 M^2
ETHANOL SERPL-MCNC: <3 MG/DL
GLUCOSE SERPL-MCNC: 155 MG/DL (ref 70–110)
GLUCOSE UR QL STRIP: NEGATIVE
HCT VFR BLD AUTO: 36.1 % (ref 37–48.5)
HGB BLD-MCNC: 12 G/DL (ref 12–16)
HGB UR QL STRIP: NEGATIVE
IMM GRANULOCYTES # BLD AUTO: 0.06 K/UL (ref 0–0.04)
IMM GRANULOCYTES NFR BLD AUTO: 0.5 % (ref 0–0.5)
KETONES UR QL STRIP: NEGATIVE
LEUKOCYTE ESTERASE UR QL STRIP: NEGATIVE
LYMPHOCYTES # BLD AUTO: 1.7 K/UL (ref 1–4.8)
LYMPHOCYTES NFR BLD: 14.1 % (ref 18–48)
MCH RBC QN AUTO: 29.7 PG (ref 27–31)
MCHC RBC AUTO-ENTMCNC: 33.2 G/DL (ref 32–36)
MCV RBC AUTO: 89 FL (ref 82–98)
METHADONE UR QL SCN>300 NG/ML: NEGATIVE
MONOCYTES # BLD AUTO: 0.9 K/UL (ref 0.3–1)
MONOCYTES NFR BLD: 7.1 % (ref 4–15)
NEUTROPHILS # BLD AUTO: 9.2 K/UL (ref 1.8–7.7)
NEUTROPHILS NFR BLD: 76.8 % (ref 38–73)
NITRITE UR QL STRIP: NEGATIVE
NRBC BLD-RTO: 0 /100 WBC
OPIATES UR QL SCN: NEGATIVE
PCP UR QL SCN>25 NG/ML: NEGATIVE
PH UR STRIP: 6 [PH] (ref 5–8)
PLATELET # BLD AUTO: 248 K/UL (ref 150–450)
PMV BLD AUTO: 10.7 FL (ref 9.2–12.9)
POTASSIUM SERPL-SCNC: 4.3 MMOL/L (ref 3.5–5.1)
PROT SERPL-MCNC: 5.7 G/DL (ref 6–8.4)
PROT UR QL STRIP: NEGATIVE
RBC # BLD AUTO: 4.04 M/UL (ref 4–5.4)
SODIUM SERPL-SCNC: 141 MMOL/L (ref 136–145)
SP GR UR STRIP: 1.02 (ref 1–1.03)
T4 FREE SERPL-MCNC: 1.05 NG/DL (ref 0.71–1.51)
TOXICOLOGY INFORMATION: ABNORMAL
TSH SERPL DL<=0.005 MIU/L-ACNC: 5.62 UIU/ML (ref 0.4–4)
URN SPEC COLLECT METH UR: NORMAL
UROBILINOGEN UR STRIP-ACNC: 1 EU/DL
WBC # BLD AUTO: 11.95 K/UL (ref 3.9–12.7)

## 2024-10-10 PROCEDURE — 36415 COLL VENOUS BLD VENIPUNCTURE: CPT | Performed by: EMERGENCY MEDICINE

## 2024-10-10 PROCEDURE — 99285 EMERGENCY DEPT VISIT HI MDM: CPT

## 2024-10-10 PROCEDURE — 80053 COMPREHEN METABOLIC PANEL: CPT | Performed by: EMERGENCY MEDICINE

## 2024-10-10 PROCEDURE — 82077 ASSAY SPEC XCP UR&BREATH IA: CPT | Performed by: EMERGENCY MEDICINE

## 2024-10-10 PROCEDURE — 80143 DRUG ASSAY ACETAMINOPHEN: CPT | Performed by: EMERGENCY MEDICINE

## 2024-10-10 PROCEDURE — 80307 DRUG TEST PRSMV CHEM ANLYZR: CPT | Performed by: EMERGENCY MEDICINE

## 2024-10-10 PROCEDURE — 84443 ASSAY THYROID STIM HORMONE: CPT | Performed by: EMERGENCY MEDICINE

## 2024-10-10 PROCEDURE — 81003 URINALYSIS AUTO W/O SCOPE: CPT | Performed by: EMERGENCY MEDICINE

## 2024-10-10 PROCEDURE — 84439 ASSAY OF FREE THYROXINE: CPT | Performed by: EMERGENCY MEDICINE

## 2024-10-10 PROCEDURE — 85025 COMPLETE CBC W/AUTO DIFF WBC: CPT | Performed by: EMERGENCY MEDICINE

## 2024-10-10 PROCEDURE — 11400000 HC PSYCH PRIVATE ROOM

## 2024-10-10 RX ORDER — MICONAZOLE NITRATE 2 %
2 CREAM (GRAM) TOPICAL
Status: DISCONTINUED | OUTPATIENT
Start: 2024-10-10 | End: 2024-10-17 | Stop reason: HOSPADM

## 2024-10-10 NOTE — ED NOTES
NEUROLOGICAL:   Patient is awake , alert , and oriented x 4 .   Moves all extremities without difficulty.   Patient reports no neuro complaints..  GCS 15    CARDIOVASCULAR:   S1 and S2 present, no murmurs, gallops, or rubs, rate regular , and pulses palpable (2+)    On palpation no edema noted , noted to none.   Patient reports no CV complaints.  .     RESPIRATORY:   Airway Clear, Open, and Patent.  Respirations are even and unlabored.   Breath sounds clear  to all lung fields.   Patient reports no respiratory complaints.     GASTROINTESTINAL:   Abdomen is soft  and non-tender x 4 quadrants. Bowel sounds are normoactive to all quadrants .   Patient reports no GI complaints .     SKIN:   Skin appears warm , dry , good turgor, color normal for race, and intact.

## 2024-10-10 NOTE — ED NOTES
Dr Joseph recommends pt sign a FVAF - Formal Voluntary Admit Form, spoke with LEONEL Burnette, [facility] Mgr, about the form.

## 2024-10-10 NOTE — ED NOTES
Spoke with PAT patient niece who states they have spoke to Dr. Merchant who wants patient to be admitted to address anxiety and then placed in nursing home. Family would like patient to be in a nursing home in [place] since patient has no children and no one to care for her.

## 2024-10-10 NOTE — ED PROVIDER NOTES
Encounter Date: 10/10/2024       History     Chief Complaint   Patient presents with    Anxiety     Pt returns to the ED for psych evaluation by recommendation of PCP, Dr. Merchant, due to increased anxiety. Pt seen in ED multiple times recently for similar complaints.      63 yo female with history as below here with anxiety from PCP office for psych eval/possible admission. Overusing benzos, anxiety not well controlled. Running out of prescribed supply early and then seeking care in ED multiple times per week/day. No SI/HI. Wishes to FVA to [facility] for detox, medication management.       Review of patient's allergies indicates:  No Known Allergies  Past Medical History:   Diagnosis Date    Anxiety     Breast cancer 2010    Cancer     Breast    COPD (chronic obstructive pulmonary disease)     Depression     Diabetes mellitus     GERD (gastroesophageal reflux disease)     Hypertension     Insomnia     Thyroid disease      Past Surgical History:   Procedure Laterality Date    BLADDER SUSPENSION      BREAST LUMPECTOMY      Right breast    CHOLECYSTECTOMY      HYSTERECTOMY      KNEE ARTHROSCOPY      Right knee    OOPHORECTOMY       Family History   Problem Relation Name Age of Onset    No Known Problems Mother      No Known Problems Father      No Known Problems Sister      No Known Problems Daughter      No Known Problems Maternal Aunt      No Known Problems Maternal Uncle      No Known Problems Paternal Aunt      No Known Problems Paternal Uncle      No Known Problems Maternal Grandmother      No Known Problems Maternal Grandfather      No Known Problems Paternal Grandmother      No Known Problems Paternal Grandfather      No Known Problems Other      Breast cancer Neg Hx      Ovarian cancer Neg Hx      BRCA 1/2 Neg Hx       Social History     Tobacco Use    Smoking status: Every Day     Current packs/day: 0.50     Average packs/day: 0.5 packs/day for 50.4 years (25.2 ttl pk-yrs)     Types: Cigarettes     Start date:  5/15/1974     Passive exposure: Past    Smokeless tobacco: Never    Tobacco comments:     quit yesterday 6/12/21   Substance Use Topics    Alcohol use: Not Currently    Drug use: Never     Review of Systems   Constitutional: Negative.    Respiratory: Negative.     Cardiovascular: Negative.    Gastrointestinal: Negative.    All other systems reviewed and are negative.      Physical Exam     Initial Vitals   BP Pulse Resp Temp SpO2   10/10/24 1507 10/10/24 1506 10/10/24 1506 10/10/24 1506 10/10/24 1506   (!) 100/53 78 20 98 °F (36.7 °C) 95 %      MAP       --                Physical Exam    Nursing note and vitals reviewed.  Constitutional: She is not diaphoretic. No distress.   HENT:   Head: Normocephalic and atraumatic.   Eyes: EOM are normal. Pupils are equal, round, and reactive to light.   Neck: Neck supple.   Normal range of motion.  Cardiovascular:  Normal rate, regular rhythm and intact distal pulses.           Pulmonary/Chest: Breath sounds normal. No respiratory distress. She has no wheezes. She has no rales.   Abdominal: Abdomen is soft. Bowel sounds are normal. She exhibits no distension. There is no abdominal tenderness. There is no rebound.   Musculoskeletal:         General: No tenderness or edema. Normal range of motion.      Cervical back: Normal range of motion and neck supple.     Neurological: She is alert. She has normal strength and normal reflexes.   Skin: Skin is warm and dry. Capillary refill takes less than 2 seconds.   Psychiatric: Thought content normal. Her mood appears anxious. Her speech is rapid and/or pressured. She is hyperactive.         ED Course   Procedures  Labs Reviewed   CBC W/ AUTO DIFFERENTIAL - Abnormal       Result Value    WBC 11.95      RBC 4.04      Hemoglobin 12.0      Hematocrit 36.1 (*)     MCV 89      MCH 29.7      MCHC 33.2      RDW 15.7 (*)     Platelets 248      MPV 10.7      Immature Granulocytes 0.5      Gran # (ANC) 9.2 (*)     Immature Grans (Abs) 0.06 (*)      Lymph # 1.7      Mono # 0.9      Eos # 0.2      Baso # 0.01      nRBC 0      Gran % 76.8 (*)     Lymph % 14.1 (*)     Mono % 7.1      Eosinophil % 1.4      Basophil % 0.1      Differential Method Automated     COMPREHENSIVE METABOLIC PANEL - Abnormal    Sodium 141      Potassium 4.3      Chloride 107      CO2 30 (*)     Glucose 155 (*)     BUN 19      Creatinine 1.4      Calcium 9.1      Total Protein 5.7 (*)     Albumin 2.8 (*)     Total Bilirubin 0.5      Alkaline Phosphatase 87      AST 5 (*)     ALT 47 (*)     eGFR 41.8 (*)     Anion Gap 4     TSH - Abnormal    TSH 5.620 (*)    DRUG SCREEN PANEL, URINE EMERGENCY - Abnormal    Benzodiazepines Presumptive Positive (*)     Methadone metabolites Negative      Cocaine (Metab.) Negative      Opiate Scrn, Ur Negative      Barbiturate Screen, Ur Negative      Amphetamine Screen, Ur Negative      THC Negative      Phencyclidine Negative      Creatinine, Urine 213.0      Toxicology Information SEE COMMENT      Narrative:     Preferred Collection Type->Urine, Clean Catch  Specimen Source->Urine   ACETAMINOPHEN LEVEL - Abnormal    Acetaminophen (Tylenol), Serum <2.0 (*)    URINALYSIS, REFLEX TO URINE CULTURE    Specimen UA Urine, Clean Catch      Color, UA Yellow      Appearance, UA Clear      pH, UA 6.0      Specific Gravity, UA 1.025      Protein, UA Negative      Glucose, UA Negative      Ketones, UA Negative      Bilirubin (UA) Negative      Occult Blood UA Negative      Nitrite, UA Negative      Urobilinogen, UA 1.0      Leukocytes, UA Negative      Narrative:     Preferred Collection Type->Urine, Clean Catch  Specimen Source->Urine   ALCOHOL,MEDICAL (ETHANOL)    Alcohol, Serum <3     T4, FREE    Free T4 1.05            Imaging Results    None          Medications - No data to display  Medical Decision Making  Discussed with analisa Elliott to A to [facility].     Problems Addressed:  Anxiety: chronic illness or injury with exacerbation, progression, or side effects of  treatment    Amount and/or Complexity of Data Reviewed  Labs: ordered. Decision-making details documented in ED Course.    Risk  Decision regarding hospitalization.                                      Clinical Impression:  Final diagnoses:  [F41.9] Anxiety (Primary)          ED Disposition Condition    Admit Stable                Philip Lopez MD  10/10/24 6216

## 2024-10-10 NOTE — ED NOTES
Pt. Supplied with paper scrubs to change into, changing at this time. Pt refused to put yellow nonskid socks on

## 2024-10-11 PROBLEM — E03.9 HYPOTHYROID: Status: ACTIVE | Noted: 2024-10-11

## 2024-10-11 PROBLEM — F41.9 ANXIETY: Status: ACTIVE | Noted: 2024-10-11

## 2024-10-11 LAB
BACTERIA BLD CULT: NORMAL
BACTERIA BLD CULT: NORMAL

## 2024-10-11 PROCEDURE — 25000003 PHARM REV CODE 250: Performed by: STUDENT IN AN ORGANIZED HEALTH CARE EDUCATION/TRAINING PROGRAM

## 2024-10-11 PROCEDURE — 99900031 HC PATIENT EDUCATION (STAT)

## 2024-10-11 PROCEDURE — 11400000 HC PSYCH PRIVATE ROOM

## 2024-10-11 PROCEDURE — 99223 1ST HOSP IP/OBS HIGH 75: CPT | Mod: AI,,, | Performed by: STUDENT IN AN ORGANIZED HEALTH CARE EDUCATION/TRAINING PROGRAM

## 2024-10-11 PROCEDURE — 25000003 PHARM REV CODE 250: Performed by: INTERNAL MEDICINE

## 2024-10-11 PROCEDURE — 99900035 HC TECH TIME PER 15 MIN (STAT)

## 2024-10-11 PROCEDURE — 94799 UNLISTED PULMONARY SVC/PX: CPT

## 2024-10-11 PROCEDURE — 94640 AIRWAY INHALATION TREATMENT: CPT

## 2024-10-11 PROCEDURE — 25000003 PHARM REV CODE 250: Performed by: PSYCHIATRY & NEUROLOGY

## 2024-10-11 PROCEDURE — 94761 N-INVAS EAR/PLS OXIMETRY MLT: CPT

## 2024-10-11 PROCEDURE — 25000003 PHARM REV CODE 250: Performed by: EMERGENCY MEDICINE

## 2024-10-11 PROCEDURE — 25000242 PHARM REV CODE 250 ALT 637 W/ HCPCS: Performed by: PSYCHIATRY & NEUROLOGY

## 2024-10-11 RX ORDER — FAMOTIDINE 20 MG/1
20 TABLET, FILM COATED ORAL DAILY
Status: DISCONTINUED | OUTPATIENT
Start: 2024-10-11 | End: 2024-10-11

## 2024-10-11 RX ORDER — ASPIRIN 81 MG/1
81 TABLET ORAL DAILY
Status: DISCONTINUED | OUTPATIENT
Start: 2024-10-11 | End: 2024-10-17 | Stop reason: HOSPADM

## 2024-10-11 RX ORDER — RISPERIDONE 1 MG/1
3 TABLET ORAL NIGHTLY
Status: DISCONTINUED | OUTPATIENT
Start: 2024-10-11 | End: 2024-10-17 | Stop reason: HOSPADM

## 2024-10-11 RX ORDER — ALBUTEROL SULFATE 2.5 MG/.5ML
2.5 SOLUTION RESPIRATORY (INHALATION) EVERY 4 HOURS PRN
Status: DISCONTINUED | OUTPATIENT
Start: 2024-10-11 | End: 2024-10-17 | Stop reason: HOSPADM

## 2024-10-11 RX ORDER — AMLODIPINE BESYLATE 10 MG/1
10 TABLET ORAL DAILY
Status: DISCONTINUED | OUTPATIENT
Start: 2024-10-11 | End: 2024-10-17 | Stop reason: HOSPADM

## 2024-10-11 RX ORDER — LEVOTHYROXINE SODIUM 75 UG/1
75 TABLET ORAL
Status: DISCONTINUED | OUTPATIENT
Start: 2024-10-12 | End: 2024-10-17 | Stop reason: HOSPADM

## 2024-10-11 RX ORDER — LORAZEPAM 1 MG/1
2 TABLET ORAL 3 TIMES DAILY
Status: DISCONTINUED | OUTPATIENT
Start: 2024-10-11 | End: 2024-10-11

## 2024-10-11 RX ORDER — DIAZEPAM 5 MG/1
10 TABLET ORAL 3 TIMES DAILY
Status: DISCONTINUED | OUTPATIENT
Start: 2024-10-11 | End: 2024-10-13

## 2024-10-11 RX ORDER — PAROXETINE 10 MG/1
10 TABLET, FILM COATED ORAL DAILY
Status: DISCONTINUED | OUTPATIENT
Start: 2024-10-11 | End: 2024-10-13

## 2024-10-11 RX ADMIN — FAMOTIDINE 20 MG: 20 TABLET, FILM COATED ORAL at 10:10

## 2024-10-11 RX ADMIN — NICOTINE POLACRILEX 2 MG: 2 GUM, CHEWING BUCCAL at 02:10

## 2024-10-11 RX ADMIN — AMLODIPINE BESYLATE 10 MG: 10 TABLET ORAL at 10:10

## 2024-10-11 RX ADMIN — DIAZEPAM 10 MG: 5 TABLET ORAL at 02:10

## 2024-10-11 RX ADMIN — RISPERIDONE 3 MG: 1 TABLET, FILM COATED ORAL at 08:10

## 2024-10-11 RX ADMIN — LORAZEPAM 2 MG: 1 TABLET ORAL at 08:10

## 2024-10-11 RX ADMIN — PAROXETINE HYDROCHLORIDE 10 MG: 10 TABLET, FILM COATED ORAL at 11:10

## 2024-10-11 RX ADMIN — NICOTINE POLACRILEX 2 MG: 2 GUM, CHEWING BUCCAL at 11:10

## 2024-10-11 RX ADMIN — ASPIRIN 81 MG: 81 TABLET, COATED ORAL at 10:10

## 2024-10-11 RX ADMIN — DIAZEPAM 10 MG: 5 TABLET ORAL at 08:10

## 2024-10-11 RX ADMIN — ALBUTEROL SULFATE 2.5 MG: 2.5 SOLUTION RESPIRATORY (INHALATION) at 07:10

## 2024-10-11 RX ADMIN — ALBUTEROL SULFATE 2.5 MG: 2.5 SOLUTION RESPIRATORY (INHALATION) at 02:10

## 2024-10-11 RX ADMIN — NICOTINE POLACRILEX 2 MG: 2 GUM, CHEWING BUCCAL at 08:10

## 2024-10-11 NOTE — HPI
65 yo female with history as below here with anxiety from PCP office for psych eval/possible admission. Overusing benzos, anxiety not well controlled. Running out of prescribed supply early and then seeking care in ED multiple times per week/day. No SI/HI. Wishes to FVA to [facility] for detox, medication management.   Pt reports she has been anxious, jittery, palpitations and occ sob from anxiety.   Repotrs taking her benzos without help.  Also takign her synthroid regulalry
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
## 2024-10-11 NOTE — PLAN OF CARE
Problem: Adult Behavioral Health Plan of Care  Goal: Optimized Coping Skills in Response to Life Stressors  Intervention: Promote Effective Coping Strategies  Flowsheets (Taken 10/11/2024 7843)  Supportive Measures:   active listening utilized   positive reinforcement provided   decision-making supported   goal-setting facilitated   self-reflection promoted   verbalization of feelings encouraged       Behavior: alert, oriented, engaged            Intervention: In this CBT-focused process group SW facilitated a group on letting go. Pt was asked explore and identify what can they let go in their lives,  identify healthy replacements, and practice letting go through mindfulness and meditation.           Response: Pt participated in group discussion and completed worksheet. Pt was able to identify things she needed to let go of such as being alone and being by herself, emotional stated of mind that she is currently in, pt's house and moving to a nursing home.           Plan: Continue to encourage pt to participate in process groups to verbalize feelings and develop healthy coping skills.

## 2024-10-11 NOTE — NURSING
"As per ER chart:  Pt returns to the ED for psych evaluation by recommendation of PCP, Dr. Merchant, due to increased anxiety. Pt seen in ED multiple times recently for similar complaints.   Alert 63 yo female with history as below here with anxiety from PCP office for psych eval/possible admission. Overusing benzos, anxiety not well controlled. Running out of prescribed supply early and then seeking care in ED multiple times per week/day. No SI/HI. Wishes to FVA to [facility] for detox, medication management.  See FVA in chart.  Pt at nurses station at this time asking for her nerve pills.  When asked if she took her meds today, pt stated "no I didn't. I don't have any nerve pills and I didn't take my medical meds.  I was too nervous."  Pt states she has been to the er the last 3 days because she needs more xanax. Pt states she ran out of xanax because she shares them with her neighbor. Pt also stated that she is wanting to go to a nursing home in [place] upon discharge from [facility].  Pt served a diet for supper and eating without difficulty.   See full assessment per admission profile.  Admission paperwork explained and signed.  See in chart.  Pt denies si, hi or a v hallucinations.  Gravely disabled.  Pt oriented to unit and unit routine.  Instructed to call for any needs or concerns at all.  Verbalized understanding.  Will cont to monitor for safety.  Patient care ongoing.  Q15 min close observations maintained as per psychiatrists orders.    "

## 2024-10-11 NOTE — PLAN OF CARE
Problem: Adult Behavioral Health Plan of Care  Goal: Plan of Care Review  Outcome: Progressing     Problem: Adult Behavioral Health Plan of Care  Goal: Patient-Specific Goal (Individualization)  Outcome: Progressing     Problem: Adult Behavioral Health Plan of Care  Goal: Adheres to Safety Considerations for Self and Others  Outcome: Progressing

## 2024-10-11 NOTE — H&P
[facility] Hospital Medicine  History & Physical    Patient Name: Vero Allen  MRN: [id number]  Patient Class: IP- Psych  Admission Date: 10/10/2024  Attending Physician: Everett Joseph III, MD   Primary Care Provider: Burke Merchant MD         Patient information was obtained from patient and ER records.     Subjective:     Principal Problem:Anxiety    Chief Complaint:   Chief Complaint   Patient presents with    Anxiety     Pt returns to the ED for psych evaluation by recommendation of PCP, Dr. Merchant, due to increased anxiety. Pt seen in ED multiple times recently for similar complaints.         HPI: 63 yo female with history as below here with anxiety from PCP office for psych eval/possible admission. Overusing benzos, anxiety not well controlled. Running out of prescribed supply early and then seeking care in ED multiple times per week/day. No SI/HI. Wishes to FVA to [facility] for detox, medication management.   Pt reports she has been anxious, jittery, palpitations and occ sob from anxiety.   Repotrs taking her benzos without help.  Also takign her synthroid regulalry    Past Medical History:   Diagnosis Date    Anxiety     Breast cancer 2010    Cancer     Breast    COPD (chronic obstructive pulmonary disease)     Depression     Diabetes mellitus     GERD (gastroesophageal reflux disease)     Hypertension     Insomnia     Thyroid disease        Past Surgical History:   Procedure Laterality Date    BLADDER SUSPENSION      BREAST LUMPECTOMY      Right breast    CHOLECYSTECTOMY      HYSTERECTOMY      KNEE ARTHROSCOPY      Right knee    OOPHORECTOMY         Review of patient's allergies indicates:  No Known Allergies    No current facility-administered medications on file prior to encounter.     Current Outpatient Medications on File Prior to Encounter   Medication Sig    albuterol (PROVENTIL/VENTOLIN HFA) 90 mcg/actuation inhaler Inhale 1-2 puffs into the lungs every 4  (four) hours as needed for Wheezing or Shortness of Breath. Rescue    albuterol-ipratropium (DUO-NEB) 2.5 mg-0.5 mg/3 mL nebulizer solution Take 3 mLs by nebulization every 4 (four) hours as needed for Wheezing or Shortness of Breath. Rescue    albuterol-ipratropium (DUO-NEB) 2.5 mg-0.5 mg/3 mL nebulizer solution Take 3 mLs by nebulization every 4 (four) hours as needed for Wheezing. Rescue    ALPRAZolam (XANAX) 0.5 MG tablet MAY FILL 7/20/2024. TAKE 1 TABLET BY MOUTH EVERY 8 HOURS    amLODIPine (NORVASC) 10 MG tablet Take 10 mg by mouth.    aspirin (ECOTRIN) 81 MG EC tablet Take 81 mg by mouth once daily.    azithromycin (Z-PAOLA) 250 MG tablet Take 2 tablets by mouth on day 1; Take 1 tablet by mouth on days 2-5    BREZTRI AEROSPHERE 160-9-4.8 mcg/actuation HFAA Inhale into the lungs.    cefUROXime (CEFTIN) 500 MG tablet Take 1 tablet (500 mg total) by mouth 2 (two) times daily. for 5 days    celecoxib (CELEBREX) 200 MG capsule Take 200 mg by mouth.    citalopram (CELEXA) 40 MG tablet Take by mouth.    cloNIDine (CATAPRES) 0.1 MG tablet Take 0.1 mg by mouth every evening.    diazePAM (VALIUM) 5 MG tablet Take 1 tablet (5 mg total) by mouth every 12 (twelve) hours as needed for Anxiety.    esomeprazole (NEXIUM) 20 MG capsule Take 20 mg by mouth before breakfast.    estradioL (ESTRACE) 1 MG tablet Take 1 tablet (1 mg total) by mouth once daily.    levothyroxine (SYNTHROID) 50 MCG tablet Take 50 mcg by mouth before breakfast.    metFORMIN (GLUCOPHAGE-XR) 750 MG ER 24hr tablet Take by mouth.    methocarbamoL (ROBAXIN) 750 MG Tab Take 750 mg by mouth.    risperiDONE (RISPERDAL) 3 MG Tab      Family History       Problem Relation (Age of Onset)    No Known Problems Mother, Father, Sister, Daughter, Maternal Aunt, Maternal Uncle, Paternal Aunt, Paternal Uncle, Maternal Grandmother, Maternal Grandfather, Paternal Grandmother, Paternal Grandfather, Other          Tobacco Use    Smoking status: Every Day     Current packs/day:  0.50     Average packs/day: 0.5 packs/day for 50.4 years (25.2 ttl pk-yrs)     Types: Cigarettes     Start date: 5/15/1974     Passive exposure: Past    Smokeless tobacco: Never    Tobacco comments:     quit yesterday 6/12/21   Substance and Sexual Activity    Alcohol use: Not Currently    Drug use: Never    Sexual activity: Not Currently     Partners: Male     Birth control/protection: None     Review of Systems   Constitutional:  Positive for activity change and fatigue. Negative for fever.   HENT:  Negative for postnasal drip, sinus pressure and sore throat.    Respiratory:  Positive for shortness of breath. Negative for cough and wheezing.    Cardiovascular:  Positive for palpitations. Negative for chest pain and leg swelling.   Gastrointestinal:  Negative for abdominal pain, diarrhea, nausea and vomiting.   Skin:  Negative for rash and wound.   Neurological:  Positive for weakness. Negative for syncope.   Psychiatric/Behavioral:  The patient is nervous/anxious.      Objective:     Vital Signs (Most Recent):  Temp: 98.3 °F (36.8 °C) (10/11/24 0731)  Pulse: 61 (10/11/24 0731)  Resp: 18 (10/11/24 0731)  BP: (!) 150/65 (10/11/24 0731)  SpO2: 96 % (10/11/24 0731) Vital Signs (24h Range):  Temp:  [97.4 °F (36.3 °C)-98.3 °F (36.8 °C)] 98.3 °F (36.8 °C)  Pulse:  [61-81] 61  Resp:  [18-20] 18  SpO2:  [95 %-98 %] 96 %  BP: (100-150)/(53-69) 150/65     Weight: 111.1 kg (245 lb)  Body mass index is 43.4 kg/m².     Physical Exam  Constitutional:       Appearance: Normal appearance. She is obese.   HENT:      Nose: Nose normal.      Mouth/Throat:      Mouth: Mucous membranes are moist.   Eyes:      Extraocular Movements: Extraocular movements intact and EOM normal.      Pupils: Pupils are equal, round, and reactive to light.   Cardiovascular:      Rate and Rhythm: Normal rate and regular rhythm.   Pulmonary:      Effort: Pulmonary effort is normal.      Breath sounds: Normal breath sounds.   Abdominal:      General: Bowel  sounds are normal.      Palpations: Abdomen is soft.   Musculoskeletal:         General: Normal range of motion.   Skin:     General: Skin is warm and dry.      Capillary Refill: Capillary refill takes less than 2 seconds.   Neurological:      General: No focal deficit present.              CRANIAL NERVES     CN II   Visual fields full to confrontation.     CN III, IV, VI   Pupils are equal, round, and reactive to light.  Extraocular motions are normal.     CN V   Facial sensation intact.     CN VII   Facial expression full, symmetric.     CN VIII   CN VIII normal.     CN IX, X   CN IX normal.   CN X normal.     CN XI   CN XI normal.     CN XII   CN XII normal.        Significant Labs: All pertinent labs within the past 24 hours have been reviewed.  Recent Lab Results         10/10/24  1544   10/10/24  1526        Benzodiazepines Presumptive Positive         Methadone metabolites Negative         Phencyclidine Negative         Acetaminophen Level   <2.0  Comment: Toxic Levels:  Adults (4 hr post-ingestion).........>150 ug/mL  Adults (12 hr post-ingestion)........>40 ug/mL  Peds (2 hr post-ingestion, liquid)...>225 ug/mL         Albumin   2.8       Alcohol, Serum   <3       ALP   87       ALT   47       Amphetamines, Urine Negative         Anion Gap   4       Appearance, UA Clear         AST   5       Barbituates, Urine Negative         Baso #   0.01       Basophil %   0.1       Bilirubin (UA) Negative         BILIRUBIN TOTAL   0.5  Comment: For infants and newborns, interpretation of results should be based  on gestational age, weight and in agreement with clinical  observations.    Premature Infant recommended reference ranges:  Up to 24 hours.............<8.0 mg/dL  Up to 48 hours............<12.0 mg/dL  3-5 days..................<15.0 mg/dL  6-29 days.................<15.0 mg/dL    For patients on Eltrombopag therapy, use of Dimension [place] TBIL is   not   recommended.         BUN   19       Calcium   9.1        Chloride   107       CO2   30       Cocaine, Urine Negative         Color, UA Yellow         Creatinine   1.4       Urine Creatinine 213.0         Differential Method   Automated       eGFR   41.8       Eos #   0.2       Eos %   1.4       Free T4   1.05  Comment: ATTENTION: The use of Biotin Supplements may interfere with this   assay.         Glucose   155       Glucose, UA Negative         Gran # (ANC)   9.2       Gran %   76.8       Hematocrit   36.1       Hemoglobin   12.0       Immature Grans (Abs)   0.06  Comment: Mild elevation in immature granulocytes is non specific and   can be seen in a variety of conditions including stress response,   acute inflammation, trauma and pregnancy. Correlation with other   laboratory and clinical findings is essential.         Immature Granulocytes   0.5       Ketones, UA Negative         Leukocyte Esterase, UA Negative         Lymph #   1.7       Lymph %   14.1       MCH   29.7       MCHC   33.2       MCV   89       Mono #   0.9       Mono %   7.1       MPV   10.7       NITRITE UA Negative         nRBC   0       Blood, UA Negative         Opiates, Urine Negative         pH, UA 6.0         Platelet Count   248       Potassium   4.3       PROTEIN TOTAL   5.7       Protein, UA Negative  Comment: Recommend a 24 hour urine protein or a urine   protein/creatinine ratio if globulin induced proteinuria is  clinically suspected.           RBC   4.04       RDW   15.7       Sodium   141       Spec Grav UA 1.025         Specimen UA Urine, Clean Catch         Marijuana (THC) Metabolite Negative         Toxicology Information SEE COMMENT  Comment: This screen includes the following classes of drugs at the   listed cut-off:    Benzodiazepines                  200 ng/ml  Methadone                        300 ng/ml  Cocaine metabolite               300 ng/ml  Opiates                         2000 ng/ml  Barbiturates                     200 ng/ml  Amphetamines                    1000  ng/ml  Marijuana metabs (THC)            50 ng/ml  Phencyclidine (PCP)               25 ng/ml    **Intended use : The UDS methods provide only a preliminary result.    A more   specific alternate chemical method must be used in order to obtain a   confirmed analytical result.  Gas chromatography mass spectrometry   (GC/MS)   is the preferred confirmatory method.  Clinical consideration and   professional judgement should be applied to any drug of abuse test   result.    Particularly when preliminary results are used.       ** Results:  Positive results are only preliminary and only suggest   the   sample may contain the analyte being tested.  Negative results   indicate that   the sample does not contain the analyte or the analyte is present in   concentrations below the cut-off level.           TSH   5.620  Comment: ATTENTION: The use of Biotin Supplements may interfere with this   assay.         UROBILINOGEN UA 1.0         WBC   11.95               Significant Imaging: I have reviewed all pertinent imaging results/findings within the past 24 hours.  Assessment/Plan:     * Anxiety  Inpt psych evalaution and treatment      Hypothyroid  Increase synthroid to 75mcg po qd      Primary hypertension  Patients blood pressure range in the last 24 hours was: BP  Min: 100/53  Max: 150/65.The patient's inpatient anti-hypertensive regimen is listed below:  Current Antihypertensives  amLODIPine tablet 10 mg, Daily, Oral    Plan  - BP is controlled, no changes needed to their regimen        VTE Risk Mitigation (From admission, onward)      None                            Cass Boggs MD  Department of Hospital Medicine  [facility] ([facility])

## 2024-10-11 NOTE — NURSING
Patient alert and oriented, sat in common area with peers this afternoon watching movie, quiet, and cooperative. Patient accepted HS snack and is currently on no routine medication or PRN's at this time. Patient did inquire about medication this evening including nebulizer treatments. Patient is without cough, SOB, or respiratory distress. Pt endorses anxiety and understands she will see medical MD and psychiatrist in am. Patient went to her room after snacks with no complaints. Patient is in her room at this time asleep and resting comfortably. Close observations continued and safe environment maintained.

## 2024-10-11 NOTE — SUBJECTIVE & OBJECTIVE
Past Medical History:   Diagnosis Date    Anxiety     Breast cancer 2010    Cancer     Breast    COPD (chronic obstructive pulmonary disease)     Depression     Diabetes mellitus     GERD (gastroesophageal reflux disease)     Hypertension     Insomnia     Thyroid disease        Past Surgical History:   Procedure Laterality Date    BLADDER SUSPENSION      BREAST LUMPECTOMY      Right breast    CHOLECYSTECTOMY      HYSTERECTOMY      KNEE ARTHROSCOPY      Right knee    OOPHORECTOMY         Review of patient's allergies indicates:  No Known Allergies    No current facility-administered medications on file prior to encounter.     Current Outpatient Medications on File Prior to Encounter   Medication Sig    albuterol (PROVENTIL/VENTOLIN HFA) 90 mcg/actuation inhaler Inhale 1-2 puffs into the lungs every 4 (four) hours as needed for Wheezing or Shortness of Breath. Rescue    albuterol-ipratropium (DUO-NEB) 2.5 mg-0.5 mg/3 mL nebulizer solution Take 3 mLs by nebulization every 4 (four) hours as needed for Wheezing or Shortness of Breath. Rescue    albuterol-ipratropium (DUO-NEB) 2.5 mg-0.5 mg/3 mL nebulizer solution Take 3 mLs by nebulization every 4 (four) hours as needed for Wheezing. Rescue    ALPRAZolam (XANAX) 0.5 MG tablet MAY FILL 7/20/2024. TAKE 1 TABLET BY MOUTH EVERY 8 HOURS    amLODIPine (NORVASC) 10 MG tablet Take 10 mg by mouth.    aspirin (ECOTRIN) 81 MG EC tablet Take 81 mg by mouth once daily.    azithromycin (Z-PAOLA) 250 MG tablet Take 2 tablets by mouth on day 1; Take 1 tablet by mouth on days 2-5    BREZTRI AEROSPHERE 160-9-4.8 mcg/actuation HFAA Inhale into the lungs.    cefUROXime (CEFTIN) 500 MG tablet Take 1 tablet (500 mg total) by mouth 2 (two) times daily. for 5 days    celecoxib (CELEBREX) 200 MG capsule Take 200 mg by mouth.    citalopram (CELEXA) 40 MG tablet Take by mouth.    cloNIDine (CATAPRES) 0.1 MG tablet Take 0.1 mg by mouth every evening.    diazePAM (VALIUM) 5 MG tablet Take 1 tablet (5  mg total) by mouth every 12 (twelve) hours as needed for Anxiety.    esomeprazole (NEXIUM) 20 MG capsule Take 20 mg by mouth before breakfast.    estradioL (ESTRACE) 1 MG tablet Take 1 tablet (1 mg total) by mouth once daily.    levothyroxine (SYNTHROID) 50 MCG tablet Take 50 mcg by mouth before breakfast.    metFORMIN (GLUCOPHAGE-XR) 750 MG ER 24hr tablet Take by mouth.    methocarbamoL (ROBAXIN) 750 MG Tab Take 750 mg by mouth.    risperiDONE (RISPERDAL) 3 MG Tab      Family History       Problem Relation (Age of Onset)    No Known Problems Mother, Father, Sister, Daughter, Maternal Aunt, Maternal Uncle, Paternal Aunt, Paternal Uncle, Maternal Grandmother, Maternal Grandfather, Paternal Grandmother, Paternal Grandfather, Other          Tobacco Use    Smoking status: Every Day     Current packs/day: 0.50     Average packs/day: 0.5 packs/day for 50.4 years (25.2 ttl pk-yrs)     Types: Cigarettes     Start date: 5/15/1974     Passive exposure: Past    Smokeless tobacco: Never    Tobacco comments:     quit yesterday 6/12/21   Substance and Sexual Activity    Alcohol use: Not Currently    Drug use: Never    Sexual activity: Not Currently     Partners: Male     Birth control/protection: None     Review of Systems   Constitutional:  Positive for activity change and fatigue. Negative for fever.   HENT:  Negative for postnasal drip, sinus pressure and sore throat.    Respiratory:  Positive for shortness of breath. Negative for cough and wheezing.    Cardiovascular:  Positive for palpitations. Negative for chest pain and leg swelling.   Gastrointestinal:  Negative for abdominal pain, diarrhea, nausea and vomiting.   Skin:  Negative for rash and wound.   Neurological:  Positive for weakness. Negative for syncope.   Psychiatric/Behavioral:  The patient is nervous/anxious.      Objective:     Vital Signs (Most Recent):  Temp: 98.3 °F (36.8 °C) (10/11/24 0731)  Pulse: 61 (10/11/24 0731)  Resp: 18 (10/11/24 0731)  BP: (!) 150/65  (10/11/24 0731)  SpO2: 96 % (10/11/24 0731) Vital Signs (24h Range):  Temp:  [97.4 °F (36.3 °C)-98.3 °F (36.8 °C)] 98.3 °F (36.8 °C)  Pulse:  [61-81] 61  Resp:  [18-20] 18  SpO2:  [95 %-98 %] 96 %  BP: (100-150)/(53-69) 150/65     Weight: 111.1 kg (245 lb)  Body mass index is 43.4 kg/m².     Physical Exam  Constitutional:       Appearance: Normal appearance. She is obese.   HENT:      Nose: Nose normal.      Mouth/Throat:      Mouth: Mucous membranes are moist.   Eyes:      Extraocular Movements: Extraocular movements intact and EOM normal.      Pupils: Pupils are equal, round, and reactive to light.   Cardiovascular:      Rate and Rhythm: Normal rate and regular rhythm.   Pulmonary:      Effort: Pulmonary effort is normal.      Breath sounds: Normal breath sounds.   Abdominal:      General: Bowel sounds are normal.      Palpations: Abdomen is soft.   Musculoskeletal:         General: Normal range of motion.   Skin:     General: Skin is warm and dry.      Capillary Refill: Capillary refill takes less than 2 seconds.   Neurological:      General: No focal deficit present.              CRANIAL NERVES     CN II   Visual fields full to confrontation.     CN III, IV, VI   Pupils are equal, round, and reactive to light.  Extraocular motions are normal.     CN V   Facial sensation intact.     CN VII   Facial expression full, symmetric.     CN VIII   CN VIII normal.     CN IX, X   CN IX normal.   CN X normal.     CN XI   CN XI normal.     CN XII   CN XII normal.        Significant Labs: All pertinent labs within the past 24 hours have been reviewed.  Recent Lab Results         10/10/24  1544   10/10/24  1526        Benzodiazepines Presumptive Positive         Methadone metabolites Negative         Phencyclidine Negative         Acetaminophen Level   <2.0  Comment: Toxic Levels:  Adults (4 hr post-ingestion).........>150 ug/mL  Adults (12 hr post-ingestion)........>40 ug/mL  Peds (2 hr post-ingestion, liquid)...>225  ug/mL         Albumin   2.8       Alcohol, Serum   <3       ALP   87       ALT   47       Amphetamines, Urine Negative         Anion Gap   4       Appearance, UA Clear         AST   5       Barbituates, Urine Negative         Baso #   0.01       Basophil %   0.1       Bilirubin (UA) Negative         BILIRUBIN TOTAL   0.5  Comment: For infants and newborns, interpretation of results should be based  on gestational age, weight and in agreement with clinical  observations.    Premature Infant recommended reference ranges:  Up to 24 hours.............<8.0 mg/dL  Up to 48 hours............<12.0 mg/dL  3-5 days..................<15.0 mg/dL  6-29 days.................<15.0 mg/dL    For patients on Eltrombopag therapy, use of Dimension [place] TBIL is   not   recommended.         BUN   19       Calcium   9.1       Chloride   107       CO2   30       Cocaine, Urine Negative         Color, UA Yellow         Creatinine   1.4       Urine Creatinine 213.0         Differential Method   Automated       eGFR   41.8       Eos #   0.2       Eos %   1.4       Free T4   1.05  Comment: ATTENTION: The use of Biotin Supplements may interfere with this   assay.         Glucose   155       Glucose, UA Negative         Gran # (ANC)   9.2       Gran %   76.8       Hematocrit   36.1       Hemoglobin   12.0       Immature Grans (Abs)   0.06  Comment: Mild elevation in immature granulocytes is non specific and   can be seen in a variety of conditions including stress response,   acute inflammation, trauma and pregnancy. Correlation with other   laboratory and clinical findings is essential.         Immature Granulocytes   0.5       Ketones, UA Negative         Leukocyte Esterase, UA Negative         Lymph #   1.7       Lymph %   14.1       MCH   29.7       MCHC   33.2       MCV   89       Mono #   0.9       Mono %   7.1       MPV   10.7       NITRITE UA Negative         nRBC   0       Blood, UA Negative         Opiates, Urine Negative         pH,  UA 6.0         Platelet Count   248       Potassium   4.3       PROTEIN TOTAL   5.7       Protein, UA Negative  Comment: Recommend a 24 hour urine protein or a urine   protein/creatinine ratio if globulin induced proteinuria is  clinically suspected.           RBC   4.04       RDW   15.7       Sodium   141       Spec Grav UA 1.025         Specimen UA Urine, Clean Catch         Marijuana (THC) Metabolite Negative         Toxicology Information SEE COMMENT  Comment: This screen includes the following classes of drugs at the   listed cut-off:    Benzodiazepines                  200 ng/ml  Methadone                        300 ng/ml  Cocaine metabolite               300 ng/ml  Opiates                         2000 ng/ml  Barbiturates                     200 ng/ml  Amphetamines                    1000 ng/ml  Marijuana metabs (THC)            50 ng/ml  Phencyclidine (PCP)               25 ng/ml    **Intended use : The UDS methods provide only a preliminary result.    A more   specific alternate chemical method must be used in order to obtain a   confirmed analytical result.  Gas chromatography mass spectrometry   (GC/MS)   is the preferred confirmatory method.  Clinical consideration and   professional judgement should be applied to any drug of abuse test   result.    Particularly when preliminary results are used.       ** Results:  Positive results are only preliminary and only suggest   the   sample may contain the analyte being tested.  Negative results   indicate that   the sample does not contain the analyte or the analyte is present in   concentrations below the cut-off level.           TSH   5.620  Comment: ATTENTION: The use of Biotin Supplements may interfere with this   assay.         UROBILINOGEN UA 1.0         WBC   11.95               Significant Imaging: I have reviewed all pertinent imaging results/findings within the past 24 hours.

## 2024-10-11 NOTE — PLAN OF CARE
10/11/24 1445   Step 1: Warning Signs   Warning Sign 1 Paying bills   Warning Sign 2 Buying medicine   Warning Sign 3 n/a   Step 2: Internal coping strategies - Things I can do to take my mind off my problems without contacting another person:   Coping Strategy 1 Be around other people   Coping Strategy 2 Take medications   Coping Strategy 3 n/a   Step 3: People and social settings that provide distraction:   1. Name Robel Jay (Brother)       Phone [phone]   2. Name JoséJordyn (Relative)       Phone [phone]   3. [place]   4. [place]    Step 4: People whom I can ask for help:   1. Person Robel Jay (Brother)       Phone [phone]   2. Person Jordyn Kumar (Relative)       Phone [phone]   3. Person n/a   Step 5: Professionals or agencies I can contact during a crisis:   1. Clinician Name [facility]       Phone [phone]   2. Clinician Name [facility] (Wednesday - Sunday 5pm-10pm)       Phone [phone]   3. Suicide Prevention Lifeline: 988 Suicide Prevention Lifeline: 988   4. Local Emergency Service       911   Step 6: Making the environment safer (plan for lethal means safety)   Safe Environment Plan Go to the nursing home   Safe Environment Optional: What is most important to me and worth living for? Being with my family   Safety Plan Tasks   Provided a Hard Copy to the Patient Y   Explained How to Follow the Steps Y   Discussed Facilitators and Barriers Y

## 2024-10-11 NOTE — PLAN OF CARE
POC reviewed this shift and is on going. Patient is depressed, calm, cooperative, quiet, anxious, sleeping, and paranoid. Denies Suicidal Ideation, Homicidal Ideation, Auditory Hallucinations, Visual Hallucinations, Tactile Hallucinations, Gustatory Hallucinations, and Delusions. Patient has been in and out of her room all day. Patient is constantly asking when is her next medication. Patient is needy. Pt continues to be medication compliant and staff will continue to monitor pt closely while on the unit.

## 2024-10-11 NOTE — ASSESSMENT & PLAN NOTE
Patients blood pressure range in the last 24 hours was: BP  Min: 100/53  Max: 150/65.The patient's inpatient anti-hypertensive regimen is listed below:  Current Antihypertensives  amLODIPine tablet 10 mg, Daily, Oral    Plan  - BP is controlled, no changes needed to their regimen
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
## 2024-10-12 PROCEDURE — 94640 AIRWAY INHALATION TREATMENT: CPT

## 2024-10-12 PROCEDURE — 99900035 HC TECH TIME PER 15 MIN (STAT)

## 2024-10-12 PROCEDURE — 11400000 HC PSYCH PRIVATE ROOM

## 2024-10-12 PROCEDURE — 99233 SBSQ HOSP IP/OBS HIGH 50: CPT | Mod: ,,, | Performed by: PSYCHIATRY & NEUROLOGY

## 2024-10-12 PROCEDURE — 94761 N-INVAS EAR/PLS OXIMETRY MLT: CPT

## 2024-10-12 PROCEDURE — 25000003 PHARM REV CODE 250: Performed by: EMERGENCY MEDICINE

## 2024-10-12 PROCEDURE — 99900031 HC PATIENT EDUCATION (STAT)

## 2024-10-12 PROCEDURE — 25000003 PHARM REV CODE 250: Performed by: INTERNAL MEDICINE

## 2024-10-12 PROCEDURE — 25000003 PHARM REV CODE 250: Performed by: STUDENT IN AN ORGANIZED HEALTH CARE EDUCATION/TRAINING PROGRAM

## 2024-10-12 PROCEDURE — 25000242 PHARM REV CODE 250 ALT 637 W/ HCPCS: Performed by: PSYCHIATRY & NEUROLOGY

## 2024-10-12 RX ADMIN — PAROXETINE HYDROCHLORIDE 10 MG: 10 TABLET, FILM COATED ORAL at 08:10

## 2024-10-12 RX ADMIN — ASPIRIN 81 MG: 81 TABLET, COATED ORAL at 08:10

## 2024-10-12 RX ADMIN — DIAZEPAM 10 MG: 5 TABLET ORAL at 08:10

## 2024-10-12 RX ADMIN — ALBUTEROL SULFATE 2.5 MG: 2.5 SOLUTION RESPIRATORY (INHALATION) at 07:10

## 2024-10-12 RX ADMIN — ALBUTEROL SULFATE 2.5 MG: 2.5 SOLUTION RESPIRATORY (INHALATION) at 01:10

## 2024-10-12 RX ADMIN — AMLODIPINE BESYLATE 10 MG: 10 TABLET ORAL at 08:10

## 2024-10-12 RX ADMIN — RISPERIDONE 3 MG: 1 TABLET, FILM COATED ORAL at 08:10

## 2024-10-12 RX ADMIN — DIAZEPAM 10 MG: 5 TABLET ORAL at 02:10

## 2024-10-12 RX ADMIN — ALBUTEROL SULFATE 2.5 MG: 2.5 SOLUTION RESPIRATORY (INHALATION) at 09:10

## 2024-10-12 RX ADMIN — LEVOTHYROXINE SODIUM 75 MCG: 75 TABLET ORAL at 05:10

## 2024-10-12 RX ADMIN — NICOTINE POLACRILEX 2 MG: 2 GUM, CHEWING BUCCAL at 08:10

## 2024-10-12 NOTE — NURSING
Patient sitting up in the common area at the beginning of the shift watching tv. Patient requesting breathing treatment, no acute distress or SOB noted. Respiratory was notified and treatment administered with Albuteral Sulfate 2.5 mg per nebulizer and tolerated well. No further complaints. Patient is depressed, anxious, appears with worried facial expression, pleasant, and cooperative with no negative behaviors. Denies S/HI, denies A/VH. No delusions present. Patient communicates with staff and peers ad cyndy. Patient accepted HS snack and compliant with HS medication. Patient continues on Diazepam detox protocol, denies withdrawal symptoms at this time. Patient went to her room after snacks. Patient asleep and resting comfortably. Close observations continued and safe environment maintained.    Patient ambulated to the nurse station and stated she urinated in her bed. Staff assistance provided with dannielle care,clothes change, and bed linen change. No further complaints. Patient returned to sleep. Continued to monitor.

## 2024-10-12 NOTE — PLAN OF CARE
Problem: Adult Behavioral Health Plan of Care  Goal: Optimized Coping Skills in Response to Life Stressors  Outcome: Progressing     Problem: Anxiety Signs/Symptoms  Goal: Optimized Energy Level (Anxiety Signs/Symptoms)  Outcome: Progressing     Problem: Anxiety Signs/Symptoms  Goal: Optimized Cognitive Function (Anxiety Signs/Symptoms)  Outcome: Progressing     Problem: Anxiety Signs/Symptoms  Goal: Improved Mood Symptoms (Anxiety Signs/Symptoms)  Outcome: Progressing     Problem: Anxiety Signs/Symptoms  Goal: Improved Sleep (Anxiety Signs/Symptoms)  Outcome: Progressing     Problem: Depressive Signs/Symptoms  Goal: Optimized Energy Level (Depressive Signs/Symptoms)  Outcome: Progressing

## 2024-10-12 NOTE — PLAN OF CARE
POC reviewed this shift and is on going. Patient is calm, cooperative, quiet, and anxious. Denies Suicidal Ideation, Homicidal Ideation, Auditory Hallucinations, Visual Hallucinations, Tactile Hallucinations, Gustatory Hallucinations, and Delusions. Patient states her anxiety is a little better. Patient reports she is not sleeping well at night. Patient out on the floor communicating with her peers and playing cards. Patient asked to get back on her Xanax, Dr Taylor refused. Pt continues to be medication compliant and staff will continue to monitor pt closely while on the unit.

## 2024-10-13 PROCEDURE — 99900035 HC TECH TIME PER 15 MIN (STAT)

## 2024-10-13 PROCEDURE — 25000003 PHARM REV CODE 250: Performed by: STUDENT IN AN ORGANIZED HEALTH CARE EDUCATION/TRAINING PROGRAM

## 2024-10-13 PROCEDURE — 25000003 PHARM REV CODE 250: Performed by: INTERNAL MEDICINE

## 2024-10-13 PROCEDURE — 94640 AIRWAY INHALATION TREATMENT: CPT

## 2024-10-13 PROCEDURE — 25000242 PHARM REV CODE 250 ALT 637 W/ HCPCS: Performed by: PSYCHIATRY & NEUROLOGY

## 2024-10-13 PROCEDURE — S4991 NICOTINE PATCH NONLEGEND: HCPCS | Performed by: PSYCHIATRY & NEUROLOGY

## 2024-10-13 PROCEDURE — 99900031 HC PATIENT EDUCATION (STAT)

## 2024-10-13 PROCEDURE — 11400000 HC PSYCH PRIVATE ROOM

## 2024-10-13 PROCEDURE — 25000003 PHARM REV CODE 250: Performed by: PSYCHIATRY & NEUROLOGY

## 2024-10-13 PROCEDURE — 94761 N-INVAS EAR/PLS OXIMETRY MLT: CPT

## 2024-10-13 PROCEDURE — 99233 SBSQ HOSP IP/OBS HIGH 50: CPT | Mod: ,,, | Performed by: PSYCHIATRY & NEUROLOGY

## 2024-10-13 RX ORDER — IBUPROFEN 200 MG
1 TABLET ORAL DAILY
Status: DISCONTINUED | OUTPATIENT
Start: 2024-10-13 | End: 2024-10-17 | Stop reason: HOSPADM

## 2024-10-13 RX ORDER — HYDROXYZINE PAMOATE 25 MG/1
25 CAPSULE ORAL EVERY 8 HOURS PRN
Status: DISCONTINUED | OUTPATIENT
Start: 2024-10-13 | End: 2024-10-17 | Stop reason: HOSPADM

## 2024-10-13 RX ORDER — DIAZEPAM 5 MG/1
5 TABLET ORAL 3 TIMES DAILY
Status: DISCONTINUED | OUTPATIENT
Start: 2024-10-14 | End: 2024-10-15

## 2024-10-13 RX ORDER — PAROXETINE HYDROCHLORIDE 20 MG/1
20 TABLET, FILM COATED ORAL DAILY
Status: DISCONTINUED | OUTPATIENT
Start: 2024-10-14 | End: 2024-10-16

## 2024-10-13 RX ADMIN — ALBUTEROL SULFATE 2.5 MG: 2.5 SOLUTION RESPIRATORY (INHALATION) at 05:10

## 2024-10-13 RX ADMIN — NICOTINE 1 PATCH: 21 PATCH, EXTENDED RELEASE TRANSDERMAL at 08:10

## 2024-10-13 RX ADMIN — RISPERIDONE 3 MG: 1 TABLET, FILM COATED ORAL at 08:10

## 2024-10-13 RX ADMIN — AMLODIPINE BESYLATE 10 MG: 10 TABLET ORAL at 08:10

## 2024-10-13 RX ADMIN — DIAZEPAM 10 MG: 5 TABLET ORAL at 08:10

## 2024-10-13 RX ADMIN — PAROXETINE HYDROCHLORIDE 10 MG: 10 TABLET, FILM COATED ORAL at 08:10

## 2024-10-13 RX ADMIN — ASPIRIN 81 MG: 81 TABLET, COATED ORAL at 08:10

## 2024-10-13 RX ADMIN — ALBUTEROL SULFATE 2.5 MG: 2.5 SOLUTION RESPIRATORY (INHALATION) at 01:10

## 2024-10-13 RX ADMIN — DIAZEPAM 10 MG: 5 TABLET ORAL at 02:10

## 2024-10-13 RX ADMIN — LEVOTHYROXINE SODIUM 75 MCG: 75 TABLET ORAL at 05:10

## 2024-10-13 RX ADMIN — ALBUTEROL SULFATE 2.5 MG: 2.5 SOLUTION RESPIRATORY (INHALATION) at 08:10

## 2024-10-13 NOTE — PLAN OF CARE
POC reviewed this shift and is on going. Patient is depressed, cooperative, and anxious. Denies Suicidal Ideation, Homicidal Ideation, Auditory Hallucinations, Visual Hallucinations, Tactile Hallucinations, Gustatory Hallucinations, and Delusions. Patient has been out all day talking with her peers and staff. Patient continues to report has issues sleeping. Patient reports she is having a lot of anxiety. Patient states her depression is there but she can handle it. Pt continues to be medication compliant and staff will continue to monitor pt closely while on the unit.

## 2024-10-13 NOTE — PROGRESS NOTES
"  Progress Note  Psychiatry    Admit Date: 10/10/2024   LOS: 2 days     SITE: [facility]  LEGAL STATUS: [facility]    SUBJECTIVE:     Interim Events: 10/12/2024  Pt seen and chart reviewed. Discussed with staff who report pt has been anxious and paranoid on the unit.    She was calm and cooperative on the unit. She denied any problems and stares her mood is f"ok..better".  She reports ongoing anxiety and depression but improved. Her thought processes were more linear today per report and compared to psych eval from yesterday but still with mild disorganization.    Pt is sleeping well. Reports appetite is normal. No physical complaints today. Denies side effects from medication. Denies SI/HI/AVH.     Chart Review (Past 24 hours):  Reviewed notes from Rns and MD and labs from the last 24 hours.    The patient's case was discussed with the treatment team/care providers today including Rns    Staff reports no behavioral or management issues.     The patient has been compliant with treatment.      HPI:    CHIEF COMPLAINT   Vero Allen is a 64 y.o. female with a past psychiatric history of  schizophrenia, depression  currently admitted to the inpatient unit with the following chief complaint: disorganized behavior    HPI   The patient was seen and examined. The chart was reviewed.     The patient presented to the ER on 10/10/2024 .     The patient was medically cleared and admitted to the [facility].     Per ED MD:  Pt returns to the ED for psych evaluation by recommendation of PCP, Dr. Merchant, due to increased anxiety. Pt seen in ED multiple times recently for similar complaints.       65 yo female with history as below here with anxiety from PCP office for psych eval/possible admission. Overusing benzos, anxiety not well controlled. Running out of prescribed supply early and then seeking care in ED multiple times per week/day. No SI/HI. Wishes to FVA to [facility] for detox, medication management.         Psychiatric " "interview:  "I can't deal with my anxiety," reports uncontrolled anxiety for the last week, "I've been running back and forth to the hospital for them to try to help me." Reports she has been taking more xanax than prescribed, reports prescribed TID but takes QID, also diverting, "I share with my neighbor." Reports compliance with her risperdal. She is somewhat disorganized and gives illogical responses at times.                  REVIEW OF SYSTEMS  Review of Systems   Constitutional:  Negative for chills, diaphoresis, fever, malaise/fatigue and weight loss.   HENT:  Negative for congestion, ear discharge, ear pain, hearing loss, nosebleeds, sinus pain, sore throat and tinnitus.    Eyes:  Negative for blurred vision, double vision, photophobia, pain, discharge and redness.   Respiratory:  Negative for cough, hemoptysis, sputum production, shortness of breath, wheezing and stridor.    Cardiovascular:  Negative for chest pain, palpitations, orthopnea, claudication, leg swelling and PND.   Gastrointestinal:  Negative for abdominal pain, blood in stool, constipation, diarrhea, heartburn, melena, nausea and vomiting.   Genitourinary:  Negative for dysuria, flank pain, frequency, hematuria and urgency.   Musculoskeletal:  Negative for back pain, falls, joint pain, myalgias and neck pain.   Skin:  Negative for itching and rash.   Neurological:  Negative for dizziness, tingling, tremors, sensory change, speech change, focal weakness, seizures, loss of consciousness, weakness and headaches.   Endo/Heme/Allergies:  Negative for environmental allergies and polydipsia. Does not bruise/bleed easily.   Psychiatric/Behavioral:  Positive for depression. Negative for hallucinations, memory loss, substance abuse and suicidal ideas. The patient is nervous/anxious. The patient does not have insomnia.            Scheduled Meds:   amLODIPine  10 mg Oral Daily    aspirin  81 mg Oral Daily    diazePAM  10 mg Oral TID    levothyroxine  75 " "mcg Oral Before breakfast    paroxetine  10 mg Oral Daily    risperiDONE  3 mg Oral QHS     PRN Meds:  Current Facility-Administered Medications:     albuterol sulfate, 2.5 mg, Nebulization, Q4H PRN    nicotine (polacrilex), 2 mg, Oral, Q1H PRN      Review of patient's allergies indicates:  No Known Allergies          OBJECTIVE:     Vital Signs (Most Recent)  Temp: 98.2 °F (36.8 °C) (10/12/24 1903)  Pulse: 80 (10/12/24 1903)  Resp: 20 (10/12/24 1903)  BP: (!) 142/67 (10/12/24 1903)  Weight: 111.1 kg (245 lb) (10/10/24 1655)  BMI (Calculated): 43.4 (10/10/24 1655)    CONSTITUTIONAL  General Appearance: unremarkable, age appropriate     MUSCULOSKELETAL  Muscle Strength and Tone:no tremor, no tic  Abnormal Involuntary Movements: No  Gait and Station: non-ataxic     PSYCHIATRIC   Level of Consciousness: awake and alert   Orientation: person, place, and situation  Grooming: Casually dressed and Well groomed  Psychomotor Behavior: normal, cooperative  Speech: normal tone, normal rate, normal pitch, normal volume  Language: grossly intact  Mood: "pretty good"  Affect: Consistent with mood  Thought Process: circumstantial  Associations: intact   Thought Content: denies SI, denies HI, and no delusions  Perceptions: denies AH and denies  VH  Memory: Able to recall past events, Remote intact, and Recent intact  Attention:Attends to interview without distraction  Fund of Knowledge: Aware of current events and Vocabulary appropriate   Estimate if Intelligence:  Average based on work/education history, vocabulary and mental status exam  Insight: has awareness of illness  Judgment: behavior is adequate to circumstances      LABS/IMAGING  No results found for this or any previous visit (from the past 48 hours).   No results found for: "PHENYTOIN", "PHENOBARB", "VALPROATE", "CBMZ"        ASSESSMENT/PLAN:     Patient is showing Patient is showing mild improvement     Diagnosis:      Patient Active Problem List    Diagnosis Date Noted "    Anxiety 10/11/2024    Hypothyroid 10/11/2024    Severe sepsis 10/06/2024    Hypokalemia 10/06/2024    History of breast cancer 10/02/2024    MDD (major depressive disorder), recurrent, severe, with psychosis 05/15/2024    COPD exacerbation 03/10/2024    History of tobacco abuse 03/10/2024    Primary hypertension 03/10/2024    Generalized anxiety disorder 03/10/2024    Severe obesity (BMI >= 40) 12/21/2023    Nicotine dependence, uncomplicated 12/21/2023          ASSESSMENT AND TREATMENT PLAN:  Unspecified psychosis  Panic attacks  BZD misuses  Psychosocial stressors        PROBLEM LIST AND MANAGEMENT PLANS     Unspecified psychosis  - continue risperdal 3 mg PO qhs  - pt counseled  - follow up with outpatient mental health providers after discharge for medication management and psychotherapy        Panic attacks  - paxil 10 mg PO qd  - pt counseled  - follow up with outpatient mental health providers after discharge for medication management and psychotherapy     BZD misuses  - valium 10 mg PO TID, will taper off as tolerated  - pt counseled  - follow up with outpatient mental health providers after discharge for medication management and psychotherapy    Psychosocial stressors  - pt counseled  - SW consulted for assistance with additional resources         Pt was informed of all the side effects, alternatives, risks and benefits of taking this medicine.  Pt made an informed decision to take these medications.  Pt was able to weigh the risks and benefits and stated that the benefits out weigh the risks at this time.     - Social Work will obtain follow up appointments for patient.     HEALTH SCREENING  Hemoglobin A1c  Lipid Panel    ENCOURAGE FRIAS MILIEU      NEED FOR CONTINUED HOSPITALIZATION  Psychiatric illness continues to pose a potential threat to life or bodily function, of self or others, thereby requiring the need for continued inpatient psychiatric hospitalization: Yes, due to: gravely disabled, as  evidenced by:  Ongoing concerns with grave disability with patient unable to perform basic feeding, hygiene and dressing activities without significant constant support.    Protective inpatient pyschiatric hospitalization required while a safe disposition plan is enacted: Yes    Patient stabilized and ready for discharge from inpatient psychiatric unit: No    Target Disposition:  home    ESTIMATED 4-6 DAYS TO DISCHARGE    PROGNOSIS: GUARDED    Time: 35min     Hill Taylor md

## 2024-10-13 NOTE — PLAN OF CARE
POC reviewed this shift and is ongoing.  Pt interacts with peers and cooperates with staff. Med compliant with no adverse reactions. Pt continues to ask for resp tx and staff assess no distress. Resp even and unlabored. Will continue to monitor for changes and safety.

## 2024-10-14 PROCEDURE — 99232 SBSQ HOSP IP/OBS MODERATE 35: CPT | Mod: ,,, | Performed by: PSYCHIATRY & NEUROLOGY

## 2024-10-14 PROCEDURE — 25000003 PHARM REV CODE 250: Performed by: INTERNAL MEDICINE

## 2024-10-14 PROCEDURE — 25000003 PHARM REV CODE 250: Performed by: STUDENT IN AN ORGANIZED HEALTH CARE EDUCATION/TRAINING PROGRAM

## 2024-10-14 PROCEDURE — 25000003 PHARM REV CODE 250: Performed by: PSYCHIATRY & NEUROLOGY

## 2024-10-14 PROCEDURE — 99900035 HC TECH TIME PER 15 MIN (STAT)

## 2024-10-14 PROCEDURE — 99900031 HC PATIENT EDUCATION (STAT)

## 2024-10-14 PROCEDURE — 90833 PSYTX W PT W E/M 30 MIN: CPT | Mod: ,,, | Performed by: PSYCHIATRY & NEUROLOGY

## 2024-10-14 PROCEDURE — 11400000 HC PSYCH PRIVATE ROOM

## 2024-10-14 PROCEDURE — 94761 N-INVAS EAR/PLS OXIMETRY MLT: CPT

## 2024-10-14 PROCEDURE — S4991 NICOTINE PATCH NONLEGEND: HCPCS | Performed by: PSYCHIATRY & NEUROLOGY

## 2024-10-14 RX ADMIN — DIAZEPAM 5 MG: 5 TABLET ORAL at 03:10

## 2024-10-14 RX ADMIN — HYDROXYZINE PAMOATE 25 MG: 25 CAPSULE ORAL at 08:10

## 2024-10-14 RX ADMIN — DIAZEPAM 5 MG: 5 TABLET ORAL at 08:10

## 2024-10-14 RX ADMIN — HYDROXYZINE PAMOATE 25 MG: 25 CAPSULE ORAL at 02:10

## 2024-10-14 RX ADMIN — PAROXETINE HYDROCHLORIDE 20 MG: 20 TABLET, FILM COATED ORAL at 08:10

## 2024-10-14 RX ADMIN — LEVOTHYROXINE SODIUM 75 MCG: 75 TABLET ORAL at 05:10

## 2024-10-14 RX ADMIN — ASPIRIN 81 MG: 81 TABLET, COATED ORAL at 08:10

## 2024-10-14 RX ADMIN — NICOTINE 1 PATCH: 21 PATCH, EXTENDED RELEASE TRANSDERMAL at 10:10

## 2024-10-14 RX ADMIN — AMLODIPINE BESYLATE 10 MG: 10 TABLET ORAL at 08:10

## 2024-10-14 RX ADMIN — RISPERIDONE 3 MG: 1 TABLET, FILM COATED ORAL at 08:10

## 2024-10-14 NOTE — PLAN OF CARE
Problem: Adult Behavioral Health Plan of Care  Goal: Develops/Participates in Therapeutic [place] to Support Successful Transition  Intervention: Mutually Develop Transition Plan  Flowsheets (Taken 10/14/2024 1603)  Current Discharge Risk: lives alone  Readmission Within the Last 30 Days: no previous admission in last 30 days  Outpatient/Agency/Support Group Needs:   outpatient medication management   outpatient counseling  Anticipated Discharge Disposition: basic nursing care/long-term care  Transportation Concerns: none  Patient/Family Anticipated Services at Transition: mental health services  Patient/Family Anticipates Transition to: long-term care facility  Transportation Anticipated: agency  Concerns to be Addressed:   coping/stress   discharge planning   medication   mental health

## 2024-10-14 NOTE — PLAN OF CARE
POC reviewed this shift and is ongoing.  Pt cooperative with staff and is interacting with peers. Pt spends time in dayroom watching tv. Pt continues to get resp tx prn. No ss of distress. Med compliant with no adverse reactions. Will continue to monitor for changes and safety.

## 2024-10-14 NOTE — PLAN OF CARE
Problem: Bariatric Environmental Safety  Goal: Safety Maintained with Care  Outcome: Progressing     Problem: Adult Behavioral Health Plan of Care  Goal: Plan of Care Review  Outcome: Progressing  Goal: Patient-Specific Goal (Individualization)  Outcome: Progressing  Goal: Adheres to Safety Considerations for Self and Others  Outcome: Progressing  Goal: Absence of New-Onset Illness or Injury  Outcome: Progressing  Goal: Optimized Coping Skills in Response to Life Stressors  Outcome: Progressing     Problem: Anxiety Signs/Symptoms  Goal: Optimized Energy Level (Anxiety Signs/Symptoms)  Outcome: Progressing  Goal: Optimized Cognitive Function (Anxiety Signs/Symptoms)  Outcome: Progressing  Goal: Improved Mood Symptoms (Anxiety Signs/Symptoms)  Outcome: Progressing  Goal: Improved Sleep (Anxiety Signs/Symptoms)  Outcome: Progressing  Goal: Enhanced Social, Occupational or Functional Skills (Anxiety Signs/Symptoms)  Outcome: Progressing  Goal: Improved Somatic Symptoms (Anxiety Signs/Symptoms)  Outcome: Progressing     Problem: Depressive Signs/Symptoms  Goal: Optimized Energy Level (Depressive Signs/Symptoms)  Outcome: Progressing  Goal: Optimized Cognitive Function (Depressive Signs/Symptoms)  Outcome: Progressing  Goal: Increased Participation and Engagement (Depressive Signs/Symptoms)  Outcome: Progressing  Goal: Enhanced Self-Esteem and Confidence (Depressive Signs/Symptoms)  Outcome: Progressing  Goal: Improved Mood Symptoms (Depressive Signs/Symptoms)  Outcome: Progressing  Goal: Optimized Nutrition Intake (Depressive Signs/Symptoms)  Outcome: Progressing  Goal: Improved Psychomotor Symptoms (Depressive Signs/Symptoms)  Outcome: Progressing  Goal: Improved Sleep (Depressive Signs/Symptoms)  Outcome: Progressing  Goal: Enhanced Social, Occupational or Functional Skills (Depressive Signs/Symptoms)  Outcome: Progressing

## 2024-10-14 NOTE — PLAN OF CARE
Plan of care reviewed and ongoing. Patient out in activity room most of the day conversing with peers on unit. Cooperative with staff. Compliant with medication. Present for all meals, good appetite. Patient denies any homicidal or suicidal thoughts at this time. Reports no symptoms of anxiety or depression at this time. No behavioral issues noted. Will continue to monitor for patient health and safety.     Problem: Bariatric Environmental Safety  Goal: Safety Maintained with Care  Outcome: Progressing     Problem: Adult Behavioral Health Plan of Care  Goal: Plan of Care Review  Outcome: Progressing  Goal: Patient-Specific Goal (Individualization)  Outcome: Progressing  Goal: Adheres to Safety Considerations for Self and Others  Outcome: Progressing  Goal: Absence of New-Onset Illness or Injury  Outcome: Progressing  Goal: Optimized Coping Skills in Response to Life Stressors  Outcome: Progressing  Goal: Develops/Participates in Therapeutic [place] to Support Successful Transition  Outcome: Progressing  Goal: Rounds/Family Conference  Outcome: Progressing     Problem: Anxiety Signs/Symptoms  Goal: Optimized Energy Level (Anxiety Signs/Symptoms)  Outcome: Progressing  Goal: Optimized Cognitive Function (Anxiety Signs/Symptoms)  Outcome: Progressing  Goal: Improved Mood Symptoms (Anxiety Signs/Symptoms)  Outcome: Progressing  Goal: Improved Sleep (Anxiety Signs/Symptoms)  Outcome: Progressing  Goal: Enhanced Social, Occupational or Functional Skills (Anxiety Signs/Symptoms)  Outcome: Progressing  Goal: Improved Somatic Symptoms (Anxiety Signs/Symptoms)  Outcome: Progressing     Problem: Depressive Signs/Symptoms  Goal: Optimized Energy Level (Depressive Signs/Symptoms)  Outcome: Progressing  Goal: Optimized Cognitive Function (Depressive Signs/Symptoms)  Outcome: Progressing  Goal: Increased Participation and Engagement (Depressive Signs/Symptoms)  Outcome: Progressing  Goal: Enhanced Self-Esteem and Confidence  (Depressive Signs/Symptoms)  Outcome: Progressing  Goal: Improved Mood Symptoms (Depressive Signs/Symptoms)  Outcome: Progressing  Goal: Optimized Nutrition Intake (Depressive Signs/Symptoms)  Outcome: Progressing  Goal: Improved Psychomotor Symptoms (Depressive Signs/Symptoms)  Outcome: Progressing  Goal: Improved Sleep (Depressive Signs/Symptoms)  Outcome: Progressing  Goal: Enhanced Social, Occupational or Functional Skills (Depressive Signs/Symptoms)  Outcome: Progressing

## 2024-10-14 NOTE — PROGRESS NOTES
"PSYCHIATRY DAILY INPATIENT PROGRESS NOTE  SUBSEQUENT HOSPITAL VISIT    ENCOUNTER DATE: 10/14/2024  SITE: [facility]    DATE OF ADMISSION: 10/10/2024  3:11 PM  LENGTH OF STAY: 4 days      CHIEF COMPLAINT   Vero Allen is a 64 y.o. female, seen during daily [place] rounds on the inpatient unit.  Vero Allen presented with the chief complaint of disorganized behavior       The patient was seen and examined. The chart was reviewed.     Reviewed notes from Rns and MD and labs from the last 24 hours.    The patient's case was discussed with the treatment team/care providers today including Rns and MD    Staff reports no behavioral or management issues.     The patient has been compliant with treatment.      Subjective 10/14/2024       Today the patient reports mood is "great", affect is appropriate. Reports anxiety is well-controlled. There is concern for continued lack of insight and minimization of symptoms. Patient reports plans to resume alprazolam on outpatient basis despite risks. She continues to deny taking greater than prescribed dose at home and states she "gave a bunch to my neighbor."States she is not sure why she is prescribed risperdal, responding "maybe bipolar."     Pt denies SE to current medication regimen. Per patient, plan is to return home before being transferred to nursing home in near future.       The patient denies any side effects to medications.        Interim/overnight events per report/notes:          Psychiatric ROS (observed, reported, or endorsed/denied):  Depressed mood - less  Interest/pleasure/anhedonia: less  Guilt/hopelessness/worthlessness - less  Changes in Sleep - No  Changes in Appetite - No  Changes in Concentration - less  Changes in Energy - less  PMA/R- No  Suicidal- active/passive ideations - No  Homicidal ideations: active/passive ideations - No    Hallucinations - No  Delusions - No  Disorganized behavior - No  Disorganized speech - No  Negative " symptoms - No    Elevated mood - No  Decreased need for sleep - No  Grandiosity - No  Racing thoughts - less  Impulsivity - No  Irritability- No  Increased energy - No  Distractibility - No  Increase in goal-directed activity or PMA- No    Symptoms of ELSY - less  Symptoms of Panic Disorder- No  Symptoms of PTSD - No        Overall progress: Patient is showing mild improvement         Psychotherapy:  Target symptoms: substance abuse  Why chosen therapy is appropriate versus another modality: relevant to diagnosis, evidence based practice  Outcome monitoring methods: self-report, observation  Therapeutic intervention type: insight oriented psychotherapy, supportive psychotherapy  Topics discussed/themes: building skills sets for symptom management, symptom recognition, substance abuse  The patient's response to the intervention is accepting. The patient's progress toward treatment goals is fair.   Duration of intervention: 16 minutes.        Medical ROS  Review of Systems   Constitutional: Negative.    HENT: Negative.     Eyes: Negative.    Respiratory: Negative.     Cardiovascular: Negative.    Gastrointestinal: Negative.    Genitourinary: Negative.    Musculoskeletal: Negative.    Skin: Negative.    Neurological: Negative.    Endo/Heme/Allergies: Negative.    Psychiatric/Behavioral:  Positive for substance abuse.          PAST MEDICAL HISTORY   Past Medical History:   Diagnosis Date    Anxiety     Breast cancer 2010    Cancer     Breast    COPD (chronic obstructive pulmonary disease)     Depression     Diabetes mellitus     GERD (gastroesophageal reflux disease)     Hypertension     Insomnia     Thyroid disease            PSYCHOTROPIC MEDICATIONS   Scheduled Meds:   amLODIPine  10 mg Oral Daily    aspirin  81 mg Oral Daily    diazePAM  5 mg Oral TID    levothyroxine  75 mcg Oral Before breakfast    nicotine  1 patch Transdermal Daily    paroxetine  20 mg Oral Daily    risperiDONE  3 mg Oral QHS     Continuous  Infusions:  PRN Meds:.  Current Facility-Administered Medications:     albuterol sulfate, 2.5 mg, Nebulization, Q4H PRN    hydrOXYzine pamoate, 25 mg, Oral, Q8H PRN    nicotine (polacrilex), 2 mg, Oral, Q1H PRN        EXAMINATION    VITALS   Vitals:    10/13/24 1337 10/13/24 1909 10/13/24 2030 10/14/24 0719   BP:  (!) 148/70  125/64   BP Location:  Left arm  Left arm   Patient Position:  Sitting  Sitting   Pulse: 96 80 80 82   Resp: 18 20 18 20   Temp:  98.4 °F (36.9 °C)  98.5 °F (36.9 °C)   TempSrc:  Oral  Oral   SpO2: 96% 98% 100% 98%   Weight:       Height:           Body mass index is 43.4 kg/m².        CONSTITUTIONAL  General Appearance: unremarkable, age appropriate    MUSCULOSKELETAL  Muscle Strength and Tone:no tremor, no tic  Abnormal Involuntary Movements: No  Gait and Station: non-ataxic    PSYCHIATRIC   Level of Consciousness: awake, alert , and oriented  Orientation: person, place, time, and situation  Grooming: Casually dressed and Well groomed  Psychomotor Behavior: normal, cooperative  Speech: normal tone, normal rate, normal pitch, normal volume  Language: grossly intact  Mood: fine  Affect: Consistent with mood and Congruent with thought  Thought Process: linear, logical  Associations: intact   Thought Content: less SI and denies HI  Perceptions: denies AH, denies  VH, and not RIS  Memory: Impaired to some degree  Attention:Attends to interview without distraction  Fund of Knowledge: Aware of current events and Vocabulary appropriate   Estimate if Intelligence:  Average based on work/education history, vocabulary and mental status exam  Insight: has awareness of illness  Judgment: behavior is adequate to circumstances        DIAGNOSTIC TESTING   Laboratory Results  No results found for this or any previous visit (from the past 24 hours).          MEDICAL DECISION MAKING      ASSESSMENT:   Unspecified psychosis  Panic attacks  BZD misuses  Psychosocial stressors        PROBLEM LIST AND MANAGEMENT  PLANS     Unspecified psychosis  - continue risperdal 3 mg PO qhs  - pt counseled  - follow up with outpatient mental health providers after discharge for medication management and psychotherapy        Panic attacks  - start paxil 10 mg PO qd-Increased to 20 mg, continue current dose  - pt counseled  - follow up with outpatient mental health providers after discharge for medication management and psychotherapy     BZD misuses  - start valium 10 mg PO TID, will taper off as tolerated-Continue 5 mg TID  - pt counseled  - follow up with outpatient mental health providers after discharge for medication management and psychotherapy     Psychosocial stressors  - pt counseled  - SW consulted for assistance with additional resources       Discussed diagnosis, risks and benefits of proposed treatment vs alternative treatments vs no treatment, potential side effects of these treatments and the inherent unpredictability of treatment. The patient expresses understanding of the above and displays the capacity to agree with this treatment given said understanding. Patient also agrees that, currently, the benefits outweigh the risks and would like to pursue/continue treatment at this time.    Any medications being used off-label were discussed with the patient inclusive of the evidence base for the use of the medications and consent was obtained for the off-label use of the medication.       DISCHARGE PLANNING  Expected Disposition Plan: Skilled Nursing Facility      NEED FOR CONTINUED HOSPITALIZATION  Psychiatric illness continues to pose a potential threat to life or bodily function, of self or others, thereby requiring the need for continued inpatient psychiatric hospitalization: Yes, due to: significant psychotic thought disorder, as evidenced by:  Ongoing concerns with grave disability with patient unable to perform basic feeding, hygiene and dressing activities without significant constant support.    Protective inpatient  pyschiatric hospitalization required while a safe disposition plan is enacted: Yes    Patient stabilized and ready for discharge from inpatient psychiatric unit: No        STAFF:   Enrique Luz NP  Psychiatry

## 2024-10-15 PROCEDURE — 25000003 PHARM REV CODE 250: Performed by: PSYCHIATRY & NEUROLOGY

## 2024-10-15 PROCEDURE — 99232 SBSQ HOSP IP/OBS MODERATE 35: CPT | Mod: ,,, | Performed by: PSYCHIATRY & NEUROLOGY

## 2024-10-15 PROCEDURE — S4991 NICOTINE PATCH NONLEGEND: HCPCS | Performed by: PSYCHIATRY & NEUROLOGY

## 2024-10-15 PROCEDURE — 25000003 PHARM REV CODE 250: Performed by: INTERNAL MEDICINE

## 2024-10-15 PROCEDURE — 90833 PSYTX W PT W E/M 30 MIN: CPT | Mod: ,,, | Performed by: PSYCHIATRY & NEUROLOGY

## 2024-10-15 PROCEDURE — 11400000 HC PSYCH PRIVATE ROOM

## 2024-10-15 PROCEDURE — 25000003 PHARM REV CODE 250: Performed by: STUDENT IN AN ORGANIZED HEALTH CARE EDUCATION/TRAINING PROGRAM

## 2024-10-15 RX ORDER — ADHESIVE BANDAGE
30 BANDAGE TOPICAL DAILY PRN
Status: DISCONTINUED | OUTPATIENT
Start: 2024-10-15 | End: 2024-10-17 | Stop reason: HOSPADM

## 2024-10-15 RX ORDER — ALUMINUM HYDROXIDE, MAGNESIUM HYDROXIDE, AND SIMETHICONE 1200; 120; 1200 MG/30ML; MG/30ML; MG/30ML
30 SUSPENSION ORAL EVERY 6 HOURS PRN
Status: DISCONTINUED | OUTPATIENT
Start: 2024-10-15 | End: 2024-10-17 | Stop reason: HOSPADM

## 2024-10-15 RX ORDER — DIAZEPAM 5 MG/1
5 TABLET ORAL 2 TIMES DAILY
Status: DISCONTINUED | OUTPATIENT
Start: 2024-10-15 | End: 2024-10-16

## 2024-10-15 RX ADMIN — HYDROXYZINE PAMOATE 25 MG: 25 CAPSULE ORAL at 08:10

## 2024-10-15 RX ADMIN — LEVOTHYROXINE SODIUM 75 MCG: 75 TABLET ORAL at 05:10

## 2024-10-15 RX ADMIN — ASPIRIN 81 MG: 81 TABLET, COATED ORAL at 08:10

## 2024-10-15 RX ADMIN — RISPERIDONE 3 MG: 1 TABLET, FILM COATED ORAL at 08:10

## 2024-10-15 RX ADMIN — AMLODIPINE BESYLATE 10 MG: 10 TABLET ORAL at 08:10

## 2024-10-15 RX ADMIN — DIAZEPAM 5 MG: 5 TABLET ORAL at 08:10

## 2024-10-15 RX ADMIN — PAROXETINE HYDROCHLORIDE 20 MG: 20 TABLET, FILM COATED ORAL at 08:10

## 2024-10-15 RX ADMIN — NICOTINE 1 PATCH: 21 PATCH, EXTENDED RELEASE TRANSDERMAL at 08:10

## 2024-10-15 NOTE — PROGRESS NOTES
"PSYCHIATRY DAILY INPATIENT PROGRESS NOTE  SUBSEQUENT HOSPITAL VISIT    ENCOUNTER DATE: 10/15/2024  SITE: [facility]    DATE OF ADMISSION: 10/10/2024  3:11 PM  LENGTH OF STAY: 5 days      CHIEF COMPLAINT   Vero Allen is a 64 y.o. female, seen during daily lombardo rounds on the inpatient unit.  Vero Allen presented with the chief complaint of disorganized behavior       The patient was seen and examined. The chart was reviewed.     Reviewed notes from Rns and MD and labs from the last 24 hours.    The patient's case was discussed with the treatment team/care providers today including Rns and MD    Staff reports no behavioral or management issues.     The patient has been compliant with treatment.      Subjective 10/15/2024       Today the patient reports mood is "good", affect is appropriate. Reports anxiety is well-controlled. There is concern for continued lack of insight and minimization of symptoms. She once more requests to be discharged so that she can "get to the nursing home." Unclear at this time if full arrangements with nursing facility have been made, will attempt to reach out to patient's niece.     Patient tolerating valium taper well. Will decrease to BID dosing today.     Pt denies SE to current medication regimen. Per patient, plan is to return home before being transferred to nursing home in near future.       The patient denies any side effects to medications.        Interim/overnight events per report/notes:          Psychiatric ROS (observed, reported, or endorsed/denied):  Depressed mood - less  Interest/pleasure/anhedonia: less  Guilt/hopelessness/worthlessness - less  Changes in Sleep - No  Changes in Appetite - No  Changes in Concentration - less  Changes in Energy - less  PMA/R- No  Suicidal- active/passive ideations - No  Homicidal ideations: active/passive ideations - No    Hallucinations - No  Delusions - No  Disorganized behavior - No  Disorganized speech - " No  Negative symptoms - No    Elevated mood - No  Decreased need for sleep - No  Grandiosity - No  Racing thoughts - less  Impulsivity - No  Irritability- No  Increased energy - No  Distractibility - No  Increase in goal-directed activity or PMA- No    Symptoms of ELSY - less  Symptoms of Panic Disorder- No  Symptoms of PTSD - No        Overall progress: Patient is showing mild improvement         Psychotherapy:  Target symptoms: substance abuse  Why chosen therapy is appropriate versus another modality: relevant to diagnosis, evidence based practice  Outcome monitoring methods: self-report, observation  Therapeutic intervention type: insight oriented psychotherapy, supportive psychotherapy  Topics discussed/themes: building skills sets for symptom management, symptom recognition, substance abuse  The patient's response to the intervention is accepting. The patient's progress toward treatment goals is fair.   Duration of intervention: 16 minutes.        Medical ROS  Review of Systems   Constitutional: Negative.    HENT: Negative.     Eyes: Negative.    Respiratory: Negative.     Cardiovascular: Negative.    Gastrointestinal: Negative.    Genitourinary: Negative.    Musculoskeletal: Negative.    Skin: Negative.    Neurological: Negative.    Endo/Heme/Allergies: Negative.    Psychiatric/Behavioral:  Positive for substance abuse.          PAST MEDICAL HISTORY   Past Medical History:   Diagnosis Date    Anxiety     Breast cancer 2010    Cancer     Breast    COPD (chronic obstructive pulmonary disease)     Depression     Diabetes mellitus     GERD (gastroesophageal reflux disease)     Hypertension     Insomnia     Thyroid disease            PSYCHOTROPIC MEDICATIONS   Scheduled Meds:   amLODIPine  10 mg Oral Daily    aspirin  81 mg Oral Daily    diazePAM  5 mg Oral TID    levothyroxine  75 mcg Oral Before breakfast    nicotine  1 patch Transdermal Daily    paroxetine  20 mg Oral Daily    risperiDONE  3 mg Oral QHS  "    Continuous Infusions:  PRN Meds:.  Current Facility-Administered Medications:     albuterol sulfate, 2.5 mg, Nebulization, Q4H PRN    hydrOXYzine pamoate, 25 mg, Oral, Q8H PRN    nicotine (polacrilex), 2 mg, Oral, Q1H PRN        EXAMINATION    VITALS   Vitals:    10/14/24 0719 10/14/24 1903 10/14/24 1909 10/15/24 0718   BP: 125/64  (!) 145/74 (!) 145/73   BP Location: Left arm  Left arm Left arm   Patient Position: Sitting  Sitting Sitting   Pulse: 82 82 92 75   Resp: 20 20 20 20   Temp: 98.5 °F (36.9 °C)  98 °F (36.7 °C) 98.3 °F (36.8 °C)   TempSrc: Oral  Oral Oral   SpO2: 98% 98% 96% 98%   Weight:       Height:           Body mass index is 43.4 kg/m².        CONSTITUTIONAL  General Appearance: unremarkable, age appropriate    MUSCULOSKELETAL  Muscle Strength and Tone:no tremor, no tic  Abnormal Involuntary Movements: No  Gait and Station: non-ataxic    PSYCHIATRIC   Level of Consciousness: awake, alert , and oriented  Orientation: person, place, time, and situation  Grooming: Casually dressed and Well groomed  Psychomotor Behavior: normal, cooperative  Speech: normal tone, normal rate, normal pitch, normal volume  Language: grossly intact  Mood: "Better"  Affect: Consistent with mood and Congruent with thought  Thought Process: linear, logical  Associations: intact   Thought Content: less SI and denies HI  Perceptions: denies AH, denies  VH, and not RIS  Memory: Impaired to some degree  Attention:Attends to interview without distraction  Fund of Knowledge: Aware of current events and Vocabulary appropriate   Estimate if Intelligence:  Average based on work/education history, vocabulary and mental status exam  Insight: has awareness of illness  Judgment: behavior is adequate to circumstances        DIAGNOSTIC TESTING   Laboratory Results  No results found for this or any previous visit (from the past 24 hours).          MEDICAL DECISION MAKING      ASSESSMENT:   Unspecified psychosis  Panic attacks  BZD " misuses  Psychosocial stressors        PROBLEM LIST AND MANAGEMENT PLANS     Unspecified psychosis  - continue risperdal 3 mg PO qhs  - pt counseled  - follow up with outpatient mental health providers after discharge for medication management and psychotherapy        Panic attacks  - start paxil 10 mg PO qd-Increased to 20 mg, continue current dose  - pt counseled  - follow up with outpatient mental health providers after discharge for medication management and psychotherapy     BZD misuses  - start valium 10 mg PO TID, will taper off as tolerated-Continue 5 mg TID-Taper to 5 mg BID  - pt counseled  - follow up with outpatient mental health providers after discharge for medication management and psychotherapy     Psychosocial stressors  - pt counseled  - SW consulted for assistance with additional resources       Discussed diagnosis, risks and benefits of proposed treatment vs alternative treatments vs no treatment, potential side effects of these treatments and the inherent unpredictability of treatment. The patient expresses understanding of the above and displays the capacity to agree with this treatment given said understanding. Patient also agrees that, currently, the benefits outweigh the risks and would like to pursue/continue treatment at this time.    Any medications being used off-label were discussed with the patient inclusive of the evidence base for the use of the medications and consent was obtained for the off-label use of the medication.       DISCHARGE PLANNING  Expected Disposition Plan: Skilled Nursing Facility      NEED FOR CONTINUED HOSPITALIZATION  Psychiatric illness continues to pose a potential threat to life or bodily function, of self or others, thereby requiring the need for continued inpatient psychiatric hospitalization: Yes, due to: significant psychotic thought disorder, as evidenced by:  Ongoing concerns with grave disability with patient unable to perform basic feeding, hygiene and  dressing activities without significant constant support.    Protective inpatient pyschiatric hospitalization required while a safe disposition plan is enacted: Yes    Patient stabilized and ready for discharge from inpatient psychiatric unit: No        STAFF:   Enrique Luz NP  Psychiatry

## 2024-10-15 NOTE — NURSING
POC reviewed this shift and is ongoing.  Pt is cooperative with care and compliant with care.  Pt was in common areas much of the evening, interacting appropriately with staff and peers.  Pt remained in her room throughout the night.

## 2024-10-15 NOTE — PLAN OF CARE
Problem: Adult Behavioral Health Plan of Care  Goal: Optimized Coping Skills in Response to Life Stressors  Intervention: Promote Effective Coping Strategies  Flowsheets (Taken 10/15/2024 9696)  Supportive Measures:   active listening utilized   problem-solving facilitated   self-reflection promoted   verbalization of feelings encouraged     Behavior: Pt was engaged during group       Intervention:   In this CBT-focused group SW facilitated discussion on triggers that contribute to decline in mental health or relapse. Each pt was asked to identify their triggers, plan to manage or avoid triggers, when at risk, when self can be trusted.            Response: Pt identified ex boyfriend, traveling in [place] as triggers. Pt identified being in the hospital is when she is at high risk. Pt identified smoking a cigarette and having a beer as when she trust herself.           Plan: Continue to encourage pt to participate in process groups to verbalize feelings and develop healthy coping skills.

## 2024-10-15 NOTE — PLAN OF CARE
Patient out in activity room most of this shift. Cooperative with staff. Compliant with medication. Patient interacts appropriately with peers on unit. Patient reports no symptoms of anxiety or depression. Denies any homicidal or suicidal thoughts. Present for all meals, fair appetite. No behavioral issues noted. Plan of care ongoing.     Problem: Bariatric Environmental Safety  Goal: Safety Maintained with Care  Outcome: Progressing     Problem: Adult Behavioral Health Plan of Care  Goal: Plan of Care Review  Outcome: Progressing  Goal: Patient-Specific Goal (Individualization)  Outcome: Progressing  Goal: Adheres to Safety Considerations for Self and Others  Outcome: Progressing  Goal: Absence of New-Onset Illness or Injury  Outcome: Progressing  Goal: Optimized Coping Skills in Response to Life Stressors  Outcome: Progressing  Goal: Develops/Participates in Therapeutic [place] to Support Successful Transition  Outcome: Progressing  Goal: Rounds/Family Conference  Outcome: Progressing     Problem: Anxiety Signs/Symptoms  Goal: Optimized Energy Level (Anxiety Signs/Symptoms)  Outcome: Progressing  Goal: Optimized Cognitive Function (Anxiety Signs/Symptoms)  Outcome: Progressing  Goal: Improved Mood Symptoms (Anxiety Signs/Symptoms)  Outcome: Progressing  Goal: Improved Sleep (Anxiety Signs/Symptoms)  Outcome: Progressing  Goal: Enhanced Social, Occupational or Functional Skills (Anxiety Signs/Symptoms)  Outcome: Progressing  Goal: Improved Somatic Symptoms (Anxiety Signs/Symptoms)  Outcome: Progressing     Problem: Depressive Signs/Symptoms  Goal: Optimized Energy Level (Depressive Signs/Symptoms)  Outcome: Progressing  Goal: Optimized Cognitive Function (Depressive Signs/Symptoms)  Outcome: Progressing  Goal: Increased Participation and Engagement (Depressive Signs/Symptoms)  Outcome: Progressing  Goal: Enhanced Self-Esteem and Confidence (Depressive Signs/Symptoms)  Outcome: Progressing  Goal: Improved Mood  Symptoms (Depressive Signs/Symptoms)  Outcome: Progressing  Goal: Optimized Nutrition Intake (Depressive Signs/Symptoms)  Outcome: Progressing  Goal: Improved Psychomotor Symptoms (Depressive Signs/Symptoms)  Outcome: Progressing  Goal: Improved Sleep (Depressive Signs/Symptoms)  Outcome: Progressing  Goal: Enhanced Social, Occupational or Functional Skills (Depressive Signs/Symptoms)  Outcome: Progressing

## 2024-10-16 PROCEDURE — 25000003 PHARM REV CODE 250: Performed by: PSYCHIATRY & NEUROLOGY

## 2024-10-16 PROCEDURE — 99232 SBSQ HOSP IP/OBS MODERATE 35: CPT | Mod: ,,, | Performed by: PSYCHIATRY & NEUROLOGY

## 2024-10-16 PROCEDURE — 25000003 PHARM REV CODE 250: Performed by: INTERNAL MEDICINE

## 2024-10-16 PROCEDURE — 11400000 HC PSYCH PRIVATE ROOM

## 2024-10-16 PROCEDURE — S4991 NICOTINE PATCH NONLEGEND: HCPCS | Performed by: PSYCHIATRY & NEUROLOGY

## 2024-10-16 PROCEDURE — 90833 PSYTX W PT W E/M 30 MIN: CPT | Mod: ,,, | Performed by: PSYCHIATRY & NEUROLOGY

## 2024-10-16 PROCEDURE — 94640 AIRWAY INHALATION TREATMENT: CPT

## 2024-10-16 PROCEDURE — 99900035 HC TECH TIME PER 15 MIN (STAT)

## 2024-10-16 PROCEDURE — 99900031 HC PATIENT EDUCATION (STAT)

## 2024-10-16 PROCEDURE — 25000242 PHARM REV CODE 250 ALT 637 W/ HCPCS: Performed by: PSYCHIATRY & NEUROLOGY

## 2024-10-16 PROCEDURE — 25000003 PHARM REV CODE 250: Performed by: STUDENT IN AN ORGANIZED HEALTH CARE EDUCATION/TRAINING PROGRAM

## 2024-10-16 PROCEDURE — 94761 N-INVAS EAR/PLS OXIMETRY MLT: CPT

## 2024-10-16 RX ORDER — DIAZEPAM 5 MG/1
5 TABLET ORAL DAILY
Status: DISCONTINUED | OUTPATIENT
Start: 2024-10-17 | End: 2024-10-17

## 2024-10-16 RX ADMIN — PAROXETINE HYDROCHLORIDE 20 MG: 20 TABLET, FILM COATED ORAL at 08:10

## 2024-10-16 RX ADMIN — HYDROXYZINE PAMOATE 25 MG: 25 CAPSULE ORAL at 08:10

## 2024-10-16 RX ADMIN — ALBUTEROL SULFATE 2.5 MG: 2.5 SOLUTION RESPIRATORY (INHALATION) at 07:10

## 2024-10-16 RX ADMIN — AMLODIPINE BESYLATE 10 MG: 10 TABLET ORAL at 08:10

## 2024-10-16 RX ADMIN — RISPERIDONE 3 MG: 1 TABLET, FILM COATED ORAL at 08:10

## 2024-10-16 RX ADMIN — NICOTINE 1 PATCH: 21 PATCH, EXTENDED RELEASE TRANSDERMAL at 08:10

## 2024-10-16 RX ADMIN — LEVOTHYROXINE SODIUM 75 MCG: 75 TABLET ORAL at 05:10

## 2024-10-16 RX ADMIN — ASPIRIN 81 MG: 81 TABLET, COATED ORAL at 08:10

## 2024-10-16 RX ADMIN — ALBUTEROL SULFATE 2.5 MG: 2.5 SOLUTION RESPIRATORY (INHALATION) at 02:10

## 2024-10-16 RX ADMIN — DIAZEPAM 5 MG: 5 TABLET ORAL at 08:10

## 2024-10-16 NOTE — PLAN OF CARE
Problem: Bariatric Environmental Safety  Goal: Safety Maintained with Care  Outcome: Progressing     Problem: Adult Behavioral Health Plan of Care  Goal: Plan of Care Review  Outcome: Progressing  Goal: Patient-Specific Goal (Individualization)  Outcome: Progressing  Goal: Adheres to Safety Considerations for Self and Others  Outcome: Progressing  Goal: Absence of New-Onset Illness or Injury  Outcome: Progressing  Goal: Optimized Coping Skills in Response to Life Stressors  Outcome: Progressing  Goal: Develops/Participates in Therapeutic [place] to Support Successful Transition  Outcome: Progressing  Goal: Rounds/Family Conference  Outcome: Progressing     Problem: Anxiety Signs/Symptoms  Goal: Optimized Energy Level (Anxiety Signs/Symptoms)  Outcome: Progressing  Goal: Optimized Cognitive Function (Anxiety Signs/Symptoms)  Outcome: Progressing  Goal: Improved Mood Symptoms (Anxiety Signs/Symptoms)  Outcome: Progressing  Goal: Improved Sleep (Anxiety Signs/Symptoms)  Outcome: Progressing  Goal: Enhanced Social, Occupational or Functional Skills (Anxiety Signs/Symptoms)  Outcome: Progressing  Goal: Improved Somatic Symptoms (Anxiety Signs/Symptoms)  Outcome: Progressing     Problem: Depressive Signs/Symptoms  Goal: Optimized Energy Level (Depressive Signs/Symptoms)  Outcome: Progressing  Goal: Optimized Cognitive Function (Depressive Signs/Symptoms)  Outcome: Progressing  Goal: Increased Participation and Engagement (Depressive Signs/Symptoms)  Outcome: Progressing  Goal: Enhanced Self-Esteem and Confidence (Depressive Signs/Symptoms)  Outcome: Progressing  Goal: Improved Mood Symptoms (Depressive Signs/Symptoms)  Outcome: Progressing  Goal: Optimized Nutrition Intake (Depressive Signs/Symptoms)  Outcome: Progressing  Goal: Improved Psychomotor Symptoms (Depressive Signs/Symptoms)  Outcome: Progressing  Goal: Improved Sleep (Depressive Signs/Symptoms)  Outcome: Progressing  Goal: Enhanced Social, Occupational or  Functional Skills (Depressive Signs/Symptoms)  Outcome: Progressing

## 2024-10-16 NOTE — PHYSICIAN QUERY
Due to the conflicting clinical picture, please clinically validate the diagnosis of Severe Sepsis.   If validated, please provide additional clinical support for the diagnosis:       Severe sepsis is ruled out, COPD exacerbation Only

## 2024-10-16 NOTE — PLAN OF CARE
Problem: Bariatric Environmental Safety  Goal: Safety Maintained with Care  10/16/2024 1109 by Kimberlee Asher LPN  Outcome: Progressing  10/15/2024 2144 by Kimberlee Asher LPN  Outcome: Progressing     Problem: Adult Behavioral Health Plan of Care  Goal: Plan of Care Review  10/16/2024 1109 by Kimberlee Asher LPN  Outcome: Progressing  10/15/2024 2144 by Kimberlee Asher LPN  Outcome: Progressing  Goal: Patient-Specific Goal (Individualization)  10/16/2024 1109 by Kimberlee Asher LPN  Outcome: Progressing  10/15/2024 2144 by Kimberlee Ashre LPN  Outcome: Progressing  Goal: Adheres to Safety Considerations for Self and Others  10/16/2024 1109 by Kimberlee Asher LPN  Outcome: Progressing  10/15/2024 2144 by Kimberlee Asher LPN  Outcome: Progressing  Goal: Absence of New-Onset Illness or Injury  10/16/2024 1109 by Kimberlee Asher LPN  Outcome: Progressing  10/15/2024 2144 by Kimberlee Asher LPN  Outcome: Progressing  Goal: Optimized Coping Skills in Response to Life Stressors  10/16/2024 1109 by Kimberlee Asher LPN  Outcome: Progressing  10/15/2024 2144 by Kimberlee Asher LPN  Outcome: Progressing  Goal: Develops/Participates in Therapeutic [place] to Support Successful Transition  10/16/2024 1109 by Kimberlee Asher LPN  Outcome: Progressing  10/15/2024 2144 by Kimberlee Asher LPN  Outcome: Progressing  Goal: Rounds/Family Conference  10/16/2024 1109 by Kimberlee Asher LPN  Outcome: Progressing  10/15/2024 2144 by Kimberlee Asher LPN  Outcome: Progressing     Problem: Anxiety Signs/Symptoms  Goal: Optimized Energy Level (Anxiety Signs/Symptoms)  10/16/2024 1109 by Kimberlee Asher LPN  Outcome: Progressing  10/15/2024 2144 by Kimberlee Asher LPN  Outcome: Progressing  Goal: Optimized Cognitive Function (Anxiety Signs/Symptoms)  10/16/2024 1109 by Kimberlee Asher LPN  Outcome: Progressing  10/15/2024 2144 by Kimberlee Asher LPN  Outcome: Progressing  Goal: Improved Mood Symptoms (Anxiety  Signs/Symptoms)  10/16/2024 1109 by Kimberlee Asher LPN  Outcome: Progressing  10/15/2024 2144 by Kimberlee Asher LPN  Outcome: Progressing  Goal: Improved Sleep (Anxiety Signs/Symptoms)  10/16/2024 1109 by Kimberlee Asher LPN  Outcome: Progressing  10/15/2024 2144 by Kimberlee Asher LPN  Outcome: Progressing  Goal: Enhanced Social, Occupational or Functional Skills (Anxiety Signs/Symptoms)  10/16/2024 1109 by Kimberlee Asher LPN  Outcome: Progressing  10/15/2024 2144 by Kimberlee Asher LPN  Outcome: Progressing  Goal: Improved Somatic Symptoms (Anxiety Signs/Symptoms)  10/16/2024 1109 by Kimberlee Asher LPN  Outcome: Progressing  10/15/2024 2144 by Kimberlee Asher LPN  Outcome: Progressing     Problem: Depressive Signs/Symptoms  Goal: Optimized Energy Level (Depressive Signs/Symptoms)  10/16/2024 1109 by Kimberlee Asher LPN  Outcome: Progressing  10/15/2024 2144 by Kimberlee Asher LPN  Outcome: Progressing  Goal: Optimized Cognitive Function (Depressive Signs/Symptoms)  10/16/2024 1109 by Kimberlee Asher LPN  Outcome: Progressing  10/15/2024 2144 by Kimberlee Asher LPN  Outcome: Progressing  Goal: Increased Participation and Engagement (Depressive Signs/Symptoms)  10/16/2024 1109 by Kimberlee Asher LPN  Outcome: Progressing  10/15/2024 2144 by Kimberlee Asher LPN  Outcome: Progressing  Goal: Enhanced Self-Esteem and Confidence (Depressive Signs/Symptoms)  10/16/2024 1109 by Kimberlee Asher LPN  Outcome: Progressing  10/15/2024 2144 by Kimberlee Asher LPN  Outcome: Progressing  Goal: Improved Mood Symptoms (Depressive Signs/Symptoms)  10/16/2024 1109 by Kimberlee Asher LPN  Outcome: Progressing  10/15/2024 2144 by Kimberlee Asher LPN  Outcome: Progressing  Goal: Optimized Nutrition Intake (Depressive Signs/Symptoms)  10/16/2024 1109 by Kimberlee Asher LPN  Outcome: Progressing  10/15/2024 2144 by Kimberlee Asher LPN  Outcome: Progressing  Goal: Improved Psychomotor Symptoms (Depressive  Signs/Symptoms)  10/16/2024 1109 by Kimberlee Asher LPN  Outcome: Progressing  10/15/2024 2144 by Kimberlee Asher LPN  Outcome: Progressing  Goal: Improved Sleep (Depressive Signs/Symptoms)  10/16/2024 1109 by Kimberlee Asher LPN  Outcome: Progressing  10/15/2024 2144 by Kimbelree Asher LPN  Outcome: Progressing  Goal: Enhanced Social, Occupational or Functional Skills (Depressive Signs/Symptoms)  10/16/2024 1109 by Kimberlee Asher LPN  Outcome: Progressing  10/15/2024 2144 by Kimberlee Asher LPN  Outcome: Progressing

## 2024-10-16 NOTE — PLAN OF CARE
Problem: Adult Behavioral Health Plan of Care  Goal: Optimized Coping Skills in Response to Life Stressors  Intervention: Promote Effective Coping Strategies  Flowsheets (Taken 10/16/2024 6664)  Supportive Measures:   active listening utilized   positive reinforcement provided   verbalization of feelings encouraged   self-reflection promoted       Behavior: alert, oriented, withdrawn            Intervention: In this CBT-focused group LMSW facilitated discussion on educating group members about the grieving process and to normalize the grief they may be experiencing due to changing their substance use or mental health. Each patient was asked to label the emotions associated with grief and apply effective coping skills to managing symptoms. Each patient was given The Stages of Grief Handout.           Response: Pt did not participate in group discussion. SW observed pt sitting next to a window and staring out of the window for awhile before leaving group before it ended.           Plan: Continue to encourage pt to participate in process groups to verbalize feelings and develop healthy coping skills.

## 2024-10-16 NOTE — PROGRESS NOTES
"PSYCHIATRY DAILY INPATIENT PROGRESS NOTE  SUBSEQUENT HOSPITAL VISIT    ENCOUNTER DATE: 10/16/2024  SITE: [facility]    DATE OF ADMISSION: 10/10/2024  3:11 PM  LENGTH OF STAY: 6 days      CHIEF COMPLAINT   Vero Allen is a 64 y.o. female, seen during daily lombardo rounds on the inpatient unit.  Vero Allen presented with the chief complaint of disorganized behavior       The patient was seen and examined. The chart was reviewed.     Reviewed notes from Rns and MD and labs from the last 24 hours.    The patient's case was discussed with the treatment team/care providers today including Rns and MD    Staff reports no behavioral or management issues.     The patient has been compliant with treatment.      Subjective 10/16/2024       Patient reports mood is "great, ready to go home." Discussed plans to continue valium taper, patient informed that this medication would not be prescribed at discharge. Pt seemed surprised by this, asking "what am I going to take for my anxiety?" Discussed titration of paxil to which she is amenable. Continues to deny auditory/visual hallucinations. Tolerating valium taper well with no subjective of objective s/s of withdrawal.     Will increase paxil tomorrow. Pt likely candidate for discharge within 24-48 hours.         The patient denies any side effects to medications.        Interim/overnight events per report/notes:          Psychiatric ROS (observed, reported, or endorsed/denied):  Depressed mood - less  Interest/pleasure/anhedonia: less  Guilt/hopelessness/worthlessness - less  Changes in Sleep - No  Changes in Appetite - No  Changes in Concentration - less  Changes in Energy - less  PMA/R- No  Suicidal- active/passive ideations - No  Homicidal ideations: active/passive ideations - No    Hallucinations - No  Delusions - No  Disorganized behavior - No  Disorganized speech - No  Negative symptoms - No    Elevated mood - No  Decreased need for sleep - " No  Grandiosity - No  Racing thoughts - less  Impulsivity - No  Irritability- No  Increased energy - No  Distractibility - No  Increase in goal-directed activity or PMA- No    Symptoms of ELSY - less  Symptoms of Panic Disorder- No  Symptoms of PTSD - No        Overall progress: Patient is showing mild improvement         Psychotherapy:  Target symptoms: substance abuse  Why chosen therapy is appropriate versus another modality: relevant to diagnosis, evidence based practice  Outcome monitoring methods: self-report, observation  Therapeutic intervention type: insight oriented psychotherapy, supportive psychotherapy  Topics discussed/themes: building skills sets for symptom management, symptom recognition, substance abuse  The patient's response to the intervention is accepting. The patient's progress toward treatment goals is fair.   Duration of intervention: 16 minutes.        Medical ROS  Review of Systems   Constitutional: Negative.    HENT: Negative.     Eyes: Negative.    Respiratory: Negative.     Cardiovascular: Negative.    Gastrointestinal: Negative.    Genitourinary: Negative.    Musculoskeletal: Negative.    Skin: Negative.    Neurological: Negative.    Endo/Heme/Allergies: Negative.    Psychiatric/Behavioral:  Positive for substance abuse.          PAST MEDICAL HISTORY   Past Medical History:   Diagnosis Date    Anxiety     Breast cancer 2010    Cancer     Breast    COPD (chronic obstructive pulmonary disease)     Depression     Diabetes mellitus     GERD (gastroesophageal reflux disease)     Hypertension     Insomnia     Thyroid disease            PSYCHOTROPIC MEDICATIONS   Scheduled Meds:   amLODIPine  10 mg Oral Daily    aspirin  81 mg Oral Daily    diazePAM  5 mg Oral BID    levothyroxine  75 mcg Oral Before breakfast    nicotine  1 patch Transdermal Daily    paroxetine  20 mg Oral Daily    risperiDONE  3 mg Oral QHS     Continuous Infusions:  PRN Meds:.  Current Facility-Administered Medications:      "albuterol sulfate, 2.5 mg, Nebulization, Q4H PRN    aluminum-magnesium hydroxide-simethicone, 30 mL, Oral, Q6H PRN    hydrOXYzine pamoate, 25 mg, Oral, Q8H PRN    magnesium hydroxide 400 mg/5 ml, 30 mL, Oral, Daily PRN    nicotine (polacrilex), 2 mg, Oral, Q1H PRN        EXAMINATION    VITALS   Vitals:    10/14/24 1909 10/15/24 0718 10/15/24 1910 10/16/24 0716   BP: (!) 145/74 (!) 145/73 134/72 126/78   BP Location: Left arm Left arm Left arm Left arm   Patient Position: Sitting Sitting Sitting Sitting   Pulse: 92 75 88 85   Resp: 20 20 20 18   Temp: 98 °F (36.7 °C) 98.3 °F (36.8 °C) 98.2 °F (36.8 °C) 97.9 °F (36.6 °C)   TempSrc: Oral Oral Oral Oral   SpO2: 96% 98% 96% 96%   Weight:       Height:           Body mass index is 43.4 kg/m².        CONSTITUTIONAL  General Appearance: unremarkable, age appropriate    MUSCULOSKELETAL  Muscle Strength and Tone:no tremor, no tic  Abnormal Involuntary Movements: No  Gait and Station: non-ataxic    PSYCHIATRIC   Level of Consciousness: awake, alert , and oriented  Orientation: person, place, time, and situation  Grooming: Casually dressed and Well groomed  Psychomotor Behavior: normal, cooperative  Speech: normal tone, normal rate, normal pitch, normal volume  Language: grossly intact  Mood: "Great"  Affect: Consistent with mood and Congruent with thought  Thought Process: linear, logical  Associations: intact   Thought Content: less SI and denies HI  Perceptions: denies AH, denies  VH, and not RIS  Memory: Impaired to some degree  Attention:Attends to interview without distraction  Fund of Knowledge: Aware of current events and Vocabulary appropriate   Estimate if Intelligence:  Average based on work/education history, vocabulary and mental status exam  Insight: has awareness of illness  Judgment: behavior is adequate to circumstances        DIAGNOSTIC TESTING   Laboratory Results  No results found for this or any previous visit (from the past 24 hours).          MEDICAL " DECISION MAKING      ASSESSMENT:   Unspecified psychosis  Panic attacks  BZD misuses  Psychosocial stressors        PROBLEM LIST AND MANAGEMENT PLANS     Unspecified psychosis  - continue risperdal 3 mg PO qhs  - pt counseled  - follow up with outpatient mental health providers after discharge for medication management and psychotherapy        Panic attacks  - start paxil 10 mg PO qd-Increased to 20 mg, continue current dose-Increase to 30 mg tomorrow  - pt counseled  - follow up with outpatient mental health providers after discharge for medication management and psychotherapy     BZD misuses  - start valium 10 mg PO TID, will taper off as tolerated-Continue 5 mg TID-Taper to 5 mg BID- Taper to 5 mg daily  - pt counseled  - follow up with outpatient mental health providers after discharge for medication management and psychotherapy     Psychosocial stressors  - pt counseled  - SW consulted for assistance with additional resources       Discussed diagnosis, risks and benefits of proposed treatment vs alternative treatments vs no treatment, potential side effects of these treatments and the inherent unpredictability of treatment. The patient expresses understanding of the above and displays the capacity to agree with this treatment given said understanding. Patient also agrees that, currently, the benefits outweigh the risks and would like to pursue/continue treatment at this time.    Any medications being used off-label were discussed with the patient inclusive of the evidence base for the use of the medications and consent was obtained for the off-label use of the medication.       DISCHARGE PLANNING  Expected Disposition Plan: Skilled Nursing Facility      NEED FOR CONTINUED HOSPITALIZATION  Psychiatric illness continues to pose a potential threat to life or bodily function, of self or others, thereby requiring the need for continued inpatient psychiatric hospitalization: Yes, due to: significant psychotic thought  disorder, as evidenced by:  Ongoing concerns with grave disability with patient unable to perform basic feeding, hygiene and dressing activities without significant constant support.    Protective inpatient pyschiatric hospitalization required while a safe disposition plan is enacted: Yes    Patient stabilized and ready for discharge from inpatient psychiatric unit: No        STAFF:   Enrique Luz NP  Psychiatry

## 2024-10-16 NOTE — NURSING
Pt is alert and oriented. Pt spends most the day in the dining/activity room. She socializes with peers and staff. Pt is calm and cooperative, med compliant. Will continue to monitor for safety.

## 2024-10-16 NOTE — NURSING
POC reviewed and ongoing. Pt in activity room at beginning of shift. Pt interacts with peers. Pt calm and cooperative. She is med compliant with no side affects. Will continue to monitor for safety.

## 2024-10-17 VITALS
WEIGHT: 245 LBS | OXYGEN SATURATION: 98 % | SYSTOLIC BLOOD PRESSURE: 135 MMHG | DIASTOLIC BLOOD PRESSURE: 64 MMHG | RESPIRATION RATE: 18 BRPM | HEIGHT: 63 IN | HEART RATE: 82 BPM | BODY MASS INDEX: 43.41 KG/M2 | TEMPERATURE: 98 F

## 2024-10-17 PROCEDURE — 99900031 HC PATIENT EDUCATION (STAT)

## 2024-10-17 PROCEDURE — S4991 NICOTINE PATCH NONLEGEND: HCPCS | Performed by: PSYCHIATRY & NEUROLOGY

## 2024-10-17 PROCEDURE — 25000003 PHARM REV CODE 250: Performed by: INTERNAL MEDICINE

## 2024-10-17 PROCEDURE — 25000003 PHARM REV CODE 250: Performed by: PSYCHIATRY & NEUROLOGY

## 2024-10-17 PROCEDURE — 90833 PSYTX W PT W E/M 30 MIN: CPT | Mod: ,,, | Performed by: PSYCHIATRY & NEUROLOGY

## 2024-10-17 PROCEDURE — 94761 N-INVAS EAR/PLS OXIMETRY MLT: CPT

## 2024-10-17 PROCEDURE — 99239 HOSP IP/OBS DSCHRG MGMT >30: CPT | Mod: ,,, | Performed by: PSYCHIATRY & NEUROLOGY

## 2024-10-17 PROCEDURE — 99900035 HC TECH TIME PER 15 MIN (STAT)

## 2024-10-17 PROCEDURE — 94640 AIRWAY INHALATION TREATMENT: CPT

## 2024-10-17 PROCEDURE — 25000242 PHARM REV CODE 250 ALT 637 W/ HCPCS: Performed by: PSYCHIATRY & NEUROLOGY

## 2024-10-17 RX ORDER — RISPERIDONE 3 MG/1
3 TABLET ORAL NIGHTLY
Qty: 30 TABLET | Refills: 1 | Status: SHIPPED | OUTPATIENT
Start: 2024-10-17 | End: 2025-10-17

## 2024-10-17 RX ORDER — PAROXETINE 30 MG/1
30 TABLET, FILM COATED ORAL DAILY
Qty: 30 TABLET | Refills: 1 | Status: SHIPPED | OUTPATIENT
Start: 2024-10-18 | End: 2025-10-18

## 2024-10-17 RX ORDER — LEVOTHYROXINE SODIUM 75 UG/1
75 TABLET ORAL
Qty: 30 TABLET | Refills: 1 | Status: SHIPPED | OUTPATIENT
Start: 2024-10-18 | End: 2025-10-18

## 2024-10-17 RX ORDER — HYDROXYZINE PAMOATE 25 MG/1
25 CAPSULE ORAL EVERY 8 HOURS PRN
Qty: 60 CAPSULE | Refills: 2 | Status: SHIPPED | OUTPATIENT
Start: 2024-10-17

## 2024-10-17 RX ADMIN — ALBUTEROL SULFATE 2.5 MG: 2.5 SOLUTION RESPIRATORY (INHALATION) at 08:10

## 2024-10-17 RX ADMIN — DIAZEPAM 5 MG: 5 TABLET ORAL at 08:10

## 2024-10-17 RX ADMIN — PAROXETINE HYDROCHLORIDE 30 MG: 20 TABLET, FILM COATED ORAL at 08:10

## 2024-10-17 RX ADMIN — LEVOTHYROXINE SODIUM 75 MCG: 75 TABLET ORAL at 06:10

## 2024-10-17 RX ADMIN — NICOTINE 1 PATCH: 21 PATCH, EXTENDED RELEASE TRANSDERMAL at 08:10

## 2024-10-17 RX ADMIN — AMLODIPINE BESYLATE 10 MG: 10 TABLET ORAL at 08:10

## 2024-10-17 RX ADMIN — ASPIRIN 81 MG: 81 TABLET, COATED ORAL at 08:10

## 2024-10-17 NOTE — PLAN OF CARE
10/17/24 0912   Medicare Message   Important Message from Medicare regarding Discharge Appeal Rights Given to patient/caregiver;Explained to patient/caregiver;Signed/date by patient/caregiver;Other (comments)   Date IMM was signed 10/17/24   Time IMM was signed 0911

## 2024-10-17 NOTE — PLAN OF CARE
Patient alert and oriented, pleasant and cooperative. Patient appetite good for breakfast. Patient happy and smiling to be discharging today. Patient compliant with medication without side effects noted. Patient is engaging with peers and watching tv at this time. Patient endorses anxiety and depression improved, denies S/HI, denies A/VH. Dr. Mckee visited with order to discharge today. Staff initiating discharge process at this time.

## 2024-10-17 NOTE — DISCHARGE INSTRUCTIONS
Discharge instructions reviewed with patient pharmacy, medication, and follow up mental health appointment. Patient verbalized understanding. Education provided and copy given with discharge paperwork,

## 2024-10-17 NOTE — NURSING
Patient discharge instructions reviewed with patient. Patient verbalized understanding. Patient transportation arrived. Discharge instructions given to patient, education provided and copy given to patient. Patient left unit with personal belongings accompanied by staff member downstairs to meet family member. Left  per car with no distress noted

## 2024-10-17 NOTE — DISCHARGE SUMMARY
"Discharge Summary  Psychiatry    Admit Date: 10/10/2024    Discharge Date and Time:  10/17/2024 8:24 AM    Attending Physician: Everett Joseph III, MD     Discharge Provider: Philip Mckee MD    Reason for Admission:  disorganized behavior     History of Present Illness:   The patient presented to the ER on 10/10/2024 .     The patient was medically cleared and admitted to the [facility].     Per ED MD:  Pt returns to the ED for psych evaluation by recommendation of PCP, Dr. Merchant, due to increased anxiety. Pt seen in ED multiple times recently for similar complaints.       63 yo female with history as below here with anxiety from PCP office for psych eval/possible admission. Overusing benzos, anxiety not well controlled. Running out of prescribed supply early and then seeking care in ED multiple times per week/day. No SI/HI. Wishes to [facility] for detox, medication management.         Psychiatric interview:  "I can't deal with my anxiety," reports uncontrolled anxiety for the last week, "I've been running back and forth to the hospital for them to try to help me." Reports she has been taking more xanax than prescribed, reports prescribed TID but takes QID, also diverting, "I share with my neighbor." Reports compliance with her risperdal. She is somewhat disorganized and gives illogical responses at times.              Symptoms of Depression: diminished mood - No, loss of interest/anhedonia - No;       Changes in Sleep: trouble with initiation- Yes, maintenance, - Yes early morning awakening with inability to return to sleep - No, hypersomnolence - No     Suicidal- active/passive ideations - No, organized plans, future intentions - No     Homicidal ideations: active/passive ideations - No, organized plans, future intentions - No     Symptoms of psychosis: hallucinations - No, delusions - No, disorganized speech - No, disorganized behavior or abnormal motor behavior - No, or negative symptoms (diminshed " "emotional expression, avolition, anhedonia, alogia, asociality) - No,      Symptoms of ellis or hypomania: elevated, expansive, or irritable mood with increased energy or activity - No; > 4 days - No,  >7 days - No; with inflated self-esteem or grandiosity - No, decreased need for sleep - No, increased rate of speech - No, FOI or racing thoughts - No, distractibility - No, increased goal directed activity or PMA - No, risky/disinhibited behavior - No     Symptoms of ELSY: excessive anxiety/worry/fear, more days than not, about numerous issues - No, ongoing for >6 months - No, difficult to control - No, with restlessness - No, fatigue - No, poor concentration - No, irritability - No, muscle tension - No, sleep disturbance - No; causes functionally impairing distress - No     Symptoms of Panic Disorder: recurrent panic attacks (palpitations/heart racing, sweating, shakiness, dyspnea, choking, chest pain/discomfort, Gi symptoms, dizzy/lightheadedness, hot/col flashes, paresthesias, derealization, fear of losing control or fear of dying or fear of "going crazy") - Yes, precipitated - No, un-precipitated - Yes, source of worry and/or behavioral changes secondary for 1 month or longer- No, agoraphobia - No     Symptoms of PTSD: h/o trauma exposure - Yes; re-experiencing/intrusive symptoms - No,      Symptoms of OCD: obsessions (recurrent thoughts/urges/images; intrusive and/or unwanted; uses other thoughts/actions to suppress) - No; compulsions (repetitive behaviors used to lower distress/anxiety/obsessions) - No, time-consuming (over 1 hour per day) or cause significant distress/impairment - - No     Symptoms of Anorexia: restriction of caloric intake leading to significantly low body weight - No, intense fear of gaining weight or persistent behavior that interferes with weight gain even thought at a significantly low weight - No, disturbance in the way in which one's body weight or shape is experienced, undue influence " of body weight or shape on self evaluation, or persistent lack of recognition of the seriousness of the current low body weight - No     Symptoms of Bulimia: recurrent episodes of binge eating (definitely larger amount  than what others would eat and lack of a sense of control over eating during episode) - No, recurrent inappropriate compensatory behaviors in order to prevent weight gain (fasting, medications, exercise, vomiting) - No, binges and compensatory behaviors both occur on average at least once a week for 3 months - No, self evaluations is unduly influenced by body shape/weight- - No       Procedures Performed: * No surgery found *    Hospital Course:    Patient was admitted to the inpatient psychiatry unit after being medically cleared in the ED. Chart and labs were reviewed. The patient was stabilized as follows:      Unspecified psychosis:  -stable/well-controlled  - continue risperdal 3 mg PO qhs  - pt counseled  - follow up with outpatient mental health providers after discharge for medication management and psychotherapy        Panic attacks  - start paxil 10 mg PO qd-Increased to 20 mg, continue current dose-Increase to/continue at 30 mg tomorrow  - pt counseled  - follow up with outpatient mental health providers after discharge for medication management and psychotherapy     BZD misuses  - start valium 10 mg PO TID, will taper off as tolerated-Continue 5 mg TID-Taper to 5 mg BID- Taper to 5 mg daily  - pt counseled  - follow up with outpatient mental health providers after discharge for medication management and psychotherapy     Psychosocial stressors  - pt counseled  - SW consulted for assistance with additional resources         The patient reports improved symptoms as documented. The patient is requesting discharge.The patient is hopeful, future oriented and goal directed. The patient readily discusses both short and long term goals. The patient can identify positive coping skills and social  support. AIMS score was 0. During hospitalization, the patient was encouraged to go to both groups and individual counseling. Patient was monitored for any side effects. A meeting was held with multidisciplinary team prior to discharge and pt's diagnosis, current medications, and follow up were discussed. The patient has been compliant with treatment and can adequately attend to activities of daily living in an independent manner. The patient denies any side effects. The patient denies SI, HI, plan or intent for self harm or harm to others. The patient is no longer a danger to self or others nor gravely disabled disabled. Patient discharged  in stable condition with scheduled outpatient follow up.    Psychiatric ROS (observed, reported, or endorsed/denied):  Depressed mood - improved  Interest/pleasure/anhedonia: improved  Guilt/hopelessness/worthlessness - improved  Changes in Sleep - No  Changes in Appetite - No  Changes in Concentration -  improved  Changes in Energy -  improved  PMA/R- No  Suicidal- active/passive ideations - No  Homicidal ideations: active/passive ideations - No     Hallucinations - No  Delusions - No  Disorganized behavior - No  Disorganized speech - No  Negative symptoms - No     Elevated mood - No  Decreased need for sleep - No  Grandiosity - No  Racing thoughts -  improved  Impulsivity - No  Irritability- No  Increased energy - No  Distractibility - No  Increase in goal-directed activity or PMA- No     Symptoms of ELSY -  improved  Symptoms of Panic Disorder- No  Symptoms of PTSD - No      Discussed diagnosis, risks and benefits of proposed treatment vs alternative treatments vs no treatment, and potential side effects of these treatments.  The patient expresses understanding of the above and displays the capacity to agree with this treatment given said understanding.  Patient also agrees that, currently, the benefits outweigh the risks and would like to pursue treatment at this  time.      Discharge MSE: stated age, casually dressed, well groomed.  No psychomotor agitation or retardation.  No abnormal involuntary movements.  Gait normal.  Speech normal, conversational.  Language fluent English. Mood fine.  Affect normal range, pleasant, euthymic.  Thought process linear.  Associations intact.  Denies suicidal or homicidal ideation.  Denies auditory hallucinations, paranoid ideation, ideas of reference.  Memory intact.  Attention intact.  Fund of knowledge intact.  Insight intact.  Judgment intact.  Alert and oriented to person, place, time.      Tobacco Usage:  Is patient a smoker? Yes  Does patient want prescription for Tobacco Cessation? No  Does patient want counseling for Tobacco Cessation? No    If patient would like to quit, then over the counter nicotine patch could be used. The patient could also follow up with his PCP or psychiatric provider for other alternatives.     Tobacco Use Treatment Practical Counseling    Were the following discussed with the patient:    Recognizing danger situations:    A. Alcohol use during the first month after quitting - Yes   B. Being around smoke and/or smokers or time/situations when the patient routinely smoked - Yes   C. Triggers and/or roadblocks are the same as danger situations - Yes    2.   Developing coping skills   A. Learning new ways to manage stress - Yes   B. Exercising and/or relaxation breathing - Yes   C. Changing routines - Yes   D. Distraction techniques to prevent tobacco use - Yes    3.   Basic information about quitting:   A. Benefits of quitting tobacco - Yes   B. How to quit techniques - Yes   C. Available resources to support quitting - Yes        Final Diagnoses:    Principal Problem: Unspecified psychosis    Secondary Diagnoses:   Panic attacks  BZD misuses  Psychosocial stressors    Labs:  Admission on 10/10/2024   Component Date Value Ref Range Status    WBC 10/10/2024 11.95  3.90 - 12.70 K/uL Final    RBC 10/10/2024 4.04   4.00 - 5.40 M/uL Final    Hemoglobin 10/10/2024 12.0  12.0 - 16.0 g/dL Final    Hematocrit 10/10/2024 36.1 (L)  37.0 - 48.5 % Final    MCV 10/10/2024 89  82 - 98 fL Final    MCH 10/10/2024 29.7  27.0 - 31.0 pg Final    MCHC 10/10/2024 33.2  32.0 - 36.0 g/dL Final    RDW 10/10/2024 15.7 (H)  11.5 - 14.5 % Final    Platelets 10/10/2024 248  150 - 450 K/uL Final    MPV 10/10/2024 10.7  9.2 - 12.9 fL Final    Immature Granulocytes 10/10/2024 0.5  0.0 - 0.5 % Final    Gran # (ANC) 10/10/2024 9.2 (H)  1.8 - 7.7 K/uL Final    Immature Grans (Abs) 10/10/2024 0.06 (H)  0.00 - 0.04 K/uL Final    Lymph # 10/10/2024 1.7  1.0 - 4.8 K/uL Final    Mono # 10/10/2024 0.9  0.3 - 1.0 K/uL Final    Eos # 10/10/2024 0.2  0.0 - 0.5 K/uL Final    Baso # 10/10/2024 0.01  0.00 - 0.20 K/uL Final    nRBC 10/10/2024 0  0 /100 WBC Final    Gran % 10/10/2024 76.8 (H)  38.0 - 73.0 % Final    Lymph % 10/10/2024 14.1 (L)  18.0 - 48.0 % Final    Mono % 10/10/2024 7.1  4.0 - 15.0 % Final    Eosinophil % 10/10/2024 1.4  0.0 - 8.0 % Final    Basophil % 10/10/2024 0.1  0.0 - 1.9 % Final    Differential Method 10/10/2024 Automated   Final    Sodium 10/10/2024 141  136 - 145 mmol/L Final    Potassium 10/10/2024 4.3  3.5 - 5.1 mmol/L Final    Chloride 10/10/2024 107  95 - 110 mmol/L Final    CO2 10/10/2024 30 (H)  23 - 29 mmol/L Final    Glucose 10/10/2024 155 (H)  70 - 110 mg/dL Final    BUN 10/10/2024 19  8 - 23 mg/dL Final    Creatinine 10/10/2024 1.4  0.5 - 1.4 mg/dL Final    Calcium 10/10/2024 9.1  8.7 - 10.5 mg/dL Final    Total Protein 10/10/2024 5.7 (L)  6.0 - 8.4 g/dL Final    Albumin 10/10/2024 2.8 (L)  3.5 - 5.2 g/dL Final    Total Bilirubin 10/10/2024 0.5  0.1 - 1.0 mg/dL Final    Alkaline Phosphatase 10/10/2024 87  55 - 135 U/L Final    AST 10/10/2024 5 (L)  10 - 40 U/L Final    ALT 10/10/2024 47 (H)  10 - 44 U/L Final    eGFR 10/10/2024 41.8 (A)  >60 mL/min/1.73 m^2 Final    Anion Gap 10/10/2024 4  3 - 11 mmol/L Final    TSH 10/10/2024 5.620  (H)  0.400 - 4.000 uIU/mL Final    Specimen UA 10/10/2024 Urine, Clean Catch   Final    Color, UA 10/10/2024 Yellow  Yellow, Straw, Jenn Final    Appearance, UA 10/10/2024 Clear  Clear Final    pH, UA 10/10/2024 6.0  5.0 - 8.0 Final    Specific Gravity, UA 10/10/2024 1.025  1.005 - 1.030 Final    Protein, UA 10/10/2024 Negative  Negative Final    Glucose, UA 10/10/2024 Negative  Negative Final    Ketones, UA 10/10/2024 Negative  Negative Final    Bilirubin (UA) 10/10/2024 Negative  Negative Final    Occult Blood UA 10/10/2024 Negative  Negative Final    Nitrite, UA 10/10/2024 Negative  Negative Final    Urobilinogen, UA 10/10/2024 1.0  <2.0 EU/dL Final    Leukocytes, UA 10/10/2024 Negative  Negative Final    Benzodiazepines 10/10/2024 Presumptive Positive (A)  Negative Final    Methadone metabolites 10/10/2024 Negative  Negative Final    Cocaine (Metab.) 10/10/2024 Negative  Negative Final    Opiate Scrn, Ur 10/10/2024 Negative  Negative Final    Barbiturate Screen, Ur 10/10/2024 Negative  Negative Final    Amphetamine Screen, Ur 10/10/2024 Negative  Negative Final    THC 10/10/2024 Negative  Negative Final    Phencyclidine 10/10/2024 Negative  Negative Final    Creatinine, Urine 10/10/2024 213.0  15.0 - 325.0 mg/dL Final    Toxicology Information 10/10/2024 SEE COMMENT   Final    Alcohol, Serum 10/10/2024 <3  <10 mg/dL Final    Acetaminophen (Tylenol), Serum 10/10/2024 <2.0 (L)  10.0 - 20.0 ug/mL Final    Free T4 10/10/2024 1.05  0.71 - 1.51 ng/dL Final   Admission on 10/08/2024, Discharged on 10/08/2024   Component Date Value Ref Range Status    Troponin I High Sensitivity 10/08/2024 22.6  0.0 - 60.0 pg/mL Final   Admission on 10/05/2024, Discharged on 10/07/2024   Component Date Value Ref Range Status    QRS Duration 10/05/2024 92  ms Final    [facility] QTC Calculation 10/05/2024 459  ms Final    Blood Culture, Routine 10/05/2024 No growth after 5 days.   Final    Blood Culture, Routine 10/05/2024 No growth after 5  days.   Final    WBC 10/05/2024 14.80 (H)  3.90 - 12.70 K/uL Final    RBC 10/05/2024 4.06  4.00 - 5.40 M/uL Final    Hemoglobin 10/05/2024 12.0  12.0 - 16.0 g/dL Final    Hematocrit 10/05/2024 35.9 (L)  37.0 - 48.5 % Final    MCV 10/05/2024 88  82 - 98 fL Final    MCH 10/05/2024 29.6  27.0 - 31.0 pg Final    MCHC 10/05/2024 33.4  32.0 - 36.0 g/dL Final    RDW 10/05/2024 16.0 (H)  11.5 - 14.5 % Final    Platelets 10/05/2024 265  150 - 450 K/uL Final    MPV 10/05/2024 10.8  9.2 - 12.9 fL Final    Immature Granulocytes 10/05/2024 0.6 (H)  0.0 - 0.5 % Final    Gran # (ANC) 10/05/2024 11.5 (H)  1.8 - 7.7 K/uL Final    Immature Grans (Abs) 10/05/2024 0.09 (H)  0.00 - 0.04 K/uL Final    Lymph # 10/05/2024 1.9  1.0 - 4.8 K/uL Final    Mono # 10/05/2024 1.3 (H)  0.3 - 1.0 K/uL Final    Eos # 10/05/2024 0.0  0.0 - 0.5 K/uL Final    Baso # 10/05/2024 0.03  0.00 - 0.20 K/uL Final    nRBC 10/05/2024 0  0 /100 WBC Final    Gran % 10/05/2024 77.7 (H)  38.0 - 73.0 % Final    Lymph % 10/05/2024 12.8 (L)  18.0 - 48.0 % Final    Mono % 10/05/2024 8.6  4.0 - 15.0 % Final    Eosinophil % 10/05/2024 0.1  0.0 - 8.0 % Final    Basophil % 10/05/2024 0.2  0.0 - 1.9 % Final    Differential Method 10/05/2024 Automated   Final    Sodium 10/05/2024 140  136 - 145 mmol/L Final    Potassium 10/05/2024 3.5  3.5 - 5.1 mmol/L Final    Chloride 10/05/2024 102  95 - 110 mmol/L Final    CO2 10/05/2024 27  23 - 29 mmol/L Final    Glucose 10/05/2024 97  70 - 110 mg/dL Final    BUN 10/05/2024 19  8 - 23 mg/dL Final    Creatinine 10/05/2024 1.8 (H)  0.5 - 1.4 mg/dL Final    Calcium 10/05/2024 9.3  8.7 - 10.5 mg/dL Final    Total Protein 10/05/2024 6.2  6.0 - 8.4 g/dL Final    Albumin 10/05/2024 3.0 (L)  3.5 - 5.2 g/dL Final    Total Bilirubin 10/05/2024 0.3  0.1 - 1.0 mg/dL Final    Alkaline Phosphatase 10/05/2024 104  55 - 135 U/L Final    AST 10/05/2024 6 (L)  10 - 40 U/L Final    ALT 10/05/2024 24  10 - 44 U/L Final    eGFR 10/05/2024 30.9 (A)  >60  mL/min/1.73 m^2 Final    Anion Gap 10/05/2024 11  3 - 11 mmol/L Final    Lactate (Lactic Acid) 10/05/2024 2.6 (H)  0.5 - 2.2 mmol/L Final    Specimen UA 10/05/2024 Urine, Clean Catch   Final    Color, UA 10/05/2024 Yellow  Yellow, Straw, Jenn Final    Appearance, UA 10/05/2024 Clear  Clear Final    pH, UA 10/05/2024 7.0  5.0 - 8.0 Final    Specific Gravity, UA 10/05/2024 1.010  1.005 - 1.030 Final    Protein, UA 10/05/2024 Negative  Negative Final    Glucose, UA 10/05/2024 Negative  Negative Final    Ketones, UA 10/05/2024 Negative  Negative Final    Bilirubin (UA) 10/05/2024 Negative  Negative Final    Occult Blood UA 10/05/2024 Negative  Negative Final    Nitrite, UA 10/05/2024 Negative  Negative Final    Urobilinogen, UA 10/05/2024 Negative  <2.0 EU/dL Final    Leukocytes, UA 10/05/2024 Negative  Negative Final    QRS Duration 10/08/2024 84  ms Final    [facility] QTC Calculation 10/08/2024 442  ms Final    Troponin I High Sensitivity 10/05/2024 48.5  0.0 - 60.0 pg/mL Final    Magnesium 10/05/2024 1.6  1.6 - 2.6 mg/dL Final    POC Rapid COVID 10/05/2024 Negative  Negative Final     Acceptable 10/05/2024 Yes   Final    POC Molecular Influenza A Ag 10/05/2024 Negative  Negative Final    POC Molecular Influenza B Ag 10/05/2024 Negative  Negative Final     Acceptable 10/05/2024 Yes   Final    Lactate (Lactic Acid) 10/06/2024 1.9  0.5 - 2.2 mmol/L Final    WBC 10/06/2024 13.14 (H)  3.90 - 12.70 K/uL Final    RBC 10/06/2024 4.28  4.00 - 5.40 M/uL Final    Hemoglobin 10/06/2024 12.4  12.0 - 16.0 g/dL Final    Hematocrit 10/06/2024 38.3  37.0 - 48.5 % Final    MCV 10/06/2024 90  82 - 98 fL Final    MCH 10/06/2024 29.0  27.0 - 31.0 pg Final    MCHC 10/06/2024 32.4  32.0 - 36.0 g/dL Final    RDW 10/06/2024 16.3 (H)  11.5 - 14.5 % Final    Platelets 10/06/2024 262  150 - 450 K/uL Final    MPV 10/06/2024 11.7  9.2 - 12.9 fL Final    Immature Granulocytes 10/06/2024 0.5  0.0 - 0.5 % Final    Gran #  (ANC) 10/06/2024 9.2 (H)  1.8 - 7.7 K/uL Final    Immature Grans (Abs) 10/06/2024 0.07 (H)  0.00 - 0.04 K/uL Final    Lymph # 10/06/2024 2.5  1.0 - 4.8 K/uL Final    Mono # 10/06/2024 1.3 (H)  0.3 - 1.0 K/uL Final    Eos # 10/06/2024 0.1  0.0 - 0.5 K/uL Final    Baso # 10/06/2024 0.03  0.00 - 0.20 K/uL Final    nRBC 10/06/2024 0  0 /100 WBC Final    Gran % 10/06/2024 70.2  38.0 - 73.0 % Final    Lymph % 10/06/2024 18.6  18.0 - 48.0 % Final    Mono % 10/06/2024 10.0  4.0 - 15.0 % Final    Eosinophil % 10/06/2024 0.5  0.0 - 8.0 % Final    Basophil % 10/06/2024 0.2  0.0 - 1.9 % Final    Differential Method 10/06/2024 Automated   Final    Sodium 10/06/2024 144  136 - 145 mmol/L Final    Potassium 10/06/2024 3.2 (L)  3.5 - 5.1 mmol/L Final    Chloride 10/06/2024 105  95 - 110 mmol/L Final    CO2 10/06/2024 32 (H)  23 - 29 mmol/L Final    Glucose 10/06/2024 93  70 - 110 mg/dL Final    BUN 10/06/2024 19  8 - 23 mg/dL Final    Creatinine 10/06/2024 1.5 (H)  0.5 - 1.4 mg/dL Final    Calcium 10/06/2024 9.0  8.7 - 10.5 mg/dL Final    Total Protein 10/06/2024 6.1  6.0 - 8.4 g/dL Final    Albumin 10/06/2024 2.8 (L)  3.5 - 5.2 g/dL Final    Total Bilirubin 10/06/2024 0.3  0.1 - 1.0 mg/dL Final    Alkaline Phosphatase 10/06/2024 95  55 - 135 U/L Final    AST 10/06/2024 8 (L)  10 - 40 U/L Final    ALT 10/06/2024 22  10 - 44 U/L Final    eGFR 10/06/2024 38.4 (A)  >60 mL/min/1.73 m^2 Final    Anion Gap 10/06/2024 7  3 - 11 mmol/L Final    POCT Glucose 10/06/2024 100  70 - 110 mg/dL Final    POCT Glucose 10/06/2024 188 (H)  70 - 110 mg/dL Final    POCT Glucose 10/06/2024 215 (H)  70 - 110 mg/dL Final    POCT Glucose 10/06/2024 202 (H)  70 - 110 mg/dL Final    WBC 10/07/2024 10.81  3.90 - 12.70 K/uL Final    RBC 10/07/2024 4.00  4.00 - 5.40 M/uL Final    Hemoglobin 10/07/2024 11.7 (L)  12.0 - 16.0 g/dL Final    Hematocrit 10/07/2024 36.3 (L)  37.0 - 48.5 % Final    MCV 10/07/2024 91  82 - 98 fL Final    MCH 10/07/2024 29.3  27.0 - 31.0  pg Final    MCHC 10/07/2024 32.2  32.0 - 36.0 g/dL Final    RDW 10/07/2024 15.8 (H)  11.5 - 14.5 % Final    Platelets 10/07/2024 242  150 - 450 K/uL Final    MPV 10/07/2024 11.5  9.2 - 12.9 fL Final    Immature Granulocytes 10/07/2024 0.5  0.0 - 0.5 % Final    Gran # (ANC) 10/07/2024 9.9 (H)  1.8 - 7.7 K/uL Final    Immature Grans (Abs) 10/07/2024 0.05 (H)  0.00 - 0.04 K/uL Final    Lymph # 10/07/2024 0.7 (L)  1.0 - 4.8 K/uL Final    Mono # 10/07/2024 0.3  0.3 - 1.0 K/uL Final    Eos # 10/07/2024 0.0  0.0 - 0.5 K/uL Final    Baso # 10/07/2024 0.00  0.00 - 0.20 K/uL Final    nRBC 10/07/2024 0  0 /100 WBC Final    Gran % 10/07/2024 91.1 (H)  38.0 - 73.0 % Final    Lymph % 10/07/2024 6.0 (L)  18.0 - 48.0 % Final    Mono % 10/07/2024 2.4 (L)  4.0 - 15.0 % Final    Eosinophil % 10/07/2024 0.0  0.0 - 8.0 % Final    Basophil % 10/07/2024 0.0  0.0 - 1.9 % Final    Differential Method 10/07/2024 Automated   Final    Sodium 10/07/2024 141  136 - 145 mmol/L Final    Potassium 10/07/2024 4.2  3.5 - 5.1 mmol/L Final    Chloride 10/07/2024 106  95 - 110 mmol/L Final    CO2 10/07/2024 27  23 - 29 mmol/L Final    Glucose 10/07/2024 163 (H)  70 - 110 mg/dL Final    BUN 10/07/2024 21  8 - 23 mg/dL Final    Creatinine 10/07/2024 1.3  0.5 - 1.4 mg/dL Final    Calcium 10/07/2024 9.3  8.7 - 10.5 mg/dL Final    Total Protein 10/07/2024 6.1  6.0 - 8.4 g/dL Final    Albumin 10/07/2024 2.7 (L)  3.5 - 5.2 g/dL Final    Total Bilirubin 10/07/2024 0.3  0.1 - 1.0 mg/dL Final    Alkaline Phosphatase 10/07/2024 92  55 - 135 U/L Final    AST 10/07/2024 11  10 - 40 U/L Final    ALT 10/07/2024 26  10 - 44 U/L Final    eGFR 10/07/2024 45.6 (A)  >60 mL/min/1.73 m^2 Final    Anion Gap 10/07/2024 8  3 - 11 mmol/L Final    Magnesium 10/07/2024 1.9  1.6 - 2.6 mg/dL Final    POCT Glucose 10/07/2024 160 (H)  70 - 110 mg/dL Final    POCT Glucose 10/07/2024 234 (H)  70 - 110 mg/dL Final   Admission on 10/03/2024, Discharged on 10/04/2024   Component Date Value  Ref Range Status    WBC 10/03/2024 10.73  3.90 - 12.70 K/uL Final    RBC 10/03/2024 3.93 (L)  4.00 - 5.40 M/uL Final    Hemoglobin 10/03/2024 11.4 (L)  12.0 - 16.0 g/dL Final    Hematocrit 10/03/2024 35.4 (L)  37.0 - 48.5 % Final    MCV 10/03/2024 90  82 - 98 fL Final    MCH 10/03/2024 29.0  27.0 - 31.0 pg Final    MCHC 10/03/2024 32.2  32.0 - 36.0 g/dL Final    RDW 10/03/2024 15.4 (H)  11.5 - 14.5 % Final    Platelets 10/03/2024 241  150 - 450 K/uL Final    MPV 10/03/2024 11.6  9.2 - 12.9 fL Final    Immature Granulocytes 10/03/2024 0.4  0.0 - 0.5 % Final    Gran # (ANC) 10/03/2024 7.5  1.8 - 7.7 K/uL Final    Immature Grans (Abs) 10/03/2024 0.04  0.00 - 0.04 K/uL Final    Lymph # 10/03/2024 2.1  1.0 - 4.8 K/uL Final    Mono # 10/03/2024 1.0  0.3 - 1.0 K/uL Final    Eos # 10/03/2024 0.1  0.0 - 0.5 K/uL Final    Baso # 10/03/2024 0.04  0.00 - 0.20 K/uL Final    nRBC 10/03/2024 0  0 /100 WBC Final    Gran % 10/03/2024 69.4  38.0 - 73.0 % Final    Lymph % 10/03/2024 19.2  18.0 - 48.0 % Final    Mono % 10/03/2024 9.4  4.0 - 15.0 % Final    Eosinophil % 10/03/2024 1.2  0.0 - 8.0 % Final    Basophil % 10/03/2024 0.4  0.0 - 1.9 % Final    Differential Method 10/03/2024 Automated   Final    Sodium 10/03/2024 138  136 - 145 mmol/L Final    Potassium 10/03/2024 3.3 (L)  3.5 - 5.1 mmol/L Final    Chloride 10/03/2024 104  95 - 110 mmol/L Final    CO2 10/03/2024 24  23 - 29 mmol/L Final    Glucose 10/03/2024 138 (H)  70 - 110 mg/dL Final    BUN 10/03/2024 12  8 - 23 mg/dL Final    Creatinine 10/03/2024 1.3  0.5 - 1.4 mg/dL Final    Calcium 10/03/2024 8.8  8.7 - 10.5 mg/dL Final    Total Protein 10/03/2024 5.8 (L)  6.0 - 8.4 g/dL Final    Albumin 10/03/2024 2.8 (L)  3.5 - 5.2 g/dL Final    Total Bilirubin 10/03/2024 0.3  0.1 - 1.0 mg/dL Final    Alkaline Phosphatase 10/03/2024 100  55 - 135 U/L Final    AST 10/03/2024 6 (L)  10 - 40 U/L Final    ALT 10/03/2024 24  10 - 44 U/L Final    eGFR 10/03/2024 45.6 (A)  >60 mL/min/1.73  m^2 Final    Anion Gap 10/03/2024 10  3 - 11 mmol/L Final    Troponin I High Sensitivity 10/03/2024 11.0  0.0 - 60.0 pg/mL Final    NT-proBNP 10/03/2024 274  5 - 900 pg/mL Final    QRS Duration 10/03/2024 84  ms Final    [facility] QTC Calculation 10/03/2024 488  ms Final         Discharged Condition: stable and improved; not currently a danger to self/others or gravely disabled    Disposition: Home or Self Care    Is patient being discharged on multiple neuroleptics? No    Follow Up/Patient Instructions:     Take all medications as prescribed.  Attend all psychiatric and medical follow up appointments.   Abstain from all drugs and alcohol.  Call the crisis line at: 1-515.546.3535 for help in a crisis and emergent situations or call 911 and Return to ED for any acute worsening of your condition including suicidal or homicidal ideations      No discharge procedures on file.        [place] [facility] [place] full [place]      Medications:  Reconciled Home Medications:      Medication List        START taking these medications      hydrOXYzine pamoate 25 MG Cap  Commonly known as: VISTARIL  Take 1 capsule (25 mg total) by mouth every 8 (eight) hours as needed (anxiety).     paroxetine 30 MG tablet  Commonly known as: PAXIL  Take 1 tablet (30 mg total) by mouth once daily.  Start taking on: October 18, 2024            CHANGE how you take these medications      albuterol-ipratropium 2.5 mg-0.5 mg/3 mL nebulizer solution  Commonly known as: DUO-NEB  Take 3 mLs by nebulization every 4 (four) hours as needed for Wheezing or Shortness of Breath. Rescue  What changed: Another medication with the same name was removed. Continue taking this medication, and follow the directions you see here.     levothyroxine 75 MCG tablet  Commonly known as: SYNTHROID  Take 1 tablet (75 mcg total) by mouth before breakfast.  Start taking on: October 18, 2024  What changed:   medication strength  how much to take      risperiDONE 3 MG Tab  Commonly known as: RISPERDAL  Take 1 tablet (3 mg total) by mouth every evening.  What changed: See the new instructions.            CONTINUE taking these medications      albuterol 90 mcg/actuation inhaler  Commonly known as: PROVENTIL/VENTOLIN HFA  Inhale 1-2 puffs into the lungs every 4 (four) hours as needed for Wheezing or Shortness of Breath. Rescue     amLODIPine 10 MG tablet  Commonly known as: NORVASC  Take 10 mg by mouth.     aspirin 81 MG EC tablet  Commonly known as: ECOTRIN  Take 81 mg by mouth once daily.     BREZTRI AEROSPHERE 160-9-4.8 mcg/actuation Hfaa  Generic drug: budesonide-glycopyr-formoterol  Inhale into the lungs.     esomeprazole 20 MG capsule  Commonly known as: NEXIUM  Take 20 mg by mouth before breakfast.     estradioL 1 MG tablet  Commonly known as: ESTRACE  Take 1 tablet (1 mg total) by mouth once daily.     metFORMIN 750 MG ER 24hr tablet  Commonly known as: GLUCOPHAGE-XR  Take by mouth.            STOP taking these medications      ALPRAZolam 0.5 MG tablet  Commonly known as: XANAX     azithromycin 250 MG tablet  Commonly known as: Z-PAOLA     cefUROXime 500 MG tablet  Commonly known as: CEFTIN     celecoxib 200 MG capsule  Commonly known as: CeleBREX     citalopram 40 MG tablet  Commonly known as: CeleXA     cloNIDine 0.1 MG tablet  Commonly known as: CATAPRES     diazePAM 5 MG tablet  Commonly known as: VALIUM     methocarbamoL 750 MG Tab  Commonly known as: ROBAXIN     predniSONE 20 MG tablet  Commonly known as: DELTASONE                Diet: regular     Activity as tolerated    Total time spent discharging patient: 35 minutes    Philip Mckee MD  Psychiatry

## 2024-10-17 NOTE — CARE UPDATE
Pt discharge to home with follow-up at [facility]. Appointment has been scheduled on October 22, 2024 at 8:45 am. Pt will receive medication management, counseling for behavioral health and smoking cessation. Pt given prescriptions for psychotropic medications and Nicoderm CQ patches 14mg/24hr. AVS faxed October 17,2024 at 10:00am.

## 2024-10-17 NOTE — NURSING
POC reviewed this shift and is ongoing.  Pt is cooperative with care and compliant with medications.  Pt was visible in the milieu and interacts well with staff and peers.

## 2024-10-17 NOTE — PROGRESS NOTES
Psychotherapy:  Target symptoms: substance abuse  Why chosen therapy is appropriate versus another modality: relevant to diagnosis, evidence based practice  Outcome monitoring methods: self-report, observation  Therapeutic intervention type: insight oriented psychotherapy, supportive psychotherapy  Topics discussed/themes: building skills sets for symptom management, symptom recognition, substance abuse  Safety planning and wrap up session  The patient's response to the intervention is accepting. The patient's progress toward treatment goals is fair.   Duration of intervention: 16 minutes.  Philip Mckee MD  Psychiatry

## 2024-10-31 NOTE — ED PROVIDER NOTES
Encounter Date: 3/21/2024       History     Chief Complaint   Patient presents with    Panic Attack     Pt reports that she was at home and started having a panic attack, was here 2 weeks ago with same concern. Pt is reporting chest tightness associated with the anxiety.      This is a 64-year-old white female with a history of COPD, diabetes mellitus type 2, hypertension, and anxiety who presents to the emergency department with complaints of shortness of breath.  Patient reports worsening chronic shortness of breath over the last few days associated with midsternal chest pain that is worse with breathing and coughing.  The patient has experienced similar symptoms multiple times in the past and is attributing her symptoms to anxiety.  She denies fever, URI signs and symptoms, vomiting, or any other relative symptoms at this time.  Patient last took prescribed Xanax yesterday.  Patient took breathing treatment prior to arrival.      Review of patient's allergies indicates:  No Known Allergies  Past Medical History:   Diagnosis Date    Anxiety     Breast cancer 2010    Cancer     Breast    COPD (chronic obstructive pulmonary disease)     Depression     Diabetes mellitus     GERD (gastroesophageal reflux disease)     Hypertension     Insomnia     Thyroid disease      Past Surgical History:   Procedure Laterality Date    BLADDER SUSPENSION      BREAST LUMPECTOMY      Right breast    CHOLECYSTECTOMY      HYSTERECTOMY      KNEE ARTHROSCOPY      Right knee    OOPHORECTOMY       Family History   Problem Relation Age of Onset    No Known Problems Mother     No Known Problems Father     No Known Problems Sister     No Known Problems Daughter     No Known Problems Maternal Aunt     No Known Problems Maternal Uncle     No Known Problems Paternal Aunt     No Known Problems Paternal Uncle     No Known Problems Maternal Grandmother     No Known Problems Maternal Grandfather     No Known Problems Paternal Grandmother     No Known  Problems Paternal Grandfather     Breast cancer Neg Hx     Ovarian cancer Neg Hx     BRCA 1/2 Neg Hx      Social History     Tobacco Use    Smoking status: Every Day     Current packs/day: 0.50     Types: Cigarettes    Smokeless tobacco: Never    Tobacco comments:     quit yesterday 6/12/21   Substance Use Topics    Alcohol use: Not Currently    Drug use: Never     Review of Systems   Constitutional:  Negative for appetite change and fever.   HENT:  Negative for congestion and rhinorrhea.    Respiratory:  Positive for cough, chest tightness and shortness of breath.    Cardiovascular:  Positive for chest pain.   Gastrointestinal:  Negative for abdominal pain and vomiting.       Physical Exam     Initial Vitals [03/21/24 1915]   BP Pulse Resp Temp SpO2   (!) 151/72 80 20 97.7 °F (36.5 °C) (!) 94 %      MAP       --         Physical Exam    Nursing note and vitals reviewed.  Constitutional: She appears well-developed and well-nourished. She is active. No distress.   HENT:   Head: Normocephalic and atraumatic.   Eyes: EOM are normal. Pupils are equal, round, and reactive to light.   Neck: Neck supple.   Normal range of motion.  Cardiovascular:  Normal rate, regular rhythm and normal heart sounds.           Pulmonary/Chest: No respiratory distress. She has no wheezes. She has rales (bilaterally).   Musculoskeletal:         General: Normal range of motion.      Cervical back: Normal range of motion and neck supple.     Neurological: She is alert and oriented to person, place, and time. GCS score is 15. GCS eye subscore is 4. GCS verbal subscore is 5. GCS motor subscore is 6.   Skin: Skin is warm and dry. Capillary refill takes less than 2 seconds.   Psychiatric: She has a normal mood and affect. Her behavior is normal. Thought content normal.         ED Course   Procedures  Labs Reviewed - No data to display       Imaging Results              X-Ray Chest AP Portable (In process)                      Medications    ALPRAZolam tablet 0.5 mg (has no administration in time range)     Medical Decision Making  Amount and/or Complexity of Data Reviewed  Radiology: ordered.    Risk  Prescription drug management.               ED Course as of 03/21/24 2054   Thu Mar 21, 2024   2051 No acute findings on chest x-ray [CB]      ED Course User Index  [CB] Leila Saenz NP                           Clinical Impression:  Final diagnoses:  [R07.9] Chest pain  [J44.9] Chronic obstructive pulmonary disease, unspecified COPD type (Primary)  [F41.9] Anxiety          ED Disposition Condition    Discharge Stable          ED Prescriptions    None       Follow-up Information       Follow up With Specialties Details Why Contact Info    Burke Merchant MD Internal Medicine Schedule an appointment as soon as possible for a visit in 2 days for re-evaluation of today's complaint [address] [id number]  [facility] [address]  [phone]               Leila Saenz NP  03/21/24 2054     [Dear  ___] : Dear  [unfilled], [Consult Letter:] : I had the pleasure of evaluating your patient, [unfilled]. [Please see my note below.] : Please see my note below. [Consult Closing:] : Thank you very much for allowing me to participate in the care of this patient.  If you have any questions, please do not hesitate to contact me. [Sincerely,] : Sincerely, [FreeTextEntry3] : Cesar Olivo MD System Director Urogynecology/URPS Department of Urology [facility]   at [facility]  of Urology [facility] [employer] [facility]

## 2024-11-08 ENCOUNTER — HOSPITAL ENCOUNTER (OUTPATIENT)
Dept: RADIOLOGY | Facility: HOSPITAL | Age: 64
Discharge: HOME | End: 2024-11-08
Attending: INTERNAL MEDICINE
Payer: MEDICARE

## 2024-11-08 DIAGNOSIS — M54.50 LOW BACK PAIN: Primary | ICD-10-CM

## 2024-11-08 DIAGNOSIS — M54.50 LOW BACK PAIN: ICD-10-CM

## 2024-11-08 PROCEDURE — 72110 X-RAY EXAM L-2 SPINE 4/>VWS: CPT | Mod: TC

## 2024-11-12 ENCOUNTER — HOSPITAL ENCOUNTER (INPATIENT)
Facility: HOSPITAL | Age: 64
LOS: 10 days | Discharge: HOME | DRG: 885 | End: 2024-11-22
Attending: EMERGENCY MEDICINE | Admitting: PSYCHIATRY & NEUROLOGY
Payer: MEDICARE

## 2024-11-12 DIAGNOSIS — Z00.8 MEDICAL CLEARANCE FOR PSYCHIATRIC ADMISSION: ICD-10-CM

## 2024-11-12 DIAGNOSIS — F33.3 MDD (MAJOR DEPRESSIVE DISORDER), RECURRENT, SEVERE, WITH PSYCHOSIS: Primary | ICD-10-CM

## 2024-11-12 LAB
ALBUMIN SERPL BCP-MCNC: 3.2 G/DL (ref 3.5–5.2)
ALP SERPL-CCNC: 110 U/L (ref 55–135)
ALT SERPL W/O P-5'-P-CCNC: 17 U/L (ref 10–44)
AMPHET+METHAMPHET UR QL: NEGATIVE
ANION GAP SERPL CALC-SCNC: 8 MMOL/L (ref 3–11)
APAP SERPL-MCNC: <2 UG/ML (ref 10–20)
AST SERPL-CCNC: 10 U/L (ref 10–40)
BARBITURATES UR QL SCN>200 NG/ML: NEGATIVE
BASOPHILS # BLD AUTO: 0.04 K/UL (ref 0–0.2)
BASOPHILS NFR BLD: 0.3 % (ref 0–1.9)
BENZODIAZ UR QL SCN>200 NG/ML: ABNORMAL
BILIRUB SERPL-MCNC: 0.5 MG/DL (ref 0.1–1)
BILIRUB UR QL STRIP: NEGATIVE
BUN SERPL-MCNC: 17 MG/DL (ref 8–23)
BZE UR QL SCN: NEGATIVE
CALCIUM SERPL-MCNC: 9.4 MG/DL (ref 8.7–10.5)
CANNABINOIDS UR QL SCN: NEGATIVE
CHLORIDE SERPL-SCNC: 103 MMOL/L (ref 95–110)
CLARITY UR: CLEAR
CO2 SERPL-SCNC: 28 MMOL/L (ref 23–29)
COLOR UR: YELLOW
CREAT SERPL-MCNC: 1.2 MG/DL (ref 0.5–1.4)
CREAT UR-MCNC: 53 MG/DL (ref 15–325)
DIFFERENTIAL METHOD BLD: ABNORMAL
EOSINOPHIL # BLD AUTO: 0.1 K/UL (ref 0–0.5)
EOSINOPHIL NFR BLD: 0.7 % (ref 0–8)
ERYTHROCYTE [DISTWIDTH] IN BLOOD BY AUTOMATED COUNT: 15.1 % (ref 11.5–14.5)
EST. GFR  (NO RACE VARIABLE): 50.2 ML/MIN/1.73 M^2
ETHANOL SERPL-MCNC: <3 MG/DL
GLUCOSE SERPL-MCNC: 100 MG/DL (ref 70–110)
GLUCOSE UR QL STRIP: NEGATIVE
HCT VFR BLD AUTO: 39.4 % (ref 37–48.5)
HGB BLD-MCNC: 13.2 G/DL (ref 12–16)
HGB UR QL STRIP: NEGATIVE
IMM GRANULOCYTES # BLD AUTO: 0.06 K/UL (ref 0–0.04)
IMM GRANULOCYTES NFR BLD AUTO: 0.5 % (ref 0–0.5)
KETONES UR QL STRIP: NEGATIVE
LEUKOCYTE ESTERASE UR QL STRIP: NEGATIVE
LYMPHOCYTES # BLD AUTO: 2.2 K/UL (ref 1–4.8)
LYMPHOCYTES NFR BLD: 18.5 % (ref 18–48)
MCH RBC QN AUTO: 29.6 PG (ref 27–31)
MCHC RBC AUTO-ENTMCNC: 33.5 G/DL (ref 32–36)
MCV RBC AUTO: 88 FL (ref 82–98)
METHADONE UR QL SCN>300 NG/ML: NEGATIVE
MONOCYTES # BLD AUTO: 1 K/UL (ref 0.3–1)
MONOCYTES NFR BLD: 8.2 % (ref 4–15)
NEUTROPHILS # BLD AUTO: 8.7 K/UL (ref 1.8–7.7)
NEUTROPHILS NFR BLD: 71.8 % (ref 38–73)
NITRITE UR QL STRIP: NEGATIVE
NRBC BLD-RTO: 0 /100 WBC
OPIATES UR QL SCN: NEGATIVE
PCP UR QL SCN>25 NG/ML: NEGATIVE
PH UR STRIP: 6 [PH] (ref 5–8)
PLATELET # BLD AUTO: 333 K/UL (ref 150–450)
PMV BLD AUTO: 10.4 FL (ref 9.2–12.9)
POTASSIUM SERPL-SCNC: 3.7 MMOL/L (ref 3.5–5.1)
PROT SERPL-MCNC: 6.4 G/DL (ref 6–8.4)
PROT UR QL STRIP: NEGATIVE
RBC # BLD AUTO: 4.46 M/UL (ref 4–5.4)
SALICYLATES SERPL-MCNC: 4.2 MG/DL (ref 15–30)
SODIUM SERPL-SCNC: 139 MMOL/L (ref 136–145)
SP GR UR STRIP: 1.01 (ref 1–1.03)
TOXICOLOGY INFORMATION: ABNORMAL
TSH SERPL DL<=0.005 MIU/L-ACNC: 1.73 UIU/ML (ref 0.4–4)
URN SPEC COLLECT METH UR: NORMAL
UROBILINOGEN UR STRIP-ACNC: NEGATIVE EU/DL
WBC # BLD AUTO: 12.1 K/UL (ref 3.9–12.7)

## 2024-11-12 PROCEDURE — 25000003 PHARM REV CODE 250: Performed by: PSYCHIATRY & NEUROLOGY

## 2024-11-12 PROCEDURE — 36415 COLL VENOUS BLD VENIPUNCTURE: CPT | Performed by: EMERGENCY MEDICINE

## 2024-11-12 PROCEDURE — 25000003 PHARM REV CODE 250: Performed by: EMERGENCY MEDICINE

## 2024-11-12 PROCEDURE — 82077 ASSAY SPEC XCP UR&BREATH IA: CPT | Performed by: EMERGENCY MEDICINE

## 2024-11-12 PROCEDURE — 80307 DRUG TEST PRSMV CHEM ANLYZR: CPT | Performed by: EMERGENCY MEDICINE

## 2024-11-12 PROCEDURE — 80053 COMPREHEN METABOLIC PANEL: CPT | Performed by: EMERGENCY MEDICINE

## 2024-11-12 PROCEDURE — 81003 URINALYSIS AUTO W/O SCOPE: CPT | Performed by: EMERGENCY MEDICINE

## 2024-11-12 PROCEDURE — 84443 ASSAY THYROID STIM HORMONE: CPT | Performed by: EMERGENCY MEDICINE

## 2024-11-12 PROCEDURE — 85025 COMPLETE CBC W/AUTO DIFF WBC: CPT | Performed by: EMERGENCY MEDICINE

## 2024-11-12 PROCEDURE — 99285 EMERGENCY DEPT VISIT HI MDM: CPT

## 2024-11-12 PROCEDURE — 80143 DRUG ASSAY ACETAMINOPHEN: CPT | Performed by: EMERGENCY MEDICINE

## 2024-11-12 PROCEDURE — 11400000 HC PSYCH PRIVATE ROOM

## 2024-11-12 PROCEDURE — 80179 DRUG ASSAY SALICYLATE: CPT | Performed by: EMERGENCY MEDICINE

## 2024-11-12 RX ORDER — HYDROXYZINE PAMOATE 50 MG/1
50 CAPSULE ORAL EVERY 6 HOURS PRN
Status: DISCONTINUED | OUTPATIENT
Start: 2024-11-12 | End: 2024-11-22 | Stop reason: HOSPADM

## 2024-11-12 RX ORDER — SODIUM CHLORIDE 0.9 % (FLUSH) 0.9 %
10 SYRINGE (ML) INJECTION
Status: DISCONTINUED | OUTPATIENT
Start: 2024-11-12 | End: 2024-11-12

## 2024-11-12 RX ORDER — OLANZAPINE 10 MG/2ML
10 INJECTION, POWDER, FOR SOLUTION INTRAMUSCULAR EVERY 8 HOURS PRN
Status: DISCONTINUED | OUTPATIENT
Start: 2024-11-12 | End: 2024-11-22 | Stop reason: HOSPADM

## 2024-11-12 RX ORDER — RISPERIDONE 1 MG/1
3 TABLET ORAL NIGHTLY
Status: DISCONTINUED | OUTPATIENT
Start: 2024-11-12 | End: 2024-11-22 | Stop reason: HOSPADM

## 2024-11-12 RX ORDER — PROMETHAZINE HYDROCHLORIDE 25 MG/1
25 TABLET ORAL EVERY 6 HOURS PRN
Status: DISCONTINUED | OUTPATIENT
Start: 2024-11-12 | End: 2024-11-22 | Stop reason: HOSPADM

## 2024-11-12 RX ORDER — LEVOTHYROXINE SODIUM 75 UG/1
75 TABLET ORAL
Status: DISCONTINUED | OUTPATIENT
Start: 2024-11-13 | End: 2024-11-22 | Stop reason: HOSPADM

## 2024-11-12 RX ORDER — ACETAMINOPHEN 325 MG/1
650 TABLET ORAL EVERY 6 HOURS PRN
Status: DISCONTINUED | OUTPATIENT
Start: 2024-11-12 | End: 2024-11-22 | Stop reason: HOSPADM

## 2024-11-12 RX ORDER — BENZONATATE 100 MG/1
100 CAPSULE ORAL 3 TIMES DAILY PRN
Status: DISCONTINUED | OUTPATIENT
Start: 2024-11-12 | End: 2024-11-22 | Stop reason: HOSPADM

## 2024-11-12 RX ORDER — ALBUTEROL SULFATE 90 UG/1
2 INHALANT RESPIRATORY (INHALATION) EVERY 4 HOURS PRN
Status: DISCONTINUED | OUTPATIENT
Start: 2024-11-12 | End: 2024-11-15

## 2024-11-12 RX ORDER — ONDANSETRON 4 MG/1
4 TABLET, ORALLY DISINTEGRATING ORAL EVERY 8 HOURS PRN
Status: DISCONTINUED | OUTPATIENT
Start: 2024-11-12 | End: 2024-11-22 | Stop reason: HOSPADM

## 2024-11-12 RX ORDER — OLANZAPINE 10 MG/1
10 TABLET ORAL EVERY 8 HOURS PRN
Status: DISCONTINUED | OUTPATIENT
Start: 2024-11-12 | End: 2024-11-22 | Stop reason: HOSPADM

## 2024-11-12 RX ORDER — TALC
6 POWDER (GRAM) TOPICAL NIGHTLY PRN
Status: DISCONTINUED | OUTPATIENT
Start: 2024-11-12 | End: 2024-11-12

## 2024-11-12 RX ORDER — ALUMINUM HYDROXIDE, MAGNESIUM HYDROXIDE, AND SIMETHICONE 1200; 120; 1200 MG/30ML; MG/30ML; MG/30ML
30 SUSPENSION ORAL EVERY 6 HOURS PRN
Status: DISCONTINUED | OUTPATIENT
Start: 2024-11-12 | End: 2024-11-22 | Stop reason: HOSPADM

## 2024-11-12 RX ORDER — LOPERAMIDE HYDROCHLORIDE 2 MG/1
2 CAPSULE ORAL
Status: DISCONTINUED | OUTPATIENT
Start: 2024-11-12 | End: 2024-11-22 | Stop reason: HOSPADM

## 2024-11-12 RX ORDER — LORAZEPAM 1 MG/1
1 TABLET ORAL
Status: COMPLETED | OUTPATIENT
Start: 2024-11-12 | End: 2024-11-12

## 2024-11-12 RX ORDER — IBUPROFEN 200 MG
1 TABLET ORAL DAILY PRN
Status: DISCONTINUED | OUTPATIENT
Start: 2024-11-12 | End: 2024-11-22 | Stop reason: HOSPADM

## 2024-11-12 RX ORDER — BENZTROPINE MESYLATE 1 MG/ML
2 INJECTION, SOLUTION INTRAMUSCULAR; INTRAVENOUS EVERY 8 HOURS PRN
Status: DISCONTINUED | OUTPATIENT
Start: 2024-11-12 | End: 2024-11-22 | Stop reason: HOSPADM

## 2024-11-12 RX ADMIN — HYDROXYZINE PAMOATE 50 MG: 50 CAPSULE ORAL at 08:11

## 2024-11-12 RX ADMIN — LORAZEPAM 1 MG: 1 TABLET ORAL at 11:11

## 2024-11-12 RX ADMIN — RISPERIDONE 3 MG: 1 TABLET, FILM COATED ORAL at 08:11

## 2024-11-12 NOTE — ED PROVIDER NOTES
Encounter Date: 11/12/2024       History     Chief Complaint   Patient presents with    Mental Health Problem     Pt seen at Dr. Merchant's office and sent here for evaluation. Pt stated that last night she started to feel like she was going to have a nervous breakdown, states that nothing happened it just came out the blue. States that she does not have SI or HI. Is currently on medication for anxiety for about one month now.      Patient with a history of depression currently on Paxil presents from Dr. Gar's office for concerns of nervous breakdown and pending.  Patient states it came on last night she has tried breathing exercises she was feels like she was going to go into a full blown mental breakdown.  She was medically compliant with her medication.  No recent fevers illnesses.  Denies any suicidal or homicidal ideation.  Patient anxious at bedside      Review of patient's allergies indicates:  No Known Allergies  Past Medical History:   Diagnosis Date    Anxiety     Breast cancer 2010    Cancer     Breast    COPD (chronic obstructive pulmonary disease)     Depression     Diabetes mellitus     GERD (gastroesophageal reflux disease)     Hypertension     Insomnia     Thyroid disease      Past Surgical History:   Procedure Laterality Date    BLADDER SUSPENSION      BREAST LUMPECTOMY      Right breast    CHOLECYSTECTOMY      HYSTERECTOMY      KNEE ARTHROSCOPY      Right knee    OOPHORECTOMY       Family History   Problem Relation Name Age of Onset    No Known Problems Mother      No Known Problems Father      No Known Problems Sister      No Known Problems Daughter      No Known Problems Maternal Aunt      No Known Problems Maternal Uncle      No Known Problems Paternal Aunt      No Known Problems Paternal Uncle      No Known Problems Maternal Grandmother      No Known Problems Maternal Grandfather      No Known Problems Paternal Grandmother      No Known Problems Paternal Grandfather      No Known Problems  Other      Breast cancer Neg Hx      Ovarian cancer Neg Hx      BRCA 1/2 Neg Hx       Social History     Tobacco Use    Smoking status: Every Day     Current packs/day: 0.50     Average packs/day: 0.5 packs/day for 50.5 years (25.2 ttl pk-yrs)     Types: Cigarettes     Start date: 5/15/1974     Passive exposure: Past    Smokeless tobacco: Never    Tobacco comments:     quit yesterday 6/12/21   Substance Use Topics    Alcohol use: Not Currently    Drug use: Never     Review of Systems   Psychiatric/Behavioral:  Positive for behavioral problems.    All other systems reviewed and are negative.      Physical Exam     Initial Vitals   BP Pulse Resp Temp SpO2   11/12/24 1050 11/12/24 1050 11/12/24 1053 11/12/24 1050 11/12/24 1050   (!) 184/83 84 16 98.2 °F (36.8 °C) 97 %      MAP       --                Physical Exam    Nursing note and vitals reviewed.  Constitutional: She appears well-developed and well-nourished.   Appears anxious   HENT:   Head: Normocephalic and atraumatic.   Eyes: EOM are normal. Pupils are equal, round, and reactive to light.   Neck:   Normal range of motion.  Cardiovascular:  Normal rate and regular rhythm.           Pulmonary/Chest: No stridor. No respiratory distress. She has no wheezes.   Abdominal: Abdomen is soft. Bowel sounds are normal. She exhibits no distension.   Musculoskeletal:         General: Normal range of motion.      Cervical back: Normal range of motion.     Neurological: She is alert and oriented to person, place, and time.         ED Course   Procedures  Labs Reviewed   ACETAMINOPHEN LEVEL - Abnormal       Result Value    Acetaminophen (Tylenol), Serum <2.0 (*)    COMPREHENSIVE METABOLIC PANEL - Abnormal    Sodium 139      Potassium 3.7      Chloride 103      CO2 28      Glucose 100      BUN 17      Creatinine 1.2      Calcium 9.4      Total Protein 6.4      Albumin 3.2 (*)     Total Bilirubin 0.5      Alkaline Phosphatase 110      AST 10      ALT 17      eGFR 50.2 (*)      Anion Gap 8     CBC W/ AUTO DIFFERENTIAL - Abnormal    WBC 12.10      RBC 4.46      Hemoglobin 13.2      Hematocrit 39.4      MCV 88      MCH 29.6      MCHC 33.5      RDW 15.1 (*)     Platelets 333      MPV 10.4      Immature Granulocytes 0.5      Gran # (ANC) 8.7 (*)     Immature Grans (Abs) 0.06 (*)     Lymph # 2.2      Mono # 1.0      Eos # 0.1      Baso # 0.04      nRBC 0      Gran % 71.8      Lymph % 18.5      Mono % 8.2      Eosinophil % 0.7      Basophil % 0.3      Differential Method Automated     DRUG SCREEN PANEL, URINE EMERGENCY - Abnormal    Benzodiazepines Presumptive Positive (*)     Methadone metabolites Negative      Cocaine (Metab.) Negative      Opiate Scrn, Ur Negative      Barbiturate Screen, Ur Negative      Amphetamine Screen, Ur Negative      THC Negative      Phencyclidine Negative      Creatinine, Urine 53.0      Toxicology Information SEE COMMENT      Narrative:     Preferred Collection Type->Urine, Clean Catch  Specimen Source->Urine   SALICYLATE LEVEL - Abnormal    Salicylate Lvl 4.2 (*)    URINALYSIS, REFLEX TO URINE CULTURE    Specimen UA Urine, Clean Catch      Color, UA Yellow      Appearance, UA Clear      pH, UA 6.0      Specific Gravity, UA 1.015      Protein, UA Negative      Glucose, UA Negative      Ketones, UA Negative      Bilirubin (UA) Negative      Occult Blood UA Negative      Nitrite, UA Negative      Urobilinogen, UA Negative      Leukocytes, UA Negative      Narrative:     Preferred Collection Type->Urine, Clean Catch  Specimen Source->Urine   ALCOHOL,MEDICAL (ETHANOL)    Alcohol, Serum <3     TSH    TSH 1.730            Imaging Results    None          Medications   LORazepam tablet 1 mg (1 mg Oral Given 11/12/24 1107)     Medical Decision Making                        Medical Decision Making:   Initial Assessment:   Patient is sent by primary care doctor for concerns of impending mental breakdown.  Differential Diagnosis:   Depression, nervous breakdown, anxiety, and  suicidal ideations, homicidal ideations, depression  ED Management:  Time 12:52 p.m.   Discussed case with Dr. Mckee who will admit the patient. She is medically cleared             Clinical Impression:  Final diagnoses:  [Z00.8] Medical clearance for psychiatric admission (Primary)          ED Disposition Condition    Admit Stable                Jordy Robert MD  11/12/24 7096

## 2024-11-12 NOTE — NURSING
"As per ER chart:    Pt seen at Dr. Merchant's office and sent here for evaluation. Pt stated that last night she started to feel like she was going to have a nervous breakdown, states that nothing happened it just came out the blue. States that she does not have SI or HI. Is currently on medication for anxiety for about one month now.       Patient with a history of depression currently on Paxil presents from Dr. Gar's office for concerns of nervous breakdown. Patient states it came on last night she has tried breathing exercises she was feels like she was going to go into a full blown mental breakdown. She was medically compliant with her medication. No recent fevers or illnesses. Denies any suicidal or homicidal ideation. Patient anxious at bedside.  Pt admitted from [facility] ER per  with [employer] x 2 at side along with ER tech.  Pt checked per proscan machine with negative results prior to walking on to unit.  Pt sent over from dr merchant's office after complaints of "having a nervous breakdown."   Pt states she is not sure why but that her nerves started getting bad for no reason at all.  Pt remains anxious and requesting a nerve pill despite receiving lorazepam in er.   Pt states "it didn't work."   Pt made aware that dr cespedes put in her admit orders and that she can have vistaril for anxiety.  Pt stated "I don't think that will work. I'll just go lay down."  Pt denies si, hi or a v hallucinations.  Gravely disabled.  See full assessment per admission profile.  Admission paperwork explained to pt and signed.  See in chart.  Pt oriented to unit and unit routine.  Verbalized understanding.  Instructed to call for any needs or concerns at all.  Will cont to monitor for safety.   Patient care ongoing.  Q15 min close obs maintained as per physician's orders.    "

## 2024-11-13 PROBLEM — J44.9 COPD (CHRONIC OBSTRUCTIVE PULMONARY DISEASE): Status: ACTIVE | Noted: 2024-03-10

## 2024-11-13 PROBLEM — E11.9 TYPE 2 DIABETES MELLITUS, WITHOUT LONG-TERM CURRENT USE OF INSULIN: Status: ACTIVE | Noted: 2024-11-13

## 2024-11-13 PROCEDURE — 25000003 PHARM REV CODE 250: Performed by: NURSE PRACTITIONER

## 2024-11-13 PROCEDURE — 25000003 PHARM REV CODE 250: Performed by: PSYCHIATRY & NEUROLOGY

## 2024-11-13 PROCEDURE — 99223 1ST HOSP IP/OBS HIGH 75: CPT | Mod: AI,,, | Performed by: PSYCHIATRY & NEUROLOGY

## 2024-11-13 PROCEDURE — 90833 PSYTX W PT W E/M 30 MIN: CPT | Mod: ,,, | Performed by: PSYCHIATRY & NEUROLOGY

## 2024-11-13 PROCEDURE — 25000242 PHARM REV CODE 250 ALT 637 W/ HCPCS: Performed by: NURSE PRACTITIONER

## 2024-11-13 PROCEDURE — 11400000 HC PSYCH PRIVATE ROOM

## 2024-11-13 PROCEDURE — S4991 NICOTINE PATCH NONLEGEND: HCPCS | Performed by: PSYCHIATRY & NEUROLOGY

## 2024-11-13 RX ORDER — METFORMIN HYDROCHLORIDE 750 MG/1
750 TABLET, EXTENDED RELEASE ORAL
Status: DISCONTINUED | OUTPATIENT
Start: 2024-11-14 | End: 2024-11-13

## 2024-11-13 RX ORDER — METFORMIN HYDROCHLORIDE 500 MG/1
500 TABLET, EXTENDED RELEASE ORAL
Status: DISCONTINUED | OUTPATIENT
Start: 2024-11-13 | End: 2024-11-22 | Stop reason: HOSPADM

## 2024-11-13 RX ORDER — FLUTICASONE FUROATE AND VILANTEROL 100; 25 UG/1; UG/1
1 POWDER RESPIRATORY (INHALATION) DAILY
Status: DISCONTINUED | OUTPATIENT
Start: 2024-11-13 | End: 2024-11-22 | Stop reason: HOSPADM

## 2024-11-13 RX ORDER — PAROXETINE HYDROCHLORIDE 20 MG/1
40 TABLET, FILM COATED ORAL DAILY
Status: DISCONTINUED | OUTPATIENT
Start: 2024-11-13 | End: 2024-11-15

## 2024-11-13 RX ORDER — BUDESONIDE 0.5 MG/2ML
0.5 INHALANT ORAL EVERY 12 HOURS
Status: DISCONTINUED | OUTPATIENT
Start: 2024-11-13 | End: 2024-11-13

## 2024-11-13 RX ORDER — IPRATROPIUM BROMIDE 0.5 MG/2.5ML
0.5 SOLUTION RESPIRATORY (INHALATION) EVERY 12 HOURS
Status: DISCONTINUED | OUTPATIENT
Start: 2024-11-13 | End: 2024-11-13

## 2024-11-13 RX ORDER — ARFORMOTEROL TARTRATE 15 UG/2ML
15 SOLUTION RESPIRATORY (INHALATION) 2 TIMES DAILY
Status: DISCONTINUED | OUTPATIENT
Start: 2024-11-13 | End: 2024-11-13

## 2024-11-13 RX ORDER — DIAZEPAM 5 MG/1
5 TABLET ORAL 3 TIMES DAILY
Status: DISCONTINUED | OUTPATIENT
Start: 2024-11-13 | End: 2024-11-15

## 2024-11-13 RX ORDER — PANTOPRAZOLE SODIUM 40 MG/1
40 TABLET, DELAYED RELEASE ORAL DAILY
Status: DISCONTINUED | OUTPATIENT
Start: 2024-11-14 | End: 2024-11-22 | Stop reason: HOSPADM

## 2024-11-13 RX ORDER — ASPIRIN 81 MG/1
81 TABLET ORAL DAILY
Status: DISCONTINUED | OUTPATIENT
Start: 2024-11-14 | End: 2024-11-22 | Stop reason: HOSPADM

## 2024-11-13 RX ORDER — GABAPENTIN 100 MG/1
100 CAPSULE ORAL 2 TIMES DAILY
Status: DISCONTINUED | OUTPATIENT
Start: 2024-11-13 | End: 2024-11-15

## 2024-11-13 RX ORDER — AMLODIPINE BESYLATE 10 MG/1
10 TABLET ORAL DAILY
Status: DISCONTINUED | OUTPATIENT
Start: 2024-11-13 | End: 2024-11-22 | Stop reason: HOSPADM

## 2024-11-13 RX ADMIN — ALBUTEROL SULFATE 2 PUFF: 90 AEROSOL, METERED RESPIRATORY (INHALATION) at 08:11

## 2024-11-13 RX ADMIN — HYDROXYZINE PAMOATE 50 MG: 50 CAPSULE ORAL at 08:11

## 2024-11-13 RX ADMIN — GABAPENTIN 100 MG: 100 CAPSULE ORAL at 08:11

## 2024-11-13 RX ADMIN — GABAPENTIN 100 MG: 100 CAPSULE ORAL at 09:11

## 2024-11-13 RX ADMIN — ALBUTEROL SULFATE 2 PUFF: 90 AEROSOL, METERED RESPIRATORY (INHALATION) at 12:11

## 2024-11-13 RX ADMIN — LEVOTHYROXINE SODIUM 75 MCG: 75 TABLET ORAL at 06:11

## 2024-11-13 RX ADMIN — FLUTICASONE FUROATE AND VILANTEROL TRIFENATATE 1 PUFF: 100; 25 POWDER RESPIRATORY (INHALATION) at 01:11

## 2024-11-13 RX ADMIN — DIAZEPAM 5 MG: 5 TABLET ORAL at 03:11

## 2024-11-13 RX ADMIN — ALBUTEROL SULFATE 2 PUFF: 90 AEROSOL, METERED RESPIRATORY (INHALATION) at 05:11

## 2024-11-13 RX ADMIN — AMLODIPINE BESYLATE 10 MG: 10 TABLET ORAL at 12:11

## 2024-11-13 RX ADMIN — DIAZEPAM 5 MG: 5 TABLET ORAL at 09:11

## 2024-11-13 RX ADMIN — NICOTINE 1 PATCH: 14 PATCH, EXTENDED RELEASE TRANSDERMAL at 09:11

## 2024-11-13 RX ADMIN — METFORMIN HYDROCHLORIDE 500 MG: 500 TABLET, EXTENDED RELEASE ORAL at 03:11

## 2024-11-13 RX ADMIN — RISPERIDONE 3 MG: 1 TABLET, FILM COATED ORAL at 08:11

## 2024-11-13 RX ADMIN — DIAZEPAM 5 MG: 5 TABLET ORAL at 08:11

## 2024-11-13 RX ADMIN — PAROXETINE HYDROCHLORIDE 40 MG: 20 TABLET, FILM COATED ORAL at 09:11

## 2024-11-13 NOTE — NURSING
Pt. Was noticed to be sitting in dining room watching TV with other Pts. She was not talking and was noticed to sitting alone at table. She denied any c/o auditory or visual hallucinations. She remains sad and depressed most times. Pt. Took her medications without diff. Encouraged Pt. To verbalize her feelings. Will cont. To monitor Pt.

## 2024-11-13 NOTE — H&P
[facility] Hospital Medicine  History & Physical    Patient Name: Vero Allen  MRN: [id number]  Patient Class: IP- Psych  Admission Date: 11/12/2024  Attending Physician: No att. providers found   Primary Care Provider: Burke Merchant MD         Patient information was obtained from patient, past medical records, and ER records.     Subjective:     Principal Problem:MDD (major depressive disorder), recurrent, severe, with psychosis    Chief Complaint:   Chief Complaint   Patient presents with    Mental Health Problem     Pt seen at Dr. Merchant's office and sent here for evaluation. Pt stated that last night she started to feel like she was going to have a nervous breakdown, states that nothing happened it just came out the blue. States that she does not have SI or HI. Is currently on medication for anxiety for about one month now.         HPI:     Encounter Date: 11/12/2024        History           Chief Complaint   Patient presents with    Mental Health Problem       Pt seen at Dr. Merchant's office and sent here for evaluation. Pt stated that last night she started to feel like she was going to have a nervous breakdown, states that nothing happened it just came out the blue. States that she does not have SI or HI. Is currently on medication for anxiety for about one month now.       Patient with a history of depression currently on Paxil presents from Dr. Gar's office for concerns of nervous breakdown and pending.  Patient states it came on last night she has tried breathing exercises she was feels like she was going to go into a full blown mental breakdown.  She was medically compliant with her medication.  No recent fevers illnesses.  Denies any suicidal or homicidal ideation.  Patient anxious at bedside             11/13/24:  Patient with history of depression, followed by Dr. Merchant.  Reports acute worsening of depressive symptoms recently.  Denies SI/HI but  felt as though she was going to have a complete breakdown.  She was sent from her PCP's office to ER for further evaluation and treatment.    Blood pressure has been labile, plan to resume home meds for now.  Continue to monitor and titrate as needed.     Past Medical History:   Diagnosis Date    Anxiety     Breast cancer 2010    Cancer     Breast    COPD (chronic obstructive pulmonary disease)     Depression     Diabetes mellitus     GERD (gastroesophageal reflux disease)     Hypertension     Insomnia     Thyroid disease        Past Surgical History:   Procedure Laterality Date    BLADDER SUSPENSION      BREAST LUMPECTOMY      Right breast    CHOLECYSTECTOMY      HYSTERECTOMY      KNEE ARTHROSCOPY      Right knee    OOPHORECTOMY         Review of patient's allergies indicates:  No Known Allergies    No current facility-administered medications on file prior to encounter.     Current Outpatient Medications on File Prior to Encounter   Medication Sig    albuterol (PROVENTIL/VENTOLIN HFA) 90 mcg/actuation inhaler Inhale 1-2 puffs into the lungs every 4 (four) hours as needed for Wheezing or Shortness of Breath. Rescue    albuterol-ipratropium (DUO-NEB) 2.5 mg-0.5 mg/3 mL nebulizer solution Take 3 mLs by nebulization every 4 (four) hours as needed for Wheezing or Shortness of Breath. Rescue    amLODIPine (NORVASC) 10 MG tablet Take 10 mg by mouth.    aspirin (ECOTRIN) 81 MG EC tablet Take 81 mg by mouth once daily.    BREZTRI AEROSPHERE 160-9-4.8 mcg/actuation HFAA Inhale into the lungs.    esomeprazole (NEXIUM) 20 MG capsule Take 20 mg by mouth before breakfast.    estradioL (ESTRACE) 1 MG tablet Take 1 tablet (1 mg total) by mouth once daily.    hydrOXYzine pamoate (VISTARIL) 25 MG Cap Take 1 capsule (25 mg total) by mouth every 8 (eight) hours as needed (anxiety).    levothyroxine (SYNTHROID) 75 MCG tablet Take 1 tablet (75 mcg total) by mouth before breakfast.    metFORMIN (GLUCOPHAGE-XR) 750 MG ER 24hr tablet Take  by mouth.    paroxetine (PAXIL) 30 MG tablet Take 1 tablet (30 mg total) by mouth once daily.    risperiDONE (RISPERDAL) 3 MG Tab Take 1 tablet (3 mg total) by mouth every evening.     Family History       Problem Relation (Age of Onset)    No Known Problems Mother, Father, Sister, Daughter, Maternal Aunt, Maternal Uncle, Paternal Aunt, Paternal Uncle, Maternal Grandmother, Maternal Grandfather, Paternal Grandmother, Paternal Grandfather, Other          Tobacco Use    Smoking status: Every Day     Current packs/day: 0.50     Average packs/day: 0.5 packs/day for 50.5 years (25.2 ttl pk-yrs)     Types: Cigarettes     Start date: 5/15/1974     Passive exposure: Past    Smokeless tobacco: Never    Tobacco comments:     quit yesterday 6/12/21   Substance and Sexual Activity    Alcohol use: Not Currently    Drug use: Never    Sexual activity: Not Currently     Partners: Male     Birth control/protection: None     Review of Systems   Psychiatric/Behavioral:  Positive for agitation, behavioral problems and dysphoric mood. The patient is nervous/anxious.    All other systems reviewed and are negative.    Objective:     Vital Signs (Most Recent):  Temp: 97.8 °F (36.6 °C) (11/13/24 0740)  Pulse: 82 (11/13/24 0740)  Resp: 18 (11/13/24 0740)  BP: (!) 159/109 (11/13/24 0740)  SpO2: 96 % (11/13/24 0740) Vital Signs (24h Range):  Temp:  [97.8 °F (36.6 °C)-98.2 °F (36.8 °C)] 97.8 °F (36.6 °C)  Pulse:  [71-87] 82  Resp:  [16-20] 18  SpO2:  [95 %-97 %] 96 %  BP: (136-184)/() 159/109     Weight: 110.7 kg (244 lb)  Body mass index is 43.22 kg/m².     Physical Exam  Vitals and nursing note reviewed.   Constitutional:       Appearance: Normal appearance.   HENT:      Head: Normocephalic and atraumatic.      Nose: Nose normal.      Mouth/Throat:      Mouth: Mucous membranes are moist.      Pharynx: Oropharynx is clear.   Eyes:      Extraocular Movements: Extraocular movements intact.      Conjunctiva/sclera: Conjunctivae normal.       Pupils: Pupils are equal, round, and reactive to light.   Cardiovascular:      Rate and Rhythm: Normal rate and regular rhythm.      Pulses: Normal pulses.      Heart sounds: Normal heart sounds.   Pulmonary:      Effort: Pulmonary effort is normal.      Breath sounds: Normal breath sounds.   Abdominal:      General: Abdomen is flat. Bowel sounds are normal.      Palpations: Abdomen is soft.   Musculoskeletal:         General: Normal range of motion.      Cervical back: Normal range of motion and neck supple.   Skin:     General: Skin is warm and dry.      Capillary Refill: Capillary refill takes less than 2 seconds.      Comments: No rashes on limited skin exam.   Neurological:      General: No focal deficit present.      Mental Status: She is alert and oriented to person, place, and time.      Cranial Nerves: No cranial nerve deficit.      Comments: I Olfactory:  Sense of smell intact    II Optic:  Pupils equal round react to light.  Vision intact.    III, IV, VI, Ocular motor, Trochlear, Abducens:  Extraocular movements intact    V Trigeminal:  Facial sensation intact facial sensation intact,, muscles of mastication intact muscles of mastication intact, corneal reflex intact, corneal reflex intact    VII Facial:  Muscles of facial expression intact     VIII Vestibular cochlear: Hearing intact vestibular cochlear: Hearing intact    IX Glossopharyngeal:  Gag reflex intact.  Tasting intact.     X Vagus:  Gag reflex intact.    XI Spinal Accessory:  Shoulder shrug intact.  Head rotation intact.    XII Hypoglossal:  Tongue movements intact.     Psychiatric:         Mood and Affect: Mood is depressed.      Comments: Patient appears depressed              CRANIAL NERVES     CN III, IV, VI   Pupils are equal, round, and reactive to light.       Significant Labs: All pertinent labs within the past 24 hours have been reviewed.    Significant Imaging: I have reviewed all pertinent imaging results/findings within the past 24  hours.  Assessment/Plan:     * MDD (major depressive disorder), recurrent, severe, with psychosis  11/13/24:  Per inpatient psych team recommendations.       Type 2 diabetes mellitus, without long-term current use of insulin  Continue metformin.       Hypothyroid  Continue synthroid.       Generalized anxiety disorder  Per inpatient psych team.       Primary hypertension  Patients blood pressure range in the last 24 hours was: BP  Min: 136/78  Max: 184/83.The patient's inpatient anti-hypertensive regimen is listed below:  Current Antihypertensives  amLODIPine tablet 10 mg, Daily, Oral    Plan  - BP is controlled, no changes needed to their regimen  - Continue home meds for now.     COPD (chronic obstructive pulmonary disease)  Patient's COPD is controlled currently.  Patient is currently on COPD Pathway. Continue scheduled inhalers and monitor respiratory status closely.     Nicotine dependence, uncomplicated  Recommend cessation     Severe obesity (BMI >= 40)  Body mass index is 43.22 kg/m². Morbid obesity complicates all aspects of disease management from diagnostic modalities to treatment. Weight loss encouraged and health benefits explained to patient.           VTE Risk Mitigation (From admission, onward)      None                            ELIANA Navarro  Department of Hospital Medicine  [facility] ([facility])

## 2024-11-13 NOTE — ASSESSMENT & PLAN NOTE
Patient's COPD is controlled currently.  Patient is currently on COPD Pathway. Continue scheduled inhalers and monitor respiratory status closely.

## 2024-11-13 NOTE — SUBJECTIVE & OBJECTIVE
Past Medical History:   Diagnosis Date    Anxiety     Breast cancer 2010    Cancer     Breast    COPD (chronic obstructive pulmonary disease)     Depression     Diabetes mellitus     GERD (gastroesophageal reflux disease)     Hypertension     Insomnia     Thyroid disease        Past Surgical History:   Procedure Laterality Date    BLADDER SUSPENSION      BREAST LUMPECTOMY      Right breast    CHOLECYSTECTOMY      HYSTERECTOMY      KNEE ARTHROSCOPY      Right knee    OOPHORECTOMY         Review of patient's allergies indicates:  No Known Allergies    No current facility-administered medications on file prior to encounter.     Current Outpatient Medications on File Prior to Encounter   Medication Sig    albuterol (PROVENTIL/VENTOLIN HFA) 90 mcg/actuation inhaler Inhale 1-2 puffs into the lungs every 4 (four) hours as needed for Wheezing or Shortness of Breath. Rescue    albuterol-ipratropium (DUO-NEB) 2.5 mg-0.5 mg/3 mL nebulizer solution Take 3 mLs by nebulization every 4 (four) hours as needed for Wheezing or Shortness of Breath. Rescue    amLODIPine (NORVASC) 10 MG tablet Take 10 mg by mouth.    aspirin (ECOTRIN) 81 MG EC tablet Take 81 mg by mouth once daily.    BREZTRI AEROSPHERE 160-9-4.8 mcg/actuation HFAA Inhale into the lungs.    esomeprazole (NEXIUM) 20 MG capsule Take 20 mg by mouth before breakfast.    estradioL (ESTRACE) 1 MG tablet Take 1 tablet (1 mg total) by mouth once daily.    hydrOXYzine pamoate (VISTARIL) 25 MG Cap Take 1 capsule (25 mg total) by mouth every 8 (eight) hours as needed (anxiety).    levothyroxine (SYNTHROID) 75 MCG tablet Take 1 tablet (75 mcg total) by mouth before breakfast.    metFORMIN (GLUCOPHAGE-XR) 750 MG ER 24hr tablet Take by mouth.    paroxetine (PAXIL) 30 MG tablet Take 1 tablet (30 mg total) by mouth once daily.    risperiDONE (RISPERDAL) 3 MG Tab Take 1 tablet (3 mg total) by mouth every evening.     Family History       Problem Relation (Age of Onset)    No Known  Problems Mother, Father, Sister, Daughter, Maternal Aunt, Maternal Uncle, Paternal Aunt, Paternal Uncle, Maternal Grandmother, Maternal Grandfather, Paternal Grandmother, Paternal Grandfather, Other          Tobacco Use    Smoking status: Every Day     Current packs/day: 0.50     Average packs/day: 0.5 packs/day for 50.5 years (25.2 ttl pk-yrs)     Types: Cigarettes     Start date: 5/15/1974     Passive exposure: Past    Smokeless tobacco: Never    Tobacco comments:     quit yesterday 6/12/21   Substance and Sexual Activity    Alcohol use: Not Currently    Drug use: Never    Sexual activity: Not Currently     Partners: Male     Birth control/protection: None     Review of Systems   Psychiatric/Behavioral:  Positive for agitation, behavioral problems and dysphoric mood. The patient is nervous/anxious.    All other systems reviewed and are negative.    Objective:     Vital Signs (Most Recent):  Temp: 97.8 °F (36.6 °C) (11/13/24 0740)  Pulse: 82 (11/13/24 0740)  Resp: 18 (11/13/24 0740)  BP: (!) 159/109 (11/13/24 0740)  SpO2: 96 % (11/13/24 0740) Vital Signs (24h Range):  Temp:  [97.8 °F (36.6 °C)-98.2 °F (36.8 °C)] 97.8 °F (36.6 °C)  Pulse:  [71-87] 82  Resp:  [16-20] 18  SpO2:  [95 %-97 %] 96 %  BP: (136-184)/() 159/109     Weight: 110.7 kg (244 lb)  Body mass index is 43.22 kg/m².     Physical Exam  Vitals and nursing note reviewed.   Constitutional:       Appearance: Normal appearance.   HENT:      Head: Normocephalic and atraumatic.      Nose: Nose normal.      Mouth/Throat:      Mouth: Mucous membranes are moist.      Pharynx: Oropharynx is clear.   Eyes:      Extraocular Movements: Extraocular movements intact.      Conjunctiva/sclera: Conjunctivae normal.      Pupils: Pupils are equal, round, and reactive to light.   Cardiovascular:      Rate and Rhythm: Normal rate and regular rhythm.      Pulses: Normal pulses.      Heart sounds: Normal heart sounds.   Pulmonary:      Effort: Pulmonary effort is normal.       Breath sounds: Normal breath sounds.   Abdominal:      General: Abdomen is flat. Bowel sounds are normal.      Palpations: Abdomen is soft.   Musculoskeletal:         General: Normal range of motion.      Cervical back: Normal range of motion and neck supple.   Skin:     General: Skin is warm and dry.      Capillary Refill: Capillary refill takes less than 2 seconds.      Comments: No rashes on limited skin exam.   Neurological:      General: No focal deficit present.      Mental Status: She is alert and oriented to person, place, and time.      Cranial Nerves: No cranial nerve deficit.      Comments: I Olfactory:  Sense of smell intact    II Optic:  Pupils equal round react to light.  Vision intact.    III, IV, VI, Ocular motor, Trochlear, Abducens:  Extraocular movements intact    V Trigeminal:  Facial sensation intact facial sensation intact,, muscles of mastication intact muscles of mastication intact, corneal reflex intact, corneal reflex intact    VII Facial:  Muscles of facial expression intact     VIII Vestibular cochlear: Hearing intact vestibular cochlear: Hearing intact    IX Glossopharyngeal:  Gag reflex intact.  Tasting intact.     X Vagus:  Gag reflex intact.    XI Spinal Accessory:  Shoulder shrug intact.  Head rotation intact.    XII Hypoglossal:  Tongue movements intact.     Psychiatric:         Mood and Affect: Mood is depressed.      Comments: Patient appears depressed              CRANIAL NERVES     CN III, IV, VI   Pupils are equal, round, and reactive to light.       Significant Labs: All pertinent labs within the past 24 hours have been reviewed.    Significant Imaging: I have reviewed all pertinent imaging results/findings within the past 24 hours.

## 2024-11-13 NOTE — ASSESSMENT & PLAN NOTE
Patients blood pressure range in the last 24 hours was: BP  Min: 136/78  Max: 184/83.The patient's inpatient anti-hypertensive regimen is listed below:  Current Antihypertensives  amLODIPine tablet 10 mg, Daily, Oral    Plan  - BP is controlled, no changes needed to their regimen  - Continue home meds for now.

## 2024-11-13 NOTE — H&P
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 counseled  -start trial of gabapentin 100 mg po BID    Complicated bereavement: pt counseled     Nicotine Dependence: pt counseled  -start nicotine 14 mg patch dermal     BZD misuses  - start valium 5 mg PO TID, will taper off as tolerated  - pt counseled  - follow up with outpatient mental health providers after discharge for medication management and psychotherapy    Psychosocial stressors: pt counseled  -SW consulted to assist with resources    COPD: pt counseled  -Med consulted     HTN: pt counseled  -Med consulted    DM: pt counseled  -Med consulted    Thyroid disease: pt counseled  -Med consulted  -resumed Synthroid 75 mcg po q AM    GERD: pt counseled  -Med consulted      PRESCRIPTION DRUG MANAGEMENT  Compliance: yes  Side Effects: no  Regimen Adjustments: see above    Discussed diagnosis, risks and benefits of proposed treatment vs alternative treatments vs no treatment, potential side effects of these treatments and the inherent unpredictability of treatment. The patient expresses understanding of the above and displays the capacity to agree with this treatment given said understanding. Patient also agrees that, currently, the benefits outweigh the risks and would like to pursue/continue treatment at this  time.    Any medications being used off-label were discussed with the patient inclusive of the evidence base for the use of the medications and consent was obtained for the off-label use of the medication.         DIAGNOSTIC TESTING  Labs reviewed with patient; follow up pending labs    Disposition:  -Will attempt to obtain outside psychiatric records if available  -SW to assist with aftercare planning and collateral  -Once stable discharge home with outpatient follow up care and/or rehab  -Continue inpatient treatment under a [facility] and/or [facility] for danger to self/ danger to others/grave disability as evident by significant psychotic thought disorder and gravely disabled        Philip Mckee MD  Psychiatry

## 2024-11-13 NOTE — HPI
Encounter Date: 11/12/2024        History           Chief Complaint   Patient presents with    Mental Health Problem       Pt seen at Dr. Merchant's office and sent here for evaluation. Pt stated that last night she started to feel like she was going to have a nervous breakdown, states that nothing happened it just came out the blue. States that she does not have SI or HI. Is currently on medication for anxiety for about one month now.       Patient with a history of depression currently on Paxil presents from Dr. Gar's office for concerns of nervous breakdown and pending.  Patient states it came on last night she has tried breathing exercises she was feels like she was going to go into a full blown mental breakdown.  She was medically compliant with her medication.  No recent fevers illnesses.  Denies any suicidal or homicidal ideation.  Patient anxious at bedside             11/13/24:  Patient with history of depression, followed by Dr. Merchant.  Reports acute worsening of depressive symptoms recently.  Denies SI/HI but felt as though she was going to have a complete breakdown.  She was sent from her PCP's office to ER for further evaluation and treatment.    Blood pressure has been labile, plan to resume home meds for now.  Continue to monitor and titrate as needed.

## 2024-11-13 NOTE — CONSULTS
RD consulted for new admit. Spoke with RN via phone and RN stated that the pt has no dietary issues at this time. Pt is tolerating a Regular diet and eating an adequate amount of meals and snacks. Noted pt has a hx of diabetes. Diet order switched to Consistent CHO and RN notified. Nutrition services are not warranted at this time. RD to sign off. Please re-consult if any nutrition/dietary issues arise.

## 2024-11-13 NOTE — PLAN OF CARE
Problem: Bariatric Environmental Safety  Goal: Safety Maintained with Care  Outcome: Progressing     Problem: Adult Behavioral Health Plan of Care  Goal: Plan of Care Review  11/12/2024 2124 by Meche Farris RN  Outcome: Progressing  11/12/2024 2123 by Meche Farris RN  Outcome: Progressing  Goal: Patient-Specific Goal (Individualization)  11/12/2024 2124 by Meche Farris RN  Outcome: Progressing  11/12/2024 2123 by Meche Farris RN  Outcome: Progressing  Goal: Adheres to Safety Considerations for Self and Others  11/12/2024 2124 by Meche Farris RN  Outcome: Progressing  11/12/2024 2123 by Meche Farris RN  Outcome: Progressing  Goal: Absence of New-Onset Illness or Injury  11/12/2024 2124 by Meche Farris RN  Outcome: Progressing  11/12/2024 2123 by Meche Farris RN  Outcome: Progressing  Goal: Optimized Coping Skills in Response to Life Stressors  Outcome: Progressing  Goal: Develops/Participates in Therapeutic Mentcle to Support Successful Transition  Outcome: Progressing     Problem: Anxiety Signs/Symptoms  Goal: Optimized Energy Level (Anxiety Signs/Symptoms)  Outcome: Progressing  Goal: Optimized Cognitive Function (Anxiety Signs/Symptoms)  Outcome: Progressing  Goal: Improved Mood Symptoms (Anxiety Signs/Symptoms)  Outcome: Progressing  Goal: Improved Sleep (Anxiety Signs/Symptoms)  Outcome: Progressing  Goal: Enhanced Social, Occupational or Functional Skills (Anxiety Signs/Symptoms)  Outcome: Progressing  Goal: Improved Somatic Symptoms (Anxiety Signs/Symptoms)  Outcome: Progressing     Problem: Depressive Signs/Symptoms  Goal: Optimized Energy Level (Depressive Signs/Symptoms)  Outcome: Progressing  Goal: Optimized Cognitive Function (Depressive Signs/Symptoms)  Outcome: Progressing  Goal: Increased Participation and Engagement (Depressive Signs/Symptoms)  Outcome: Progressing  Goal: Enhanced Self-Esteem and Confidence (Depressive Signs/Symptoms)  Outcome:  Progressing  Goal: Improved Mood Symptoms (Depressive Signs/Symptoms)  Outcome: Progressing  Goal: Optimized Nutrition Intake (Depressive Signs/Symptoms)  Outcome: Progressing  Goal: Improved Psychomotor Symptoms (Depressive Signs/Symptoms)  Outcome: Progressing  Goal: Improved Sleep (Depressive Signs/Symptoms)  Outcome: Progressing  Goal: Enhanced Social, Occupational or Functional Skills (Depressive Signs/Symptoms)  Outcome: Progressing

## 2024-11-14 PROCEDURE — 11400000 HC PSYCH PRIVATE ROOM

## 2024-11-14 PROCEDURE — 25000003 PHARM REV CODE 250: Performed by: NURSE PRACTITIONER

## 2024-11-14 PROCEDURE — 90833 PSYTX W PT W E/M 30 MIN: CPT | Mod: ,,, | Performed by: PSYCHIATRY & NEUROLOGY

## 2024-11-14 PROCEDURE — S4991 NICOTINE PATCH NONLEGEND: HCPCS | Performed by: NURSE PRACTITIONER

## 2024-11-14 PROCEDURE — 99232 SBSQ HOSP IP/OBS MODERATE 35: CPT | Mod: ,,, | Performed by: PSYCHIATRY & NEUROLOGY

## 2024-11-14 RX ADMIN — HYDROXYZINE PAMOATE 50 MG: 50 CAPSULE ORAL at 08:11

## 2024-11-14 RX ADMIN — ACETAMINOPHEN 650 MG: 325 TABLET ORAL at 12:11

## 2024-11-14 RX ADMIN — NICOTINE 1 PATCH: 14 PATCH, EXTENDED RELEASE TRANSDERMAL at 08:11

## 2024-11-14 RX ADMIN — ASPIRIN 81 MG: 81 TABLET, COATED ORAL at 08:11

## 2024-11-14 RX ADMIN — FLUTICASONE FUROATE AND VILANTEROL TRIFENATATE 1 PUFF: 100; 25 POWDER RESPIRATORY (INHALATION) at 08:11

## 2024-11-14 RX ADMIN — GABAPENTIN 100 MG: 100 CAPSULE ORAL at 08:11

## 2024-11-14 RX ADMIN — RISPERIDONE 3 MG: 1 TABLET, FILM COATED ORAL at 08:11

## 2024-11-14 RX ADMIN — METFORMIN HYDROCHLORIDE 500 MG: 500 TABLET, EXTENDED RELEASE ORAL at 04:11

## 2024-11-14 RX ADMIN — DIAZEPAM 5 MG: 5 TABLET ORAL at 08:11

## 2024-11-14 RX ADMIN — LEVOTHYROXINE SODIUM 75 MCG: 75 TABLET ORAL at 06:11

## 2024-11-14 RX ADMIN — PAROXETINE HYDROCHLORIDE 40 MG: 20 TABLET, FILM COATED ORAL at 08:11

## 2024-11-14 RX ADMIN — AMLODIPINE BESYLATE 10 MG: 10 TABLET ORAL at 08:11

## 2024-11-14 RX ADMIN — PANTOPRAZOLE SODIUM 40 MG: 40 TABLET, DELAYED RELEASE ORAL at 08:11

## 2024-11-14 RX ADMIN — ACETAMINOPHEN 650 MG: 325 TABLET ORAL at 08:11

## 2024-11-14 RX ADMIN — DIAZEPAM 5 MG: 5 TABLET ORAL at 03:11

## 2024-11-14 NOTE — PLAN OF CARE
Collateral:   Robel Jay, brother, [phone]     Collateral Perception of Problem:   Depression and anxiety is kicking her butt     Previous Psych History/Hospitalizations:  Yes   Suicide Attempts (how/severity):  I don't think so     How long has pt had problems (childhood dx?):  N/a     Impulse issues:  None     History of violence:   None     Drug Use:  None     Alcohol Use:  Sometimes     Legal Issues:  None     Other Pertinent Info:   She's by herself in the trailer   I wanted to get her into the nursing home to take her medications right, half of the time she doesn't know what she's talking   She filed for bankruptcy   Her  , she got a boyfriend he , boyfriend  a year ago     Baseline:  She likes to cook a lot, joke around, lonely, depressed       Discharge Plan:  Hoping she can go into a nursing home

## 2024-11-14 NOTE — PROGRESS NOTES
"PSYCHIATRY DAILY INPATIENT PROGRESS NOTE  SUBSEQUENT HOSPITAL VISIT    ENCOUNTER DATE: 11/14/2024  SITE: [facility]    DATE OF ADMISSION: 11/12/2024 10:45 AM  LENGTH OF STAY: 2 days      CHIEF COMPLAINT   Vero Allen is a 64 y.o. female, seen during daily lombardo rounds on the inpatient unit.  Vero Allen presented with the chief complaint of  psychosis(paranoia)/anxiety, "I feel like I'm going to have a nervous breakdown."       The patient was seen and examined. The chart was reviewed.     Reviewed notes from Rns and MD and labs from the last 24 hours.    The patient's case was discussed with the treatment team/care providers today including Rns and MD    Staff reports no behavioral or management issues.     The patient has been compliant with treatment.      Subjective 11/14/2024       Patient reports mood is "not good" and that anxiety is "real bad" which she attributes to "having trouble breathing" (VS grossly within normal limits and patient appears in no respiratory distress.) Reports coming to the hospital due to anxiety, adding "I live alone, I can't stand being by myself." She reports being denied entry to nursing home following last hospitalization.     Patient maintains that she has not taken benzos since last hospital discharge despite positive UDS.      Patient endorses ongoing depression and hopelessness about the future and her living situation.     Denies SE to medication regimen at this time.       Interim/overnight events per report/notes:          Psychiatric ROS (observed, reported, or endorsed/denied):  Depressed mood - Yes  Interest/pleasure/anhedonia: Yes  Guilt/hopelessness/worthlessness - Yes  Changes in Sleep - less  Changes in Appetite - No  Changes in Concentration - Yes  Changes in Energy - Yes  PMA/R- Yes  Suicidal- active/passive ideations - Continuing  Homicidal ideations: active/passive ideations - No    Hallucinations - No  Delusions - No  Disorganized " behavior - No  Disorganized speech - No  Negative symptoms - No    Elevated mood - No  Decreased need for sleep - No  Grandiosity - No  Racing thoughts - No  Impulsivity - No  Irritability- No  Increased energy - No  Distractibility - No  Increase in goal-directed activity or PMA- No    Symptoms of ELSY - less  Symptoms of Panic Disorder- No  Symptoms of PTSD - No        Overall progress: Patient is showing no improvement on the Unit to date        Psychotherapy:  Target symptoms: depression, anxiety   Why chosen therapy is appropriate versus another modality: relevant to diagnosis, evidence based practice  Outcome monitoring methods: self-report, observation  Therapeutic intervention type: insight oriented psychotherapy, supportive psychotherapy  Topics discussed/themes: building skills sets for symptom management, symptom recognition, life stage transitional issues  The patient's response to the intervention is accepting. The patient's progress toward treatment goals is fair.   Duration of intervention: 16 minutes.        Medical ROS  Review of Systems   Constitutional: Negative.    HENT: Negative.     Eyes: Negative.    Respiratory:          Subjective shortness of breath   Cardiovascular: Negative.    Gastrointestinal: Negative.    Genitourinary: Negative.    Musculoskeletal: Negative.    Skin: Negative.    Neurological: Negative.    Endo/Heme/Allergies: Negative.    Psychiatric/Behavioral:  Positive for depression. The patient is nervous/anxious.          PAST MEDICAL HISTORY   Past Medical History:   Diagnosis Date    Anxiety     Breast cancer 2010    Cancer     Breast    COPD (chronic obstructive pulmonary disease)     Depression     Diabetes mellitus     GERD (gastroesophageal reflux disease)     Hypertension     Insomnia     Thyroid disease            PSYCHOTROPIC MEDICATIONS   Scheduled Meds:   amLODIPine  10 mg Oral Daily    aspirin  81 mg Oral Daily    diazePAM  5 mg Oral TID    fluticasone  furoate-vilanteroL  1 puff Inhalation Daily    gabapentin  100 mg Oral BID    levothyroxine  75 mcg Oral Before breakfast    metFORMIN  500 mg Oral with dinner    pantoprazole  40 mg Oral Daily    paroxetine  40 mg Oral Daily    risperiDONE  3 mg Oral QHS     Continuous Infusions:  PRN Meds:.  Current Facility-Administered Medications:     acetaminophen, 650 mg, Oral, Q6H PRN    albuterol, 2 puff, Inhalation, Q4H PRN    aluminum-magnesium hydroxide-simethicone, 30 mL, Oral, Q6H PRN    benzonatate, 100 mg, Oral, TID PRN    benztropine mesylate, 2 mg, Intramuscular, Q8H PRN    hydrOXYzine pamoate, 50 mg, Oral, Q6H PRN    loperamide, 2 mg, Oral, PRN    nicotine, 1 patch, Transdermal, Daily PRN    OLANZapine, 10 mg, Oral, Q8H PRN **AND** OLANZapine, 10 mg, Intramuscular, Q8H PRN    ondansetron, 4 mg, Oral, Q8H PRN    promethazine, 25 mg, Oral, Q6H PRN        EXAMINATION    VITALS   Vitals:    11/13/24 1734 11/13/24 1914 11/13/24 2026 11/14/24 0716   BP:  (!) 155/73  127/82   BP Location:  Left arm  Left arm   Patient Position:  Sitting  Sitting   Pulse: 92 90 82 93   Resp: (!) 24 20  18   Temp:  98.1 °F (36.7 °C)  98.7 °F (37.1 °C)   TempSrc:  Oral  Oral   SpO2:  (!) 93%  96%   Weight:       Height:           Body mass index is 43.22 kg/m².        CONSTITUTIONAL  General Appearance: disheveled, obese    MUSCULOSKELETAL  Muscle Strength and Tone:no tremor, no tic  Abnormal Involuntary Movements: No  Gait and Station: non-ataxic    PSYCHIATRIC   Level of Consciousness: awake, alert , and oriented  Orientation: person, place, time, and situation  Grooming: Casually dressed and Well groomed  Psychomotor Behavior: normal, cooperative, friendly and cooperative  Speech: slowed, monotone  Language: grossly intact  Mood: depressed  Affect: Consistent with mood and Congruent with thought  Thought Process: linear, logical  Associations: intact   Thought Content: less SI, denies HI, and no delusions  Perceptions: denies AH, denies   VH, and not RIS  Memory: Able to recall past events, Remote intact, and Recent intact  Attention:Attends to interview without distraction  Fund of Knowledge: Aware of current events and Vocabulary appropriate   Estimate if Intelligence:  Below average based on work/education history, vocabulary and mental status exam  Insight: limited awareness of illness  Judgment: behavior is adequate to circumstances        DIAGNOSTIC TESTING   Laboratory Results  No results found for this or any previous visit (from the past 24 hours).          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   Pain disorder     Complicated bereavement     BZD misuses      Psychosocial stressors     Nicotine Dependence      COPD  HTN  DM  Thyroid disease  GERD        PROBLEM LIST AND MANAGEMENT PLANS     Depression with psychosis: pt counseled  -resumed home 3 mg po q HS  -resumed Paxil 30 mg po q day- increase to 40 mg po q day tomorrow- Continue     Anxiety: pt counseled  -meds as above  -Paxil as above  -gabapentin as below     Insomnia: pt counseled  -vistaril prn      Pain: pt counseled  -start trial of gabapentin 100 mg po BID     Complicated bereavement: pt counseled      Nicotine Dependence: pt counseled  -start nicotine 14 mg patch dermal      BZD misuses  - start valium 5 mg PO TID, will taper off as tolerated  - pt counseled  - follow up with outpatient mental health providers after discharge for medication management and psychotherapy     Psychosocial stressors: pt counseled  -SW consulted to assist with resources     COPD: pt counseled  -Med consulted      HTN: pt counseled  -Med consulted     DM: pt counseled  -Med consulted     Thyroid disease: pt counseled  -Med consulted  -resumed Synthroid 75 mcg po q AM     GERD: pt counseled  -Med consulted             Discussed diagnosis, risks and benefits of proposed treatment vs alternative  treatments vs no treatment, potential side effects of these treatments and the inherent unpredictability of treatment. The patient expresses understanding of the above and displays the capacity to agree with this treatment given said understanding. Patient also agrees that, currently, the benefits outweigh the risks and would like to pursue/continue treatment at this time.    Any medications being used off-label were discussed with the patient inclusive of the evidence base for the use of the medications and consent was obtained for the off-label use of the medication.       DISCHARGE PLANNING  Expected Disposition Plan: Home or Self Care      NEED FOR CONTINUED HOSPITALIZATION  Psychiatric illness continues to pose a potential threat to life or bodily function, of self or others, thereby requiring the need for continued inpatient psychiatric hospitalization: Yes, due to: danger to self, as evidenced by:  Ongoing concerns with SI.    Protective inpatient pyschiatric hospitalization required while a safe disposition plan is enacted: Yes    Patient stabilized and ready for discharge from inpatient psychiatric unit: No        STAFF:   Enrique Luz NP  Psychiatry
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
## 2024-11-14 NOTE — NURSING
POC reviewed this shift and is ongoing.  Pt is cooperative with care, complaint with medications.  Pt is visible in the milieu and interacts well with staff and peers.

## 2024-11-14 NOTE — PLAN OF CARE
Problem: Bariatric Environmental Safety  Goal: Safety Maintained with Care  Outcome: Progressing     Problem: Adult Behavioral Health Plan of Care  Goal: Plan of Care Review  Outcome: Progressing  Goal: Patient-Specific Goal (Individualization)  Outcome: Progressing  Goal: Adheres to Safety Considerations for Self and Others  Outcome: Progressing  Goal: Absence of New-Onset Illness or Injury  Outcome: Progressing  Goal: Optimized Coping Skills in Response to Life Stressors  Outcome: Progressing     Problem: Anxiety Signs/Symptoms  Goal: Optimized Energy Level (Anxiety Signs/Symptoms)  Outcome: Progressing  Goal: Optimized Cognitive Function (Anxiety Signs/Symptoms)  Outcome: Progressing  Goal: Improved Mood Symptoms (Anxiety Signs/Symptoms)  Outcome: Progressing  Goal: Improved Sleep (Anxiety Signs/Symptoms)  Outcome: Progressing  Goal: Enhanced Social, Occupational or Functional Skills (Anxiety Signs/Symptoms)  Outcome: Progressing  Goal: Improved Somatic Symptoms (Anxiety Signs/Symptoms)  Outcome: Progressing     Problem: Depressive Signs/Symptoms  Goal: Optimized Energy Level (Depressive Signs/Symptoms)  Outcome: Progressing  Goal: Optimized Cognitive Function (Depressive Signs/Symptoms)  Outcome: Progressing  Goal: Increased Participation and Engagement (Depressive Signs/Symptoms)  Outcome: Progressing  Goal: Enhanced Self-Esteem and Confidence (Depressive Signs/Symptoms)  Outcome: Progressing  Goal: Improved Mood Symptoms (Depressive Signs/Symptoms)  Outcome: Progressing  Goal: Optimized Nutrition Intake (Depressive Signs/Symptoms)  Outcome: Progressing  Goal: Improved Psychomotor Symptoms (Depressive Signs/Symptoms)  Outcome: Progressing  Goal: Improved Sleep (Depressive Signs/Symptoms)  Outcome: Progressing  Goal: Enhanced Social, Occupational or Functional Skills (Depressive Signs/Symptoms)  Outcome: Progressing     Problem: Diabetes Comorbidity  Goal: Blood Glucose Level Within Targeted Range  Outcome:  Progressing

## 2024-11-14 NOTE — PLAN OF CARE
11/14/24 1455   Step 1: Warning Signs   Warning Sign 1 anxiety   Warning Sign 2 breathing medication flares up my anxiety   Warning Sign 3 racing heart   Step 2: Internal coping strategies - Things I can do to take my mind off my problems without contacting another person:   Coping Strategy 1 word search puzzles   Coping Strategy 2 watch tv   Coping Strategy 3 n/a   Step 3: People and social settings that provide distraction:   1. Name Zainab (neighbor)   2. Name Robel Jay (brother)       Phone [phone]   3. Place Zainab's house   4. Place Robel's rosana    Step 4: People whom I can ask for help:   1. Person Robel Jay (brother)       Phone [phone]   2. Person Jordyn Kumar (niece)       Phone [phone]   3. Person n/a   Step 5: Professionals or agencies I can contact during a crisis:   1. Clinician Name [facility]       Phone [phone]   3. Suicide Prevention Lifeline: 988 suicide prevention lifeline 988   4. [facility] Emergency [facility]       911       Emergency Services Phone warm line [phone] wed-sun 5pm-10pm   Step 6: Making the environment safer (plan for lethal means safety)   Safe Environment Plan come to hospital when my anxiety gets out of control, go to follow up appointment   Safe Environment Optional: What is most important to me and worth living for? my family   Safety Plan Tasks   Provided a Hard Copy to the Patient Y   Explained How to Follow the Steps Y   Discussed Facilitators and Barriers Y
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
## 2024-11-14 NOTE — PLAN OF CARE
POC reviewed this shift and is on going. Patient is withdrawn, depressed, anxious, and showing pressured speech.  Denies Suicidal Ideation, Homicidal Ideation, Auditory Hallucinations, Visual Hallucinations, Tactile Hallucinations, Gustatory Hallucinations, and Delusions. Minimal interactions with peers,self isolative.  Pt continues to be medication compliant and staff will continue to monitor pt closely while on the unit.

## 2024-11-14 NOTE — PLAN OF CARE
POC reviewed this shift and is on going. Patient is withdrawn, depressed, cooperative, anxious, and paranoid.  Denies Suicidal Ideation, Homicidal Ideation, Auditory Hallucinations, Visual Hallucinations, Tactile Hallucinations, Gustatory Hallucinations, and Delusions. Minimal interactions with peers, self isolative. Pt continues to be medication compliant and staff will continue to monitor pt closely while on the unit.

## 2024-11-15 PROCEDURE — 25000003 PHARM REV CODE 250: Performed by: NURSE PRACTITIONER

## 2024-11-15 PROCEDURE — 90833 PSYTX W PT W E/M 30 MIN: CPT | Mod: ,,, | Performed by: PSYCHIATRY & NEUROLOGY

## 2024-11-15 PROCEDURE — 99233 SBSQ HOSP IP/OBS HIGH 50: CPT | Mod: ,,, | Performed by: PSYCHIATRY & NEUROLOGY

## 2024-11-15 PROCEDURE — S4991 NICOTINE PATCH NONLEGEND: HCPCS | Performed by: NURSE PRACTITIONER

## 2024-11-15 PROCEDURE — 25000003 PHARM REV CODE 250: Performed by: PSYCHIATRY & NEUROLOGY

## 2024-11-15 PROCEDURE — 11400000 HC PSYCH PRIVATE ROOM

## 2024-11-15 PROCEDURE — 25000242 PHARM REV CODE 250 ALT 637 W/ HCPCS: Performed by: INTERNAL MEDICINE

## 2024-11-15 PROCEDURE — 25000003 PHARM REV CODE 250: Performed by: INTERNAL MEDICINE

## 2024-11-15 RX ORDER — ALBUTEROL SULFATE 90 UG/1
2 INHALANT RESPIRATORY (INHALATION) EVERY 6 HOURS PRN
Status: DISCONTINUED | OUTPATIENT
Start: 2024-11-15 | End: 2024-11-22 | Stop reason: HOSPADM

## 2024-11-15 RX ORDER — DIAZEPAM 5 MG/1
5 TABLET ORAL 2 TIMES DAILY
Status: DISCONTINUED | OUTPATIENT
Start: 2024-11-15 | End: 2024-11-16

## 2024-11-15 RX ORDER — MONTELUKAST SODIUM 10 MG/1
10 TABLET ORAL DAILY
Status: DISCONTINUED | OUTPATIENT
Start: 2024-11-15 | End: 2024-11-22 | Stop reason: HOSPADM

## 2024-11-15 RX ORDER — FLUTICASONE PROPIONATE 50 MCG
2 SPRAY, SUSPENSION (ML) NASAL DAILY
Status: DISCONTINUED | OUTPATIENT
Start: 2024-11-15 | End: 2024-11-22 | Stop reason: HOSPADM

## 2024-11-15 RX ORDER — GUAIFENESIN 600 MG/1
1200 TABLET, EXTENDED RELEASE ORAL 2 TIMES DAILY
Status: DISCONTINUED | OUTPATIENT
Start: 2024-11-15 | End: 2024-11-22 | Stop reason: HOSPADM

## 2024-11-15 RX ORDER — GABAPENTIN 100 MG/1
200 CAPSULE ORAL 2 TIMES DAILY
Status: DISCONTINUED | OUTPATIENT
Start: 2024-11-15 | End: 2024-11-16

## 2024-11-15 RX ADMIN — ASPIRIN 81 MG: 81 TABLET, COATED ORAL at 08:11

## 2024-11-15 RX ADMIN — METFORMIN HYDROCHLORIDE 500 MG: 500 TABLET, EXTENDED RELEASE ORAL at 04:11

## 2024-11-15 RX ADMIN — RISPERIDONE 3 MG: 1 TABLET, FILM COATED ORAL at 08:11

## 2024-11-15 RX ADMIN — PANTOPRAZOLE SODIUM 40 MG: 40 TABLET, DELAYED RELEASE ORAL at 08:11

## 2024-11-15 RX ADMIN — HYDROXYZINE PAMOATE 50 MG: 50 CAPSULE ORAL at 08:11

## 2024-11-15 RX ADMIN — GABAPENTIN 100 MG: 100 CAPSULE ORAL at 08:11

## 2024-11-15 RX ADMIN — MONTELUKAST 10 MG: 10 TABLET, FILM COATED ORAL at 08:11

## 2024-11-15 RX ADMIN — LEVOTHYROXINE SODIUM 75 MCG: 75 TABLET ORAL at 06:11

## 2024-11-15 RX ADMIN — DIAZEPAM 5 MG: 5 TABLET ORAL at 08:11

## 2024-11-15 RX ADMIN — AMLODIPINE BESYLATE 10 MG: 10 TABLET ORAL at 08:11

## 2024-11-15 RX ADMIN — GUAIFENESIN 1200 MG: 600 TABLET, EXTENDED RELEASE ORAL at 11:11

## 2024-11-15 RX ADMIN — GUAIFENESIN 1200 MG: 600 TABLET, EXTENDED RELEASE ORAL at 08:11

## 2024-11-15 RX ADMIN — FLUTICASONE PROPIONATE 100 MCG: 50 SPRAY, METERED NASAL at 11:11

## 2024-11-15 RX ADMIN — FLUTICASONE FUROATE AND VILANTEROL TRIFENATATE 1 PUFF: 100; 25 POWDER RESPIRATORY (INHALATION) at 08:11

## 2024-11-15 RX ADMIN — NICOTINE 1 PATCH: 14 PATCH, EXTENDED RELEASE TRANSDERMAL at 08:11

## 2024-11-15 RX ADMIN — ALBUTEROL SULFATE 2 PUFF: 90 AEROSOL, METERED RESPIRATORY (INHALATION) at 08:11

## 2024-11-15 RX ADMIN — GABAPENTIN 200 MG: 100 CAPSULE ORAL at 08:11

## 2024-11-15 RX ADMIN — PAROXETINE HYDROCHLORIDE 40 MG: 20 TABLET, FILM COATED ORAL at 08:11

## 2024-11-15 NOTE — PROGRESS NOTES
"PSYCHIATRY DAILY INPATIENT PROGRESS NOTE  SUBSEQUENT HOSPITAL VISIT    ENCOUNTER DATE: 11/15/2024  SITE: [facility]    DATE OF ADMISSION: 11/12/2024 10:45 AM  LENGTH OF STAY: 3 days      CHIEF COMPLAINT   Vero Allen is a 64 y.o. female, seen during daily lombardo rounds on the inpatient unit.  Vero Allen presented with the chief complaint of  psychosis(paranoia)/anxiety, "I feel like I'm going to have a nervous breakdown."       The patient was seen and examined. The chart was reviewed.     Reviewed notes from Rns, NP, SW, and LPN and labs from the last 24 hours.    The patient's case was discussed with the treatment team/care providers today including Rns, CTRS, NP, and SW    Staff reports no behavioral or management issues.     The patient has been compliant with treatment.      Subjective 11/15/2024       Patient reports mood is "a little better" and that her anxiety is "ok.. it helps being around other people."   -She reports SOB and post treatment tachycardia as triggers for her anxiety.  -she finds socializing helpful in managing symptoms. Loneliness was identified as the primary trigger for her depression.   She reports being denied entry to nursing home following last hospitalization. She is open to re-applying to a NH vs assisted living    Patient maintains that she has not taken benzos since last hospital discharge despite positive UDS.     No current symptoms of withdrawals were reported this AM; she is tolerating the Valium well.     Patient endorses ongoing depression and hopelessness about the future and her living situation. This is mildly better    Denies SE to medication regimen at this time.         Psychiatric ROS (observed, reported, or endorsed/denied):  Depressed mood - decreasing slowly  Interest/pleasure/anhedonia: decreasing slowly  Guilt/hopelessness/worthlessness - decreasing slowly  Changes in Sleep - decreasing slowly  Changes in Appetite - No  Changes in " Concentration - decreasing slowly  Changes in Energy - decreasing slowly  PMA/R- decreasing slowly  Suicidal- active/passive ideations - decreasing slowly  Homicidal ideations: active/passive ideations - No    Hallucinations - No  Delusions - No  Disorganized behavior - No  Disorganized speech - No  Negative symptoms - No    Elevated mood - No  Decreased need for sleep - No  Grandiosity - No  Racing thoughts - No  Impulsivity - No  Irritability- No  Increased energy - No  Distractibility - No  Increase in goal-directed activity or PMA- No    Symptoms of ELSY - less  Symptoms of Panic Disorder- No  Symptoms of PTSD - No        Overall progress: Patient is showing mild improvement         Psychotherapy:  Target symptoms: depression, anxiety   Why chosen therapy is appropriate versus another modality: relevant to diagnosis, evidence based practice  Outcome monitoring methods: self-report, observation  Therapeutic intervention type: insight oriented psychotherapy, supportive psychotherapy  Topics discussed/themes: building skills sets for symptom management, symptom recognition, life stage transitional issues  The patient's response to the intervention is accepting. The patient's progress toward treatment goals is fair.   Duration of intervention: 16 minutes.        Medical ROS  Review of Systems   Constitutional: Negative.    HENT: Negative.     Eyes: Negative.    Respiratory:          Subjective shortness of breath   Cardiovascular: Negative.    Gastrointestinal: Negative.    Genitourinary: Negative.    Musculoskeletal: Negative.    Skin: Negative.    Neurological: Negative.    Endo/Heme/Allergies: Negative.    Psychiatric/Behavioral:  Positive for depression. The patient is nervous/anxious.          PAST MEDICAL HISTORY   Past Medical History:   Diagnosis Date    Anxiety     Breast cancer 2010    Cancer     Breast    COPD (chronic obstructive pulmonary disease)     Depression     Diabetes mellitus     GERD  (gastroesophageal reflux disease)     Hypertension     Insomnia     Thyroid disease            PSYCHOTROPIC MEDICATIONS   Scheduled Meds:   amLODIPine  10 mg Oral Daily    aspirin  81 mg Oral Daily    diazePAM  5 mg Oral TID    fluticasone furoate-vilanteroL  1 puff Inhalation Daily    fluticasone propionate  2 spray Each Nostril Daily    gabapentin  100 mg Oral BID    guaiFENesin  1,200 mg Oral BID    levothyroxine  75 mcg Oral Before breakfast    metFORMIN  500 mg Oral with dinner    montelukast  10 mg Oral Daily    pantoprazole  40 mg Oral Daily    paroxetine  40 mg Oral Daily    risperiDONE  3 mg Oral QHS     Continuous Infusions:  PRN Meds:.  Current Facility-Administered Medications:     acetaminophen, 650 mg, Oral, Q6H PRN    albuterol, 2 puff, Inhalation, Q6H PRN    aluminum-magnesium hydroxide-simethicone, 30 mL, Oral, Q6H PRN    benzonatate, 100 mg, Oral, TID PRN    benztropine mesylate, 2 mg, Intramuscular, Q8H PRN    hydrOXYzine pamoate, 50 mg, Oral, Q6H PRN    loperamide, 2 mg, Oral, PRN    nicotine, 1 patch, Transdermal, Daily PRN    OLANZapine, 10 mg, Oral, Q8H PRN **AND** OLANZapine, 10 mg, Intramuscular, Q8H PRN    ondansetron, 4 mg, Oral, Q8H PRN    promethazine, 25 mg, Oral, Q6H PRN        EXAMINATION    VITALS   Vitals:    11/14/24 0833 11/14/24 1905 11/15/24 0701 11/15/24 0826   BP:  131/70 (!) 141/65    BP Location:  Left arm Left arm    Patient Position:  Sitting Sitting    Pulse: 80 68 67 70   Resp: 20 20 20 20   Temp:  98.1 °F (36.7 °C) 98.2 °F (36.8 °C)    TempSrc:  Oral Oral    SpO2:  97% 96%    Weight:       Height:           Body mass index is 43.22 kg/m².        CONSTITUTIONAL  General Appearance: disheveled, obese    MUSCULOSKELETAL  Muscle Strength and Tone:no tremor, no tic  Abnormal Involuntary Movements: No  Gait and Station: non-ataxic    PSYCHIATRIC   Level of Consciousness: awake, alert , and oriented  Orientation: person, place, time, and situation  Grooming: Casually dressed  and Well groomed  Psychomotor Behavior: normal, cooperative, friendly and cooperative  Speech: slowed, monotone  Language: grossly intact  Mood: depressed  Affect: Consistent with mood and Congruent with thought  Thought Process: linear, logical  Associations: intact   Thought Content: less SI, denies HI, and no delusions  Perceptions: denies AH, denies  VH, and not RIS  Memory: Able to recall past events, Remote intact, and Recent intact  Attention:Attends to interview without distraction  Fund of Knowledge: Aware of current events and Vocabulary appropriate   Estimate if Intelligence:  Below average based on work/education history, vocabulary and mental status exam  Insight: limited awareness of illness- slowly improving  Judgment: behavior is adequate to circumstances- slowly improving         DIAGNOSTIC TESTING   Laboratory Results  No results found for this or any previous visit (from the past 24 hours).          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   Pain disorder     Complicated bereavement     BZD misuses      Psychosocial stressors     Nicotine Dependence      COPD  HTN  DM  Thyroid disease  GERD        PROBLEM LIST AND MANAGEMENT PLANS     Depression with psychosis: pt counseled  -resumed home Risoerdal 3 mg po q HS  -resumed Paxil 30 mg po q day- increase to 40 mg po q day tomorrow-Continue- increase to 50 mg po q day tomorrow (goal dose is 60 mg daily)     Anxiety: pt counseled  -meds as above  -Paxil as above  -gabapentin as below     Insomnia: pt counseled  -vistaril prn      Pain: pt counseled  -start trial of gabapentin 100 mg po BID- increase to 200 mg po BID     Complicated bereavement: pt counseled      Nicotine Dependence: pt counseled  -start nicotine 14 mg patch dermal      BZD misuses  - start valium 5 mg PO TID, will taper off as tolerated- decrease to BID dosing today  - pt  counseled  - follow up with outpatient mental health providers after discharge for medication management and psychotherapy     Psychosocial stressors: pt counseled  -SW consulted to assist with resources     COPD: pt counseled  -Med consulted   -continue Fluticasone-vilanterol 100-25 mcg inhaled 1 puff daily  -montelukast 10 mg po q Day     HTN: pt counseled  -Med consulted  -conitnue Norvasc 10 mg po q day     DM: pt counseled  -Med consulted  -continue Metformin  mg po q Day     Thyroid disease: pt counseled  -Med consulted  -resumed Synthroid 75 mcg po q AM     GERD: pt counseled  -Med consulted  Continue pantoprazole 40 mg po q day             Discussed diagnosis, risks and benefits of proposed treatment vs alternative treatments vs no treatment, potential side effects of these treatments and the inherent unpredictability of treatment. The patient expresses understanding of the above and displays the capacity to agree with this treatment given said understanding. Patient also agrees that, currently, the benefits outweigh the risks and would like to pursue/continue treatment at this time.    Any medications being used off-label were discussed with the patient inclusive of the evidence base for the use of the medications and consent was obtained for the off-label use of the medication.       DISCHARGE PLANNING  Expected Disposition Plan: Home or Self Care      NEED FOR CONTINUED HOSPITALIZATION  Psychiatric illness continues to pose a potential threat to life or bodily function, of self or others, thereby requiring the need for continued inpatient psychiatric hospitalization: Yes, due to: danger to self, as evidenced by:  Ongoing concerns with SI.    Protective inpatient pyschiatric hospitalization required while a safe disposition plan is enacted: Yes    Patient stabilized and ready for discharge from inpatient psychiatric unit: No        STAFF:   Philip Mckee MD  Psychiatry

## 2024-11-15 NOTE — PLAN OF CARE
Problem: Adult Behavioral Health Plan of Care  Goal: Develops/Participates in Therapeutic [place] to Support Successful Transition  Intervention: Mutually Develop Transition Plan  Flowsheets (Taken 11/15/2024 1133)  Current Discharge Risk:   lack of support system/caregiver   psychiatric illness   lives alone  Readmission Within the Last 30 Days: other (see comments)  Outpatient/Agency/Support Group Needs:   outpatient medication management   outpatient counseling   outpatient psychiatric care (specify)  Transition Support: follow-up care discussed  Anticipated Discharge Disposition: home or self-care  Transportation Concerns: none  Patient/Family Anticipated Services at Transition:   outpatient care   mental health services  Patient/Family Anticipates Transition to: home  Transportation Anticipated: family or friend will provide  Concerns to be Addressed:   mental health   medication   coping/stress   grief and loss  Patient's Choice of Community Agency(s): [facility]

## 2024-11-15 NOTE — PLAN OF CARE
Problem: Bariatric Environmental Safety  Goal: Safety Maintained with Care  Outcome: Progressing     Problem: Adult Behavioral Health Plan of Care  Goal: Plan of Care Review  Outcome: Progressing     Problem: Adult Behavioral Health Plan of Care  Goal: Patient-Specific Goal (Individualization)  Outcome: Progressing     Problem: Adult Behavioral Health Plan of Care  Goal: Adheres to Safety Considerations for Self and Others  Outcome: Progressing     Problem: Adult Behavioral Health Plan of Care  Goal: Optimized Coping Skills in Response to Life Stressors  Outcome: Progressing     Problem: Anxiety Signs/Symptoms  Goal: Optimized Energy Level (Anxiety Signs/Symptoms)  Outcome: Progressing     Problem: Anxiety Signs/Symptoms  Goal: Improved Mood Symptoms (Anxiety Signs/Symptoms)  Outcome: Progressing     Problem: Depressive Signs/Symptoms  Goal: Optimized Energy Level (Depressive Signs/Symptoms)  Outcome: Progressing     Problem: Depressive Signs/Symptoms  Goal: Improved Sleep (Depressive Signs/Symptoms)  Outcome: Progressing

## 2024-11-16 PROCEDURE — 25000003 PHARM REV CODE 250: Performed by: NURSE PRACTITIONER

## 2024-11-16 PROCEDURE — 90833 PSYTX W PT W E/M 30 MIN: CPT | Mod: ,,, | Performed by: PSYCHIATRY & NEUROLOGY

## 2024-11-16 PROCEDURE — 11400000 HC PSYCH PRIVATE ROOM

## 2024-11-16 PROCEDURE — 99233 SBSQ HOSP IP/OBS HIGH 50: CPT | Mod: ,,, | Performed by: PSYCHIATRY & NEUROLOGY

## 2024-11-16 PROCEDURE — 25000003 PHARM REV CODE 250: Performed by: INTERNAL MEDICINE

## 2024-11-16 PROCEDURE — S4991 NICOTINE PATCH NONLEGEND: HCPCS | Performed by: NURSE PRACTITIONER

## 2024-11-16 PROCEDURE — 25000003 PHARM REV CODE 250: Performed by: PSYCHIATRY & NEUROLOGY

## 2024-11-16 RX ORDER — GABAPENTIN 300 MG/1
300 CAPSULE ORAL 2 TIMES DAILY
Status: DISCONTINUED | OUTPATIENT
Start: 2024-11-16 | End: 2024-11-17

## 2024-11-16 RX ORDER — DIAZEPAM 2 MG/1
2 TABLET ORAL 3 TIMES DAILY
Status: DISCONTINUED | OUTPATIENT
Start: 2024-11-16 | End: 2024-11-19

## 2024-11-16 RX ADMIN — GABAPENTIN 300 MG: 300 CAPSULE ORAL at 08:11

## 2024-11-16 RX ADMIN — DIAZEPAM 5 MG: 5 TABLET ORAL at 08:11

## 2024-11-16 RX ADMIN — PANTOPRAZOLE SODIUM 40 MG: 40 TABLET, DELAYED RELEASE ORAL at 08:11

## 2024-11-16 RX ADMIN — NICOTINE 1 PATCH: 14 PATCH, EXTENDED RELEASE TRANSDERMAL at 08:11

## 2024-11-16 RX ADMIN — MONTELUKAST 10 MG: 10 TABLET, FILM COATED ORAL at 08:11

## 2024-11-16 RX ADMIN — GUAIFENESIN 1200 MG: 600 TABLET, EXTENDED RELEASE ORAL at 08:11

## 2024-11-16 RX ADMIN — FLUTICASONE FUROATE AND VILANTEROL TRIFENATATE 1 PUFF: 100; 25 POWDER RESPIRATORY (INHALATION) at 08:11

## 2024-11-16 RX ADMIN — ASPIRIN 81 MG: 81 TABLET, COATED ORAL at 08:11

## 2024-11-16 RX ADMIN — GABAPENTIN 200 MG: 100 CAPSULE ORAL at 08:11

## 2024-11-16 RX ADMIN — ALBUTEROL SULFATE 2 PUFF: 90 AEROSOL, METERED RESPIRATORY (INHALATION) at 08:11

## 2024-11-16 RX ADMIN — FLUTICASONE PROPIONATE 100 MCG: 50 SPRAY, METERED NASAL at 08:11

## 2024-11-16 RX ADMIN — PAROXETINE HYDROCHLORIDE 50 MG: 20 TABLET, FILM COATED ORAL at 08:11

## 2024-11-16 RX ADMIN — DIAZEPAM 2 MG: 2 TABLET ORAL at 08:11

## 2024-11-16 RX ADMIN — ACETAMINOPHEN 650 MG: 325 TABLET ORAL at 08:11

## 2024-11-16 RX ADMIN — DIAZEPAM 2 MG: 2 TABLET ORAL at 02:11

## 2024-11-16 RX ADMIN — LEVOTHYROXINE SODIUM 75 MCG: 75 TABLET ORAL at 06:11

## 2024-11-16 RX ADMIN — METFORMIN HYDROCHLORIDE 500 MG: 500 TABLET, EXTENDED RELEASE ORAL at 05:11

## 2024-11-16 RX ADMIN — HYDROXYZINE PAMOATE 50 MG: 50 CAPSULE ORAL at 08:11

## 2024-11-16 RX ADMIN — RISPERIDONE 3 MG: 1 TABLET, FILM COATED ORAL at 08:11

## 2024-11-16 RX ADMIN — AMLODIPINE BESYLATE 10 MG: 10 TABLET ORAL at 08:11

## 2024-11-16 NOTE — NURSING
Plan of care reviewed. Pt has been in the common areas most of the day.  Pt remains depressed, anxious.  Pt states she is feeling better than she did on admission.  Pt denies si, hi or a v hallucinations.  Gravely disabled. Pt interacting with staff and peers without prompting.   Pt states she continues to think about how lonely she is at home.  States her boyfriend  recently.  Pt states she has 2 brothers and they live locally but that they have their own problems and unable to help out most of the time.   Pt remains on valium taper.  Denies any withdrawal symptoms.  Pt taking meds without side effects noted.  Appetite adequate.  Pt did not attend group sessions today.  Pt instructed to call for any needs or concerns at all. Verbalized understanding.  Will cont to monitor for safety.  Patient care ongoing.

## 2024-11-16 NOTE — PLAN OF CARE
POV reviewed. Pt. Was noticed to be sitting in dining room watching TV with other Pt. Pt. Denied any c/o auditory or visual hallucinations. She also denied any c/o suicidal or homicidal ideations. She cont. To c/o feeling depressed. Pt. To her medications without diff. Encouraged Pt. To verbalize her feelings when needed. Will cont. To monitor Pt.

## 2024-11-16 NOTE — PROGRESS NOTES
"PSYCHIATRY DAILY INPATIENT PROGRESS NOTE  SUBSEQUENT HOSPITAL VISIT    ENCOUNTER DATE: 11/16/2024  SITE: [facility]    DATE OF ADMISSION: 11/12/2024 10:45 AM  LENGTH OF STAY: 4 days      CHIEF COMPLAINT   Vero Allen is a 64 y.o. female, seen during daily lombardo rounds on the inpatient unit.  Vero Allen presented with the chief complaint of  psychosis(paranoia)/anxiety, "I feel like I'm going to have a nervous breakdown."       The patient was seen and examined. The chart was reviewed.     Reviewed notes from Rns and SW and labs from the last 24 hours.    The patient's case was discussed with the treatment team/care providers today including Rns    Staff reports no behavioral or management issues.     The patient has been compliant with treatment.      Subjective 11/16/2024       Patient again reports that her mood is "a little better" and that her anxiety is "ok"   -She reports SOB and post treatment tachycardia as triggers for her anxiety.  -she finds socializing helpful in managing symptoms. Loneliness was identified as the primary trigger for her depression.   She reports being denied entry to nursing home following last hospitalization. She is open to re-applying to a NH vs assisted living    Patient maintains that she has not taken benzos since last hospital discharge despite positive UDS.     No current symptoms of withdrawals were reported this AM; she is tolerating the Valium well.     Patient endorses ongoing depression and hopelessness about the future and her living situation. This is mildly better    Denies SE to medication regimen at this time.         Psychiatric ROS (observed, reported, or endorsed/denied):  Depressed mood - decreasing slowly  Interest/pleasure/anhedonia: decreasing slowly  Guilt/hopelessness/worthlessness - decreasing slowly  Changes in Sleep - decreasing slowly  Changes in Appetite - No  Changes in Concentration - decreasing slowly  Changes in Energy " - decreasing slowly  PMA/R- decreasing slowly  Suicidal- active/passive ideations - decreasing slowly  Homicidal ideations: active/passive ideations - No    Hallucinations - No  Delusions - No  Disorganized behavior - No  Disorganized speech - No  Negative symptoms - No    Elevated mood - No  Decreased need for sleep - No  Grandiosity - No  Racing thoughts - No  Impulsivity - No  Irritability- No  Increased energy - No  Distractibility - No  Increase in goal-directed activity or PMA- No    Symptoms of ELSY - decreasing slowly  Symptoms of Panic Disorder- No  Symptoms of PTSD - No        Overall progress: Patient is showing mild improvement         Psychotherapy:  Target symptoms: depression, anxiety   Why chosen therapy is appropriate versus another modality: relevant to diagnosis, evidence based practice  Outcome monitoring methods: self-report, observation  Therapeutic intervention type: insight oriented psychotherapy, supportive psychotherapy  Topics discussed/themes: building skills sets for symptom management, symptom recognition, life stage transitional issues  The patient's response to the intervention is accepting. The patient's progress toward treatment goals is fair.   Duration of intervention: 16 minutes.        Medical ROS  Review of Systems   Constitutional: Negative.    HENT: Negative.     Eyes: Negative.    Respiratory:          Subjective shortness of breath   Cardiovascular: Negative.    Gastrointestinal: Negative.    Genitourinary: Negative.    Musculoskeletal: Negative.    Skin: Negative.    Neurological: Negative.    Endo/Heme/Allergies: Negative.    Psychiatric/Behavioral:  Positive for depression. The patient is nervous/anxious.          PAST MEDICAL HISTORY   Past Medical History:   Diagnosis Date    Anxiety     Breast cancer 2010    Cancer     Breast    COPD (chronic obstructive pulmonary disease)     Depression     Diabetes mellitus     GERD (gastroesophageal reflux disease)     Hypertension      Insomnia     Thyroid disease            PSYCHOTROPIC MEDICATIONS   Scheduled Meds:   amLODIPine  10 mg Oral Daily    aspirin  81 mg Oral Daily    diazePAM  5 mg Oral BID    fluticasone furoate-vilanteroL  1 puff Inhalation Daily    fluticasone propionate  2 spray Each Nostril Daily    gabapentin  200 mg Oral BID    guaiFENesin  1,200 mg Oral BID    levothyroxine  75 mcg Oral Before breakfast    metFORMIN  500 mg Oral with dinner    montelukast  10 mg Oral Daily    pantoprazole  40 mg Oral Daily    paroxetine  50 mg Oral Daily    risperiDONE  3 mg Oral QHS     Continuous Infusions:  PRN Meds:.  Current Facility-Administered Medications:     acetaminophen, 650 mg, Oral, Q6H PRN    albuterol, 2 puff, Inhalation, Q6H PRN    aluminum-magnesium hydroxide-simethicone, 30 mL, Oral, Q6H PRN    benzonatate, 100 mg, Oral, TID PRN    benztropine mesylate, 2 mg, Intramuscular, Q8H PRN    hydrOXYzine pamoate, 50 mg, Oral, Q6H PRN    loperamide, 2 mg, Oral, PRN    nicotine, 1 patch, Transdermal, Daily PRN    OLANZapine, 10 mg, Oral, Q8H PRN **AND** OLANZapine, 10 mg, Intramuscular, Q8H PRN    ondansetron, 4 mg, Oral, Q8H PRN    promethazine, 25 mg, Oral, Q6H PRN        EXAMINATION    VITALS   Vitals:    11/15/24 0826 11/15/24 1911 11/16/24 0727 11/16/24 0809   BP:  136/69 139/89    BP Location:  Left arm Right arm    Patient Position:  Sitting Sitting    Pulse: 70 83 79 80   Resp: 20 18 18 20   Temp:  98.2 °F (36.8 °C) 98.4 °F (36.9 °C)    TempSrc:  Oral Oral    SpO2:  97% 97%    Weight:       Height:           Body mass index is 43.22 kg/m².        CONSTITUTIONAL  General Appearance: disheveled, obese    MUSCULOSKELETAL  Muscle Strength and Tone:no tremor, no tic  Abnormal Involuntary Movements: No  Gait and Station: non-ataxic    PSYCHIATRIC   Level of Consciousness: awake, alert , and oriented  Orientation: person, place, time, and situation  Grooming: Casually dressed and Well groomed  Psychomotor Behavior: normal,  cooperative, friendly and cooperative  Speech: slowed, monotone  Language: grossly intact  Mood: depressed  Affect: Consistent with mood and Congruent with thought  Thought Process: linear, logical  Associations: intact   Thought Content: less SI, denies HI, and no delusions  Perceptions: denies AH, denies  VH, and not RIS  Memory: Able to recall past events, Remote intact, and Recent intact  Attention:Attends to interview without distraction  Fund of Knowledge: Aware of current events and Vocabulary appropriate   Estimate if Intelligence:  Below average based on work/education history, vocabulary and mental status exam  Insight: limited awareness of illness- slowly improving  Judgment: behavior is adequate to circumstances- slowly improving         DIAGNOSTIC TESTING   Laboratory Results  No results found for this or any previous visit (from the past 24 hours).          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   Pain disorder     Complicated bereavement     BZD misuses      Psychosocial stressors     Nicotine Dependence      COPD  HTN  DM  Thyroid disease  GERD        PROBLEM LIST AND MANAGEMENT PLANS     Depression with psychosis: pt counseled  -resumed home Risoerdal 3 mg po q HS  -resumed Paxil 30 mg po q day- increase to 40 mg po q day tomorrow-Continue- increase to 50 mg po q day today (goal dose is 60 mg daily)     Anxiety: pt counseled  -meds as above  -Paxil as above  -gabapentin as below     Insomnia: pt counseled  -vistaril prn      Pain: pt counseled  -start trial of gabapentin 100 mg po BID- increase to 200 mg po BID- increase to 300 mg po BID     Complicated bereavement: pt counseled      Nicotine Dependence: pt counseled  -started/continue nicotine 14 mg patch dermal      BZD misuses  - start valium 5 mg PO TID, will taper off as tolerated- decrease to BID dosing- decrease to 5/2/2 today- decrease  to 2 mg po TID tomorrow  - pt counseled  - follow up with outpatient mental health providers after discharge for medication management and psychotherapy     Psychosocial stressors: pt counseled  -SW consulted to assist with resources     COPD: pt counseled  -Med consulted   -continue Fluticasone-vilanterol 100-25 mcg inhaled 1 puff daily  -montelukast 10 mg po q Day     HTN: pt counseled  -Med consulted  -conitnue Norvasc 10 mg po q day     DM: pt counseled  -Med consulted  -continue Metformin  mg po q Day     Thyroid disease: pt counseled  -Med consulted  -resumed Synthroid 75 mcg po q AM     GERD: pt counseled  -Med consulted  Continue pantoprazole 40 mg po q day             Discussed diagnosis, risks and benefits of proposed treatment vs alternative treatments vs no treatment, potential side effects of these treatments and the inherent unpredictability of treatment. The patient expresses understanding of the above and displays the capacity to agree with this treatment given said understanding. Patient also agrees that, currently, the benefits outweigh the risks and would like to pursue/continue treatment at this time.    Any medications being used off-label were discussed with the patient inclusive of the evidence base for the use of the medications and consent was obtained for the off-label use of the medication.       DISCHARGE PLANNING  Expected Disposition Plan: Home or Self Care      NEED FOR CONTINUED HOSPITALIZATION  Psychiatric illness continues to pose a potential threat to life or bodily function, of self or others, thereby requiring the need for continued inpatient psychiatric hospitalization: Yes, due to: danger to self, as evidenced by:  Ongoing concerns with SI.    Protective inpatient pyschiatric hospitalization required while a safe disposition plan is enacted: Yes    Patient stabilized and ready for discharge from inpatient psychiatric unit: No        STAFF:   Philip Mckee,  MD  Psychiatry

## 2024-11-16 NOTE — PLAN OF CARE
Problem: Adult Behavioral Health Plan of Care  Goal: Plan of Care Review  Outcome: Progressing     Problem: Anxiety Signs/Symptoms  Goal: Improved Sleep (Anxiety Signs/Symptoms)  Outcome: Progressing     Problem: Anxiety Signs/Symptoms  Goal: Improved Mood Symptoms (Anxiety Signs/Symptoms)  Outcome: Progressing     Problem: Anxiety Signs/Symptoms  Goal: Enhanced Social, Occupational or Functional Skills (Anxiety Signs/Symptoms)  Outcome: Progressing     Problem: Anxiety Signs/Symptoms  Goal: Improved Somatic Symptoms (Anxiety Signs/Symptoms)  Outcome: Progressing

## 2024-11-17 PROCEDURE — 11400000 HC PSYCH PRIVATE ROOM

## 2024-11-17 PROCEDURE — 99233 SBSQ HOSP IP/OBS HIGH 50: CPT | Mod: ,,, | Performed by: PSYCHIATRY & NEUROLOGY

## 2024-11-17 PROCEDURE — 25000003 PHARM REV CODE 250: Performed by: PSYCHIATRY & NEUROLOGY

## 2024-11-17 PROCEDURE — 25000003 PHARM REV CODE 250: Performed by: NURSE PRACTITIONER

## 2024-11-17 PROCEDURE — 25000003 PHARM REV CODE 250: Performed by: INTERNAL MEDICINE

## 2024-11-17 PROCEDURE — S4991 NICOTINE PATCH NONLEGEND: HCPCS | Performed by: NURSE PRACTITIONER

## 2024-11-17 RX ORDER — GABAPENTIN 400 MG/1
400 CAPSULE ORAL 2 TIMES DAILY
Status: DISCONTINUED | OUTPATIENT
Start: 2024-11-17 | End: 2024-11-21

## 2024-11-17 RX ADMIN — GABAPENTIN 400 MG: 400 CAPSULE ORAL at 08:11

## 2024-11-17 RX ADMIN — RISPERIDONE 3 MG: 1 TABLET, FILM COATED ORAL at 08:11

## 2024-11-17 RX ADMIN — GABAPENTIN 300 MG: 300 CAPSULE ORAL at 08:11

## 2024-11-17 RX ADMIN — AMLODIPINE BESYLATE 10 MG: 10 TABLET ORAL at 08:11

## 2024-11-17 RX ADMIN — FLUTICASONE FUROATE AND VILANTEROL TRIFENATATE 1 PUFF: 100; 25 POWDER RESPIRATORY (INHALATION) at 08:11

## 2024-11-17 RX ADMIN — HYDROXYZINE PAMOATE 50 MG: 50 CAPSULE ORAL at 08:11

## 2024-11-17 RX ADMIN — PANTOPRAZOLE SODIUM 40 MG: 40 TABLET, DELAYED RELEASE ORAL at 08:11

## 2024-11-17 RX ADMIN — NICOTINE 1 PATCH: 14 PATCH, EXTENDED RELEASE TRANSDERMAL at 08:11

## 2024-11-17 RX ADMIN — METFORMIN HYDROCHLORIDE 500 MG: 500 TABLET, EXTENDED RELEASE ORAL at 04:11

## 2024-11-17 RX ADMIN — GUAIFENESIN 1200 MG: 600 TABLET, EXTENDED RELEASE ORAL at 08:11

## 2024-11-17 RX ADMIN — ASPIRIN 81 MG: 81 TABLET, COATED ORAL at 08:11

## 2024-11-17 RX ADMIN — FLUTICASONE PROPIONATE 100 MCG: 50 SPRAY, METERED NASAL at 08:11

## 2024-11-17 RX ADMIN — DIAZEPAM 2 MG: 2 TABLET ORAL at 08:11

## 2024-11-17 RX ADMIN — PAROXETINE HYDROCHLORIDE 50 MG: 20 TABLET, FILM COATED ORAL at 08:11

## 2024-11-17 RX ADMIN — DIAZEPAM 2 MG: 2 TABLET ORAL at 02:11

## 2024-11-17 RX ADMIN — MONTELUKAST 10 MG: 10 TABLET, FILM COATED ORAL at 08:11

## 2024-11-17 RX ADMIN — LEVOTHYROXINE SODIUM 75 MCG: 75 TABLET ORAL at 06:11

## 2024-11-17 RX ADMIN — ALBUTEROL SULFATE 2 PUFF: 90 AEROSOL, METERED RESPIRATORY (INHALATION) at 08:11

## 2024-11-17 NOTE — NURSING
Status has been unchanged today. Pt has spent the majority of the day in the common area.  Pt remains depressed, anxious but states better than on admission.  States her problem is mostly loneliness at home. Pt denies si, hi or  av hallucinations. Gravely disabled. Pt denies any sob.  O2 sat 97 percent on room air.   Pt does c/o back soreness from the bed here on u but states she had this at home also.  Pt taking meds without side effects noted. Appetite adequate.  Pt instructed to call for any needs or concerns at all. Verbalized understanding.  Will cont to monitor for safety. Patient care ongoing.

## 2024-11-17 NOTE — PLAN OF CARE
Problem: Adult Behavioral Health Plan of Care  Goal: Plan of Care Review  Outcome: Progressing     Problem: Anxiety Signs/Symptoms  Goal: Optimized Energy Level (Anxiety Signs/Symptoms)  Outcome: Progressing  Goal: Optimized Cognitive Function (Anxiety Signs/Symptoms)  Outcome: Progressing  Goal: Improved Mood Symptoms (Anxiety Signs/Symptoms)  Outcome: Progressing  Goal: Improved Sleep (Anxiety Signs/Symptoms)  Outcome: Progressing  Goal: Enhanced Social, Occupational or Functional Skills (Anxiety Signs/Symptoms)  Outcome: Progressing  Goal: Improved Somatic Symptoms (Anxiety Signs/Symptoms)  Outcome: Progressing

## 2024-11-17 NOTE — PLAN OF CARE
Problem: Adult Behavioral Health Plan of Care  Goal: Plan of Care Review  Outcome: Progressing     Problem: Adult Behavioral Health Plan of Care  Goal: Patient-Specific Goal (Individualization)  Outcome: Progressing     Problem: Adult Behavioral Health Plan of Care  Goal: Adheres to Safety Considerations for Self and Others  Outcome: Progressing     Problem: Adult Behavioral Health Plan of Care  Goal: Optimized Coping Skills in Response to Life Stressors  Outcome: Progressing     Problem: Anxiety Signs/Symptoms  Goal: Optimized Energy Level (Anxiety Signs/Symptoms)  Outcome: Progressing     Problem: Anxiety Signs/Symptoms  Goal: Improved Mood Symptoms (Anxiety Signs/Symptoms)  Outcome: Progressing     Problem: Anxiety Signs/Symptoms  Goal: Improved Somatic Symptoms (Anxiety Signs/Symptoms)  Outcome: Progressing     Problem: Depressive Signs/Symptoms  Goal: Optimized Energy Level (Depressive Signs/Symptoms)  Outcome: Progressing     Problem: Depressive Signs/Symptoms  Goal: Improved Sleep (Depressive Signs/Symptoms)  Outcome: Progressing

## 2024-11-17 NOTE — NURSING
Plan of care reviewed.  Pt has spent most of the evening in her bedroom.  Pt was out in common areas most of the morning.  Pt out of room for meals and snacks.  Pt states her depression and anxiety is improved.  Pt denies si, hi or a v hallucinations.  Gravely disabled. No sob noted.  Respirations even and unlabored at this time.  O2 sat 97 percent on room air.  Pt states she knows that she does not meet criteria for the nursing home but that she may be interested in going to a group home.  Will pass this on to the discharge planner on Monday.  Pt taking meds without side effects noted.  Appetite adequate.  Pt instructed to call for any needs or concerns at all. Verbalized understanding.  Will cont to monitor for safety. Patient care ongoing.

## 2024-11-17 NOTE — NURSING
Patient alert and oriented, quiet, withdrawn, stayed out of her room in common area this evening, quiet, interacts with staff and peers when prompted or to make needs known, cooperative with no negative behaviors noted. Patient endorses depression and anxiety with some improvement noted, denies S/HI, denies A/VH, no delusions present. Patient accepted HS snack and compliant with HS medication. Patient requested PRN for back pain (see emar) and noted effective.  Patient went to her room after snacks, voices no complaints. Patient is asleep and resting comfortably. Close observations continued and safe environment maintained.

## 2024-11-17 NOTE — PROGRESS NOTES
"PSYCHIATRY DAILY INPATIENT PROGRESS NOTE  SUBSEQUENT HOSPITAL VISIT    ENCOUNTER DATE: 11/17/2024  SITE: [facility]    DATE OF ADMISSION: 11/12/2024 10:45 AM  LENGTH OF STAY: 5 days      CHIEF COMPLAINT   Vero Allen is a 64 y.o. female, seen during daily lombardo rounds on the inpatient unit.  Vero Allen presented with the chief complaint of  psychosis(paranoia)/anxiety, "I feel like I'm going to have a nervous breakdown."       The patient was seen and examined. The chart was reviewed.     Reviewed notes from Rns and SW and labs from the last 24 hours.    The patient's case was discussed with the treatment team/care providers today including Rns    Staff reports no behavioral or management issues.     The patient has been compliant with treatment.      Subjective 11/17/2024       Patient again reports that her mood is "ok.. a little better" and that her anxiety is "ok"   -She reports SOB and post treatment tachycardia as triggers for her anxiety. She denied current SOB/dyspnea   -she finds socializing helpful in managing symptoms. Loneliness was identified as the primary trigger for her depression.   She reports being denied entry to nursing home following last hospitalization. She is open to re-applying to a NH vs assisted living vs group home  Applications are pended     Patient maintains that she has not taken benzos since last hospital discharge despite positive UDS.     No current symptoms of withdrawals were reported this AM; she is tolerating the Valium well.     Patient endorses ongoing depression and hopelessness about the future and her living situation. This is again reported to be mildly better    Denies SE to medication regimen at this time.         Psychiatric ROS (observed, reported, or endorsed/denied):  Depressed mood - decreasing steadily  Interest/pleasure/anhedonia: decreasing steadily  Guilt/hopelessness/worthlessness - decreasing steadily  Changes in Sleep - " decreasing steadily  Changes in Appetite - No  Changes in Concentration - decreasing steadily  Changes in Energy - decreasing steadily  PMA/R- decreasing steadily  Suicidal- active/passive ideations - improving steadily  Homicidal ideations: active/passive ideations - No    Hallucinations - No  Delusions - No  Disorganized behavior - No  Disorganized speech - No  Negative symptoms - No    Elevated mood - No  Decreased need for sleep - No  Grandiosity - No  Racing thoughts - No  Impulsivity - No  Irritability- No  Increased energy - No  Distractibility - No  Increase in goal-directed activity or PMA- No    Symptoms of ELSY - decreasing steadily  Symptoms of Panic Disorder- No  Symptoms of PTSD - No        Overall progress: Patient is showing mild improvement         Psychotherapy:  Target symptoms: depression, anxiety   Why chosen therapy is appropriate versus another modality: relevant to diagnosis, evidence based practice  Outcome monitoring methods: self-report, observation  Therapeutic intervention type: insight oriented psychotherapy, supportive psychotherapy  Topics discussed/themes: building skills sets for symptom management, symptom recognition, life stage transitional issues  The patient's response to the intervention is accepting. The patient's progress toward treatment goals is fair.   Duration of intervention: 5 minutes.        Medical ROS  Review of Systems   Constitutional: Negative.    HENT: Negative.     Eyes: Negative.    Respiratory:          Subjective shortness of breath   Cardiovascular: Negative.    Gastrointestinal: Negative.    Genitourinary: Negative.    Musculoskeletal: Negative.    Skin: Negative.    Neurological: Negative.    Endo/Heme/Allergies: Negative.    Psychiatric/Behavioral:          See HPI         PAST MEDICAL HISTORY   Past Medical History:   Diagnosis Date    Anxiety     Breast cancer 2010    Cancer     Breast    COPD (chronic obstructive pulmonary disease)     Depression      Diabetes mellitus     GERD (gastroesophageal reflux disease)     Hypertension     Insomnia     Thyroid disease            PSYCHOTROPIC MEDICATIONS   Scheduled Meds:   amLODIPine  10 mg Oral Daily    aspirin  81 mg Oral Daily    diazePAM  2 mg Oral TID    fluticasone furoate-vilanteroL  1 puff Inhalation Daily    fluticasone propionate  2 spray Each Nostril Daily    gabapentin  300 mg Oral BID    guaiFENesin  1,200 mg Oral BID    levothyroxine  75 mcg Oral Before breakfast    metFORMIN  500 mg Oral with dinner    montelukast  10 mg Oral Daily    pantoprazole  40 mg Oral Daily    paroxetine  50 mg Oral Daily    risperiDONE  3 mg Oral QHS     Continuous Infusions:  PRN Meds:.  Current Facility-Administered Medications:     acetaminophen, 650 mg, Oral, Q6H PRN    albuterol, 2 puff, Inhalation, Q6H PRN    aluminum-magnesium hydroxide-simethicone, 30 mL, Oral, Q6H PRN    benzonatate, 100 mg, Oral, TID PRN    benztropine mesylate, 2 mg, Intramuscular, Q8H PRN    hydrOXYzine pamoate, 50 mg, Oral, Q6H PRN    loperamide, 2 mg, Oral, PRN    nicotine, 1 patch, Transdermal, Daily PRN    OLANZapine, 10 mg, Oral, Q8H PRN **AND** OLANZapine, 10 mg, Intramuscular, Q8H PRN    ondansetron, 4 mg, Oral, Q8H PRN    promethazine, 25 mg, Oral, Q6H PRN        EXAMINATION    VITALS   Vitals:    11/16/24 0809 11/16/24 1920 11/17/24 0745 11/17/24 0805   BP:  (!) 150/71 (!) 151/73    BP Location:  Left arm     Patient Position:  Sitting     Pulse: 80 74 71 71   Resp: 20 20 20 20   Temp:  96.9 °F (36.1 °C) 98.3 °F (36.8 °C)    TempSrc:  Oral     SpO2:  95% 97%    Weight:       Height:           Body mass index is 43.22 kg/m².        CONSTITUTIONAL  General Appearance: disheveled, obese    MUSCULOSKELETAL  Muscle Strength and Tone:no tremor, no tic  Abnormal Involuntary Movements: No  Gait and Station: non-ataxic    PSYCHIATRIC   Level of Consciousness: awake, alert , and oriented  Orientation: person, place, time, and situation  Grooming:  Casually dressed and Well groomed  Psychomotor Behavior: normal, cooperative, friendly and cooperative  Speech: slowed, monotone  Language: grossly intact  Mood: depressed  Affect: Consistent with mood and Congruent with thought  Thought Process: linear, logical  Associations: intact   Thought Content: less SI, denies HI, and no delusions  Perceptions: denies AH, denies  VH, and not RIS  Memory: Able to recall past events, Remote intact, and Recent intact  Attention:Attends to interview without distraction  Fund of Knowledge: Aware of current events and Vocabulary appropriate   Estimate if Intelligence:  Below average based on work/education history, vocabulary and mental status exam  Insight: limited awareness of illness- slowly improving  Judgment: behavior is adequate to circumstances- slowly improving         DIAGNOSTIC TESTING   Laboratory Results  No results found for this or any previous visit (from the past 24 hours).          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   Pain disorder     Complicated bereavement     BZD misuses      Psychosocial stressors     Nicotine Dependence      COPD  HTN  DM  Thyroid disease  GERD        PROBLEM LIST AND MANAGEMENT PLANS     Depression with psychosis: pt counseled  -resumed home Risoerdal 3 mg po q HS  -resumed Paxil 30 mg po q day- increase to 40 mg po q day tomorrow-Continue- increase to 50 mg po q day  (goal dose is 60 mg daily)     Anxiety: pt counseled  -meds as above  -Paxil as above  -gabapentin as below     Insomnia: pt counseled  -vistaril prn      Pain: pt counseled  -start trial of gabapentin 100 mg po BID- increase to 200 mg po BID- increase to 300 mg po BID- increase to 400 mg po BID     Complicated bereavement: pt counseled      Nicotine Dependence: pt counseled  -started/continue nicotine 14 mg patch dermal      BZD misuses  - start valium 5 mg PO TID,  will taper off as tolerated- decrease to BID dosing- decrease to 5/2/2 today- decrease to 2 mg po TID today- continue taper as tolerated  - pt counseled  - follow up with outpatient mental health providers after discharge for medication management and psychotherapy     Psychosocial stressors: pt counseled  -SW consulted to assist with resources     COPD: pt counseled  -Med consulted   -continue Fluticasone-vilanterol 100-25 mcg inhaled 1 puff daily  -montelukast 10 mg po q Day     HTN: pt counseled  -Med consulted  -conitnue Norvasc 10 mg po q day     DM: pt counseled  -Med consulted  -continue Metformin  mg po q Day     Thyroid disease: pt counseled  -Med consulted  -resumed Synthroid 75 mcg po q AM     GERD: pt counseled  -Med consulted  Continue pantoprazole 40 mg po q day             Discussed diagnosis, risks and benefits of proposed treatment vs alternative treatments vs no treatment, potential side effects of these treatments and the inherent unpredictability of treatment. The patient expresses understanding of the above and displays the capacity to agree with this treatment given said understanding. Patient also agrees that, currently, the benefits outweigh the risks and would like to pursue/continue treatment at this time.    Any medications being used off-label were discussed with the patient inclusive of the evidence base for the use of the medications and consent was obtained for the off-label use of the medication.       DISCHARGE PLANNING  Expected Disposition Plan: Home or Self Care      NEED FOR CONTINUED HOSPITALIZATION  Psychiatric illness continues to pose a potential threat to life or bodily function, of self or others, thereby requiring the need for continued inpatient psychiatric hospitalization: Yes, due to: danger to self, as evidenced by:  Concerns with SI--Fading.    Protective inpatient pyschiatric hospitalization required while a safe disposition plan is enacted: Yes    Patient  stabilized and ready for discharge from inpatient psychiatric unit: No        STAFF:   Philip Mckee MD  Psychiatry

## 2024-11-18 PROCEDURE — 25000003 PHARM REV CODE 250: Performed by: NURSE PRACTITIONER

## 2024-11-18 PROCEDURE — 25000003 PHARM REV CODE 250: Performed by: PSYCHIATRY & NEUROLOGY

## 2024-11-18 PROCEDURE — 90833 PSYTX W PT W E/M 30 MIN: CPT | Mod: ,,, | Performed by: PSYCHIATRY & NEUROLOGY

## 2024-11-18 PROCEDURE — S4991 NICOTINE PATCH NONLEGEND: HCPCS | Performed by: NURSE PRACTITIONER

## 2024-11-18 PROCEDURE — 11400000 HC PSYCH PRIVATE ROOM

## 2024-11-18 PROCEDURE — 99232 SBSQ HOSP IP/OBS MODERATE 35: CPT | Mod: ,,, | Performed by: PSYCHIATRY & NEUROLOGY

## 2024-11-18 PROCEDURE — 25000003 PHARM REV CODE 250: Performed by: INTERNAL MEDICINE

## 2024-11-18 RX ORDER — PAROXETINE HYDROCHLORIDE 20 MG/1
60 TABLET, FILM COATED ORAL DAILY
Status: DISCONTINUED | OUTPATIENT
Start: 2024-11-18 | End: 2024-11-22 | Stop reason: HOSPADM

## 2024-11-18 RX ADMIN — METFORMIN HYDROCHLORIDE 500 MG: 500 TABLET, EXTENDED RELEASE ORAL at 05:11

## 2024-11-18 RX ADMIN — GABAPENTIN 400 MG: 400 CAPSULE ORAL at 08:11

## 2024-11-18 RX ADMIN — PANTOPRAZOLE SODIUM 40 MG: 40 TABLET, DELAYED RELEASE ORAL at 08:11

## 2024-11-18 RX ADMIN — RISPERIDONE 3 MG: 1 TABLET, FILM COATED ORAL at 08:11

## 2024-11-18 RX ADMIN — ASPIRIN 81 MG: 81 TABLET, COATED ORAL at 08:11

## 2024-11-18 RX ADMIN — PAROXETINE HYDROCHLORIDE 60 MG: 20 TABLET, FILM COATED ORAL at 08:11

## 2024-11-18 RX ADMIN — HYDROXYZINE PAMOATE 50 MG: 50 CAPSULE ORAL at 08:11

## 2024-11-18 RX ADMIN — DIAZEPAM 2 MG: 2 TABLET ORAL at 08:11

## 2024-11-18 RX ADMIN — DIAZEPAM 2 MG: 2 TABLET ORAL at 03:11

## 2024-11-18 RX ADMIN — ALBUTEROL SULFATE 2 PUFF: 90 AEROSOL, METERED RESPIRATORY (INHALATION) at 08:11

## 2024-11-18 RX ADMIN — GUAIFENESIN 1200 MG: 600 TABLET, EXTENDED RELEASE ORAL at 08:11

## 2024-11-18 RX ADMIN — MONTELUKAST 10 MG: 10 TABLET, FILM COATED ORAL at 08:11

## 2024-11-18 RX ADMIN — LEVOTHYROXINE SODIUM 75 MCG: 75 TABLET ORAL at 05:11

## 2024-11-18 RX ADMIN — FLUTICASONE FUROATE AND VILANTEROL TRIFENATATE 1 PUFF: 100; 25 POWDER RESPIRATORY (INHALATION) at 08:11

## 2024-11-18 RX ADMIN — AMLODIPINE BESYLATE 10 MG: 10 TABLET ORAL at 08:11

## 2024-11-18 RX ADMIN — FLUTICASONE PROPIONATE 100 MCG: 50 SPRAY, METERED NASAL at 08:11

## 2024-11-18 RX ADMIN — NICOTINE 1 PATCH: 14 PATCH, EXTENDED RELEASE TRANSDERMAL at 08:11

## 2024-11-18 NOTE — PLAN OF CARE
Problem: Adult Behavioral Health Plan of Care  Goal: Optimized Coping Skills in Response to Life Stressors  Intervention: Promote Effective Coping Strategies  Flowsheets (Taken 11/18/2024 9016)  Supportive Measures:   active listening utilized   positive reinforcement provided   verbalization of feelings encouraged   guided imagery facilitated       Behavior: alert, oriented            Intervention: In this CBT-focused process group SW facilitated a group discussion on the 5 basic keys to effective communication. Patients will engage by pairing off completing a back to back drawing activity; then roles would be reversed. SW will engage in group discussion about the 5 basic keys and a list of discussion questions regarding activity.           Response: Pt attended group and was respectful to staff and peers. Pt participated in group discussion on effective communication. Pt participated in drawing exercise.          Plan: Continue to encourage pt to participate in process groups to verbalize feelings and develop healthy coping skills.

## 2024-11-18 NOTE — PLAN OF CARE
Problem: Adult Behavioral Health Plan of Care  Goal: Optimized Coping Skills in Response to Life Stressors  Intervention: Promote Effective Coping Strategies  Flowsheets (Taken 11/14/2024 1600)  Supportive Measures:   active listening utilized   verbalization of feelings encouraged   self-reflection promoted   self-responsibility promoted       Behavior:  Engaged, alert,and oriented           Intervention:  In this CBT-focused group SW facilitated discussion on stress exploration. Utilizing stress exploration worksheet pt are asked to identify stressors and factors that protect them from stress.          Response:  Pt identified stressors as heart beating fast, worrying about bills, and not being able to sleep. Pt identified social supports as (brother, niece, and sister), take a ride to get out the house, drink a beer, and socializing more.         Plan:  Continue to encourage pt to participate in process groups to verbalize feelings and develop healthy coping skills.

## 2024-11-18 NOTE — PROGRESS NOTES
"PSYCHIATRY DAILY INPATIENT PROGRESS NOTE  SUBSEQUENT HOSPITAL VISIT    ENCOUNTER DATE: 11/18/2024  SITE: [facility]    DATE OF ADMISSION: 11/12/2024 10:45 AM  LENGTH OF STAY: 6 days      CHIEF COMPLAINT   Vero Allen is a 64 y.o. female, seen during daily lombardo rounds on the inpatient unit.  Vero Allen presented with the chief complaint of  psychosis(paranoia)/anxiety, "I feel like I'm going to have a nervous breakdown."       The patient was seen and examined. The chart was reviewed.     Reviewed notes from Rns and SW and labs from the last 24 hours.    The patient's case was discussed with the treatment team/care providers today including Rns    Staff reports no behavioral or management issues.     The patient has been compliant with treatment.      Subjective 11/18/2024    Patient reports mood was "great" adding she had a "great weekend." Reports anxiety is well-controlled, currently rated 0/10. Denies suicidal or homicidal ideation. Patient states that to her knowledge, SW staff is attempting to get her placed in a nursing home, though she is not sure specifically which  facility. She looks forward to this, stating "I hate being at home by myself."     Denies auditory/visual hallucinations. No paranoia noted this morning.     Continues to deny SE of current medication regimen.            Psychiatric ROS (observed, reported, or endorsed/denied):  Depressed mood - decreasing steadily  Interest/pleasure/anhedonia: decreasing steadily  Guilt/hopelessness/worthlessness - decreasing steadily  Changes in Sleep - decreasing steadily  Changes in Appetite - No  Changes in Concentration - decreasing steadily  Changes in Energy - decreasing steadily  PMA/R- decreasing steadily  Suicidal- active/passive ideations - improving steadily  Homicidal ideations: active/passive ideations - No    Hallucinations - No  Delusions - No  Disorganized behavior - No  Disorganized speech - No  Negative " symptoms - No    Elevated mood - No  Decreased need for sleep - No  Grandiosity - No  Racing thoughts - No  Impulsivity - No  Irritability- No  Increased energy - No  Distractibility - No  Increase in goal-directed activity or PMA- No    Symptoms of ELSY - decreasing steadily  Symptoms of Panic Disorder- No  Symptoms of PTSD - No        Overall progress: Patient is showing mild improvement         Psychotherapy:  Target symptoms: depression, anxiety   Why chosen therapy is appropriate versus another modality: relevant to diagnosis, evidence based practice  Outcome monitoring methods: self-report, observation  Therapeutic intervention type: insight oriented psychotherapy, supportive psychotherapy  Topics discussed/themes: building skills sets for symptom management, symptom recognition, life stage transitional issues  The patient's response to the intervention is accepting. The patient's progress toward treatment goals is fair.   Duration of intervention: 5 minutes.        Medical ROS  Review of Systems   Constitutional: Negative.    HENT: Negative.     Eyes: Negative.    Respiratory:          Subjective shortness of breath   Cardiovascular: Negative.    Gastrointestinal: Negative.    Genitourinary: Negative.    Musculoskeletal: Negative.    Skin: Negative.    Neurological: Negative.    Endo/Heme/Allergies: Negative.    Psychiatric/Behavioral:          See HPI         PAST MEDICAL HISTORY   Past Medical History:   Diagnosis Date    Anxiety     Breast cancer 2010    Cancer     Breast    COPD (chronic obstructive pulmonary disease)     Depression     Diabetes mellitus     GERD (gastroesophageal reflux disease)     Hypertension     Insomnia     Thyroid disease            PSYCHOTROPIC MEDICATIONS   Scheduled Meds:   amLODIPine  10 mg Oral Daily    aspirin  81 mg Oral Daily    diazePAM  2 mg Oral TID    fluticasone furoate-vilanteroL  1 puff Inhalation Daily    fluticasone propionate  2 spray Each Nostril Daily     "gabapentin  400 mg Oral BID    guaiFENesin  1,200 mg Oral BID    levothyroxine  75 mcg Oral Before breakfast    metFORMIN  500 mg Oral with dinner    montelukast  10 mg Oral Daily    pantoprazole  40 mg Oral Daily    paroxetine  50 mg Oral Daily    risperiDONE  3 mg Oral QHS     Continuous Infusions:  PRN Meds:.  Current Facility-Administered Medications:     acetaminophen, 650 mg, Oral, Q6H PRN    albuterol, 2 puff, Inhalation, Q6H PRN    aluminum-magnesium hydroxide-simethicone, 30 mL, Oral, Q6H PRN    benzonatate, 100 mg, Oral, TID PRN    benztropine mesylate, 2 mg, Intramuscular, Q8H PRN    hydrOXYzine pamoate, 50 mg, Oral, Q6H PRN    loperamide, 2 mg, Oral, PRN    nicotine, 1 patch, Transdermal, Daily PRN    OLANZapine, 10 mg, Oral, Q8H PRN **AND** OLANZapine, 10 mg, Intramuscular, Q8H PRN    ondansetron, 4 mg, Oral, Q8H PRN    promethazine, 25 mg, Oral, Q6H PRN        EXAMINATION    VITALS   Vitals:    11/17/24 0745 11/17/24 0805 11/17/24 2000 11/18/24 0748   BP: (!) 151/73  137/86 (!) 136/91   BP Location:   Left arm Left arm   Patient Position:   Sitting Sitting   Pulse: 71 71 78 89   Resp: 20 20 20 18   Temp: 98.3 °F (36.8 °C)  97.7 °F (36.5 °C) 97.7 °F (36.5 °C)   TempSrc:   Oral Oral   SpO2: 97%  97% 95%   Weight:       Height:           Body mass index is 43.22 kg/m².        CONSTITUTIONAL  General Appearance: disheveled, obese    MUSCULOSKELETAL  Muscle Strength and Tone:no tremor, no tic  Abnormal Involuntary Movements: No  Gait and Station: non-ataxic    PSYCHIATRIC   Level of Consciousness: awake, alert , and oriented  Orientation: person, place, time, and situation  Grooming: Casually dressed and Well groomed  Psychomotor Behavior: normal, cooperative, friendly and cooperative  Speech: slowed, monotone  Language: grossly intact  Mood: "Great"  Affect: Consistent with mood and Congruent with thought  Thought Process: linear, logical  Associations: intact   Thought Content: less SI, denies HI, and no " delusions  Perceptions: denies AH, denies  VH, and not RIS  Memory: Able to recall past events, Remote intact, and Recent intact  Attention:Attends to interview without distraction  Fund of Knowledge: Aware of current events and Vocabulary appropriate   Estimate if Intelligence:  Below average based on work/education history, vocabulary and mental status exam  Insight: limited awareness of illness- slowly improving  Judgment: behavior is adequate to circumstances- slowly improving         DIAGNOSTIC TESTING   Laboratory Results  No results found for this or any previous visit (from the past 24 hours).          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   Pain disorder     Complicated bereavement     BZD misuses      Psychosocial stressors     Nicotine Dependence      COPD  HTN  DM  Thyroid disease  GERD        PROBLEM LIST AND MANAGEMENT PLANS     Depression with psychosis: pt counseled  -resumed home Risoerdal 3 mg po q HS  -resumed Paxil 30 mg po q day- increase to 40 mg po q day tomorrow-Continue- increase to 50 mg po q day  (goal dose is 60 mg daily)-Increase to 60 mg tomorrow     Anxiety: pt counseled  -meds as above  -Paxil as above  -gabapentin as below     Insomnia: pt counseled  -vistaril prn      Pain: pt counseled  -start trial of gabapentin 100 mg po BID- increase to 200 mg po BID- increase to 300 mg po BID- increase to 400 mg po BID     Complicated bereavement: pt counseled      Nicotine Dependence: pt counseled  -started/continue nicotine 14 mg patch dermal      BZD misuses  - start valium 5 mg PO TID, will taper off as tolerated- decrease to BID dosing- decrease to 5/2/2 today- decrease to 2 mg po TID today- continue taper as tolerated  - pt counseled  - follow up with outpatient mental health providers after discharge for medication management and psychotherapy     Psychosocial stressors: pt  counseled  -SW consulted to assist with resources     COPD: pt counseled  -Med consulted   -continue Fluticasone-vilanterol 100-25 mcg inhaled 1 puff daily  -montelukast 10 mg po q Day     HTN: pt counseled  -Med consulted  -conitnue Norvasc 10 mg po q day     DM: pt counseled  -Med consulted  -continue Metformin  mg po q Day     Thyroid disease: pt counseled  -Med consulted  -resumed Synthroid 75 mcg po q AM     GERD: pt counseled  -Med consulted  Continue pantoprazole 40 mg po q day             Discussed diagnosis, risks and benefits of proposed treatment vs alternative treatments vs no treatment, potential side effects of these treatments and the inherent unpredictability of treatment. The patient expresses understanding of the above and displays the capacity to agree with this treatment given said understanding. Patient also agrees that, currently, the benefits outweigh the risks and would like to pursue/continue treatment at this time.    Any medications being used off-label were discussed with the patient inclusive of the evidence base for the use of the medications and consent was obtained for the off-label use of the medication.       DISCHARGE PLANNING  Expected Disposition Plan: Home or Self Care      NEED FOR CONTINUED HOSPITALIZATION  Psychiatric illness continues to pose a potential threat to life or bodily function, of self or others, thereby requiring the need for continued inpatient psychiatric hospitalization: Yes, due to: danger to self, as evidenced by:  Concerns with SI--Fading.    Protective inpatient pyschiatric hospitalization required while a safe disposition plan is enacted: Yes    Patient stabilized and ready for discharge from inpatient psychiatric unit: No        STAFF:   Enrique Luz NP  Psychiatry

## 2024-11-18 NOTE — PLAN OF CARE
POV reviewed. Pt. Remains withdrawn to her room. Pt. Denied any c/o auditory or visual hallucinations. Pt. Also denied any c/o suicidal or homicidal ideations. She took her night medications without diff. Encouraged Pt. To verbalize her feelings. Will cont. To monitor Pt.  Problem: Bariatric Environmental Safety  Goal: Safety Maintained with Care  Outcome: Progressing     Problem: Adult Behavioral Health Plan of Care  Goal: Plan of Care Review  Outcome: Progressing  Goal: Patient-Specific Goal (Individualization)  Outcome: Progressing  Goal: Adheres to Safety Considerations for Self and Others  Outcome: Progressing  Goal: Absence of New-Onset Illness or Injury  Outcome: Progressing  Goal: Optimized Coping Skills in Response to Life Stressors  Outcome: Progressing  Goal: Develops/Participates in Therapeutic [place] to Support Successful Transition  Outcome: Progressing  Goal: Rounds/Family Conference  Outcome: Progressing     Problem: Anxiety Signs/Symptoms  Goal: Optimized Energy Level (Anxiety Signs/Symptoms)  Outcome: Progressing  Goal: Optimized Cognitive Function (Anxiety Signs/Symptoms)  Outcome: Progressing  Goal: Improved Mood Symptoms (Anxiety Signs/Symptoms)  Outcome: Progressing  Goal: Improved Sleep (Anxiety Signs/Symptoms)  Outcome: Progressing  Goal: Enhanced Social, Occupational or Functional Skills (Anxiety Signs/Symptoms)  Outcome: Progressing  Goal: Improved Somatic Symptoms (Anxiety Signs/Symptoms)  Outcome: Progressing     Problem: Depressive Signs/Symptoms  Goal: Optimized Energy Level (Depressive Signs/Symptoms)  Outcome: Progressing  Goal: Optimized Cognitive Function (Depressive Signs/Symptoms)  Outcome: Progressing  Goal: Increased Participation and Engagement (Depressive Signs/Symptoms)  Outcome: Progressing  Goal: Enhanced Self-Esteem and Confidence (Depressive Signs/Symptoms)  Outcome: Progressing  Goal: Improved Mood Symptoms (Depressive Signs/Symptoms)  Outcome: Progressing  Goal:  Optimized Nutrition Intake (Depressive Signs/Symptoms)  Outcome: Progressing  Goal: Improved Psychomotor Symptoms (Depressive Signs/Symptoms)  Outcome: Progressing  Goal: Improved Sleep (Depressive Signs/Symptoms)  Outcome: Progressing  Goal: Enhanced Social, Occupational or Functional Skills (Depressive Signs/Symptoms)  Outcome: Progressing     Problem: Diabetes Comorbidity  Goal: Blood Glucose Level Within Targeted Range  Outcome: Progressing

## 2024-11-18 NOTE — PLAN OF CARE
Care plan reviewed and on-going. Participated in group therapy today. Stayed in common area for most of day. Meds adjusted. Tolerating PO intake. Med compliant. Denies SI, HI, or hallucinations.

## 2024-11-19 PROCEDURE — 25000003 PHARM REV CODE 250: Performed by: NURSE PRACTITIONER

## 2024-11-19 PROCEDURE — 11400000 HC PSYCH PRIVATE ROOM

## 2024-11-19 PROCEDURE — 25000003 PHARM REV CODE 250: Performed by: INTERNAL MEDICINE

## 2024-11-19 PROCEDURE — S4991 NICOTINE PATCH NONLEGEND: HCPCS | Performed by: NURSE PRACTITIONER

## 2024-11-19 PROCEDURE — 90833 PSYTX W PT W E/M 30 MIN: CPT | Mod: ,,, | Performed by: PSYCHIATRY & NEUROLOGY

## 2024-11-19 PROCEDURE — 25000003 PHARM REV CODE 250: Performed by: PSYCHIATRY & NEUROLOGY

## 2024-11-19 PROCEDURE — 99232 SBSQ HOSP IP/OBS MODERATE 35: CPT | Mod: ,,, | Performed by: PSYCHIATRY & NEUROLOGY

## 2024-11-19 RX ORDER — DIAZEPAM 2 MG/1
2 TABLET ORAL 2 TIMES DAILY
Status: DISCONTINUED | OUTPATIENT
Start: 2024-11-19 | End: 2024-11-22 | Stop reason: HOSPADM

## 2024-11-19 RX ADMIN — LEVOTHYROXINE SODIUM 75 MCG: 75 TABLET ORAL at 06:11

## 2024-11-19 RX ADMIN — RISPERIDONE 3 MG: 1 TABLET, FILM COATED ORAL at 07:11

## 2024-11-19 RX ADMIN — PANTOPRAZOLE SODIUM 40 MG: 40 TABLET, DELAYED RELEASE ORAL at 08:11

## 2024-11-19 RX ADMIN — PAROXETINE HYDROCHLORIDE 60 MG: 20 TABLET, FILM COATED ORAL at 08:11

## 2024-11-19 RX ADMIN — GUAIFENESIN 1200 MG: 600 TABLET, EXTENDED RELEASE ORAL at 07:11

## 2024-11-19 RX ADMIN — HYDROXYZINE PAMOATE 50 MG: 50 CAPSULE ORAL at 07:11

## 2024-11-19 RX ADMIN — DIAZEPAM 2 MG: 2 TABLET ORAL at 07:11

## 2024-11-19 RX ADMIN — ACETAMINOPHEN 650 MG: 325 TABLET ORAL at 04:11

## 2024-11-19 RX ADMIN — GUAIFENESIN 1200 MG: 600 TABLET, EXTENDED RELEASE ORAL at 08:11

## 2024-11-19 RX ADMIN — FLUTICASONE PROPIONATE 100 MCG: 50 SPRAY, METERED NASAL at 08:11

## 2024-11-19 RX ADMIN — GABAPENTIN 400 MG: 400 CAPSULE ORAL at 08:11

## 2024-11-19 RX ADMIN — ASPIRIN 81 MG: 81 TABLET, COATED ORAL at 08:11

## 2024-11-19 RX ADMIN — FLUTICASONE FUROATE AND VILANTEROL TRIFENATATE 1 PUFF: 100; 25 POWDER RESPIRATORY (INHALATION) at 08:11

## 2024-11-19 RX ADMIN — AMLODIPINE BESYLATE 10 MG: 10 TABLET ORAL at 08:11

## 2024-11-19 RX ADMIN — NICOTINE 1 PATCH: 14 PATCH, EXTENDED RELEASE TRANSDERMAL at 08:11

## 2024-11-19 RX ADMIN — DIAZEPAM 2 MG: 2 TABLET ORAL at 08:11

## 2024-11-19 RX ADMIN — MONTELUKAST 10 MG: 10 TABLET, FILM COATED ORAL at 08:11

## 2024-11-19 RX ADMIN — METFORMIN HYDROCHLORIDE 500 MG: 500 TABLET, EXTENDED RELEASE ORAL at 04:11

## 2024-11-19 RX ADMIN — GABAPENTIN 400 MG: 400 CAPSULE ORAL at 07:11

## 2024-11-19 NOTE — PLAN OF CARE
Patient remains calm and compliant with medication.  Patient slept through the night.  Patient is still indicating that she is looking for a nursing home to live in.  No further concerns.

## 2024-11-19 NOTE — PROGRESS NOTES
"PSYCHIATRY DAILY INPATIENT PROGRESS NOTE  SUBSEQUENT HOSPITAL VISIT    ENCOUNTER DATE: 11/19/2024  SITE: [facility]    DATE OF ADMISSION: 11/12/2024 10:45 AM  LENGTH OF STAY: 7 days      CHIEF COMPLAINT   Vero Allen is a 64 y.o. female, seen during daily lombardo rounds on the inpatient unit.  Vero Allen presented with the chief complaint of  psychosis(paranoia)/anxiety, "I feel like I'm going to have a nervous breakdown."       The patient was seen and examined. The chart was reviewed.     Reviewed notes from Rns and SW and labs from the last 24 hours.    The patient's case was discussed with the treatment team/care providers today including Rns    Staff reports no behavioral or management issues.     The patient has been compliant with treatment.      Subjective 11/19/2024    Patient reports mood is "great", affect is pleasant, appropriate- states she is "ready to get out of here and face the world." Denies anxiety at this time. Denies suicidal/homicidal ideation. Denies shortness of breath at this time.    Provider informed by  that patient may qualify for nursing home but that additional documentation may be necessary before facility makes decision. Awaiting further instruction.        Continues to deny SE of current medication regimen.            Psychiatric ROS (observed, reported, or endorsed/denied):  Depressed mood - decreasing steadily  Interest/pleasure/anhedonia: decreasing steadily  Guilt/hopelessness/worthlessness - decreasing steadily  Changes in Sleep - decreasing steadily  Changes in Appetite - No  Changes in Concentration - decreasing steadily  Changes in Energy - decreasing steadily  PMA/R- decreasing steadily  Suicidal- active/passive ideations - denies  Homicidal ideations: active/passive ideations - No    Hallucinations - No  Delusions - No  Disorganized behavior - No  Disorganized speech - No  Negative symptoms - No    Elevated mood - No  Decreased " need for sleep - No  Grandiosity - No  Racing thoughts - No  Impulsivity - No  Irritability- No  Increased energy - No  Distractibility - No  Increase in goal-directed activity or PMA- No    Symptoms of ELSY - decreasing steadily  Symptoms of Panic Disorder- No  Symptoms of PTSD - No        Overall progress: Patient is showing mild improvement         Psychotherapy:  Target symptoms: depression, anxiety   Why chosen therapy is appropriate versus another modality: relevant to diagnosis, evidence based practice  Outcome monitoring methods: self-report, observation  Therapeutic intervention type: insight oriented psychotherapy, supportive psychotherapy  Topics discussed/themes: building skills sets for symptom management, symptom recognition, life stage transitional issues  The patient's response to the intervention is accepting. The patient's progress toward treatment goals is fair.   Duration of intervention: 5 minutes.        Medical ROS  Review of Systems   Constitutional: Negative.    HENT: Negative.     Eyes: Negative.    Respiratory:          Subjective shortness of breath   Cardiovascular: Negative.    Gastrointestinal: Negative.    Genitourinary: Negative.    Musculoskeletal: Negative.    Skin: Negative.    Neurological: Negative.    Endo/Heme/Allergies: Negative.    Psychiatric/Behavioral:          See HPI         PAST MEDICAL HISTORY   Past Medical History:   Diagnosis Date    Anxiety     Breast cancer 2010    Cancer     Breast    COPD (chronic obstructive pulmonary disease)     Depression     Diabetes mellitus     GERD (gastroesophageal reflux disease)     Hypertension     Insomnia     Thyroid disease            PSYCHOTROPIC MEDICATIONS   Scheduled Meds:   amLODIPine  10 mg Oral Daily    aspirin  81 mg Oral Daily    diazePAM  2 mg Oral TID    fluticasone furoate-vilanteroL  1 puff Inhalation Daily    fluticasone propionate  2 spray Each Nostril Daily    gabapentin  400 mg Oral BID    guaiFENesin  1,200 mg  "Oral BID    levothyroxine  75 mcg Oral Before breakfast    metFORMIN  500 mg Oral with dinner    montelukast  10 mg Oral Daily    pantoprazole  40 mg Oral Daily    paroxetine  60 mg Oral Daily    risperiDONE  3 mg Oral QHS     Continuous Infusions:  PRN Meds:.  Current Facility-Administered Medications:     acetaminophen, 650 mg, Oral, Q6H PRN    albuterol, 2 puff, Inhalation, Q6H PRN    aluminum-magnesium hydroxide-simethicone, 30 mL, Oral, Q6H PRN    benzonatate, 100 mg, Oral, TID PRN    benztropine mesylate, 2 mg, Intramuscular, Q8H PRN    hydrOXYzine pamoate, 50 mg, Oral, Q6H PRN    loperamide, 2 mg, Oral, PRN    nicotine, 1 patch, Transdermal, Daily PRN    OLANZapine, 10 mg, Oral, Q8H PRN **AND** OLANZapine, 10 mg, Intramuscular, Q8H PRN    ondansetron, 4 mg, Oral, Q8H PRN    promethazine, 25 mg, Oral, Q6H PRN        EXAMINATION    VITALS   Vitals:    11/18/24 0839 11/18/24 1912 11/18/24 2023 11/19/24 0742   BP:  (!) 161/83  (!) 156/77   BP Location:  Left arm  Left arm   Patient Position:  Sitting  Sitting   Pulse: 82 88 88 71   Resp: 20 20 20 18   Temp:  98.3 °F (36.8 °C)  98.7 °F (37.1 °C)   TempSrc:  Oral  Oral   SpO2:  96% 96% 97%   Weight:       Height:           Body mass index is 43.22 kg/m².        CONSTITUTIONAL  General Appearance: disheveled, obese    MUSCULOSKELETAL  Muscle Strength and Tone:no tremor, no tic  Abnormal Involuntary Movements: No  Gait and Station: non-ataxic    PSYCHIATRIC   Level of Consciousness: awake, alert , and oriented  Orientation: person, place, time, and situation  Grooming: Casually dressed and Well groomed  Psychomotor Behavior: normal, cooperative, friendly and cooperative  Speech: slowed, monotone  Language: grossly intact  Mood: "Great"  Affect: Consistent with mood and Congruent with thought  Thought Process: linear, logical  Associations: intact   Thought Content: less SI, denies HI, and no delusions  Perceptions: denies AH, denies  VH, and not RIS  Memory: Able to " recall past events, Remote intact, and Recent intact  Attention:Attends to interview without distraction  Fund of Knowledge: Aware of current events and Vocabulary appropriate   Estimate if Intelligence:  Below average based on work/education history, vocabulary and mental status exam  Insight: limited awareness of illness- slowly improving  Judgment: behavior is adequate to circumstances- slowly improving         DIAGNOSTIC TESTING   Laboratory Results  No results found for this or any previous visit (from the past 24 hours).          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   Pain disorder     Complicated bereavement     BZD misuses      Psychosocial stressors     Nicotine Dependence      COPD  HTN  DM  Thyroid disease  GERD        PROBLEM LIST AND MANAGEMENT PLANS     Depression with psychosis: pt counseled  -resumed home Risoerdal 3 mg po q HS  -resumed Paxil 30 mg po q day- increase to 40 mg po q day tomorrow-Continue- increase to 50 mg po q day  (goal dose is 60 mg daily)-Increase to 60 mg tomorrow     Anxiety: pt counseled  -meds as above  -Paxil as above  -gabapentin as below     Insomnia: pt counseled  -vistaril prn      Pain: pt counseled  -start trial of gabapentin 100 mg po BID- increase to 200 mg po BID- increase to 300 mg po BID- increase to 400 mg po BID     Complicated bereavement: pt counseled      Nicotine Dependence: pt counseled  -started/continue nicotine 14 mg patch dermal      BZD misuses  - start valium 5 mg PO TID, will taper off as tolerated- decrease to BID dosing- decrease to 5/2/2 today- decrease to 2 mg po TID today- continue taper as tolerated- Decrease to BID  - pt counseled  - follow up with outpatient mental health providers after discharge for medication management and psychotherapy     Psychosocial stressors: pt counseled  -SW consulted to assist with resources     COPD: pt  counseled  -Med consulted   -continue Fluticasone-vilanterol 100-25 mcg inhaled 1 puff daily  -montelukast 10 mg po q Day     HTN: pt counseled  -Med consulted  -conitnue Norvasc 10 mg po q day     DM: pt counseled  -Med consulted  -continue Metformin  mg po q Day     Thyroid disease: pt counseled  -Med consulted  -resumed Synthroid 75 mcg po q AM     GERD: pt counseled  -Med consulted  Continue pantoprazole 40 mg po q day             Discussed diagnosis, risks and benefits of proposed treatment vs alternative treatments vs no treatment, potential side effects of these treatments and the inherent unpredictability of treatment. The patient expresses understanding of the above and displays the capacity to agree with this treatment given said understanding. Patient also agrees that, currently, the benefits outweigh the risks and would like to pursue/continue treatment at this time.    Any medications being used off-label were discussed with the patient inclusive of the evidence base for the use of the medications and consent was obtained for the off-label use of the medication.       DISCHARGE PLANNING  Expected Disposition Plan: Home or Self Care      NEED FOR CONTINUED HOSPITALIZATION  Psychiatric illness continues to pose a potential threat to life or bodily function, of self or others, thereby requiring the need for continued inpatient psychiatric hospitalization: Yes, due to: danger to self, as evidenced by:  Concerns with SI--Fading.    Protective inpatient pyschiatric hospitalization required while a safe disposition plan is enacted: Yes    Patient stabilized and ready for discharge from inpatient psychiatric unit: No        STAFF:   Enrique Luz NP  Psychiatry

## 2024-11-20 PROCEDURE — 25000003 PHARM REV CODE 250: Performed by: INTERNAL MEDICINE

## 2024-11-20 PROCEDURE — S4991 NICOTINE PATCH NONLEGEND: HCPCS | Performed by: NURSE PRACTITIONER

## 2024-11-20 PROCEDURE — 99232 SBSQ HOSP IP/OBS MODERATE 35: CPT | Mod: ,,, | Performed by: PSYCHIATRY & NEUROLOGY

## 2024-11-20 PROCEDURE — 25000003 PHARM REV CODE 250: Performed by: NURSE PRACTITIONER

## 2024-11-20 PROCEDURE — 25000003 PHARM REV CODE 250: Performed by: PSYCHIATRY & NEUROLOGY

## 2024-11-20 PROCEDURE — 90833 PSYTX W PT W E/M 30 MIN: CPT | Mod: ,,, | Performed by: PSYCHIATRY & NEUROLOGY

## 2024-11-20 PROCEDURE — 11400000 HC PSYCH PRIVATE ROOM

## 2024-11-20 RX ADMIN — RISPERIDONE 3 MG: 1 TABLET, FILM COATED ORAL at 08:11

## 2024-11-20 RX ADMIN — GUAIFENESIN 1200 MG: 600 TABLET, EXTENDED RELEASE ORAL at 08:11

## 2024-11-20 RX ADMIN — AMLODIPINE BESYLATE 10 MG: 10 TABLET ORAL at 08:11

## 2024-11-20 RX ADMIN — FLUTICASONE FUROATE AND VILANTEROL TRIFENATATE 1 PUFF: 100; 25 POWDER RESPIRATORY (INHALATION) at 08:11

## 2024-11-20 RX ADMIN — PANTOPRAZOLE SODIUM 40 MG: 40 TABLET, DELAYED RELEASE ORAL at 08:11

## 2024-11-20 RX ADMIN — NICOTINE 1 PATCH: 14 PATCH, EXTENDED RELEASE TRANSDERMAL at 08:11

## 2024-11-20 RX ADMIN — DIAZEPAM 2 MG: 2 TABLET ORAL at 08:11

## 2024-11-20 RX ADMIN — HYDROXYZINE PAMOATE 50 MG: 50 CAPSULE ORAL at 08:11

## 2024-11-20 RX ADMIN — ALBUTEROL SULFATE 2 PUFF: 90 AEROSOL, METERED RESPIRATORY (INHALATION) at 08:11

## 2024-11-20 RX ADMIN — FLUTICASONE PROPIONATE 100 MCG: 50 SPRAY, METERED NASAL at 08:11

## 2024-11-20 RX ADMIN — MONTELUKAST 10 MG: 10 TABLET, FILM COATED ORAL at 08:11

## 2024-11-20 RX ADMIN — GABAPENTIN 400 MG: 400 CAPSULE ORAL at 08:11

## 2024-11-20 RX ADMIN — PAROXETINE HYDROCHLORIDE 60 MG: 20 TABLET, FILM COATED ORAL at 08:11

## 2024-11-20 RX ADMIN — LEVOTHYROXINE SODIUM 75 MCG: 75 TABLET ORAL at 05:11

## 2024-11-20 RX ADMIN — METFORMIN HYDROCHLORIDE 500 MG: 500 TABLET, EXTENDED RELEASE ORAL at 04:11

## 2024-11-20 RX ADMIN — ASPIRIN 81 MG: 81 TABLET, COATED ORAL at 08:11

## 2024-11-20 NOTE — PLAN OF CARE
Problem: Adult Behavioral Health Plan of Care  Goal: Optimized Coping Skills in Response to Life Stressors  Intervention: Promote Effective Coping Strategies  Flowsheets (Taken 11/20/2024 1511)  Supportive Measures:   active listening utilized   positive reinforcement provided   self-responsibility promoted   verbalization of feelings encouraged       Behavior: alert, oriented            Intervention: In this CBT-focused group, patients discussed the importance of setting goals and each patient was asked to identify specific goals to aid in their future treatment.          Response: Pt attended group and was respectful to staff and peers. Pt participated in group discussion and completed handout. Pt identified the following life goals: keeping in touch with her family, spending time with her friends, going to work, loving/taking care of her body, and focus on improving her mental health.           Plan: Continue to encourage pt to participate in process groups to verbalize feelings and develop healthy coping skills.

## 2024-11-20 NOTE — PLAN OF CARE
POC reviewed this shift and is on going. Patient is calm and cooperative.  Denies Suicidal Ideation, Homicidal Ideation, Auditory Hallucinations, Visual Hallucinations, Tactile Hallucinations, Gustatory Hallucinations, and Delusions. Interacts well with select peers. States she is ready for discharge. Pt continues to be medication compliant and staff will continue to monitor pt closely while on the unit.

## 2024-11-20 NOTE — PLAN OF CARE
POC reviewed this shift and is ongoing.  Pt quiet and spends a lot of time withdrawn to her room. Pt on unit as needed. Pt able to make her needs known. No ss of distress. Med compliant.

## 2024-11-20 NOTE — NURSING
Plan of care reviewed. Pt has been in the common areas most of the day. Pt interacting with staff and peers without difficulty. Pt's mood improved.  Pt denies si, hi or a v hallucinations.  Gravely disabled.  Pt remains anxious, depressed but states better than on admission.  States she feels good being around people and that loneliness is her main problem when at home.  Pt thinking about going to a group home or nursing home upon  discharge from [facility]. Pt denies sob.  O2 sat 97 percent on room air.  Pt taking meds without side effects noted.  Appetite adequate.  Pt instructed to call for any needs or concerns at all.  Verbalized understanding.  Will cont to monitor for safety.  Patient care ongoing

## 2024-11-20 NOTE — PLAN OF CARE
Problem: Anxiety Signs/Symptoms  Goal: Optimized Energy Level (Anxiety Signs/Symptoms)  Outcome: Progressing  Goal: Optimized Cognitive Function (Anxiety Signs/Symptoms)  Outcome: Progressing  Goal: Improved Mood Symptoms (Anxiety Signs/Symptoms)  Outcome: Progressing  Goal: Improved Sleep (Anxiety Signs/Symptoms)  Outcome: Progressing  Goal: Enhanced Social, Occupational or Functional Skills (Anxiety Signs/Symptoms)  Outcome: Progressing  Goal: Improved Somatic Symptoms (Anxiety Signs/Symptoms)  Outcome: Progressing

## 2024-11-21 PROCEDURE — 11400000 HC PSYCH PRIVATE ROOM

## 2024-11-21 PROCEDURE — 25000003 PHARM REV CODE 250: Performed by: PSYCHIATRY & NEUROLOGY

## 2024-11-21 PROCEDURE — 25000003 PHARM REV CODE 250: Performed by: INTERNAL MEDICINE

## 2024-11-21 PROCEDURE — 99232 SBSQ HOSP IP/OBS MODERATE 35: CPT | Mod: ,,, | Performed by: PSYCHIATRY & NEUROLOGY

## 2024-11-21 PROCEDURE — 25000003 PHARM REV CODE 250: Performed by: NURSE PRACTITIONER

## 2024-11-21 PROCEDURE — 90833 PSYTX W PT W E/M 30 MIN: CPT | Mod: ,,, | Performed by: PSYCHIATRY & NEUROLOGY

## 2024-11-21 PROCEDURE — S4991 NICOTINE PATCH NONLEGEND: HCPCS | Performed by: NURSE PRACTITIONER

## 2024-11-21 RX ORDER — GABAPENTIN 300 MG/1
300 CAPSULE ORAL 3 TIMES DAILY
Status: DISCONTINUED | OUTPATIENT
Start: 2024-11-21 | End: 2024-11-22 | Stop reason: HOSPADM

## 2024-11-21 RX ADMIN — PAROXETINE HYDROCHLORIDE 60 MG: 20 TABLET, FILM COATED ORAL at 08:11

## 2024-11-21 RX ADMIN — LEVOTHYROXINE SODIUM 75 MCG: 75 TABLET ORAL at 05:11

## 2024-11-21 RX ADMIN — GABAPENTIN 400 MG: 400 CAPSULE ORAL at 08:11

## 2024-11-21 RX ADMIN — RISPERIDONE 3 MG: 1 TABLET, FILM COATED ORAL at 08:11

## 2024-11-21 RX ADMIN — NICOTINE 1 PATCH: 14 PATCH, EXTENDED RELEASE TRANSDERMAL at 08:11

## 2024-11-21 RX ADMIN — ASPIRIN 81 MG: 81 TABLET, COATED ORAL at 08:11

## 2024-11-21 RX ADMIN — DIAZEPAM 2 MG: 2 TABLET ORAL at 08:11

## 2024-11-21 RX ADMIN — ALBUTEROL SULFATE 2 PUFF: 90 AEROSOL, METERED RESPIRATORY (INHALATION) at 08:11

## 2024-11-21 RX ADMIN — GABAPENTIN 300 MG: 300 CAPSULE ORAL at 03:11

## 2024-11-21 RX ADMIN — GABAPENTIN 300 MG: 300 CAPSULE ORAL at 08:11

## 2024-11-21 RX ADMIN — MONTELUKAST 10 MG: 10 TABLET, FILM COATED ORAL at 08:11

## 2024-11-21 RX ADMIN — FLUTICASONE PROPIONATE 100 MCG: 50 SPRAY, METERED NASAL at 08:11

## 2024-11-21 RX ADMIN — FLUTICASONE FUROATE AND VILANTEROL TRIFENATATE 1 PUFF: 100; 25 POWDER RESPIRATORY (INHALATION) at 08:11

## 2024-11-21 RX ADMIN — GUAIFENESIN 1200 MG: 600 TABLET, EXTENDED RELEASE ORAL at 08:11

## 2024-11-21 RX ADMIN — PANTOPRAZOLE SODIUM 40 MG: 40 TABLET, DELAYED RELEASE ORAL at 08:11

## 2024-11-21 RX ADMIN — AMLODIPINE BESYLATE 10 MG: 10 TABLET ORAL at 08:11

## 2024-11-21 RX ADMIN — HYDROXYZINE PAMOATE 50 MG: 50 CAPSULE ORAL at 08:11

## 2024-11-21 RX ADMIN — METFORMIN HYDROCHLORIDE 500 MG: 500 TABLET, EXTENDED RELEASE ORAL at 04:11

## 2024-11-21 NOTE — PROGRESS NOTES
"PSYCHIATRY DAILY INPATIENT PROGRESS NOTE  SUBSEQUENT HOSPITAL VISIT    ENCOUNTER DATE: 11/21/2024  SITE: [facility]    DATE OF ADMISSION: 11/12/2024 10:45 AM  LENGTH OF STAY: 9 days      CHIEF COMPLAINT   Vero Allen is a 64 y.o. female, seen during daily lombardo rounds on the inpatient unit.  Vero Allen presented with the chief complaint of  psychosis(paranoia)/anxiety, "I feel like I'm going to have a nervous breakdown."       The patient was seen and examined. The chart was reviewed.     Reviewed notes from Rns and SW and labs from the last 24 hours.    The patient's case was discussed with the treatment team/care providers today including Rns    Staff reports no behavioral or management issues.     The patient has been compliant with treatment.      Subjective 11/21/2024    Patient reports mood is "great," anxiety remains well controlled. Patient does endorse some chronic back pain and requests to be ordered "the medicine that Dr. Merchant gives me for pain."  shows that patient is prescribed norco for pain. Patient counseled on risks associated with coadministration of benzos/opiates/gabapentin, especially considering patient's hx of chronic respiratory illness. She verbalized understanding and is amenable to titration of gabapentin for now.     Disposition remains unclear. Nursing home remains a possibility but no new updates. Patient may be candidate for discharge home while awaiting NH decision/placement.         Psychiatric ROS (observed, reported, or endorsed/denied):  Depressed mood - denies  Interest/pleasure/anhedonia: decreasing steadily  Guilt/hopelessness/worthlessness - decreasing steadily  Changes in Sleep - decreasing steadily  Changes in Appetite - No  Changes in Concentration - decreasing steadily  Changes in Energy - decreasing steadily  PMA/R- decreasing steadily  Suicidal- active/passive ideations - denies  Homicidal ideations: active/passive ideations - " No    Hallucinations - No  Delusions - No  Disorganized behavior - No  Disorganized speech - No  Negative symptoms - No    Elevated mood - No  Decreased need for sleep - No  Grandiosity - No  Racing thoughts - No  Impulsivity - No  Irritability- No  Increased energy - No  Distractibility - No  Increase in goal-directed activity or PMA- No    Symptoms of ELSY - decreasing steadily  Symptoms of Panic Disorder- No  Symptoms of PTSD - No        Overall progress: Patient is showing moderate improvement        Psychotherapy:  Target symptoms: depression, anxiety   Why chosen therapy is appropriate versus another modality: relevant to diagnosis, evidence based practice  Outcome monitoring methods: self-report, observation  Therapeutic intervention type: insight oriented psychotherapy, supportive psychotherapy  Topics discussed/themes: building skills sets for symptom management, symptom recognition, life stage transitional issues  The patient's response to the intervention is accepting. The patient's progress toward treatment goals is good.   Duration of intervention: 15 minutes.        Medical ROS  Review of Systems   Constitutional: Negative.    HENT: Negative.     Eyes: Negative.    Respiratory:          Subjective shortness of breath   Cardiovascular: Negative.    Gastrointestinal: Negative.    Genitourinary: Negative.    Musculoskeletal:  Positive for back pain.   Skin: Negative.    Neurological: Negative.    Endo/Heme/Allergies: Negative.    Psychiatric/Behavioral:          See HPI         PAST MEDICAL HISTORY   Past Medical History:   Diagnosis Date    Anxiety     Breast cancer 2010    Cancer     Breast    COPD (chronic obstructive pulmonary disease)     Depression     Diabetes mellitus     GERD (gastroesophageal reflux disease)     Hypertension     Insomnia     Thyroid disease            PSYCHOTROPIC MEDICATIONS   Scheduled Meds:   amLODIPine  10 mg Oral Daily    aspirin  81 mg Oral Daily    diazePAM  2 mg Oral BID  "   fluticasone furoate-vilanteroL  1 puff Inhalation Daily    fluticasone propionate  2 spray Each Nostril Daily    gabapentin  400 mg Oral BID    guaiFENesin  1,200 mg Oral BID    levothyroxine  75 mcg Oral Before breakfast    metFORMIN  500 mg Oral with dinner    montelukast  10 mg Oral Daily    pantoprazole  40 mg Oral Daily    paroxetine  60 mg Oral Daily    risperiDONE  3 mg Oral QHS     Continuous Infusions:  PRN Meds:.  Current Facility-Administered Medications:     acetaminophen, 650 mg, Oral, Q6H PRN    albuterol, 2 puff, Inhalation, Q6H PRN    aluminum-magnesium hydroxide-simethicone, 30 mL, Oral, Q6H PRN    benzonatate, 100 mg, Oral, TID PRN    benztropine mesylate, 2 mg, Intramuscular, Q8H PRN    hydrOXYzine pamoate, 50 mg, Oral, Q6H PRN    loperamide, 2 mg, Oral, PRN    nicotine, 1 patch, Transdermal, Daily PRN    OLANZapine, 10 mg, Oral, Q8H PRN **AND** OLANZapine, 10 mg, Intramuscular, Q8H PRN    ondansetron, 4 mg, Oral, Q8H PRN    promethazine, 25 mg, Oral, Q6H PRN        EXAMINATION    VITALS   Vitals:    11/20/24 1914 11/20/24 2018 11/21/24 0726 11/21/24 0804   BP: 136/70  125/79    BP Location: Left arm  Left arm    Patient Position: Sitting  Sitting    Pulse: 66 66 87 87   Resp: 16 16 18 20   Temp: 98.1 °F (36.7 °C)  97.2 °F (36.2 °C)    TempSrc: Oral  Oral    SpO2: 96% 96% 97%    Weight:       Height:           Body mass index is 43.22 kg/m².        CONSTITUTIONAL  General Appearance: disheveled, obese    MUSCULOSKELETAL  Muscle Strength and Tone:no tremor, no tic  Abnormal Involuntary Movements: No  Gait and Station: non-ataxic    PSYCHIATRIC   Level of Consciousness: awake, alert , and oriented  Orientation: person, place, time, and situation  Grooming: Casually dressed and Well groomed  Psychomotor Behavior: normal, cooperative, friendly and cooperative  Speech: slowed, monotone  Language: grossly intact  Mood: "Great"  Affect: Consistent with mood and Congruent with thought  Thought Process: " linear, logical  Associations: intact   Thought Content: less SI, denies HI, and no delusions  Perceptions: denies AH, denies  VH, and not RIS  Memory: Able to recall past events, Remote intact, and Recent intact  Attention:Attends to interview without distraction  Fund of Knowledge: Aware of current events and Vocabulary appropriate   Estimate if Intelligence:  Below average based on work/education history, vocabulary and mental status exam  Insight: has awareness of illness-    Judgment: behavior is adequate to circumstances-          DIAGNOSTIC TESTING   Laboratory Results  No results found for this or any previous visit (from the past 24 hours).          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   Pain disorder     Complicated bereavement     BZD misuses      Psychosocial stressors     Nicotine Dependence      COPD  HTN  DM  Thyroid disease  GERD        PROBLEM LIST AND MANAGEMENT PLANS     Depression with psychosis: pt counseled  -resumed home Risoerdal 3 mg po q HS  -resumed Paxil 30 mg po q day- increase to 40 mg po q day tomorrow-Continue- increase to 50 mg po q day  (goal dose is 60 mg daily)-Increase to 60 mg tomorrow- Continue     Anxiety: pt counseled  -meds as above  -Paxil as above  -gabapentin as below     Insomnia: pt counseled  -vistaril prn      Pain: pt counseled  -start trial of gabapentin 100 mg po BID- increase to 200 mg po BID- increase to 300 mg po BID- increase to 400 mg po BID- Titrate to 300 mg TID     Complicated bereavement: pt counseled      Nicotine Dependence: pt counseled  -started/continue nicotine 14 mg patch dermal      BZD misuses  - start valium 5 mg PO TID, will taper off as tolerated- decrease to BID dosing- decrease to 5/2/2 today- decrease to 2 mg po TID today- continue taper as tolerated- Decrease to BID-Continue  - pt counseled  - follow up with outpatient mental health  providers after discharge for medication management and psychotherapy     Psychosocial stressors: pt counseled  -SW consulted to assist with resources     COPD: pt counseled  -Med consulted   -continue Fluticasone-vilanterol 100-25 mcg inhaled 1 puff daily  -montelukast 10 mg po q Day     HTN: pt counseled  -Med consulted  -conitnue Norvasc 10 mg po q day     DM: pt counseled  -Med consulted  -continue Metformin  mg po q Day     Thyroid disease: pt counseled  -Med consulted  -resumed Synthroid 75 mcg po q AM     GERD: pt counseled  -Med consulted  Continue pantoprazole 40 mg po q day             Discussed diagnosis, risks and benefits of proposed treatment vs alternative treatments vs no treatment, potential side effects of these treatments and the inherent unpredictability of treatment. The patient expresses understanding of the above and displays the capacity to agree with this treatment given said understanding. Patient also agrees that, currently, the benefits outweigh the risks and would like to pursue/continue treatment at this time.    Any medications being used off-label were discussed with the patient inclusive of the evidence base for the use of the medications and consent was obtained for the off-label use of the medication.       DISCHARGE PLANNING  Expected Disposition Plan: Home or Self Care      NEED FOR CONTINUED HOSPITALIZATION  Psychiatric illness continues to pose a potential threat to life or bodily function, of self or others, thereby requiring the need for continued inpatient psychiatric hospitalization: Yes, due to: danger to self, as evidenced by:  Concerns with SI--Fading.    Protective inpatient pyschiatric hospitalization required while a safe disposition plan is enacted: Yes    Patient stabilized and ready for discharge from inpatient psychiatric unit: No        STAFF:   Enrique Luz NP  Psychiatry

## 2024-11-21 NOTE — PLAN OF CARE
Problem: Adult Behavioral Health Plan of Care  Goal: Optimized Coping Skills in Response to Life Stressors  Intervention: Promote Effective Coping Strategies  11/21/2024 1515 by Yenifer Pereira SSW   Flowsheets (Taken 11/19/2024 1515)  Supportive Measures:   active listening utilized   self-reflection promoted   verbalization of feelings encouraged     Behavior:  Engaged and oriented         Intervention:  In this CBT-focused group LMSW facilitated discussion on the importance of values and the importance of prioritizing them.  Each patient was asked to discuss what values are important to them, where do values come from, and prioritize their own values from 1-10 in order to meet their treatment goals. Each patient was asked to complete the Personal Values worksheet and the Values Clarification worksheet.          Response:  Pt listed values in order of: love, family, friends, fun, responsibility, honesty, humor, achievement, peace, and fairness.       Plan:  Continue to encourage pt to participate in process groups to verbalize feelings and develop healthy coping skills.

## 2024-11-21 NOTE — NURSING
Plan of care reviewed. Pt has been out in the common areas most of the evening.  Pt continues to be depressed, anxious but states improved significantly since admission. Pt denies si, hi or a v hallucinations.  Pt gravely disabled. Pt taking meds without side effects noted.  Appetite adequate.  Pt denies any sob or discomfort.  O2 sat 97 percent on room air. Pt instructed to call for any needs or concerns at all.  Verbalized understanding. Will cont to monitor for safety.  Patient care ongoing.

## 2024-11-21 NOTE — PLAN OF CARE
Pt. Was noticed to be in dining room watching TV with other Pts. And talking to staff members. She denied any c/o auditory or visual hallucinations. She also denied any c/o suicidal or homicidal ideations. Pt. Stated that she's ready to go home and be with her family for the holidays. She took her night medications without diff. Will cont. To monitor Pt.  Problem: Adult Behavioral Health Plan of Care  Goal: Plan of Care Review  Outcome: Progressing  Goal: Patient-Specific Goal (Individualization)  Outcome: Progressing  Goal: Adheres to Safety Considerations for Self and Others  Outcome: Progressing  Goal: Absence of New-Onset Illness or Injury  Outcome: Progressing  Goal: Optimized Coping Skills in Response to Life Stressors  Outcome: Progressing  Goal: Develops/Participates in Therapeutic [place] to Support Successful Transition  Outcome: Progressing  Goal: Rounds/Family Conference  Outcome: Progressing     Problem: Anxiety Signs/Symptoms  Goal: Optimized Energy Level (Anxiety Signs/Symptoms)  Outcome: Progressing  Goal: Optimized Cognitive Function (Anxiety Signs/Symptoms)  Outcome: Progressing  Goal: Improved Mood Symptoms (Anxiety Signs/Symptoms)  Outcome: Progressing  Goal: Improved Sleep (Anxiety Signs/Symptoms)  Outcome: Progressing  Goal: Enhanced Social, Occupational or Functional Skills (Anxiety Signs/Symptoms)  Outcome: Progressing  Goal: Improved Somatic Symptoms (Anxiety Signs/Symptoms)  Outcome: Progressing     Problem: Depressive Signs/Symptoms  Goal: Optimized Energy Level (Depressive Signs/Symptoms)  Outcome: Progressing  Goal: Optimized Cognitive Function (Depressive Signs/Symptoms)  Outcome: Progressing  Goal: Increased Participation and Engagement (Depressive Signs/Symptoms)  Outcome: Progressing  Goal: Enhanced Self-Esteem and Confidence (Depressive Signs/Symptoms)  Outcome: Progressing  Goal: Improved Mood Symptoms (Depressive Signs/Symptoms)  Outcome: Progressing  Goal: Optimized Nutrition  Intake (Depressive Signs/Symptoms)  Outcome: Progressing  Goal: Improved Psychomotor Symptoms (Depressive Signs/Symptoms)  Outcome: Progressing  Goal: Improved Sleep (Depressive Signs/Symptoms)  Outcome: Progressing  Goal: Enhanced Social, Occupational or Functional Skills (Depressive Signs/Symptoms)  Outcome: Progressing     Problem: Diabetes Comorbidity  Goal: Blood Glucose Level Within Targeted Range  Outcome: Progressing

## 2024-11-21 NOTE — PLAN OF CARE
Problem: Adult Behavioral Health Plan of Care  Goal: Optimized Coping Skills in Response to Life Stressors  Intervention: Promote Effective Coping Strategies  Flowsheets (Taken 11/21/2024 1100)  Supportive Measures:   active listening utilized   self-reflection promoted   problem-solving facilitated   verbalization of feelings encouraged   relaxation techniques promoted       Behavior:  Engaged and oriented           Intervention:  In this CBT-focused process group SW facilitated group discussion on coping skills. Each pt was asked to identify unhealthy/healthy coping skills, categorize coping skills, practice applying coping skills and music  to stressful life events.         Response:  Pt identified unhealthy coping skills as drinking. Pt identified healthy coping skills as watching tv, cooking, calling her sister, reading a book, and word search puzzles.         Plan:  Continue to encourage pt to participate in process groups to verbalize feelings and develop healthy coping skills.

## 2024-11-21 NOTE — PLAN OF CARE
Problem: Adult Behavioral Health Plan of Care  Goal: Plan of Care Review  Outcome: Progressing     Problem: Anxiety Signs/Symptoms  Goal: Optimized Energy Level (Anxiety Signs/Symptoms)  Outcome: Progressing     Problem: Anxiety Signs/Symptoms  Goal: Optimized Cognitive Function (Anxiety Signs/Symptoms)  Outcome: Progressing

## 2024-11-22 VITALS
WEIGHT: 244 LBS | BODY MASS INDEX: 43.23 KG/M2 | OXYGEN SATURATION: 95 % | HEIGHT: 63 IN | HEART RATE: 80 BPM | SYSTOLIC BLOOD PRESSURE: 160 MMHG | TEMPERATURE: 97 F | DIASTOLIC BLOOD PRESSURE: 85 MMHG | RESPIRATION RATE: 20 BRPM

## 2024-11-22 PROCEDURE — 25000003 PHARM REV CODE 250: Performed by: NURSE PRACTITIONER

## 2024-11-22 PROCEDURE — 25000003 PHARM REV CODE 250: Performed by: INTERNAL MEDICINE

## 2024-11-22 PROCEDURE — 99239 HOSP IP/OBS DSCHRG MGMT >30: CPT | Mod: ,,, | Performed by: STUDENT IN AN ORGANIZED HEALTH CARE EDUCATION/TRAINING PROGRAM

## 2024-11-22 PROCEDURE — 25000003 PHARM REV CODE 250: Performed by: PSYCHIATRY & NEUROLOGY

## 2024-11-22 PROCEDURE — S4991 NICOTINE PATCH NONLEGEND: HCPCS | Performed by: NURSE PRACTITIONER

## 2024-11-22 RX ORDER — GABAPENTIN 300 MG/1
300 CAPSULE ORAL 3 TIMES DAILY
Qty: 90 CAPSULE | Refills: 0 | Status: SHIPPED | OUTPATIENT
Start: 2024-11-22 | End: 2025-11-22

## 2024-11-22 RX ORDER — IBUPROFEN 200 MG
1 TABLET ORAL DAILY PRN
Qty: 30 PATCH | Refills: 0 | Status: SHIPPED | OUTPATIENT
Start: 2024-11-22

## 2024-11-22 RX ORDER — PAROXETINE 30 MG/1
60 TABLET, FILM COATED ORAL DAILY
Qty: 60 TABLET | Refills: 0 | Status: SHIPPED | OUTPATIENT
Start: 2024-11-23 | End: 2025-11-23

## 2024-11-22 RX ADMIN — NICOTINE 1 PATCH: 14 PATCH, EXTENDED RELEASE TRANSDERMAL at 08:11

## 2024-11-22 RX ADMIN — GUAIFENESIN 1200 MG: 600 TABLET, EXTENDED RELEASE ORAL at 08:11

## 2024-11-22 RX ADMIN — PANTOPRAZOLE SODIUM 40 MG: 40 TABLET, DELAYED RELEASE ORAL at 08:11

## 2024-11-22 RX ADMIN — PAROXETINE HYDROCHLORIDE 60 MG: 20 TABLET, FILM COATED ORAL at 08:11

## 2024-11-22 RX ADMIN — FLUTICASONE PROPIONATE 100 MCG: 50 SPRAY, METERED NASAL at 08:11

## 2024-11-22 RX ADMIN — DIAZEPAM 2 MG: 2 TABLET ORAL at 08:11

## 2024-11-22 RX ADMIN — MONTELUKAST 10 MG: 10 TABLET, FILM COATED ORAL at 08:11

## 2024-11-22 RX ADMIN — FLUTICASONE FUROATE AND VILANTEROL TRIFENATATE 1 PUFF: 100; 25 POWDER RESPIRATORY (INHALATION) at 08:11

## 2024-11-22 RX ADMIN — ASPIRIN 81 MG: 81 TABLET, COATED ORAL at 08:11

## 2024-11-22 RX ADMIN — LEVOTHYROXINE SODIUM 75 MCG: 75 TABLET ORAL at 05:11

## 2024-11-22 RX ADMIN — AMLODIPINE BESYLATE 10 MG: 10 TABLET ORAL at 08:11

## 2024-11-22 RX ADMIN — GABAPENTIN 300 MG: 300 CAPSULE ORAL at 08:11

## 2024-11-22 NOTE — PLAN OF CARE
Problem: Adult Behavioral Health Plan of Care  Goal: Optimized Coping Skills in Response to Life Stressors  Intervention: Promote Effective Coping Strategies  Flowsheets (Taken 11/22/2024 5134)  Supportive Measures:   active listening utilized   goal-setting facilitated   self-responsibility promoted   verbalization of feelings encouraged         Behavior: alert, oriented          Intervention: In this CBT-focused process group SW facilitated a group on letting go. Pt was asked explore and identify what can they let go in their lives,  identify healthy replacements, and practice letting go through mindfulness and meditation.           Response: Pt was present in group and respectful to peers. Pt stated she was happy about being discharged today. Pt participated in group discussion and completed handout. Pt wrote the following things she was ready to let go of are: stop smoking, my family nagging me about smoking, the death of my friend, and losing weight.           Plan: Continue to encourage pt to participate in process groups to verbalize feelings and develop healthy coping skills.

## 2024-11-22 NOTE — NURSING
Patient has met all criteria to be discharged today. Patient was explained all discharge instructions and the Patient verbalized an understanding of those instructions. Patient was returned all personal belongings upon departure. Patient was escorted off the unit and out of the hospital by a staff member. Patient was given a copy of [facility] Patient Education Act 737.

## 2024-11-22 NOTE — PLAN OF CARE
YOMI informed pt of [facility]. Pt has to go into [facility] to fill out application and services can begin from there. [facility] will provide transportation once application is filled and approved. Day services are available for seniors 8:30 am- 12:30 pm Monday thru Friday.
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
## 2024-11-22 NOTE — PLAN OF CARE
Pt. Was noticed to be sitting in dining room watching TV with other Pts. She denied any c/o auditory or visual hallucinations, along with no c/o suicidal or homicidal ideations. Pt. States that she feels like she's ready to go home. She took her night medications without diff. Pt. Ate her snack and then went to bed. Will cont. To monitor Pt.  Problem: Adult Behavioral Health Plan of Care  Goal: Plan of Care Review  Outcome: Progressing  Goal: Patient-Specific Goal (Individualization)  Outcome: Progressing  Goal: Adheres to Safety Considerations for Self and Others  Outcome: Progressing  Goal: Absence of New-Onset Illness or Injury  Outcome: Progressing  Goal: Optimized Coping Skills in Response to Life Stressors  Outcome: Progressing  Goal: Develops/Participates in Therapeutic [place] to Support Successful Transition  Outcome: Progressing  Goal: Rounds/Family Conference  Outcome: Progressing     Problem: Anxiety Signs/Symptoms  Goal: Optimized Energy Level (Anxiety Signs/Symptoms)  Outcome: Progressing  Goal: Optimized Cognitive Function (Anxiety Signs/Symptoms)  Outcome: Progressing  Goal: Improved Mood Symptoms (Anxiety Signs/Symptoms)  Outcome: Progressing  Goal: Improved Sleep (Anxiety Signs/Symptoms)  Outcome: Progressing  Goal: Enhanced Social, Occupational or Functional Skills (Anxiety Signs/Symptoms)  Outcome: Progressing  Goal: Improved Somatic Symptoms (Anxiety Signs/Symptoms)  Outcome: Progressing     Problem: Depressive Signs/Symptoms  Goal: Optimized Energy Level (Depressive Signs/Symptoms)  Outcome: Progressing  Goal: Optimized Cognitive Function (Depressive Signs/Symptoms)  Outcome: Progressing  Goal: Increased Participation and Engagement (Depressive Signs/Symptoms)  Outcome: Progressing  Goal: Enhanced Self-Esteem and Confidence (Depressive Signs/Symptoms)  Outcome: Progressing  Goal: Improved Mood Symptoms (Depressive Signs/Symptoms)  Outcome: Progressing  Goal: Optimized Nutrition Intake  (Depressive Signs/Symptoms)  Outcome: Progressing  Goal: Improved Psychomotor Symptoms (Depressive Signs/Symptoms)  Outcome: Progressing  Goal: Improved Sleep (Depressive Signs/Symptoms)  Outcome: Progressing  Goal: Enhanced Social, Occupational or Functional Skills (Depressive Signs/Symptoms)  Outcome: Progressing     Problem: Diabetes Comorbidity  Goal: Blood Glucose Level Within Targeted Range  Outcome: Progressing

## 2024-11-22 NOTE — PLAN OF CARE
11/22/24 1134   Medicare Message   Important Message from Medicare regarding Discharge Appeal Rights Given to patient/caregiver;Explained to patient/caregiver;Signed/date by patient/caregiver;Other (comments)   Date IMM was signed 11/22/24   Time IMM was signed 0947

## 2024-12-11 NOTE — PROGRESS NOTES
"  Progress Note  Psychiatry    Admit Date: 10/10/2024   LOS: 3 days     SITE: [facility]  LEGAL STATUS: [facility]    SUBJECTIVE:     Interim Events: 10/13/2024  Pt seen and chart reviewed. Discussed with staff who report pt has been anxious on the unit.    She was calm and cooperative in session She denied any problems and stares her mood is "depressed and anxious"  She reports ongoing anxiety and depression but feels she can "handle" it. States anxiety is "bad". Her thought processes were linear.    Pt reports poor sleep. Reports appetite is normal. No physical complaints today. Denies side effects from medication. Denies SI/HI/AVH.     Chart Review (Past 24 hours):  Reviewed notes from Rns and labs from the last 24 hours.    The patient's case was discussed with the treatment team/care providers today including Rns    Staff reports no behavioral or management issues.     The patient has been compliant with treatment.      HPI:    CHIEF COMPLAINT   Vero Allen is a 64 y.o. female with a past psychiatric history of  schizophrenia, depression  currently admitted to the inpatient unit with the following chief complaint: disorganized behavior    HPI   The patient was seen and examined. The chart was reviewed.     The patient presented to the ER on 10/10/2024 .     The patient was medically cleared and admitted to the U.     Per ED MD:  Pt returns to the ED for psych evaluation by recommendation of PCP, Dr. Merchant, due to increased anxiety. Pt seen in ED multiple times recently for similar complaints.       65 yo female with history as below here with anxiety from PCP office for psych eval/possible admission. Overusing benzos, anxiety not well controlled. Running out of prescribed supply early and then seeking care in ED multiple times per week/day. No SI/HI. Wishes to FVA to [facility] for detox, medication management.         Psychiatric interview:  "I can't deal with my anxiety," reports uncontrolled " "anxiety for the last week, "I've been running back and forth to the hospital for them to try to help me." Reports she has been taking more xanax than prescribed, reports prescribed TID but takes QID, also diverting, "I share with my neighbor." Reports compliance with her risperdal. She is somewhat disorganized and gives illogical responses at times.                  REVIEW OF SYSTEMS  Review of Systems   Constitutional:  Negative for chills, diaphoresis, fever, malaise/fatigue and weight loss.   HENT:  Negative for congestion, ear discharge, ear pain, hearing loss, nosebleeds, sinus pain, sore throat and tinnitus.    Eyes:  Negative for blurred vision, double vision, photophobia, pain, discharge and redness.   Respiratory:  Negative for cough, hemoptysis, sputum production, shortness of breath, wheezing and stridor.    Cardiovascular:  Negative for chest pain, palpitations, orthopnea, claudication, leg swelling and PND.   Gastrointestinal:  Negative for abdominal pain, blood in stool, constipation, diarrhea, heartburn, melena, nausea and vomiting.   Genitourinary:  Negative for dysuria, flank pain, frequency, hematuria and urgency.   Musculoskeletal:  Negative for back pain, falls, joint pain, myalgias and neck pain.   Skin:  Negative for itching and rash.   Neurological:  Negative for dizziness, tingling, tremors, sensory change, speech change, focal weakness, seizures, loss of consciousness, weakness and headaches.   Endo/Heme/Allergies:  Negative for environmental allergies and polydipsia. Does not bruise/bleed easily.   Psychiatric/Behavioral:  Positive for depression. Negative for hallucinations, memory loss, substance abuse and suicidal ideas. The patient is nervous/anxious. The patient does not have insomnia.            Scheduled Meds:   amLODIPine  10 mg Oral Daily    aspirin  81 mg Oral Daily    diazePAM  10 mg Oral TID    levothyroxine  75 mcg Oral Before breakfast    nicotine  1 patch Transdermal Daily    " "paroxetine  10 mg Oral Daily    risperiDONE  3 mg Oral QHS     PRN Meds:  Current Facility-Administered Medications:     albuterol sulfate, 2.5 mg, Nebulization, Q4H PRN    nicotine (polacrilex), 2 mg, Oral, Q1H PRN      Review of patient's allergies indicates:  No Known Allergies          OBJECTIVE:     Vital Signs (Most Recent)  Temp: 98.4 °F (36.9 °C) (10/13/24 1909)  Pulse: 80 (10/13/24 2030)  Resp: 18 (10/13/24 2030)  BP: (!) 148/70 (10/13/24 1909)  Weight: 111.1 kg (245 lb) (10/10/24 1655)  BMI (Calculated): 43.4 (10/10/24 1655)    CONSTITUTIONAL  General Appearance: unremarkable, age appropriate     MUSCULOSKELETAL  Muscle Strength and Tone:no tremor, no tic  Abnormal Involuntary Movements: No  Gait and Station: non-ataxic     PSYCHIATRIC   Level of Consciousness: awake and alert   Orientation: person, place, and situation  Grooming: Casually dressed and Well groomed  Psychomotor Behavior: normal, cooperative  Speech: normal tone, normal rate, normal pitch, normal volume  Language: grossly intact  Mood: "depressed and anxious"  Affect: Consistent with mood  Thought Process: circumstantial  Associations: intact   Thought Content: denies SI, denies HI, and no delusions  Perceptions: denies AH and denies  VH  Memory: Able to recall past events, Remote intact, and Recent intact  Attention:Attends to interview without distraction  Fund of Knowledge: Aware of current events and Vocabulary appropriate   Estimate if Intelligence:  Average based on work/education history, vocabulary and mental status exam  Insight: has awareness of illness  Judgment: behavior is adequate to circumstances      LABS/IMAGING  No results found for this or any previous visit (from the past 48 hours).   No results found for: "PHENYTOIN", "PHENOBARB", "VALPROATE", "CBMZ"        ASSESSMENT/PLAN:     Patient is showing Patient is showing mild improvement     Diagnosis:      Patient Active Problem List    Diagnosis Date Noted    Anxiety " 10/11/2024    Hypothyroid 10/11/2024    Severe sepsis 10/06/2024    Hypokalemia 10/06/2024    History of breast cancer 10/02/2024    MDD (major depressive disorder), recurrent, severe, with psychosis 05/15/2024    COPD exacerbation 03/10/2024    History of tobacco abuse 03/10/2024    Primary hypertension 03/10/2024    Generalized anxiety disorder 03/10/2024    Severe obesity (BMI >= 40) 12/21/2023    Nicotine dependence, uncomplicated 12/21/2023          ASSESSMENT AND TREATMENT PLAN:  Unspecified psychosis  Panic attacks  BZD misuses  Psychosocial stressors        PROBLEM LIST AND MANAGEMENT PLANS     Unspecified psychosis  - continue risperdal 3 mg PO qhs  - pt counseled  - follow up with outpatient mental health providers after discharge for medication management and psychotherapy        Panic attacks  - increase paxil to 20 mg PO qd  - pt counseled  - follow up with outpatient mental health providers after discharge for medication management and psychotherapy     BZD misuses  - decrease valium to 5 mg PO TID, will taper off as tolerated  - pt counseled  - follow up with outpatient mental health providers after discharge for medication management and psychotherapy  Vistaril 25mg po q 8 hr prn anxiety    Psychosocial stressors  - pt counseled  - SW consulted for assistance with additional resources         Pt was informed of all the side effects, alternatives, risks and benefits of taking this medicine.  Pt made an informed decision to take these medications.  Pt was able to weigh the risks and benefits and stated that the benefits out weigh the risks at this time.     - Social Work will obtain follow up appointments for patient.     HEALTH SCREENING  Hemoglobin A1c  Lipid Panel    ENCOURAGE FRIAS MILIEU      NEED FOR CONTINUED HOSPITALIZATION  Psychiatric illness continues to pose a potential threat to life or bodily function, of self or others, thereby requiring the need for continued inpatient psychiatric  hospitalization: Yes, due to: gravely disabled, as evidenced by:  Ongoing concerns with grave disability with patient unable to perform basic feeding, hygiene and dressing activities without significant constant support.    Protective inpatient pyschiatric hospitalization required while a safe disposition plan is enacted: Yes    Patient stabilized and ready for discharge from inpatient psychiatric unit: No    Target Disposition:  home    ESTIMATED 4-5 DAYS TO DISCHARGE    PROGNOSIS: GUARDED    Time: 35min     Hill Taylor md         No.

## 2024-12-19 ENCOUNTER — HOSPITAL ENCOUNTER (OUTPATIENT)
Dept: RADIOLOGY | Facility: HOSPITAL | Age: 64
Discharge: HOME | End: 2024-12-19
Attending: INTERNAL MEDICINE
Payer: MEDICARE

## 2024-12-19 DIAGNOSIS — M54.50 LOW BACK PAIN: ICD-10-CM

## 2024-12-19 DIAGNOSIS — M54.50 LOW BACK PAIN: Primary | ICD-10-CM

## 2024-12-19 PROCEDURE — 72114 X-RAY EXAM L-S SPINE BENDING: CPT | Mod: TC

## 2024-12-23 ENCOUNTER — HOSPITAL ENCOUNTER (INPATIENT)
Facility: HOSPITAL | Age: 64
LOS: 7 days | Discharge: HOME | DRG: 885 | End: 2024-12-30
Attending: EMERGENCY MEDICINE | Admitting: EMERGENCY MEDICINE
Payer: MEDICARE

## 2024-12-23 DIAGNOSIS — R44.3 HALLUCINATIONS: ICD-10-CM

## 2024-12-23 DIAGNOSIS — F32.A DEPRESSION, UNSPECIFIED DEPRESSION TYPE: Primary | ICD-10-CM

## 2024-12-23 DIAGNOSIS — F23 ACUTE PSYCHOSIS: ICD-10-CM

## 2024-12-23 DIAGNOSIS — J44.9 CHRONIC OBSTRUCTIVE PULMONARY DISEASE, UNSPECIFIED COPD TYPE: ICD-10-CM

## 2024-12-23 LAB
ALBUMIN SERPL BCP-MCNC: 3 G/DL (ref 3.5–5.2)
ALP SERPL-CCNC: 106 U/L (ref 55–135)
ALT SERPL W/O P-5'-P-CCNC: 34 U/L (ref 10–44)
AMPHET+METHAMPHET UR QL: NEGATIVE
ANION GAP SERPL CALC-SCNC: 7 MMOL/L (ref 3–11)
APAP SERPL-MCNC: <2 UG/ML (ref 10–20)
AST SERPL-CCNC: 21 U/L (ref 10–40)
BARBITURATES UR QL SCN>200 NG/ML: NEGATIVE
BASOPHILS # BLD AUTO: 0.04 K/UL (ref 0–0.2)
BASOPHILS NFR BLD: 0.5 % (ref 0–1.9)
BENZODIAZ UR QL SCN>200 NG/ML: NEGATIVE
BILIRUB SERPL-MCNC: 0.5 MG/DL (ref 0.1–1)
BILIRUB UR QL STRIP: NEGATIVE
BUN SERPL-MCNC: 14 MG/DL (ref 8–23)
BZE UR QL SCN: NEGATIVE
CALCIUM SERPL-MCNC: 8.8 MG/DL (ref 8.7–10.5)
CANNABINOIDS UR QL SCN: NEGATIVE
CHLORIDE SERPL-SCNC: 106 MMOL/L (ref 95–110)
CLARITY UR: CLEAR
CO2 SERPL-SCNC: 30 MMOL/L (ref 23–29)
COLOR UR: YELLOW
CREAT SERPL-MCNC: 1.3 MG/DL (ref 0.5–1.4)
CREAT UR-MCNC: 140 MG/DL (ref 15–325)
DIFFERENTIAL METHOD BLD: ABNORMAL
EOSINOPHIL # BLD AUTO: 0.2 K/UL (ref 0–0.5)
EOSINOPHIL NFR BLD: 2 % (ref 0–8)
ERYTHROCYTE [DISTWIDTH] IN BLOOD BY AUTOMATED COUNT: 14.6 % (ref 11.5–14.5)
EST. GFR  (NO RACE VARIABLE): 45.6 ML/MIN/1.73 M^2
ESTIMATED AVG GLUCOSE: 137 MG/DL (ref 68–131)
ETHANOL SERPL-MCNC: <3 MG/DL
GLUCOSE SERPL-MCNC: 140 MG/DL (ref 70–110)
GLUCOSE UR QL STRIP: NEGATIVE
HBA1C MFR BLD: 6.4 % (ref 4–5.6)
HCT VFR BLD AUTO: 37.7 % (ref 37–48.5)
HGB BLD-MCNC: 12.1 G/DL (ref 12–16)
HGB UR QL STRIP: NEGATIVE
IMM GRANULOCYTES # BLD AUTO: 0.04 K/UL (ref 0–0.04)
IMM GRANULOCYTES NFR BLD AUTO: 0.5 % (ref 0–0.5)
KETONES UR QL STRIP: NEGATIVE
LEUKOCYTE ESTERASE UR QL STRIP: NEGATIVE
LYMPHOCYTES # BLD AUTO: 2 K/UL (ref 1–4.8)
LYMPHOCYTES NFR BLD: 22.9 % (ref 18–48)
MCH RBC QN AUTO: 29.1 PG (ref 27–31)
MCHC RBC AUTO-ENTMCNC: 32.1 G/DL (ref 32–36)
MCV RBC AUTO: 91 FL (ref 82–98)
METHADONE UR QL SCN>300 NG/ML: NEGATIVE
MONOCYTES # BLD AUTO: 1 K/UL (ref 0.3–1)
MONOCYTES NFR BLD: 11 % (ref 4–15)
NEUTROPHILS # BLD AUTO: 5.6 K/UL (ref 1.8–7.7)
NEUTROPHILS NFR BLD: 63.1 % (ref 38–73)
NITRITE UR QL STRIP: NEGATIVE
NRBC BLD-RTO: 0 /100 WBC
OPIATES UR QL SCN: ABNORMAL
PCP UR QL SCN>25 NG/ML: NEGATIVE
PH UR STRIP: 6 [PH] (ref 5–8)
PLATELET # BLD AUTO: 292 K/UL (ref 150–450)
PMV BLD AUTO: 10.4 FL (ref 9.2–12.9)
POCT GLUCOSE: 159 MG/DL (ref 70–110)
POTASSIUM SERPL-SCNC: 4.1 MMOL/L (ref 3.5–5.1)
PROT SERPL-MCNC: 6.1 G/DL (ref 6–8.4)
PROT UR QL STRIP: NEGATIVE
RBC # BLD AUTO: 4.16 M/UL (ref 4–5.4)
SODIUM SERPL-SCNC: 143 MMOL/L (ref 136–145)
SP GR UR STRIP: 1.02 (ref 1–1.03)
TOXICOLOGY INFORMATION: ABNORMAL
TSH SERPL DL<=0.005 MIU/L-ACNC: 2.88 UIU/ML (ref 0.4–4)
URN SPEC COLLECT METH UR: NORMAL
UROBILINOGEN UR STRIP-ACNC: NEGATIVE EU/DL
WBC # BLD AUTO: 8.81 K/UL (ref 3.9–12.7)

## 2024-12-23 PROCEDURE — 25000003 PHARM REV CODE 250: Performed by: PSYCHIATRY & NEUROLOGY

## 2024-12-23 PROCEDURE — 80143 DRUG ASSAY ACETAMINOPHEN: CPT | Performed by: EMERGENCY MEDICINE

## 2024-12-23 PROCEDURE — 82077 ASSAY SPEC XCP UR&BREATH IA: CPT | Performed by: EMERGENCY MEDICINE

## 2024-12-23 PROCEDURE — 80307 DRUG TEST PRSMV CHEM ANLYZR: CPT | Performed by: EMERGENCY MEDICINE

## 2024-12-23 PROCEDURE — 81003 URINALYSIS AUTO W/O SCOPE: CPT | Performed by: EMERGENCY MEDICINE

## 2024-12-23 PROCEDURE — 99223 1ST HOSP IP/OBS HIGH 75: CPT | Mod: ,,, | Performed by: PSYCHIATRY & NEUROLOGY

## 2024-12-23 PROCEDURE — 99285 EMERGENCY DEPT VISIT HI MDM: CPT

## 2024-12-23 PROCEDURE — 25000003 PHARM REV CODE 250: Performed by: EMERGENCY MEDICINE

## 2024-12-23 PROCEDURE — 84443 ASSAY THYROID STIM HORMONE: CPT | Performed by: EMERGENCY MEDICINE

## 2024-12-23 PROCEDURE — 90833 PSYTX W PT W E/M 30 MIN: CPT | Mod: ,,, | Performed by: PSYCHIATRY & NEUROLOGY

## 2024-12-23 PROCEDURE — 36415 COLL VENOUS BLD VENIPUNCTURE: CPT | Performed by: EMERGENCY MEDICINE

## 2024-12-23 PROCEDURE — 85025 COMPLETE CBC W/AUTO DIFF WBC: CPT | Performed by: EMERGENCY MEDICINE

## 2024-12-23 PROCEDURE — 11400000 HC PSYCH PRIVATE ROOM

## 2024-12-23 PROCEDURE — 83036 HEMOGLOBIN GLYCOSYLATED A1C: CPT | Performed by: EMERGENCY MEDICINE

## 2024-12-23 PROCEDURE — 80053 COMPREHEN METABOLIC PANEL: CPT | Performed by: EMERGENCY MEDICINE

## 2024-12-23 RX ORDER — HYDROXYZINE PAMOATE 25 MG/1
25 CAPSULE ORAL EVERY 8 HOURS PRN
Status: DISCONTINUED | OUTPATIENT
Start: 2024-12-23 | End: 2024-12-23

## 2024-12-23 RX ORDER — IBUPROFEN 200 MG
1 TABLET ORAL DAILY PRN
Status: DISCONTINUED | OUTPATIENT
Start: 2024-12-23 | End: 2024-12-30 | Stop reason: HOSPADM

## 2024-12-23 RX ORDER — LOPERAMIDE HYDROCHLORIDE 2 MG/1
2 CAPSULE ORAL
Status: DISCONTINUED | OUTPATIENT
Start: 2024-12-23 | End: 2024-12-30 | Stop reason: HOSPADM

## 2024-12-23 RX ORDER — OLANZAPINE 10 MG/1
10 TABLET ORAL EVERY 8 HOURS PRN
Status: DISCONTINUED | OUTPATIENT
Start: 2024-12-23 | End: 2024-12-30 | Stop reason: HOSPADM

## 2024-12-23 RX ORDER — GABAPENTIN 400 MG/1
400 CAPSULE ORAL 3 TIMES DAILY
Status: DISCONTINUED | OUTPATIENT
Start: 2024-12-23 | End: 2024-12-24

## 2024-12-23 RX ORDER — OLANZAPINE 10 MG/2ML
10 INJECTION, POWDER, FOR SOLUTION INTRAMUSCULAR EVERY 8 HOURS PRN
Status: DISCONTINUED | OUTPATIENT
Start: 2024-12-23 | End: 2024-12-30 | Stop reason: HOSPADM

## 2024-12-23 RX ORDER — ALUMINUM HYDROXIDE, MAGNESIUM HYDROXIDE, AND SIMETHICONE 1200; 120; 1200 MG/30ML; MG/30ML; MG/30ML
30 SUSPENSION ORAL EVERY 6 HOURS PRN
Status: DISCONTINUED | OUTPATIENT
Start: 2024-12-23 | End: 2024-12-30 | Stop reason: HOSPADM

## 2024-12-23 RX ORDER — BENZTROPINE MESYLATE 1 MG/ML
2 INJECTION, SOLUTION INTRAMUSCULAR; INTRAVENOUS EVERY 8 HOURS PRN
Status: DISCONTINUED | OUTPATIENT
Start: 2024-12-23 | End: 2024-12-30 | Stop reason: HOSPADM

## 2024-12-23 RX ORDER — INSULIN ASPART 100 [IU]/ML
0-5 INJECTION, SOLUTION INTRAVENOUS; SUBCUTANEOUS
Status: DISCONTINUED | OUTPATIENT
Start: 2024-12-23 | End: 2024-12-26

## 2024-12-23 RX ORDER — GLUCAGON 1 MG
1 KIT INJECTION
Status: DISCONTINUED | OUTPATIENT
Start: 2024-12-23 | End: 2024-12-26

## 2024-12-23 RX ORDER — BENZONATATE 100 MG/1
100 CAPSULE ORAL 3 TIMES DAILY PRN
Status: DISCONTINUED | OUTPATIENT
Start: 2024-12-23 | End: 2024-12-30 | Stop reason: HOSPADM

## 2024-12-23 RX ORDER — ONDANSETRON 4 MG/1
4 TABLET, ORALLY DISINTEGRATING ORAL EVERY 8 HOURS PRN
Status: DISCONTINUED | OUTPATIENT
Start: 2024-12-23 | End: 2024-12-30 | Stop reason: HOSPADM

## 2024-12-23 RX ORDER — PROMETHAZINE HYDROCHLORIDE 25 MG/1
25 TABLET ORAL EVERY 6 HOURS PRN
Status: DISCONTINUED | OUTPATIENT
Start: 2024-12-23 | End: 2024-12-30 | Stop reason: HOSPADM

## 2024-12-23 RX ORDER — IBUPROFEN 200 MG
16 TABLET ORAL
Status: DISCONTINUED | OUTPATIENT
Start: 2024-12-23 | End: 2024-12-26

## 2024-12-23 RX ORDER — HYDROXYZINE PAMOATE 50 MG/1
50 CAPSULE ORAL EVERY 6 HOURS PRN
Status: DISCONTINUED | OUTPATIENT
Start: 2024-12-23 | End: 2024-12-30 | Stop reason: HOSPADM

## 2024-12-23 RX ORDER — ALBUTEROL SULFATE 90 UG/1
2 INHALANT RESPIRATORY (INHALATION) EVERY 4 HOURS PRN
Status: DISCONTINUED | OUTPATIENT
Start: 2024-12-23 | End: 2024-12-30 | Stop reason: HOSPADM

## 2024-12-23 RX ORDER — ASPIRIN 81 MG/1
81 TABLET ORAL DAILY
Status: DISCONTINUED | OUTPATIENT
Start: 2024-12-23 | End: 2024-12-30 | Stop reason: HOSPADM

## 2024-12-23 RX ORDER — ONDANSETRON 4 MG/1
8 TABLET, ORALLY DISINTEGRATING ORAL EVERY 8 HOURS PRN
Status: DISCONTINUED | OUTPATIENT
Start: 2024-12-23 | End: 2024-12-23

## 2024-12-23 RX ORDER — TALC
6 POWDER (GRAM) TOPICAL NIGHTLY PRN
Status: DISCONTINUED | OUTPATIENT
Start: 2024-12-23 | End: 2024-12-30 | Stop reason: HOSPADM

## 2024-12-23 RX ORDER — PAROXETINE HYDROCHLORIDE 20 MG/1
60 TABLET, FILM COATED ORAL DAILY
Status: DISCONTINUED | OUTPATIENT
Start: 2024-12-23 | End: 2024-12-30 | Stop reason: HOSPADM

## 2024-12-23 RX ORDER — ACETAMINOPHEN 325 MG/1
650 TABLET ORAL EVERY 6 HOURS PRN
Status: DISCONTINUED | OUTPATIENT
Start: 2024-12-23 | End: 2024-12-30 | Stop reason: HOSPADM

## 2024-12-23 RX ORDER — PAROXETINE HYDROCHLORIDE 20 MG/1
40 TABLET, FILM COATED ORAL DAILY
Status: DISCONTINUED | OUTPATIENT
Start: 2024-12-23 | End: 2024-12-23

## 2024-12-23 RX ORDER — RISPERIDONE 1 MG/1
3 TABLET ORAL NIGHTLY
Status: DISCONTINUED | OUTPATIENT
Start: 2024-12-23 | End: 2024-12-23

## 2024-12-23 RX ORDER — RISPERIDONE 1 MG/1
4 TABLET ORAL NIGHTLY
Status: DISCONTINUED | OUTPATIENT
Start: 2024-12-23 | End: 2024-12-26

## 2024-12-23 RX ORDER — ACETAMINOPHEN 325 MG/1
650 TABLET ORAL EVERY 8 HOURS PRN
Status: DISCONTINUED | OUTPATIENT
Start: 2024-12-23 | End: 2024-12-23

## 2024-12-23 RX ORDER — LEVOTHYROXINE SODIUM 75 UG/1
75 TABLET ORAL
Status: DISCONTINUED | OUTPATIENT
Start: 2024-12-24 | End: 2024-12-30 | Stop reason: HOSPADM

## 2024-12-23 RX ORDER — IBUPROFEN 200 MG
24 TABLET ORAL
Status: DISCONTINUED | OUTPATIENT
Start: 2024-12-23 | End: 2024-12-26

## 2024-12-23 RX ORDER — AMLODIPINE BESYLATE 10 MG/1
10 TABLET ORAL DAILY
Status: DISCONTINUED | OUTPATIENT
Start: 2024-12-23 | End: 2024-12-30 | Stop reason: HOSPADM

## 2024-12-23 RX ADMIN — AMLODIPINE BESYLATE 10 MG: 10 TABLET ORAL at 11:12

## 2024-12-23 RX ADMIN — ASPIRIN 81 MG: 81 TABLET, COATED ORAL at 11:12

## 2024-12-23 RX ADMIN — GABAPENTIN 400 MG: 400 CAPSULE ORAL at 08:12

## 2024-12-23 RX ADMIN — GABAPENTIN 400 MG: 400 CAPSULE ORAL at 04:12

## 2024-12-23 RX ADMIN — RISPERIDONE 4 MG: 1 TABLET, FILM COATED ORAL at 08:12

## 2024-12-23 RX ADMIN — HYDROXYZINE PAMOATE 50 MG: 50 CAPSULE ORAL at 08:12

## 2024-12-23 RX ADMIN — PAROXETINE HYDROCHLORIDE 60 MG: 20 TABLET, FILM COATED ORAL at 11:12

## 2024-12-23 NOTE — CONSULTS
RD consulted for new admit. As per chart pt has no dietary issues at this time. Pt is tolerating a Diabetic diet and consuming an adequate amount of meals and snacks.Nutrition services are not warranted at this time. RD to sign off. Please re-consult if any nutrition/dietary issues arise.

## 2024-12-23 NOTE — ED PROVIDER NOTES
"Encounter Date: 12/23/2024       History     Chief Complaint   Patient presents with    Psychiatric Evaluation     Pt stated that she was referred to ED by PCP Dr. Merchant for possible U admission - pt stated that she is hearing "neighbor messing with her home - phones ringing" - per patient compliant with meds. Denied URI / UTI symptoms. No complaints. Denied SI / HI.      64-year-old female with a history of severe anxiety, COPD, depression, diabetes, hypertension presents to the ER at the direction of her physician, patient is hearing things, thinks her neighbors are grinding on her trailer, trying to tip over her trailer.  Hearing phone drinking outside, paranoid.  No SI or HI.  History of admits to the psych unit in the past.  No other issues.  Patient is tearful, does not want to go home      Review of patient's allergies indicates:  No Known Allergies  Past Medical History:   Diagnosis Date    Anxiety     Breast cancer 2010    Cancer     Breast    COPD (chronic obstructive pulmonary disease)     Depression     Diabetes mellitus     GERD (gastroesophageal reflux disease)     Hypertension     Insomnia     Thyroid disease      Past Surgical History:   Procedure Laterality Date    BLADDER SUSPENSION      BREAST LUMPECTOMY      Right breast    CHOLECYSTECTOMY      HYSTERECTOMY      KNEE ARTHROSCOPY      Right knee    OOPHORECTOMY       Family History   Problem Relation Name Age of Onset    No Known Problems Mother      No Known Problems Father      No Known Problems Sister      No Known Problems Daughter      No Known Problems Maternal Aunt      No Known Problems Maternal Uncle      No Known Problems Paternal Aunt      No Known Problems Paternal Uncle      No Known Problems Maternal Grandmother      No Known Problems Maternal Grandfather      No Known Problems Paternal Grandmother      No Known Problems Paternal Grandfather      No Known Problems Other      Breast cancer Neg Hx      Ovarian cancer Neg Hx      " BRCA 1/2 Neg Hx       Social History     Tobacco Use    Smoking status: Every Day     Current packs/day: 0.50     Average packs/day: 0.5 packs/day for 50.6 years (25.3 ttl pk-yrs)     Types: Cigarettes, Vaping with nicotine     Start date: 5/15/1974     Passive exposure: Current    Smokeless tobacco: Never   Substance Use Topics    Alcohol use: Not Currently    Drug use: Never     Review of Systems   Constitutional:  Negative for fever.   HENT:  Negative for sore throat.    Respiratory:  Negative for shortness of breath.    Cardiovascular:  Negative for chest pain.   Gastrointestinal:  Negative for nausea.   Genitourinary:  Negative for dysuria.   Musculoskeletal:  Negative for back pain.   Skin:  Negative for rash.   Neurological:  Negative for weakness.   Hematological:  Does not bruise/bleed easily.   Psychiatric/Behavioral:  Positive for dysphoric mood and hallucinations.    All other systems reviewed and are negative.      Physical Exam     Initial Vitals [12/23/24 0858]   BP Pulse Resp Temp SpO2   (!) 151/99 84 18 98.4 °F (36.9 °C) 96 %      MAP       --         Physical Exam    Nursing note and vitals reviewed.  Constitutional: She appears well-developed and well-nourished. She is not diaphoretic. No distress.   HENT:   Head: Normocephalic and atraumatic.   Eyes: Conjunctivae and EOM are normal. Pupils are equal, round, and reactive to light.   Neck: Neck supple.   Normal range of motion.  Cardiovascular:  Normal rate, regular rhythm, normal heart sounds and intact distal pulses.           No murmur heard.  Pulmonary/Chest: Breath sounds normal. No respiratory distress. She has no wheezes. She has no rhonchi. She has no rales. She exhibits no tenderness.   Abdominal: Abdomen is soft. Bowel sounds are normal.   Musculoskeletal:         General: No tenderness or edema. Normal range of motion.      Cervical back: Normal range of motion and neck supple.     Neurological: She is alert and oriented to person,  place, and time. She has normal strength. No cranial nerve deficit. GCS score is 15. GCS eye subscore is 4. GCS verbal subscore is 5. GCS motor subscore is 6.   Skin: Skin is warm and dry. Capillary refill takes less than 2 seconds.   Psychiatric: Her speech is normal. Judgment normal. She is withdrawn and actively hallucinating. Thought content is paranoid. Cognition and memory are normal. She exhibits a depressed mood. She is attentive.         ED Course   Procedures  Labs Reviewed   CBC W/ AUTO DIFFERENTIAL - Abnormal       Result Value    WBC 8.81      RBC 4.16      Hemoglobin 12.1      Hematocrit 37.7      MCV 91      MCH 29.1      MCHC 32.1      RDW 14.6 (*)     Platelets 292      MPV 10.4      Immature Granulocytes 0.5      Gran # (ANC) 5.6      Immature Grans (Abs) 0.04      Lymph # 2.0      Mono # 1.0      Eos # 0.2      Baso # 0.04      nRBC 0      Gran % 63.1      Lymph % 22.9      Mono % 11.0      Eosinophil % 2.0      Basophil % 0.5      Differential Method Automated     COMPREHENSIVE METABOLIC PANEL - Abnormal    Sodium 143      Potassium 4.1      Chloride 106      CO2 30 (*)     Glucose 140 (*)     BUN 14      Creatinine 1.3      Calcium 8.8      Total Protein 6.1      Albumin 3.0 (*)     Total Bilirubin 0.5      Alkaline Phosphatase 106      AST 21      ALT 34      eGFR 45.6 (*)     Anion Gap 7     DRUG SCREEN PANEL, URINE EMERGENCY - Abnormal    Benzodiazepines Negative      Methadone metabolites Negative      Cocaine (Metab.) Negative      Opiate Scrn, Ur Presumptive Positive (*)     Barbiturate Screen, Ur Negative      Amphetamine Screen, Ur Negative      THC Negative      Phencyclidine Negative      Creatinine, Urine 140.0      Toxicology Information SEE COMMENT      Narrative:     Preferred Collection Type->Urine, Clean Catch  Specimen Source->Urine   ACETAMINOPHEN LEVEL - Abnormal    Acetaminophen (Tylenol), Serum <2.0 (*)    URINALYSIS, REFLEX TO URINE CULTURE    Specimen UA Urine, Clean Catch       Color, UA Yellow      Appearance, UA Clear      pH, UA 6.0      Specific Gravity, UA 1.020      Protein, UA Negative      Glucose, UA Negative      Ketones, UA Negative      Bilirubin (UA) Negative      Occult Blood UA Negative      Nitrite, UA Negative      Urobilinogen, UA Negative      Leukocytes, UA Negative      Narrative:     Preferred Collection Type->Urine, Clean Catch  Specimen Source->Urine   ALCOHOL,MEDICAL (ETHANOL)    Alcohol, Serum <3     TSH          Imaging Results    None          Medications - No data to display  Medical Decision Making  Amount and/or Complexity of Data Reviewed  Labs: ordered.    Risk  Decision regarding hospitalization.                          Medical Decision Making:   Differential Diagnosis:   Delusional, paranoid, hallucinations, severe depression             Clinical Impression:  Final diagnoses:  [F32.A] Depression, unspecified depression type (Primary)  [F23] Acute psychosis  [R44.3] Hallucinations          ED Disposition Condition    Admit Stable                Kunal Saunders MD  12/23/24 1000

## 2024-12-23 NOTE — NURSING
"As per ER chart:  64-year-old female with a history of severe anxiety, COPD, depression, diabetes, hypertension presents to the ER at the direction of her physician, patient is hearing things, thinks her neighbors are grinding on her trailer, trying to tip over her trailer. Hearing phone drinking outside, paranoid. No SI or HI. History of admits to the psych unit in the past. No other issues. Patient is tearful, does not want to go home.  Pt admitted to [facility] from er with [facility] x 2 at pt side.  Pt checked per proscan machine with negative results prior to walking on to unit.  ER tech also at pt side.  Pt belongings given to mht to inventory.  Pt states she thought she heard something under her trailer but states the police told her nobody can get under her trailer due to the skirting.  Pt also states she thinks she is having a nervous breakdown.   Pt denies si, hi.  Gravely disabled.  Pt preoccupied and keeps asking about her nebulizer being reordered and her hydrocodone.  Pt stated  " I need my meds.  But I can't go home.  I got too much going on over there right now." Admits to being compliant with her meds at home.  States she did follow up with her mental health appointment on last week.   Pt would not go into any further details about why she is  here.  See full assessment per admission profile.  Admission paperwork explained to pt and signed.  See in chart. Pt oriented to unit and unit routine.   Pt instructed to call for any needs ro concerns at all. Verbalized understanding. Will cont to monitor for safety.  Patient care ongoing.  Q15 min close obs maintained as per physician's orders.   "

## 2024-12-24 PROBLEM — F23 ACUTE PSYCHOSIS: Status: ACTIVE | Noted: 2024-12-24

## 2024-12-24 LAB
POCT GLUCOSE: 113 MG/DL (ref 70–110)
POCT GLUCOSE: 114 MG/DL (ref 70–110)
POCT GLUCOSE: 120 MG/DL (ref 70–110)
POCT GLUCOSE: 129 MG/DL (ref 70–110)

## 2024-12-24 PROCEDURE — 25000003 PHARM REV CODE 250: Performed by: EMERGENCY MEDICINE

## 2024-12-24 PROCEDURE — S4991 NICOTINE PATCH NONLEGEND: HCPCS | Performed by: PSYCHIATRY & NEUROLOGY

## 2024-12-24 PROCEDURE — 99232 SBSQ HOSP IP/OBS MODERATE 35: CPT | Mod: ,,, | Performed by: PSYCHIATRY & NEUROLOGY

## 2024-12-24 PROCEDURE — 11400000 HC PSYCH PRIVATE ROOM

## 2024-12-24 PROCEDURE — 25000242 PHARM REV CODE 250 ALT 637 W/ HCPCS: Performed by: EMERGENCY MEDICINE

## 2024-12-24 PROCEDURE — 25000003 PHARM REV CODE 250: Performed by: PSYCHIATRY & NEUROLOGY

## 2024-12-24 PROCEDURE — 90833 PSYTX W PT W E/M 30 MIN: CPT | Mod: ,,, | Performed by: PSYCHIATRY & NEUROLOGY

## 2024-12-24 RX ORDER — CETIRIZINE HYDROCHLORIDE 5 MG/1
10 TABLET ORAL DAILY
Status: DISCONTINUED | OUTPATIENT
Start: 2024-12-24 | End: 2024-12-30 | Stop reason: HOSPADM

## 2024-12-24 RX ADMIN — HYDROXYZINE PAMOATE 50 MG: 50 CAPSULE ORAL at 08:12

## 2024-12-24 RX ADMIN — GABAPENTIN 500 MG: 100 CAPSULE ORAL at 08:12

## 2024-12-24 RX ADMIN — AMLODIPINE BESYLATE 10 MG: 10 TABLET ORAL at 08:12

## 2024-12-24 RX ADMIN — ALBUTEROL SULFATE 2 PUFF: 90 AEROSOL, METERED RESPIRATORY (INHALATION) at 08:12

## 2024-12-24 RX ADMIN — PAROXETINE HYDROCHLORIDE 60 MG: 20 TABLET, FILM COATED ORAL at 08:12

## 2024-12-24 RX ADMIN — GABAPENTIN 400 MG: 400 CAPSULE ORAL at 08:12

## 2024-12-24 RX ADMIN — GABAPENTIN 500 MG: 100 CAPSULE ORAL at 02:12

## 2024-12-24 RX ADMIN — HYDROXYZINE PAMOATE 50 MG: 50 CAPSULE ORAL at 09:12

## 2024-12-24 RX ADMIN — NICOTINE 1 PATCH: 14 PATCH, EXTENDED RELEASE TRANSDERMAL at 08:12

## 2024-12-24 RX ADMIN — LEVOTHYROXINE SODIUM 75 MCG: 75 TABLET ORAL at 06:12

## 2024-12-24 RX ADMIN — CETIRIZINE HYDROCHLORIDE 10 MG: 5 TABLET ORAL at 11:12

## 2024-12-24 RX ADMIN — RISPERIDONE 4 MG: 1 TABLET, FILM COATED ORAL at 08:12

## 2024-12-24 RX ADMIN — ASPIRIN 81 MG: 81 TABLET, COATED ORAL at 08:12

## 2024-12-24 NOTE — PLAN OF CARE
Problem: Adult Behavioral Health Plan of Care  Goal: Optimized Coping Skills in Response to Life Stressors  Intervention: Promote Effective Coping Strategies  Flowsheets (Taken 12/23/2024 1500)  Supportive Measures:   active listening utilized   problem-solving facilitated   verbalization of feelings encouraged   self-reflection promoted       Behavior:  Engaged and oriented     Intervention:  In this CBT-focused process group SW facilitated a group discussion on the 5 basic keys to effective communication. Patients will engage by pairing off completing a back to back drawing activity; then roles would be reversed. SW will engage in group discussion about the 5 basic keys and a list of discussion questions regarding activity.            Response: Pt engaged in back to back activity with partner. Pt engaged in open discussion with group.       Plan:  Continue to encourage pt to participate in process groups to verbalize feelings and develop healthy coping skills.

## 2024-12-24 NOTE — PLAN OF CARE
Problem: Bariatric Environmental Safety  Goal: Safety Maintained with Care  Outcome: Progressing     Problem: Adult Behavioral Health Plan of Care  Goal: Plan of Care Review  Outcome: Progressing  Goal: Patient-Specific Goal (Individualization)  Outcome: Progressing  Goal: Adheres to Safety Considerations for Self and Others  Outcome: Progressing  Goal: Absence of New-Onset Illness or Injury  Outcome: Progressing  Goal: Optimized Coping Skills in Response to Life Stressors  Outcome: Progressing  Goal: Develops/Participates in Therapeutic [place] to Support Successful Transition  Outcome: Progressing  Goal: Rounds/Family Conference  Outcome: Progressing     Problem: Diabetes Comorbidity  Goal: Blood Glucose Level Within Targeted Range  Outcome: Progressing     Problem: Sepsis/Septic Shock  Goal: Optimal Coping  Outcome: Progressing  Goal: Absence of Bleeding  Outcome: Progressing  Goal: Blood Glucose Level Within Targeted Range  Outcome: Progressing  Goal: Absence of Infection Signs and Symptoms  Outcome: Progressing  Goal: Optimal Nutrition Intake  Outcome: Progressing     Problem: Anxiety Signs/Symptoms  Goal: Optimized Energy Level (Anxiety Signs/Symptoms)  Outcome: Progressing  Goal: Optimized Cognitive Function (Anxiety Signs/Symptoms)  Outcome: Progressing  Goal: Improved Mood Symptoms (Anxiety Signs/Symptoms)  Outcome: Progressing  Goal: Improved Sleep (Anxiety Signs/Symptoms)  Outcome: Progressing  Goal: Enhanced Social, Occupational or Functional Skills (Anxiety Signs/Symptoms)  Outcome: Progressing  Goal: Improved Somatic Symptoms (Anxiety Signs/Symptoms)  Outcome: Progressing     Problem: Psychotic Signs/Symptoms  Goal: Improved Behavioral Control (Psychotic Signs/Symptoms)  Outcome: Progressing  Goal: Optimal Cognitive Function (Psychotic Signs/Symptoms)  Outcome: Progressing  Goal: Increased Participation and Engagement (Psychotic Signs/Symptoms)  Outcome: Progressing  Goal: Improved Mood Symptoms  (Psychotic Signs/Symptoms)  Outcome: Progressing  Goal: Improved Psychomotor Symptoms (Psychotic Signs/Symptoms)  Outcome: Progressing  Goal: Decreased Sensory Symptoms (Psychotic Signs/Symptoms)  Outcome: Progressing  Goal: Improved Sleep (Psychotic Signs/Symptoms)  Outcome: Progressing  Goal: Enhanced Social, Occupational or Functional Skills (Psychotic Signs/Symptoms)  Outcome: Progressing     Problem: Depressive Signs/Symptoms  Goal: Optimized Energy Level (Depressive Signs/Symptoms)  Outcome: Progressing  Goal: Optimized Cognitive Function (Depressive Signs/Symptoms)  Outcome: Progressing  Goal: Increased Participation and Engagement (Depressive Signs/Symptoms)  Outcome: Progressing  Goal: Enhanced Self-Esteem and Confidence (Depressive Signs/Symptoms)  Outcome: Progressing  Goal: Improved Mood Symptoms (Depressive Signs/Symptoms)  Outcome: Progressing  Goal: Optimized Nutrition Intake (Depressive Signs/Symptoms)  Outcome: Progressing  Goal: Improved Psychomotor Symptoms (Depressive Signs/Symptoms)  Outcome: Progressing  Goal: Improved Sleep (Depressive Signs/Symptoms)  Outcome: Progressing  Goal: Enhanced Social, Occupational or Functional Skills (Depressive Signs/Symptoms)  Outcome: Progressing

## 2024-12-24 NOTE — ASSESSMENT & PLAN NOTE
Patient's blood pressure range in the last 24 hours was: BP  Min: 139/71  Max: 170/85.The patient's inpatient anti-hypertensive regimen is listed below:  Current Antihypertensives  amLODIPine tablet 10 mg, Daily, Oral    Plan  - BP is controlled, no changes needed to their regimen

## 2024-12-24 NOTE — PLAN OF CARE
Problem: Bariatric Environmental Safety  Goal: Safety Maintained with Care  Outcome: Progressing     Problem: Adult Behavioral Health Plan of Care  Goal: Plan of Care Review  Outcome: Progressing  Goal: Patient-Specific Goal (Individualization)  Outcome: Progressing  Goal: Adheres to Safety Considerations for Self and Others  Outcome: Progressing  Goal: Absence of New-Onset Illness or Injury  Outcome: Progressing  Goal: Optimized Coping Skills in Response to Life Stressors  Outcome: Progressing     Problem: Diabetes Comorbidity  Goal: Blood Glucose Level Within Targeted Range  Outcome: Progressing     Problem: Sepsis/Septic Shock  Goal: Optimal Coping  Outcome: Progressing  Goal: Absence of Bleeding  Outcome: Progressing  Goal: Blood Glucose Level Within Targeted Range  Outcome: Progressing  Goal: Absence of Infection Signs and Symptoms  Outcome: Progressing  Goal: Optimal Nutrition Intake  Outcome: Progressing     Problem: Anxiety Signs/Symptoms  Goal: Optimized Energy Level (Anxiety Signs/Symptoms)  Outcome: Progressing  Goal: Optimized Cognitive Function (Anxiety Signs/Symptoms)  Outcome: Progressing  Goal: Improved Mood Symptoms (Anxiety Signs/Symptoms)  Outcome: Progressing  Goal: Improved Sleep (Anxiety Signs/Symptoms)  Outcome: Progressing  Goal: Enhanced Social, Occupational or Functional Skills (Anxiety Signs/Symptoms)  Outcome: Progressing  Goal: Improved Somatic Symptoms (Anxiety Signs/Symptoms)  Outcome: Progressing     Problem: Psychotic Signs/Symptoms  Goal: Improved Behavioral Control (Psychotic Signs/Symptoms)  Outcome: Progressing  Goal: Optimal Cognitive Function (Psychotic Signs/Symptoms)  Outcome: Progressing  Goal: Increased Participation and Engagement (Psychotic Signs/Symptoms)  Outcome: Progressing  Goal: Improved Mood Symptoms (Psychotic Signs/Symptoms)  Outcome: Progressing  Goal: Improved Psychomotor Symptoms (Psychotic Signs/Symptoms)  Outcome: Progressing  Goal: Decreased Sensory  Symptoms (Psychotic Signs/Symptoms)  Outcome: Progressing  Goal: Improved Sleep (Psychotic Signs/Symptoms)  Outcome: Progressing  Goal: Enhanced Social, Occupational or Functional Skills (Psychotic Signs/Symptoms)  Outcome: Progressing     Problem: Depressive Signs/Symptoms  Goal: Optimized Energy Level (Depressive Signs/Symptoms)  Outcome: Progressing  Goal: Optimized Cognitive Function (Depressive Signs/Symptoms)  Outcome: Progressing  Goal: Increased Participation and Engagement (Depressive Signs/Symptoms)  Outcome: Progressing  Goal: Enhanced Self-Esteem and Confidence (Depressive Signs/Symptoms)  Outcome: Progressing  Goal: Improved Mood Symptoms (Depressive Signs/Symptoms)  Outcome: Progressing  Goal: Optimized Nutrition Intake (Depressive Signs/Symptoms)  Outcome: Progressing  Goal: Improved Psychomotor Symptoms (Depressive Signs/Symptoms)  Outcome: Progressing  Goal: Improved Sleep (Depressive Signs/Symptoms)  Outcome: Progressing  Goal: Enhanced Social, Occupational or Functional Skills (Depressive Signs/Symptoms)  Outcome: Progressing

## 2024-12-24 NOTE — ASSESSMENT & PLAN NOTE
Patient's COPD is controlled currently.  Patient is currently off COPD Pathway. Continue scheduled inhalers  prn  and monitor respiratory status closely.

## 2024-12-24 NOTE — SUBJECTIVE & OBJECTIVE
Past Medical History:   Diagnosis Date    Anxiety     Breast cancer 2010    Cancer     Breast    COPD (chronic obstructive pulmonary disease)     Depression     Diabetes mellitus     GERD (gastroesophageal reflux disease)     Hypertension     Insomnia     Thyroid disease        Past Surgical History:   Procedure Laterality Date    BLADDER SUSPENSION      BREAST LUMPECTOMY      Right breast    CHOLECYSTECTOMY      HYSTERECTOMY      KNEE ARTHROSCOPY      Right knee    OOPHORECTOMY         Review of patient's allergies indicates:  No Known Allergies    No current facility-administered medications on file prior to encounter.     Current Outpatient Medications on File Prior to Encounter   Medication Sig    albuterol (PROVENTIL/VENTOLIN HFA) 90 mcg/actuation inhaler Inhale 1-2 puffs into the lungs every 4 (four) hours as needed for Wheezing or Shortness of Breath. Rescue    albuterol-ipratropium (DUO-NEB) 2.5 mg-0.5 mg/3 mL nebulizer solution Take 3 mLs by nebulization every 4 (four) hours as needed for Wheezing or Shortness of Breath. Rescue    amLODIPine (NORVASC) 10 MG tablet Take 10 mg by mouth.    aspirin (ECOTRIN) 81 MG EC tablet Take 81 mg by mouth once daily.    BREZTRI AEROSPHERE 160-9-4.8 mcg/actuation HFAA Inhale into the lungs.    esomeprazole (NEXIUM) 20 MG capsule Take 20 mg by mouth before breakfast.    estradioL (ESTRACE) 1 MG tablet Take 1 tablet (1 mg total) by mouth once daily.    gabapentin (NEURONTIN) 300 MG capsule Take 1 capsule (300 mg total) by mouth 3 (three) times daily.    hydrOXYzine pamoate (VISTARIL) 25 MG Cap Take 1 capsule (25 mg total) by mouth every 8 (eight) hours as needed (anxiety).    levothyroxine (SYNTHROID) 75 MCG tablet Take 1 tablet (75 mcg total) by mouth before breakfast.    metFORMIN (GLUCOPHAGE-XR) 750 MG ER 24hr tablet Take by mouth.    paroxetine (PAXIL) 30 MG tablet Take 2 tablets (60 mg total) by mouth once daily.    risperiDONE (RISPERDAL) 3 MG Tab Take 1 tablet (3 mg  total) by mouth every evening.    nicotine (NICODERM CQ) 14 mg/24 hr Place 1 patch onto the skin daily as needed (nicotine withdrawal).     Family History       Problem Relation (Age of Onset)    No Known Problems Mother, Father, Sister, Daughter, Maternal Aunt, Maternal Uncle, Paternal Aunt, Paternal Uncle, Maternal Grandmother, Maternal Grandfather, Paternal Grandmother, Paternal Grandfather, Other          Tobacco Use    Smoking status: Every Day     Current packs/day: 0.50     Average packs/day: 0.5 packs/day for 50.6 years (25.3 ttl pk-yrs)     Types: Cigarettes, Vaping with nicotine     Start date: 5/15/1974     Passive exposure: Current    Smokeless tobacco: Never   Substance and Sexual Activity    Alcohol use: Not Currently    Drug use: Never    Sexual activity: Not Currently     Partners: Male     Birth control/protection: None     Review of Systems   Constitutional:  Negative for activity change, chills and fever.   HENT:  Negative for postnasal drip, sinus pain and sore throat.    Respiratory:  Positive for shortness of breath. Negative for cough.    Cardiovascular:  Positive for palpitations. Negative for chest pain.   Gastrointestinal:  Negative for abdominal pain, diarrhea, nausea and vomiting.   Skin:  Negative for rash and wound.   Neurological:  Negative for weakness.   Psychiatric/Behavioral:  The patient is nervous/anxious.      Objective:     Vital Signs (Most Recent):  Temp: 98.4 °F (36.9 °C) (12/24/24 0756)  Pulse: 85 (12/24/24 0829)  Resp: 20 (12/24/24 0829)  BP: 139/71 (12/24/24 0756)  SpO2: 97 % (12/24/24 0756) Vital Signs (24h Range):  Temp:  [98.4 °F (36.9 °C)-98.7 °F (37.1 °C)] 98.4 °F (36.9 °C)  Pulse:  [85-91] 85  Resp:  [20] 20  SpO2:  [95 %-97 %] 97 %  BP: (139-170)/(71-85) 139/71     Weight: 111.1 kg (245 lb)  Body mass index is 43.4 kg/m².     Physical Exam  Constitutional:       Appearance: She is obese.   HENT:      Nose: Nose normal.      Mouth/Throat:      Mouth: Mucous  membranes are moist.   Eyes:      Extraocular Movements: Extraocular movements intact and EOM normal.      Pupils: Pupils are equal, round, and reactive to light.   Cardiovascular:      Rate and Rhythm: Normal rate and regular rhythm.   Pulmonary:      Effort: Pulmonary effort is normal.      Breath sounds: Normal breath sounds.   Abdominal:      General: Bowel sounds are normal.      Palpations: Abdomen is soft.   Musculoskeletal:         General: Normal range of motion.   Skin:     General: Skin is warm and dry.      Capillary Refill: Capillary refill takes less than 2 seconds.   Neurological:      General: No focal deficit present.      Mental Status: She is alert.              CRANIAL NERVES     CN III, IV, VI   Pupils are equal, round, and reactive to light.  Extraocular motions are normal.     CN V   Facial sensation intact.     CN VII   Facial expression full, symmetric.     CN VIII   CN VIII normal.     CN IX, X   CN IX normal.   CN X normal.     CN XI   CN XI normal.     CN XII   CN XII normal.        Significant Labs: All pertinent labs within the past 24 hours have been reviewed.  Recent Lab Results         12/24/24  0622   12/23/24 2021        POCT Glucose 120   159               Significant Imaging: I have reviewed all pertinent imaging results/findings within the past 24 hours.

## 2024-12-24 NOTE — H&P
"  [facility] Hospital Medicine  History & Physical    Patient Name: Vero Allen  MRN: [id number]  Patient Class: IP- Psych  Admission Date: 12/23/2024  Attending Physician: Everett Joseph III, MD   Primary Care Provider: Bruke Merchant MD         Patient information was obtained from patient and ER records.     Subjective:     Principal Problem:Acute psychosis    Chief Complaint:   Chief Complaint   Patient presents with    Psychiatric Evaluation     Pt stated that she was referred to ED by PCP Dr. Merchant for possible U admission - pt stated that she is hearing "neighbor messing with her home - phones ringing" - per patient compliant with meds. Denied URI / UTI symptoms. No complaints. Denied SI / HI.         HPI: 64-year-old female with a history of severe anxiety, COPD, depression, diabetes, hypertension presents to the ER at the direction of her physician, patient is hearing things, thinks her neighbors are grinding on her trailer, trying to tip over her trailer. Hearing phone drinking outside, paranoid. No SI or HI. History of admits to the psych unit in the past. No other issues. Patient is tearful, does not want to go home    Pt co anxiety this am that causes sob - denies any cp - has had similar episodes in the past - pt is havign no distress at this time    Past Medical History:   Diagnosis Date    Anxiety     Breast cancer 2010    Cancer     Breast    COPD (chronic obstructive pulmonary disease)     Depression     Diabetes mellitus     GERD (gastroesophageal reflux disease)     Hypertension     Insomnia     Thyroid disease        Past Surgical History:   Procedure Laterality Date    BLADDER SUSPENSION      BREAST LUMPECTOMY      Right breast    CHOLECYSTECTOMY      HYSTERECTOMY      KNEE ARTHROSCOPY      Right knee    OOPHORECTOMY         Review of patient's allergies indicates:  No Known Allergies    No current facility-administered medications on file prior " to encounter.     Current Outpatient Medications on File Prior to Encounter   Medication Sig    albuterol (PROVENTIL/VENTOLIN HFA) 90 mcg/actuation inhaler Inhale 1-2 puffs into the lungs every 4 (four) hours as needed for Wheezing or Shortness of Breath. Rescue    albuterol-ipratropium (DUO-NEB) 2.5 mg-0.5 mg/3 mL nebulizer solution Take 3 mLs by nebulization every 4 (four) hours as needed for Wheezing or Shortness of Breath. Rescue    amLODIPine (NORVASC) 10 MG tablet Take 10 mg by mouth.    aspirin (ECOTRIN) 81 MG EC tablet Take 81 mg by mouth once daily.    BREZTRI AEROSPHERE 160-9-4.8 mcg/actuation HFAA Inhale into the lungs.    esomeprazole (NEXIUM) 20 MG capsule Take 20 mg by mouth before breakfast.    estradioL (ESTRACE) 1 MG tablet Take 1 tablet (1 mg total) by mouth once daily.    gabapentin (NEURONTIN) 300 MG capsule Take 1 capsule (300 mg total) by mouth 3 (three) times daily.    hydrOXYzine pamoate (VISTARIL) 25 MG Cap Take 1 capsule (25 mg total) by mouth every 8 (eight) hours as needed (anxiety).    levothyroxine (SYNTHROID) 75 MCG tablet Take 1 tablet (75 mcg total) by mouth before breakfast.    metFORMIN (GLUCOPHAGE-XR) 750 MG ER 24hr tablet Take by mouth.    paroxetine (PAXIL) 30 MG tablet Take 2 tablets (60 mg total) by mouth once daily.    risperiDONE (RISPERDAL) 3 MG Tab Take 1 tablet (3 mg total) by mouth every evening.    nicotine (NICODERM CQ) 14 mg/24 hr Place 1 patch onto the skin daily as needed (nicotine withdrawal).     Family History       Problem Relation (Age of Onset)    No Known Problems Mother, Father, Sister, Daughter, Maternal Aunt, Maternal Uncle, Paternal Aunt, Paternal Uncle, Maternal Grandmother, Maternal Grandfather, Paternal Grandmother, Paternal Grandfather, Other          Tobacco Use    Smoking status: Every Day     Current packs/day: 0.50     Average packs/day: 0.5 packs/day for 50.6 years (25.3 ttl pk-yrs)     Types: Cigarettes, Vaping with nicotine     Start date:  5/15/1974     Passive exposure: Current    Smokeless tobacco: Never   Substance and Sexual Activity    Alcohol use: Not Currently    Drug use: Never    Sexual activity: Not Currently     Partners: Male     Birth control/protection: None     Review of Systems   Constitutional:  Negative for activity change, chills and fever.   HENT:  Negative for postnasal drip, sinus pain and sore throat.    Respiratory:  Positive for shortness of breath. Negative for cough.    Cardiovascular:  Positive for palpitations. Negative for chest pain.   Gastrointestinal:  Negative for abdominal pain, diarrhea, nausea and vomiting.   Skin:  Negative for rash and wound.   Neurological:  Negative for weakness.   Psychiatric/Behavioral:  The patient is nervous/anxious.      Objective:     Vital Signs (Most Recent):  Temp: 98.4 °F (36.9 °C) (12/24/24 0756)  Pulse: 85 (12/24/24 0829)  Resp: 20 (12/24/24 0829)  BP: 139/71 (12/24/24 0756)  SpO2: 97 % (12/24/24 0756) Vital Signs (24h Range):  Temp:  [98.4 °F (36.9 °C)-98.7 °F (37.1 °C)] 98.4 °F (36.9 °C)  Pulse:  [85-91] 85  Resp:  [20] 20  SpO2:  [95 %-97 %] 97 %  BP: (139-170)/(71-85) 139/71     Weight: 111.1 kg (245 lb)  Body mass index is 43.4 kg/m².     Physical Exam  Constitutional:       Appearance: She is obese.   HENT:      Nose: Nose normal.      Mouth/Throat:      Mouth: Mucous membranes are moist.   Eyes:      Extraocular Movements: Extraocular movements intact and EOM normal.      Pupils: Pupils are equal, round, and reactive to light.   Cardiovascular:      Rate and Rhythm: Normal rate and regular rhythm.   Pulmonary:      Effort: Pulmonary effort is normal.      Breath sounds: Normal breath sounds.   Abdominal:      General: Bowel sounds are normal.      Palpations: Abdomen is soft.   Musculoskeletal:         General: Normal range of motion.   Skin:     General: Skin is warm and dry.      Capillary Refill: Capillary refill takes less than 2 seconds.   Neurological:      General: No  focal deficit present.      Mental Status: She is alert.              CRANIAL NERVES     CN III, IV, VI   Pupils are equal, round, and reactive to light.  Extraocular motions are normal.     CN V   Facial sensation intact.     CN VII   Facial expression full, symmetric.     CN VIII   CN VIII normal.     CN IX, X   CN IX normal.   CN X normal.     CN XI   CN XI normal.     CN XII   CN XII normal.        Significant Labs: All pertinent labs within the past 24 hours have been reviewed.  Recent Lab Results         12/24/24 0622 12/23/24 2021        POCT Glucose 120   159               Significant Imaging: I have reviewed all pertinent imaging results/findings within the past 24 hours.  Assessment/Plan:     * Acute psychosis  Inpt psych for eval and treatment    Type 2 diabetes mellitus, without long-term current use of insulin  accuchecks and ssi    Hypothyroid  Cont synthroid      Anxiety  Being followed by psych    MDD (major depressive disorder), recurrent, severe, with psychosis  Pt will be followed by psych for further treatment        Primary hypertension  Patient's blood pressure range in the last 24 hours was: BP  Min: 139/71  Max: 170/85.The patient's inpatient anti-hypertensive regimen is listed below:  Current Antihypertensives  amLODIPine tablet 10 mg, Daily, Oral    Plan  - BP is controlled, no changes needed to their regimen      COPD (chronic obstructive pulmonary disease)  Patient's COPD is controlled currently.  Patient is currently off COPD Pathway. Continue scheduled inhalers  prn  and monitor respiratory status closely.     Nicotine dependence, uncomplicated  Dangers of cigarette smoking were reviewed with patient in detail. Patient was Counseled for 3-10 minutes. Nicotine replacement options were discussed. Nicotine replacement was discussed-  nicotine patch given      VTE Risk Mitigation (From admission, onward)           Ordered     IP VTE LOW RISK PATIENT  Once         12/23/24 9747                                     Cass Boggs MD  Department of [facility] Medicine  [facility]

## 2024-12-24 NOTE — HPI
64-year-old female with a history of severe anxiety, COPD, depression, diabetes, hypertension presents to the ER at the direction of her physician, patient is hearing things, thinks her neighbors are grinding on her trailer, trying to tip over her trailer. Hearing phone drinking outside, paranoid. No SI or HI. History of admits to the psych unit in the past. No other issues. Patient is tearful, does not want to go home    Pt co anxiety this am that causes sob - denies any cp - has had similar episodes in the past - pt is havign no distress at this time
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
## 2024-12-24 NOTE — PLAN OF CARE
Collateral:   khanh Bocanegra, [phone]     Collateral Perception of Problem:   Starting to hear things again, hx of schizophrenia it was under control but doctor has made a lot of medication change    Previous Psych History/Hospitalizations:  Yes     Suicide Attempts (how/severity):  Age 19     How long has pt had problems (childhood dx?):  Hx of schizophrenia for the past 20 years     Impulse issues:  Hallucinations   She went out and brought a brand new vehicle once   Tends to tell us things she has done after the fact     History of violence:   None     Drug Use:  None     Alcohol Use:  Drink in the past     Legal Issues:  None     Other Pertinent Info:   Has experience hearing people in the attic, walls  moving in the past when first dx with schizophrenia   She tends to get scared, she lives alone   She stayed with her brother for a little while, but her brother isn't well enough to take care of her   I don't think she is taking her medication correctly   Majority of our family resides in [place], no one is able to take her in   Her brother that has been providing her transportation is very sickly   She has no children   Dr Merchant stated they are still trying/working on Nursing home admission   She isn't able to keep up her hygiene or home      Baseline:  Always complain about something is hurting her, pretty quiet     Discharge Plan:   Home, still awaiting NH placement

## 2024-12-24 NOTE — PLAN OF CARE
Problem: Bariatric Environmental Safety  Goal: Safety Maintained with Care  Outcome: Progressing     Problem: Adult Behavioral Health Plan of Care  Goal: Plan of Care Review  Outcome: Progressing  Goal: Patient-Specific Goal (Individualization)  Outcome: Progressing  Goal: Adheres to Safety Considerations for Self and Others  Outcome: Progressing  Goal: Absence of New-Onset Illness or Injury  Outcome: Progressing  Goal: Optimized Coping Skills in Response to Life Stressors  Outcome: Progressing  Goal: Develops/Participates in Therapeutic [place] to Support Successful Transition  Outcome: Progressing  Goal: Rounds/Family Conference  Outcome: Progressing

## 2024-12-24 NOTE — PLAN OF CARE
12/24/24 1156   Discharge Assessment   Assessment Type Discharge Planning Assessment   Confirmed/corrected address, phone number and insurance Yes   Confirmed Demographics Correct on Facesheet   Source of Information patient;family;health record   When was your last doctors appointment?   (unknown)   People in Home alone   Facility Arrived From: [facility] ED   Do you expect to return to your current living situation? Yes   Do you have help at home or someone to help you manage your care at home? No   Prior to hospitilization cognitive status: Unable to Assess   Current cognitive status: Alert/Oriented;No Deficits   Walking or Climbing Stairs Difficulty no   Dressing/Bathing Difficulty no   Do you have any problems with:   (n/a)   Home Accessibility not wheelchair accessible   Home Layout Able to live on 1st floor   Equipment Currently Used at Home none   Readmission within 30 days? No   Patient currently being followed by outpatient case management? No   Do you currently have service(s) that help you manage your care at home? No   Do you take prescription medications? Yes   Do you have prescription coverage? Yes   Do you have any problems affording any of your prescribed medications? No   Is the patient taking medications as prescribed? yes   Who is going to help you get home at discharge? Robel Jay   How do you get to doctors appointments? family or friend will provide   Are you on dialysis? No   Do you take coumadin? No   Discharge Plan A Home   DME Needed Upon Discharge  none   Discharge Plan discussed with: Patient   Transition of Care Barriers Mental illness   Physical Activity   On average, how many days per week do you engage in moderate to strenuous exercise (like a brisk walk)? 0 days   On average, how many minutes do you engage in exercise at this level? 0 min   Financial Resource Strain   How hard is it for you to pay for the very basics like food, housing, medical care, and heating? Not very   Housing  Stability   In the last 12 months, was there a time when you were not able to pay the mortgage or rent on time? Y   At any time in the past 12 months, were you homeless or living in a shelter (including now)? N   Transportation Needs   Has the lack of transportation kept you from medical appointments, meetings, work or from getting things needed for daily living? No   Food Insecurity   Within the past 12 months, you worried that your food would run out before you got the money to buy more. Never true   Within the past 12 months, the food you bought just didn't last and you didn't have money to get more. Never true   Stress   Do you feel stress - tense, restless, nervous, or anxious, or unable to sleep at night because your mind is troubled all the time - these days? Very much   Social Isolation   How often do you feel lonely or isolated from those around you?  Always   Alcohol Use   Q1: How often do you have a drink containing alcohol? Never   Q2: How many drinks containing alcohol do you have on a typical day when you are drinking? None   Q3: How often do you have six or more drinks on one occasion? Never   Utilities   In the past 12 months has the electric, gas, oil, or water company threatened to shut off services in your home? No   Health Literacy   How often do you need to have someone help you when you read instructions, pamphlets, or other written material from your doctor or pharmacy? Never

## 2024-12-24 NOTE — NURSING
POC reviewed this shift and is ongoing.  Pt is cooperative with care and complaint with medications.  Pt w/c/o needing nebulizer treatments and 'something for congestion'.  Pt encouraged to discuss concerns with provider tomorrow.  Pt isolated to room for most of this shift, out only for medications and vitals.  Pt refused evening snack.

## 2024-12-24 NOTE — ASSESSMENT & PLAN NOTE
Dangers of cigarette smoking were reviewed with patient in detail. Patient was Counseled for 3-10 minutes. Nicotine replacement options were discussed. Nicotine replacement was discussed-  nicotine patch given

## 2024-12-24 NOTE — PLAN OF CARE
12/24/24 1149   Step 1: Warning Signs   Warning Sign 1 anxiety   Warning Sign 2 my neighbors   Warning Sign 3 trouble breathing   Step 2: Internal coping strategies - Things I can do to take my mind off my problems without contacting another person:   Coping Strategy 1 word search puzzles   Coping Strategy 2 watch tv   Coping Strategy 3 cook   Step 3: People and social settings that provide distraction:   1. Name Jordyn Kumar (niece)       Phone [phone]   2. Name Robel Jay (brother)       Phone [phone]   3. Place Robel's house   4. Place n/a    Step 4: People whom I can ask for help:   1. Person Robel Jay       Phone [phone]   2. Person Jordyn Kumar       Phone [phone]   3. Person n/a   Step 5: Professionals or agencies I can contact during a crisis:   1. Clinician Name [facility] [address] [place] [address] [place] [address], [place]       Phone [phone]   3. Suicide Prevention Lifeline: 988 suicide prevention lifeline 988   4. [facility] Emergency Service       911       Emergency Services Phone warm line [phone] wed-sun 5pm-10pm   Step 6: Making the environment safer (plan for lethal means safety)   Safe Environment Plan call 911   Safe Environment Optional: What is most important to me and worth living for? my family   Safety Plan Tasks   Provided a Hard Copy to the Patient Y   Explained How to Follow the Steps Y   Discussed Facilitators and Barriers Y
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
## 2024-12-25 LAB
POCT GLUCOSE: 113 MG/DL (ref 70–110)
POCT GLUCOSE: 114 MG/DL (ref 70–110)
POCT GLUCOSE: 118 MG/DL (ref 70–110)
POCT GLUCOSE: 124 MG/DL (ref 70–110)
POCT GLUCOSE: 143 MG/DL (ref 70–110)

## 2024-12-25 PROCEDURE — 25000003 PHARM REV CODE 250: Performed by: PSYCHIATRY & NEUROLOGY

## 2024-12-25 PROCEDURE — 99232 SBSQ HOSP IP/OBS MODERATE 35: CPT | Mod: ,,, | Performed by: STUDENT IN AN ORGANIZED HEALTH CARE EDUCATION/TRAINING PROGRAM

## 2024-12-25 PROCEDURE — 11400000 HC PSYCH PRIVATE ROOM

## 2024-12-25 PROCEDURE — S4991 NICOTINE PATCH NONLEGEND: HCPCS | Performed by: PSYCHIATRY & NEUROLOGY

## 2024-12-25 PROCEDURE — 25000003 PHARM REV CODE 250: Performed by: EMERGENCY MEDICINE

## 2024-12-25 RX ADMIN — HYDROXYZINE PAMOATE 50 MG: 50 CAPSULE ORAL at 08:12

## 2024-12-25 RX ADMIN — LEVOTHYROXINE SODIUM 75 MCG: 75 TABLET ORAL at 05:12

## 2024-12-25 RX ADMIN — ALBUTEROL SULFATE 2 PUFF: 90 AEROSOL, METERED RESPIRATORY (INHALATION) at 08:12

## 2024-12-25 RX ADMIN — RISPERIDONE 4 MG: 1 TABLET, FILM COATED ORAL at 08:12

## 2024-12-25 RX ADMIN — GABAPENTIN 500 MG: 100 CAPSULE ORAL at 08:12

## 2024-12-25 RX ADMIN — ALUMINUM HYDROXIDE, MAGNESIUM HYDROXIDE, AND DIMETHICONE 30 ML: 200; 20; 200 SUSPENSION ORAL at 11:12

## 2024-12-25 RX ADMIN — CETIRIZINE HYDROCHLORIDE 10 MG: 5 TABLET ORAL at 08:12

## 2024-12-25 RX ADMIN — GABAPENTIN 500 MG: 100 CAPSULE ORAL at 03:12

## 2024-12-25 RX ADMIN — PAROXETINE HYDROCHLORIDE 60 MG: 20 TABLET, FILM COATED ORAL at 08:12

## 2024-12-25 RX ADMIN — ALUMINUM HYDROXIDE, MAGNESIUM HYDROXIDE, AND DIMETHICONE 30 ML: 200; 20; 200 SUSPENSION ORAL at 10:12

## 2024-12-25 RX ADMIN — NICOTINE 1 PATCH: 14 PATCH, EXTENDED RELEASE TRANSDERMAL at 08:12

## 2024-12-25 RX ADMIN — ALBUTEROL SULFATE 2 PUFF: 90 AEROSOL, METERED RESPIRATORY (INHALATION) at 06:12

## 2024-12-25 RX ADMIN — AMLODIPINE BESYLATE 10 MG: 10 TABLET ORAL at 08:12

## 2024-12-25 RX ADMIN — ASPIRIN 81 MG: 81 TABLET, COATED ORAL at 08:12

## 2024-12-25 NOTE — PLAN OF CARE
Problem: Adult Behavioral Health Plan of Care  Goal: Plan of Care Review  Outcome: Progressing     Problem: Adult Behavioral Health Plan of Care  Goal: Patient-Specific Goal (Individualization)  Outcome: Progressing     Problem: Adult Behavioral Health Plan of Care  Goal: Adheres to Safety Considerations for Self and Others  Outcome: Progressing     Problem: Anxiety Signs/Symptoms  Goal: Optimized Energy Level (Anxiety Signs/Symptoms)  Outcome: Progressing     Problem: Anxiety Signs/Symptoms  Goal: Optimized Cognitive Function (Anxiety Signs/Symptoms)  Outcome: Progressing     Problem: Anxiety Signs/Symptoms  Goal: Improved Mood Symptoms (Anxiety Signs/Symptoms)  Outcome: Progressing     Problem: Anxiety Signs/Symptoms  Goal: Improved Sleep (Anxiety Signs/Symptoms)  Outcome: Progressing     Problem: Psychotic Signs/Symptoms  Goal: Improved Behavioral Control (Psychotic Signs/Symptoms)  Outcome: Progressing     Problem: Psychotic Signs/Symptoms  Goal: Improved Mood Symptoms (Psychotic Signs/Symptoms)  Outcome: Progressing     Problem: Psychotic Signs/Symptoms  Goal: Improved Sleep (Psychotic Signs/Symptoms)  Outcome: Progressing     Problem: Depressive Signs/Symptoms  Goal: Optimized Energy Level (Depressive Signs/Symptoms)  Outcome: Progressing     Problem: Depressive Signs/Symptoms  Goal: Improved Mood Symptoms (Depressive Signs/Symptoms)  Outcome: Progressing

## 2024-12-25 NOTE — NURSING
Plan of care reviewed.  Pt has been in the common area most of the day.  Pt remains anxious.  States her anxiety is because of her neighbors.  States they don't like her and they used a  to saw on her tie downs on her trailer.  Stated she called the police but they did nothing.  Pt did state, however, that  there was no damage so it could have been her just imagining things.  Pt states she is still planning on going to the nursing home.  States her bags are packed and  She is waiting on the decision from the state.  Pt continues to be somatic and drug seeking at times.  Taking meds without side effects noted.  Appetite adequate.  Pt interacting with peers and staff without difficulty.  Pt did not attend group therapy despite encouragement.  Instructed to call for any needs or concerns at all. Verbalized understanding.  Will cont to monitor for safety. Patient care ongoing.

## 2024-12-25 NOTE — NURSING
Pt notified [facility] that she was wet with urine and unable to change her own absorbent brief.  Pt stated that she was too weak to sit or stand without assistance.  Accucheck performed 114.  Pt assisted to change brief.  Pt urinated on her fresh bed sheet while being assisted by staff.  Pt immediately urinated in fresh brief and again needed assistance to change.

## 2024-12-25 NOTE — PROGRESS NOTES
"PSYCHIATRY DAILY INPATIENT PROGRESS NOTE  SUBSEQUENT HOSPITAL VISIT    ENCOUNTER DATE: 12/25/2024  SITE: [facility]    DATE OF ADMISSION: 12/23/2024  9:00 AM  LENGTH OF STAY: 2 days      CHIEF COMPLAINT   Vero Allen is a 64 y.o. female, seen during daily lombardo rounds on the inpatient unit.  Vero Allen presented with the chief complaint of psychosis(paranoia)/anxiety, "I feel like I'm going to have a nervous breakdown."       The patient was seen and examined. The chart was reviewed.     Reviewed notes from Rns and MD and labs from the last 24 hours.    The patient's case was discussed with the treatment team/care providers today including Rns and NP    Staff reports no behavioral or management issues.     The patient has been compliant with treatment.      Subjective 12/25/2024     Pt states "I need some sleeping pills, I can't sleep at night."    Pt continues to report continued delusions regarding her trailer.      The patient denies any side effects to medications.        Interim/overnight events per report/notes:  Per RN:  Pt dramatically insisting that she can't breath, while speaking in full sentences.  Pt assessed by nurse then stated that she meant that she couldn't breath through her nose.  Pt laying in bed moaning loudly, unable to explain why she is moaning.       Pt again urinated on herself, requiring staff assistance to change her absorbent brief.          Psychiatric ROS (observed, reported, or endorsed/denied):  Depressed mood - Yes  Interest/pleasure/anhedonia: Yes  Guilt/hopelessness/worthlessness - No  Changes in Sleep - less  Changes in Appetite - No  Changes in Concentration - No  Changes in Energy - No  PMA/R- No  Suicidal- active/passive ideations - No  Homicidal ideations: active/passive ideations - No    Hallucinations - fluctuating  Delusions - Yes  Disorganized behavior - No  Disorganized speech - No  Negative symptoms - No    Elevated mood - No  Decreased " need for sleep - No  Grandiosity - No  Racing thoughts - Yes  Impulsivity - No  Irritability- No  Increased energy - No  Distractibility - No  Increase in goal-directed activity or PMA- No    Symptoms of ELSY - Yes  Symptoms of Panic Disorder- No  Symptoms of PTSD - No        Overall progress: Patient is showing no improvement on the Unit to date          Medical ROS  Review of Systems   Constitutional: Negative.    HENT:  Positive for congestion.    Eyes: Negative.    Respiratory:  Positive for cough.         Cough subjective   Cardiovascular: Negative.    Gastrointestinal: Negative.    Genitourinary: Negative.    Musculoskeletal: Negative.    Skin: Negative.    Neurological: Negative.    Endo/Heme/Allergies: Negative.          PAST MEDICAL HISTORY   Past Medical History:   Diagnosis Date    Anxiety     Breast cancer 2010    Cancer     Breast    COPD (chronic obstructive pulmonary disease)     Depression     Diabetes mellitus     GERD (gastroesophageal reflux disease)     Hypertension     Insomnia     Thyroid disease            PSYCHOTROPIC MEDICATIONS   Scheduled Meds:   amLODIPine  10 mg Oral Daily    aspirin  81 mg Oral Daily    cetirizine  10 mg Oral Daily    gabapentin  500 mg Oral TID    levothyroxine  75 mcg Oral Before breakfast    paroxetine  60 mg Oral Daily    risperiDONE  4 mg Oral QHS     Continuous Infusions:  PRN Meds:.  Current Facility-Administered Medications:     acetaminophen, 650 mg, Oral, Q6H PRN    albuterol, 2 puff, Inhalation, Q4H PRN    aluminum-magnesium hydroxide-simethicone, 30 mL, Oral, Q6H PRN    benzonatate, 100 mg, Oral, TID PRN    benztropine mesylate, 2 mg, Intramuscular, Q8H PRN    dextrose 50%, 12.5 g, Intravenous, PRN    dextrose 50%, 25 g, Intravenous, PRN    glucagon (human recombinant), 1 mg, Intramuscular, PRN    glucose, 16 g, Oral, PRN    glucose, 24 g, Oral, PRN    hydrOXYzine pamoate, 50 mg, Oral, Q6H PRN    insulin aspart U-100, 0-5 Units, Subcutaneous, QID (AC + HS)  PRN    loperamide, 2 mg, Oral, PRN    melatonin, 6 mg, Oral, Nightly PRN    nicotine, 1 patch, Transdermal, Daily PRN    OLANZapine, 10 mg, Oral, Q8H PRN **AND** OLANZapine, 10 mg, Intramuscular, Q8H PRN    ondansetron, 4 mg, Oral, Q8H PRN    promethazine, 25 mg, Oral, Q6H PRN        EXAMINATION    VITALS   Vitals:    12/24/24 1904 12/24/24 2005 12/25/24 0603 12/25/24 0737   BP: 129/67   132/86   BP Location: Left arm   Left arm   Patient Position: Sitting   Sitting   Pulse: 74 86 82 104   Resp: 20 18 20 18   Temp: 97.9 °F (36.6 °C)   97 °F (36.1 °C)   TempSrc: Oral   Oral   SpO2: 97%   95%   Weight:       Height:           Body mass index is 43.4 kg/m².        CONSTITUTIONAL  General Appearance: unremarkable, age appropriate    MUSCULOSKELETAL  Muscle Strength and Tone:no tremor, no tic  Abnormal Involuntary Movements: No  Gait and Station: non-ataxic    PSYCHIATRIC   Level of Consciousness: awake, alert , and oriented  Orientation: person, place, time, and situation  Grooming: Casually dressed and Disheveled  Psychomotor Behavior: normal, cooperative  Speech: normal tone, normal rate, normal pitch, normal volume  Language: grossly intact  Mood: anxious  Affect: Incongruent with mood  Thought Process: Illogical  Associations: intact   Thought Content: +delusions, denies SI, and denies HI  Perceptions: less VH, denies AH, and not RIS  Memory: Able to recall past events, Remote intact, and Recent intact  Attention:Attends to interview without distraction  Fund of Knowledge: Aware of current events and Vocabulary appropriate   Estimate if Intelligence:  Average based on work/education history, vocabulary and mental status exam  Insight: limited awareness of illness  Judgment: limited        DIAGNOSTIC TESTING   Laboratory Results  Recent Results (from the past 24 hours)   POCT glucose    Collection Time: 12/24/24  4:34 PM   Result Value Ref Range    POCT Glucose 113 (H) 70 - 110 mg/dL   POCT glucose    Collection  Time: 12/24/24  8:04 PM   Result Value Ref Range    POCT Glucose 114 (H) 70 - 110 mg/dL   POCT glucose    Collection Time: 12/25/24  2:39 AM   Result Value Ref Range    POCT Glucose 114 (H) 70 - 110 mg/dL   POCT glucose    Collection Time: 12/25/24  5:58 AM   Result Value Ref Range    POCT Glucose 124 (H) 70 - 110 mg/dL   POCT glucose    Collection Time: 12/25/24 10:52 AM   Result Value Ref Range    POCT Glucose 118 (H) 70 - 110 mg/dL             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   Pain disorder     Complicated bereavement     BZD misuses      Psychosocial stressors     Nicotine Dependence      COPD  HTN  DM  Thyroid disease  GERD  Chronic back pain  Seasonal allergies        PROBLEM LIST AND MANAGEMENT PLANS     Depression with psychosis: pt counseled  R/o SAD  -resumed home Risperdal- increase from 3 to 4 mg po q HS  -resumed home Paxil 60 mg po q day      Anxiety: pt counseled  -meds as above  -Paxil as above  -gabapentin as below     Insomnia: pt counseled  -vistaril prn      Pain: pt counseled  -start trial of home gabapentin- increase from 300 mg po TID to 400 mg po TID-Increase to 500 mg TID     Complicated bereavement: pt counseled      Nicotine Dependence: pt counseled  -started/conitnue nicotine 14 mg patch dermal      BZD misuses  -UDS negative today; pt denied recent in use  On early full remission  - pt counseled  - follow up with outpatient mental health providers after discharge for medication management and psychotherapy     Psychosocial stressors: pt counseled  -SW consulted to assist with resources     COPD: pt counseled  -Med consulted      HTN: pt counseled  -Med consulted     DM: pt counseled  -Med consulted     Thyroid disease: pt counseled  -Med consulted  -resumed Synthroid 75 mcg po q AM     GERD: pt counseled  -Med consulted     Chronic back pain: pt counseled  -Med  consulted  -gabapentin as above    Allergies:  -Initiate cetirizine 10 mg daily    Discussed diagnosis, risks and benefits of proposed treatment vs alternative treatments vs no treatment, potential side effects of these treatments and the inherent unpredictability of treatment. The patient expresses understanding of the above and displays the capacity to agree with this treatment given said understanding. Patient also agrees that, currently, the benefits outweigh the risks and would like to pursue/continue treatment at this time.    Any medications being used off-label were discussed with the patient inclusive of the evidence base for the use of the medications and consent was obtained for the off-label use of the medication.       DISCHARGE PLANNING  Expected Disposition Plan:  to be determined      NEED FOR CONTINUED HOSPITALIZATION  Psychiatric illness continues to pose a potential threat to life or bodily function, of self or others, thereby requiring the need for continued inpatient psychiatric hospitalization: Yes, due to: hallucinations, as evidenced by:  Ongoing concerns with grave disability with patient unable to perform basic feeding, hygiene and dressing activities without significant constant support.    Protective inpatient pyschiatric hospitalization required while a safe disposition plan is enacted: Yes    Patient stabilized and ready for discharge from inpatient psychiatric unit: No      The patient location is: [facility]    Visit type: audiovisual    Face to Face time with patient: 10  15 minutes of total time spent on the encounter, which includes face to face time and non-face to face time preparing to see the patient (eg, review of tests), Obtaining and/or reviewing separately obtained history, Documenting clinical information in the electronic or other health record, Independently interpreting results (not separately reported) and communicating results to the patient/family/caregiver, or  Care coordination (not separately reported).     Each patient to whom he or she provides medical services by telemedicine is:  (1) informed of the relationship between the physician and patient and the respective role of any other health care provider with respect to management of the patient; and (2) notified that he or she may decline to receive medical services by telemedicine and may withdraw from such care at any time.      STAFF:   Everett Joseph III, MD  Psychiatry

## 2024-12-25 NOTE — NURSING
"Patient has been out of her room in common area most of the day interacting with peers, and watching Lb movies on tv. Patient endorses depression and anxiety continued but feeling better, denies S/HI, denies A/VH, No RIS observed. No delusional statements noted this shift. Patient only fixated on her "stopped up nose", and c/o indigestion. Appetite is good for meals and snacks, and medication compliant. Zyrtec continued as ordered. PRN Maalox administered for indigestion as ordered (see emar) and noted effective. Accucheck 118 before lunch. Dr. Joseph visited per telemed with new order for Saline nasal spray PRN. Patient sitting in dining area no complaints at this time. No incontinent episodes this shift, patient toileted independently. Close observations continued and safe environment maintained.  "

## 2024-12-25 NOTE — NURSING
Pt dramatically insisting that she can't breath, while speaking in full sentences.  Pt assessed by nurse then stated that she meant that she couldn't breath through her nose.  Pt laying in bed moaning loudly, unable to explain why she is moaning.      Pt again urinated on herself, requiring staff assistance to change her absorbent brief.

## 2024-12-25 NOTE — NURSING
Pt is cooperative with staff on unit. Pt socializes with peers in the activity room. Pt is med complaint, seeking meds stating the  Was suppose to order her medicine for her nose for in the evening as well as in the morning. She was educated that at this time it is only once a day. Pt verbalized understanding and requested her PRN inhaler. Will continue to monitor for safety.    shortness of breath

## 2024-12-25 NOTE — PLAN OF CARE
Problem: Bariatric Environmental Safety  Goal: Safety Maintained with Care  Outcome: Progressing     Problem: Adult Behavioral Health Plan of Care  Goal: Plan of Care Review  Outcome: Progressing  Goal: Patient-Specific Goal (Individualization)  Outcome: Progressing  Goal: Adheres to Safety Considerations for Self and Others  Outcome: Progressing  Goal: Absence of New-Onset Illness or Injury  Outcome: Progressing  Goal: Optimized Coping Skills in Response to Life Stressors  Outcome: Progressing  Goal: Develops/Participates in Therapeutic Walsh to Support Successful Transition  Outcome: Progressing  Goal: Rounds/Family Conference  Outcome: Progressing     Problem: Diabetes Comorbidity  Goal: Blood Glucose Level Within Targeted Range  Outcome: Progressing     Problem: Sepsis/Septic Shock  Goal: Optimal Coping  Outcome: Progressing  Goal: Absence of Bleeding  Outcome: Progressing  Goal: Blood Glucose Level Within Targeted Range  Outcome: Progressing  Goal: Absence of Infection Signs and Symptoms  Outcome: Progressing  Goal: Optimal Nutrition Intake  Outcome: Progressing     Problem: Anxiety Signs/Symptoms  Goal: Optimized Energy Level (Anxiety Signs/Symptoms)  Outcome: Progressing  Goal: Optimized Cognitive Function (Anxiety Signs/Symptoms)  Outcome: Progressing  Goal: Improved Mood Symptoms (Anxiety Signs/Symptoms)  Outcome: Progressing  Goal: Improved Sleep (Anxiety Signs/Symptoms)  Outcome: Progressing  Goal: Enhanced Social, Occupational or Functional Skills (Anxiety Signs/Symptoms)  Outcome: Progressing  Goal: Improved Somatic Symptoms (Anxiety Signs/Symptoms)  Outcome: Progressing     Problem: Psychotic Signs/Symptoms  Goal: Improved Behavioral Control (Psychotic Signs/Symptoms)  Outcome: Progressing  Goal: Optimal Cognitive Function (Psychotic Signs/Symptoms)  Outcome: Progressing  Goal: Increased Participation and Engagement (Psychotic Signs/Symptoms)  Outcome: Progressing  Goal: Improved Mood Symptoms  (Psychotic Signs/Symptoms)  Outcome: Progressing  Goal: Improved Psychomotor Symptoms (Psychotic Signs/Symptoms)  Outcome: Progressing  Goal: Decreased Sensory Symptoms (Psychotic Signs/Symptoms)  Outcome: Progressing  Goal: Improved Sleep (Psychotic Signs/Symptoms)  Outcome: Progressing  Goal: Enhanced Social, Occupational or Functional Skills (Psychotic Signs/Symptoms)  Outcome: Progressing     Problem: Depressive Signs/Symptoms  Goal: Optimized Energy Level (Depressive Signs/Symptoms)  Outcome: Progressing  Goal: Optimized Cognitive Function (Depressive Signs/Symptoms)  Outcome: Progressing  Goal: Increased Participation and Engagement (Depressive Signs/Symptoms)  Outcome: Progressing  Goal: Enhanced Self-Esteem and Confidence (Depressive Signs/Symptoms)  Outcome: Progressing  Goal: Improved Mood Symptoms (Depressive Signs/Symptoms)  Outcome: Progressing  Goal: Optimized Nutrition Intake (Depressive Signs/Symptoms)  Outcome: Progressing  Goal: Improved Psychomotor Symptoms (Depressive Signs/Symptoms)  Outcome: Progressing  Goal: Improved Sleep (Depressive Signs/Symptoms)  Outcome: Progressing  Goal: Enhanced Social, Occupational or Functional Skills (Depressive Signs/Symptoms)  Outcome: Progressing

## 2024-12-26 LAB — POCT GLUCOSE: 111 MG/DL (ref 70–110)

## 2024-12-26 PROCEDURE — 25000003 PHARM REV CODE 250: Performed by: PSYCHIATRY & NEUROLOGY

## 2024-12-26 PROCEDURE — 99233 SBSQ HOSP IP/OBS HIGH 50: CPT | Mod: ,,, | Performed by: PSYCHIATRY & NEUROLOGY

## 2024-12-26 PROCEDURE — 11400000 HC PSYCH PRIVATE ROOM

## 2024-12-26 PROCEDURE — 90833 PSYTX W PT W E/M 30 MIN: CPT | Mod: ,,, | Performed by: PSYCHIATRY & NEUROLOGY

## 2024-12-26 PROCEDURE — 25000242 PHARM REV CODE 250 ALT 637 W/ HCPCS: Performed by: PSYCHIATRY & NEUROLOGY

## 2024-12-26 PROCEDURE — 25000003 PHARM REV CODE 250: Performed by: EMERGENCY MEDICINE

## 2024-12-26 RX ORDER — FLUTICASONE PROPIONATE 50 MCG
2 SPRAY, SUSPENSION (ML) NASAL DAILY
Status: DISCONTINUED | OUTPATIENT
Start: 2024-12-26 | End: 2024-12-30 | Stop reason: HOSPADM

## 2024-12-26 RX ORDER — GABAPENTIN 300 MG/1
600 CAPSULE ORAL 3 TIMES DAILY
Status: DISCONTINUED | OUTPATIENT
Start: 2024-12-26 | End: 2024-12-30 | Stop reason: HOSPADM

## 2024-12-26 RX ORDER — RISPERIDONE 1 MG/1
2 TABLET ORAL 2 TIMES DAILY
Status: DISCONTINUED | OUTPATIENT
Start: 2024-12-26 | End: 2024-12-30 | Stop reason: HOSPADM

## 2024-12-26 RX ADMIN — ALUMINUM HYDROXIDE, MAGNESIUM HYDROXIDE, AND DIMETHICONE 30 ML: 200; 20; 200 SUSPENSION ORAL at 08:12

## 2024-12-26 RX ADMIN — FLUTICASONE PROPIONATE 100 MCG: 50 SPRAY, METERED NASAL at 11:12

## 2024-12-26 RX ADMIN — HYDROXYZINE PAMOATE 50 MG: 50 CAPSULE ORAL at 08:12

## 2024-12-26 RX ADMIN — RISPERIDONE 2 MG: 1 TABLET, FILM COATED ORAL at 10:12

## 2024-12-26 RX ADMIN — RISPERIDONE 2 MG: 1 TABLET, FILM COATED ORAL at 08:12

## 2024-12-26 RX ADMIN — AMLODIPINE BESYLATE 10 MG: 10 TABLET ORAL at 08:12

## 2024-12-26 RX ADMIN — GABAPENTIN 600 MG: 300 CAPSULE ORAL at 08:12

## 2024-12-26 RX ADMIN — CETIRIZINE HYDROCHLORIDE 10 MG: 5 TABLET ORAL at 08:12

## 2024-12-26 RX ADMIN — LEVOTHYROXINE SODIUM 75 MCG: 75 TABLET ORAL at 05:12

## 2024-12-26 RX ADMIN — ALUMINUM HYDROXIDE, MAGNESIUM HYDROXIDE, AND DIMETHICONE 30 ML: 200; 20; 200 SUSPENSION ORAL at 02:12

## 2024-12-26 RX ADMIN — GABAPENTIN 600 MG: 300 CAPSULE ORAL at 02:12

## 2024-12-26 RX ADMIN — PAROXETINE HYDROCHLORIDE 60 MG: 20 TABLET, FILM COATED ORAL at 08:12

## 2024-12-26 RX ADMIN — GABAPENTIN 500 MG: 100 CAPSULE ORAL at 08:12

## 2024-12-26 RX ADMIN — ASPIRIN 81 MG: 81 TABLET, COATED ORAL at 08:12

## 2024-12-26 NOTE — NURSING
POC reviewed this shift and is ongoing.  Pt is cooperative with care and complaint with medications.  Pt is visible in the milieu and interacts well with staff and peers.  Pt continues to seek out staff frequently but hasn't displayed any histrionic behaviors this shift.

## 2024-12-26 NOTE — NURSING
Pt was out of her room, in common areas all evening.  Pt interacts well with peers and ambulates easily w/o difficulty or assistance.  Pt requested PRN albuterol inhaler, vistaril for sleep and mylanta for heartburn.  When asked about her day, pt became slightly agitated, rambling about her neighbors trying to crawl under her house and making noises around her trailer to upset her.  Pt verbally redirected and able to self-calm with some guidance from staff.

## 2024-12-26 NOTE — PROGRESS NOTES
"PSYCHIATRY DAILY INPATIENT PROGRESS NOTE  SUBSEQUENT HOSPITAL VISIT    ENCOUNTER DATE: 12/26/2024  SITE: [facility]    DATE OF ADMISSION: 12/23/2024  9:00 AM  LENGTH OF STAY: 3 days      CHIEF COMPLAINT   Vero Allen is a 64 y.o. female, seen during daily lombardo rounds on the inpatient unit.  Vero Allen presented with the chief complaint of psychosis(paranoia)/anxiety, "I feel like I'm going to have a nervous breakdown."       The patient was seen and examined. The chart was reviewed.     Reviewed notes from Rns, MD, and LPN and labs from the last 24 hours.    The patient's case was discussed with the treatment team/care providers today including Rns, CTRS, NP, and SW    Staff reports no behavioral or management issues.     The patient has been compliant with treatment.      Subjective 12/26/2024    Per yesterday's notes  Pt states "I need some sleeping pills, I can't sleep at night."  Pt continues to report continued delusions regarding her trailer.    Today, she again reports that she is very sleepy.  Depression/psychosis (paranoid delusions) persist. She was very somatic today.  She discussed alternative living arrangements.  ADLS remain diminshed      The patient denies any side effects to medications.            Psychiatric ROS (observed, reported, or endorsed/denied):  Depressed mood - Yes  Interest/pleasure/anhedonia: Yes  Guilt/hopelessness/worthlessness - No  Changes in Sleep - less  Changes in Appetite - No  Changes in Concentration - No  Changes in Energy - No  PMA/R- No  Suicidal- active/passive ideations - No  Homicidal ideations: active/passive ideations - No    Hallucinations - fluctuating  Delusions - Yes  Disorganized behavior - No  Disorganized speech - No  Negative symptoms - No    Elevated mood - No  Decreased need for sleep - No  Grandiosity - No  Racing thoughts - Yes  Impulsivity - No  Irritability- No  Increased energy - No  Distractibility - No  Increase in " goal-directed activity or PMA- No    Symptoms of ELSY - Yes  Symptoms of Panic Disorder- No  Symptoms of PTSD - No        Overall progress: Patient is showing no improvement on the Unit to date      Psychotherapy:  Target symptoms: depression, anxiety   Why chosen therapy is appropriate versus another modality: relevant to diagnosis, patient responds to this modality, evidence based practice  Outcome monitoring methods: self-report, observation  Therapeutic intervention type: insight oriented psychotherapy, behavior modifying psychotherapy, supportive psychotherapy, interactive psychotherapy  Topics discussed/themes: building skills sets for symptom management, symptom recognition  The patient's response to the intervention is accepting. The patient's progress toward treatment goals is limited.   Duration of intervention: 16 minutes.       Medical ROS  Review of Systems   Constitutional: Negative.    HENT:  Positive for congestion.    Eyes: Negative.    Respiratory:  Positive for cough.         Cough subjective   Cardiovascular: Negative.    Gastrointestinal: Negative.    Genitourinary: Negative.    Musculoskeletal: Negative.    Skin: Negative.    Neurological: Negative.    Endo/Heme/Allergies: Negative.    Psychiatric/Behavioral:          See HPI         PAST MEDICAL HISTORY   Past Medical History:   Diagnosis Date    Anxiety     Breast cancer 2010    Cancer     Breast    COPD (chronic obstructive pulmonary disease)     Depression     Diabetes mellitus     GERD (gastroesophageal reflux disease)     Hypertension     Insomnia     Thyroid disease            PSYCHOTROPIC MEDICATIONS   Scheduled Meds:   amLODIPine  10 mg Oral Daily    aspirin  81 mg Oral Daily    cetirizine  10 mg Oral Daily    gabapentin  500 mg Oral TID    levothyroxine  75 mcg Oral Before breakfast    paroxetine  60 mg Oral Daily    risperiDONE  4 mg Oral QHS     Continuous Infusions:  PRN Meds:.  Current Facility-Administered Medications:      acetaminophen, 650 mg, Oral, Q6H PRN    albuterol, 2 puff, Inhalation, Q4H PRN    aluminum-magnesium hydroxide-simethicone, 30 mL, Oral, Q6H PRN    benzonatate, 100 mg, Oral, TID PRN    benztropine mesylate, 2 mg, Intramuscular, Q8H PRN    hydrOXYzine pamoate, 50 mg, Oral, Q6H PRN    loperamide, 2 mg, Oral, PRN    melatonin, 6 mg, Oral, Nightly PRN    nicotine, 1 patch, Transdermal, Daily PRN    OLANZapine, 10 mg, Oral, Q8H PRN **AND** OLANZapine, 10 mg, Intramuscular, Q8H PRN    ondansetron, 4 mg, Oral, Q8H PRN    promethazine, 25 mg, Oral, Q6H PRN        EXAMINATION    VITALS   Vitals:    12/25/24 0737 12/25/24 1911 12/25/24 2022 12/26/24 0737   BP: 132/86 (!) 171/76  (!) 137/92   BP Location: Left arm Left arm  Left arm   Patient Position: Sitting Sitting  Sitting   Pulse: 104 82 72 98   Resp: 18 20 20 18   Temp: 97 °F (36.1 °C) 98.2 °F (36.8 °C)  97.5 °F (36.4 °C)   TempSrc: Oral Oral  Oral   SpO2: 95% 96%  96%   Weight:       Height:           Body mass index is 43.4 kg/m².        CONSTITUTIONAL  General Appearance: unremarkable, age appropriate    MUSCULOSKELETAL  Muscle Strength and Tone:no tremor, no tic  Abnormal Involuntary Movements: No  Gait and Station: non-ataxic    PSYCHIATRIC   Level of Consciousness: awake, alert , and oriented  Orientation: person, place, time, and situation  Grooming: Casually dressed and Disheveled  Psychomotor Behavior: normal, cooperative  Speech: normal tone, normal rate, normal pitch, normal volume  Language: grossly intact  Mood: anxious  Affect: Incongruent with mood  Thought Process: Illogical  Associations: intact   Thought Content: +delusions, denies SI, and denies HI  Perceptions: less VH, denies AH, and not RIS  Memory: Able to recall past events, Remote intact, and Recent intact  Attention:Attends to interview without distraction  Fund of Knowledge: Aware of current events and Vocabulary appropriate   Estimate if Intelligence:  Average based on work/education history,  vocabulary and mental status exam  Insight: limited awareness of illness  Judgment: limited        DIAGNOSTIC TESTING   Laboratory Results  Recent Results (from the past 24 hours)   POCT glucose    Collection Time: 12/25/24 10:52 AM   Result Value Ref Range    POCT Glucose 118 (H) 70 - 110 mg/dL   POCT glucose    Collection Time: 12/25/24  4:10 PM   Result Value Ref Range    POCT Glucose 143 (H) 70 - 110 mg/dL   POCT glucose    Collection Time: 12/25/24  7:55 PM   Result Value Ref Range    POCT Glucose 113 (H) 70 - 110 mg/dL   POCT glucose    Collection Time: 12/26/24  5:29 AM   Result Value Ref Range    POCT Glucose 111 (H) 70 - 110 mg/dL             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   Pain disorder     Complicated bereavement     BZD misuses      Psychosocial stressors     Nicotine Dependence      COPD  HTN  DM  Thyroid disease  GERD  Chronic back pain  Seasonal allergies        PROBLEM LIST AND MANAGEMENT PLANS     Depression with psychosis: pt counseled  R/o SAD  -resumed home Risperdal- increase from 3 to 4 mg po q HS- change to 2 mg po BID- will conitnue to optimize  -resumed/cpntinue home Paxil 60 mg po q day      Anxiety: pt counseled  -meds as above  -Paxil as above  -gabapentin as below     Insomnia: pt counseled  -vistaril prn      Pain: pt counseled  -start trial of home gabapentin- increase from 300 mg po TID to 400 mg po TID-Increase to 500 mg TID-Increase to 600 mg TID     Complicated bereavement: pt counseled      Nicotine Dependence: pt counseled  -started/conitnue nicotine 14 mg patch dermal      BZD misuses  -UDS negative today; pt denied recent in use  On early full remission  - pt counseled  - follow up with outpatient mental health providers after discharge for medication management and psychotherapy     Psychosocial stressors: pt counseled  -SW consulted to assist with resources      COPD: pt counseled  -Med consulted      HTN: pt counseled  -Med consulted     DM: pt counseled  -Med consulted     Thyroid disease: pt counseled  -Med consulted  -resumed Synthroid 75 mcg po q AM     GERD: pt counseled  -Med consulted     Chronic back pain: pt counseled  -Med consulted  -gabapentin as above    Allergies:  -Initiate cetirizine 10 mg daily    Discussed diagnosis, risks and benefits of proposed treatment vs alternative treatments vs no treatment, potential side effects of these treatments and the inherent unpredictability of treatment. The patient expresses understanding of the above and displays the capacity to agree with this treatment given said understanding. Patient also agrees that, currently, the benefits outweigh the risks and would like to pursue/continue treatment at this time.    Any medications being used off-label were discussed with the patient inclusive of the evidence base for the use of the medications and consent was obtained for the off-label use of the medication.       DISCHARGE PLANNING  Expected Disposition Plan:  to be determined      NEED FOR CONTINUED HOSPITALIZATION  Psychiatric illness continues to pose a potential threat to life or bodily function, of self or others, thereby requiring the need for continued inpatient psychiatric hospitalization: Yes, due to: hallucinations, as evidenced by:  Ongoing concerns with grave disability with patient unable to perform basic feeding, hygiene and dressing activities without significant constant support.    Protective inpatient pyschiatric hospitalization required while a safe disposition plan is enacted: Yes    Patient stabilized and ready for discharge from inpatient psychiatric unit: No      STAFF:   Philip Mckee MD  Psychiatry

## 2024-12-27 LAB — POCT GLUCOSE: 127 MG/DL (ref 70–110)

## 2024-12-27 PROCEDURE — 99232 SBSQ HOSP IP/OBS MODERATE 35: CPT | Mod: ,,, | Performed by: PSYCHIATRY & NEUROLOGY

## 2024-12-27 PROCEDURE — 25000003 PHARM REV CODE 250: Performed by: EMERGENCY MEDICINE

## 2024-12-27 PROCEDURE — 25000003 PHARM REV CODE 250: Performed by: PSYCHIATRY & NEUROLOGY

## 2024-12-27 PROCEDURE — 11400000 HC PSYCH PRIVATE ROOM

## 2024-12-27 PROCEDURE — 90833 PSYTX W PT W E/M 30 MIN: CPT | Mod: ,,, | Performed by: PSYCHIATRY & NEUROLOGY

## 2024-12-27 PROCEDURE — S4991 NICOTINE PATCH NONLEGEND: HCPCS | Performed by: PSYCHIATRY & NEUROLOGY

## 2024-12-27 RX ADMIN — LEVOTHYROXINE SODIUM 75 MCG: 75 TABLET ORAL at 06:12

## 2024-12-27 RX ADMIN — CETIRIZINE HYDROCHLORIDE 10 MG: 5 TABLET ORAL at 08:12

## 2024-12-27 RX ADMIN — RISPERIDONE 2 MG: 1 TABLET, FILM COATED ORAL at 08:12

## 2024-12-27 RX ADMIN — PAROXETINE HYDROCHLORIDE 60 MG: 20 TABLET, FILM COATED ORAL at 09:12

## 2024-12-27 RX ADMIN — GABAPENTIN 600 MG: 300 CAPSULE ORAL at 08:12

## 2024-12-27 RX ADMIN — HYDROXYZINE PAMOATE 50 MG: 50 CAPSULE ORAL at 08:12

## 2024-12-27 RX ADMIN — NICOTINE 1 PATCH: 14 PATCH, EXTENDED RELEASE TRANSDERMAL at 08:12

## 2024-12-27 RX ADMIN — ALUMINUM HYDROXIDE, MAGNESIUM HYDROXIDE, AND DIMETHICONE 30 ML: 200; 20; 200 SUSPENSION ORAL at 09:12

## 2024-12-27 RX ADMIN — ASPIRIN 81 MG: 81 TABLET, COATED ORAL at 08:12

## 2024-12-27 RX ADMIN — FLUTICASONE PROPIONATE 100 MCG: 50 SPRAY, METERED NASAL at 08:12

## 2024-12-27 RX ADMIN — ALBUTEROL SULFATE 2 PUFF: 90 AEROSOL, METERED RESPIRATORY (INHALATION) at 08:12

## 2024-12-27 RX ADMIN — GABAPENTIN 600 MG: 300 CAPSULE ORAL at 03:12

## 2024-12-27 RX ADMIN — AMLODIPINE BESYLATE 10 MG: 10 TABLET ORAL at 08:12

## 2024-12-27 NOTE — PLAN OF CARE
Patient compliant with medication.  Patient still talks about trying to get in the nursing home in [place] and is fighting her denial.  Out of room socializing with peers.  Denies ideations.  No concerns.

## 2024-12-27 NOTE — NURSING
Patient c/o heartburn, received prn medication as ordered, see emar. No adverse reaction noted. Patient currently in activity room watching tv, conversing with peers, no distress noted. Voices some relief from medication.

## 2024-12-27 NOTE — NURSING
Plan of care reviewed. Pt has been in the common areas all day. Pt interacting with peers and staff without difficulty.  Pt continues to talk about her neighbors and how she thinks they are trying to mess with  the tie downs on her trailer.   states she is still waiting to hear about nursing home placement.  Pt denies si, hi or a v hallucinations.  Pt gravely disabled.  Pt taking meds without side effects noted.  Appetite adequate.  Pt instructed to call for any needs or concerns at all.  Verbalized understanding.  Will cont to monitor for safety.  Patient care ongoing.

## 2024-12-27 NOTE — PROGRESS NOTES
"PSYCHIATRY DAILY INPATIENT PROGRESS NOTE  SUBSEQUENT HOSPITAL VISIT    ENCOUNTER DATE: 12/27/2024  SITE: [facility]    DATE OF ADMISSION: 12/23/2024  9:00 AM  LENGTH OF STAY: 4 days      CHIEF COMPLAINT   Vero Allen is a 64 y.o. female, seen during daily lombardo rounds on the inpatient unit.  Vero Allen presented with the chief complaint of psychosis(paranoia)/anxiety, "I feel like I'm going to have a nervous breakdown."       The patient was seen and examined. The chart was reviewed.     Reviewed notes from Rns, MD, and LPN and labs from the last 24 hours.    The patient's case was discussed with the treatment team/care providers today including Rns, CTRS, NP, and SW    Staff reports no behavioral or management issues.     The patient has been compliant with treatment.      Subjective 12/27/2024    Patient reports mood is "better", sleep improved. She continues to express delusional thoughts regarding neighbors "messing with my trailer tie-downs." Pt reports that if unable to transfer to nursing home she would like to stay with her brother. This plan is not yet confirmed. She denies suicidal ideation, denies homicidal ideation. Denies active auditory/visual hallucinations at time of assessment. Endorses improving appetite. Patient observed laughing and smiling in milieu, appropriately interacting with other patients.      [place] of nursing home placement remains ongoing and is unclear at this time.       The patient denies any side effects to medications.            Psychiatric ROS (observed, reported, or endorsed/denied):  Depressed mood - less  Interest/pleasure/anhedonia: less  Guilt/hopelessness/worthlessness - No  Changes in Sleep - less  Changes in Appetite - No  Changes in Concentration - No  Changes in Energy - No  PMA/R- No  Suicidal- active/passive ideations - No  Homicidal ideations: active/passive ideations - No    Hallucinations - less  Delusions - Yes  Disorganized " behavior - No  Disorganized speech - No  Negative symptoms - No    Elevated mood - No  Decreased need for sleep - No  Grandiosity - No  Racing thoughts - Yes  Impulsivity - No  Irritability- No  Increased energy - No  Distractibility - No  Increase in goal-directed activity or PMA- No    Symptoms of ELSY - Yes  Symptoms of Panic Disorder- No  Symptoms of PTSD - No        Overall progress: Patient is showing mild improvement       Psychotherapy:  Target symptoms: depression, anxiety   Why chosen therapy is appropriate versus another modality: relevant to diagnosis, patient responds to this modality, evidence based practice  Outcome monitoring methods: self-report, observation  Therapeutic intervention type: insight oriented psychotherapy, behavior modifying psychotherapy, supportive psychotherapy, interactive psychotherapy  Topics discussed/themes: building skills sets for symptom management, symptom recognition  The patient's response to the intervention is accepting. The patient's progress toward treatment goals is limited.   Duration of intervention: 16 minutes.       Medical ROS  Review of Systems   Constitutional: Negative.    HENT:  Positive for congestion.    Eyes: Negative.    Respiratory:  Positive for cough.         Cough subjective   Cardiovascular: Negative.    Gastrointestinal: Negative.    Genitourinary: Negative.    Musculoskeletal: Negative.    Skin: Negative.    Neurological: Negative.    Endo/Heme/Allergies: Negative.    Psychiatric/Behavioral:          See HPI         PAST MEDICAL HISTORY   Past Medical History:   Diagnosis Date    Anxiety     Breast cancer 2010    Cancer     Breast    COPD (chronic obstructive pulmonary disease)     Depression     Diabetes mellitus     GERD (gastroesophageal reflux disease)     Hypertension     Insomnia     Thyroid disease            PSYCHOTROPIC MEDICATIONS   Scheduled Meds:   amLODIPine  10 mg Oral Daily    aspirin  81 mg Oral Daily    cetirizine  10 mg Oral  "Daily    fluticasone propionate  2 spray Each Nostril Daily    gabapentin  600 mg Oral TID    levothyroxine  75 mcg Oral Before breakfast    paroxetine  60 mg Oral Daily    risperiDONE  2 mg Oral BID     Continuous Infusions:  PRN Meds:.  Current Facility-Administered Medications:     acetaminophen, 650 mg, Oral, Q6H PRN    albuterol, 2 puff, Inhalation, Q4H PRN    aluminum-magnesium hydroxide-simethicone, 30 mL, Oral, Q6H PRN    benzonatate, 100 mg, Oral, TID PRN    benztropine mesylate, 2 mg, Intramuscular, Q8H PRN    hydrOXYzine pamoate, 50 mg, Oral, Q6H PRN    loperamide, 2 mg, Oral, PRN    melatonin, 6 mg, Oral, Nightly PRN    nicotine, 1 patch, Transdermal, Daily PRN    OLANZapine, 10 mg, Oral, Q8H PRN **AND** OLANZapine, 10 mg, Intramuscular, Q8H PRN    ondansetron, 4 mg, Oral, Q8H PRN    promethazine, 25 mg, Oral, Q6H PRN        EXAMINATION    VITALS   Vitals:    12/26/24 0737 12/26/24 1914 12/27/24 0731 12/27/24 0818   BP: (!) 137/92 (!) 170/80 134/78    BP Location: Left arm Left arm Left arm    Patient Position: Sitting Sitting Sitting    Pulse: 98 85 69 70   Resp: 18 20 20 20   Temp: 97.5 °F (36.4 °C) 97.1 °F (36.2 °C) 98.1 °F (36.7 °C)    TempSrc: Oral Oral Oral    SpO2: 96% 96% 95%    Weight:       Height:           Body mass index is 43.4 kg/m².        CONSTITUTIONAL  General Appearance: unremarkable, age appropriate    MUSCULOSKELETAL  Muscle Strength and Tone:no tremor, no tic  Abnormal Involuntary Movements: No  Gait and Station: non-ataxic    PSYCHIATRIC   Level of Consciousness: awake, alert , and oriented  Orientation: person, place, time, and situation  Grooming: Casually dressed and Disheveled  Psychomotor Behavior: normal, cooperative  Speech: normal tone, normal rate, normal pitch, normal volume  Language: grossly intact  Mood: "Better"  Affect: Appropriate, Consistent with mood, and Incongruent with mood  Thought Process: More logical  Associations: intact   Thought Content: +delusions, " denies SI, and denies HI  Perceptions: less VH, denies AH, and not RIS  Memory: Able to recall past events, Remote intact, and Recent intact  Attention:Attends to interview without distraction  Fund of Knowledge: Aware of current events and Vocabulary appropriate   Estimate if Intelligence:  Average based on work/education history, vocabulary and mental status exam  Insight: limited awareness of illness  Judgment: limited        DIAGNOSTIC TESTING   Laboratory Results  No results found for this or any previous visit (from the past 24 hours).            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   Pain disorder     Complicated bereavement     BZD misuses      Psychosocial stressors     Nicotine Dependence      COPD  HTN  DM  Thyroid disease  GERD  Chronic back pain  Seasonal allergies        PROBLEM LIST AND MANAGEMENT PLANS     Depression with psychosis: pt counseled  R/o SAD  -resumed home Risperdal- increase from 3 to 4 mg po q HS- change to 2 mg po BID- will conitnue to optimize  -resumed/cpntinue home Paxil 60 mg po q day      Anxiety: pt counseled  -meds as above  -Paxil as above  -gabapentin as below     Insomnia: pt counseled  -vistaril prn      Pain: pt counseled  -start trial of home gabapentin- increase from 300 mg po TID to 400 mg po TID-Increase to 500 mg TID-Increase to 600 mg TID     Complicated bereavement: pt counseled      Nicotine Dependence: pt counseled  -started/conitnue nicotine 14 mg patch dermal      BZD misuses  -UDS negative today; pt denied recent in use  On early full remission  - pt counseled  - follow up with outpatient mental health providers after discharge for medication management and psychotherapy     Psychosocial stressors: pt counseled  -SW consulted to assist with resources     COPD: pt counseled  -Med consulted      HTN: pt counseled  -Med consulted     DM: pt counseled  -Med  consulted     Thyroid disease: pt counseled  -Med consulted  -resumed Synthroid 75 mcg po q AM     GERD: pt counseled  -Med consulted     Chronic back pain: pt counseled  -Med consulted  -gabapentin as above    Allergies:  -Initiate cetirizine 10 mg daily    Discussed diagnosis, risks and benefits of proposed treatment vs alternative treatments vs no treatment, potential side effects of these treatments and the inherent unpredictability of treatment. The patient expresses understanding of the above and displays the capacity to agree with this treatment given said understanding. Patient also agrees that, currently, the benefits outweigh the risks and would like to pursue/continue treatment at this time.    Any medications being used off-label were discussed with the patient inclusive of the evidence base for the use of the medications and consent was obtained for the off-label use of the medication.       DISCHARGE PLANNING  Expected Disposition Plan:  to be determined      NEED FOR CONTINUED HOSPITALIZATION  Psychiatric illness continues to pose a potential threat to life or bodily function, of self or others, thereby requiring the need for continued inpatient psychiatric hospitalization: Yes, due to: hallucinations, as evidenced by:  Ongoing concerns with grave disability with patient unable to perform basic feeding, hygiene and dressing activities without significant constant support.    Protective inpatient pyschiatric hospitalization required while a safe disposition plan is enacted: Yes    Patient stabilized and ready for discharge from inpatient psychiatric unit: No      STAFF:   Enrique Luz NP  Psychiatry

## 2024-12-27 NOTE — PLAN OF CARE
Problem: Adult Behavioral Health Plan of Care  Goal: Plan of Care Review  Outcome: Progressing     Problem: Anxiety Signs/Symptoms  Goal: Improved Somatic Symptoms (Anxiety Signs/Symptoms)  Outcome: Progressing     Problem: Psychotic Signs/Symptoms  Goal: Improved Mood Symptoms (Psychotic Signs/Symptoms)  Outcome: Progressing

## 2024-12-27 NOTE — PLAN OF CARE
Problem: Adult Behavioral Health Plan of Care  Goal: Optimized Coping Skills in Response to Life Stressors  Intervention: Promote Effective Coping Strategies  Flowsheets (Taken 12/27/2024 4883)  Supportive Measures:   active listening utilized   self-reflection promoted   problem-solving facilitated   goal-setting facilitated   verbalization of feelings encouraged       Behavior:  Pt was engaged and oriented during group. Pt stated she was sleepy and tired.           Intervention:    In this CBT-focused group, SW discussed the importance goals and values. Each patient was asked to identify 3 goals based on values such as (what they're doing well, where improvement is needed, and goals).          Response:  Pt identified goals in relation to body as where improvement is needed (eat the right foods) , and goals (lose weight/get down to 200 pounds). Pt identified goals in relation to friends as where improvement is needed (invite friends over for snacks and wine) , and goals (get to know more friends, make new friends).         Plan: Continue to encourage pt to participate in process groups to verbalize feelings and develop healthy coping skills.

## 2024-12-28 PROCEDURE — 25000003 PHARM REV CODE 250: Performed by: PSYCHIATRY & NEUROLOGY

## 2024-12-28 PROCEDURE — 99232 SBSQ HOSP IP/OBS MODERATE 35: CPT | Mod: ,,, | Performed by: STUDENT IN AN ORGANIZED HEALTH CARE EDUCATION/TRAINING PROGRAM

## 2024-12-28 PROCEDURE — 11400000 HC PSYCH PRIVATE ROOM

## 2024-12-28 PROCEDURE — S4991 NICOTINE PATCH NONLEGEND: HCPCS | Performed by: PSYCHIATRY & NEUROLOGY

## 2024-12-28 PROCEDURE — 25000003 PHARM REV CODE 250: Performed by: EMERGENCY MEDICINE

## 2024-12-28 RX ADMIN — ALUMINUM HYDROXIDE, MAGNESIUM HYDROXIDE, AND DIMETHICONE 30 ML: 200; 20; 200 SUSPENSION ORAL at 09:12

## 2024-12-28 RX ADMIN — GABAPENTIN 600 MG: 300 CAPSULE ORAL at 08:12

## 2024-12-28 RX ADMIN — ALBUTEROL SULFATE 2 PUFF: 90 AEROSOL, METERED RESPIRATORY (INHALATION) at 08:12

## 2024-12-28 RX ADMIN — FLUTICASONE PROPIONATE 100 MCG: 50 SPRAY, METERED NASAL at 08:12

## 2024-12-28 RX ADMIN — AMLODIPINE BESYLATE 10 MG: 10 TABLET ORAL at 08:12

## 2024-12-28 RX ADMIN — RISPERIDONE 2 MG: 1 TABLET, FILM COATED ORAL at 08:12

## 2024-12-28 RX ADMIN — LEVOTHYROXINE SODIUM 75 MCG: 75 TABLET ORAL at 05:12

## 2024-12-28 RX ADMIN — NICOTINE 1 PATCH: 14 PATCH, EXTENDED RELEASE TRANSDERMAL at 08:12

## 2024-12-28 RX ADMIN — HYDROXYZINE PAMOATE 50 MG: 50 CAPSULE ORAL at 08:12

## 2024-12-28 RX ADMIN — ASPIRIN 81 MG: 81 TABLET, COATED ORAL at 08:12

## 2024-12-28 RX ADMIN — CETIRIZINE HYDROCHLORIDE 10 MG: 5 TABLET ORAL at 08:12

## 2024-12-28 RX ADMIN — PAROXETINE HYDROCHLORIDE 60 MG: 20 TABLET, FILM COATED ORAL at 09:12

## 2024-12-28 RX ADMIN — GABAPENTIN 600 MG: 300 CAPSULE ORAL at 02:12

## 2024-12-28 NOTE — PLAN OF CARE
Problem: Adult Behavioral Health Plan of Care  Goal: Plan of Care Review  Outcome: Progressing     Problem: Adult Behavioral Health Plan of Care  Goal: Optimized Coping Skills in Response to Life Stressors  Outcome: Progressing     Problem: Anxiety Signs/Symptoms  Goal: Optimized Energy Level (Anxiety Signs/Symptoms)  Outcome: Progressing     Problem: Anxiety Signs/Symptoms  Goal: Improved Mood Symptoms (Anxiety Signs/Symptoms)  Outcome: Progressing     Problem: Anxiety Signs/Symptoms  Goal: Improved Sleep (Anxiety Signs/Symptoms)  Outcome: Progressing     Problem: Anxiety Signs/Symptoms  Goal: Enhanced Social, Occupational or Functional Skills (Anxiety Signs/Symptoms)  Outcome: Progressing

## 2024-12-28 NOTE — NURSING
"Patient out in common area sitting with peers at the beginning of the shift. Patient took a shower this evening. Patient fixated on discharge mentioning in conversation to selected peer " I am ready to go home". Patient endorses depression and anxiety improved on current medication and is feeling better, denies S/I, denies A/VH. No delusional statements made this evening. Patient accepted HS snack and compliant with HS medication. Patient requested PRN med for insomnia and Flonase nasal spray. Patient informed she already had her Flonase this morning and its daily, then she requested her PRN inhaler however showing no symptoms of SOB or coughing,or wheezing noted. PRN medication administered as ordered (see emar) and noted effective with no further complaints .Blood sugars continue to be monitored HS was 127. Patient went to bed after snacks. Close observations continued and safe environment maintained.  "

## 2024-12-28 NOTE — PROGRESS NOTES
"PSYCHIATRY DAILY INPATIENT PROGRESS NOTE  SUBSEQUENT HOSPITAL VISIT    ENCOUNTER DATE: 12/28/2024  SITE: [facility]    DATE OF ADMISSION: 12/23/2024  9:00 AM  LENGTH OF STAY: 5 days      CHIEF COMPLAINT   Vero Allen is a 64 y.o. female, seen during daily lombardo rounds on the inpatient unit.  Vero Allen presented with the chief complaint of psychosis(paranoia)/anxiety, "I feel like I'm going to have a nervous breakdown."       The patient was seen and examined. The chart was reviewed.     Reviewed notes from Rns and labs from the last 24 hours.    The patient's case was discussed with the treatment team/care providers today including Rns    Staff reports no behavioral or management issues.     The patient has been compliant with treatment.      Subjective 12/28/2024    Pt states she is feeling better, feels her medications are effective.     Her disposition is uncertain, pt would like to be placed in nursing however likely will not qualify per discharge planner.    Delusions are continuing.    The patient denies any side effects to medications.    Per RN:  Pt interacting with peers and staff without difficulty. Pt continues to talk about her neighbors and how she thinks they are trying to mess with the tie downs on her trailer.           Psychiatric ROS (observed, reported, or endorsed/denied):  Depressed mood - less  Interest/pleasure/anhedonia: less  Guilt/hopelessness/worthlessness - No  Changes in Sleep - less  Changes in Appetite - No  Changes in Concentration - No  Changes in Energy - No  PMA/R- No  Suicidal- active/passive ideations - No  Homicidal ideations: active/passive ideations - No    Hallucinations - less  Delusions - Yes  Disorganized behavior - No  Disorganized speech - No  Negative symptoms - No    Elevated mood - No  Decreased need for sleep - No  Grandiosity - No  Racing thoughts - Yes  Impulsivity - No  Irritability- No  Increased energy - No  Distractibility - " No  Increase in goal-directed activity or PMA- No    Symptoms of ELSY - Yes  Symptoms of Panic Disorder- No  Symptoms of PTSD - No        Overall progress: Patient is showing mild improvement           Medical ROS  Review of Systems   Constitutional: Negative.    HENT:  Positive for congestion.    Eyes: Negative.    Respiratory:  Positive for cough.         Cough subjective   Cardiovascular: Negative.    Gastrointestinal: Negative.    Genitourinary: Negative.    Musculoskeletal: Negative.    Skin: Negative.    Neurological: Negative.    Endo/Heme/Allergies: Negative.    Psychiatric/Behavioral:          See HPI         PAST MEDICAL HISTORY   Past Medical History:   Diagnosis Date    Anxiety     Breast cancer 2010    Cancer     Breast    COPD (chronic obstructive pulmonary disease)     Depression     Diabetes mellitus     GERD (gastroesophageal reflux disease)     Hypertension     Insomnia     Thyroid disease            PSYCHOTROPIC MEDICATIONS   Scheduled Meds:   amLODIPine  10 mg Oral Daily    aspirin  81 mg Oral Daily    cetirizine  10 mg Oral Daily    fluticasone propionate  2 spray Each Nostril Daily    gabapentin  600 mg Oral TID    levothyroxine  75 mcg Oral Before breakfast    paroxetine  60 mg Oral Daily    risperiDONE  2 mg Oral BID     Continuous Infusions:  PRN Meds:.  Current Facility-Administered Medications:     acetaminophen, 650 mg, Oral, Q6H PRN    albuterol, 2 puff, Inhalation, Q4H PRN    aluminum-magnesium hydroxide-simethicone, 30 mL, Oral, Q6H PRN    benzonatate, 100 mg, Oral, TID PRN    benztropine mesylate, 2 mg, Intramuscular, Q8H PRN    hydrOXYzine pamoate, 50 mg, Oral, Q6H PRN    loperamide, 2 mg, Oral, PRN    melatonin, 6 mg, Oral, Nightly PRN    nicotine, 1 patch, Transdermal, Daily PRN    OLANZapine, 10 mg, Oral, Q8H PRN **AND** OLANZapine, 10 mg, Intramuscular, Q8H PRN    ondansetron, 4 mg, Oral, Q8H PRN    promethazine, 25 mg, Oral, Q6H PRN        EXAMINATION    VITALS   Vitals:     "12/27/24 0818 12/27/24 1904 12/27/24 2037 12/28/24 0753   BP:  (!) 151/69  (!) 154/70   BP Location:  Left arm  Left arm   Patient Position:  Sitting  Sitting   Pulse: 70 80 82 86   Resp: 20 16 20 20   Temp:  98.2 °F (36.8 °C)  98 °F (36.7 °C)   TempSrc:  Oral  Oral   SpO2:  96%  96%   Weight:       Height:           Body mass index is 43.4 kg/m².        CONSTITUTIONAL  General Appearance: unremarkable, age appropriate    MUSCULOSKELETAL  Muscle Strength and Tone:no tremor, no tic  Abnormal Involuntary Movements: No  Gait and Station: non-ataxic    PSYCHIATRIC   Level of Consciousness: awake, alert , and oriented  Orientation: person, place, time, and situation  Grooming: Casually dressed and Disheveled  Psychomotor Behavior: normal, cooperative  Speech: normal tone, normal rate, normal pitch, normal volume  Language: grossly intact  Mood: "okay"  Affect: Appropriate, Consistent with mood, and Incongruent with mood  Thought Process: More logical  Associations: intact   Thought Content: +delusions, denies SI, and denies HI  Perceptions: less VH, denies AH, and not RIS  Memory: Able to recall past events, Remote intact, and Recent intact  Attention:Attends to interview without distraction  Fund of Knowledge: Aware of current events and Vocabulary appropriate   Estimate if Intelligence:  Average based on work/education history, vocabulary and mental status exam  Insight: has awareness of illness  Judgment: behavior is adequate to circumstances        DIAGNOSTIC TESTING   Laboratory Results  Recent Results (from the past 24 hours)   POCT glucose    Collection Time: 12/27/24  9:03 PM   Result Value Ref Range    POCT Glucose 127 (H) 70 - 110 mg/dL               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   Pain disorder     Complicated bereavement     BZD misuses      Psychosocial stressors     Nicotine Dependence    "   COPD  HTN  DM  Thyroid disease  GERD  Chronic back pain  Seasonal allergies        PROBLEM LIST AND MANAGEMENT PLANS         Depression with psychosis: pt counseled  R/o SAD  -resumed home Risperdal- increase from 3 to 4 mg po q HS- change to 2 mg po BID- will conitnue to optimize  -resumed/continue home Paxil 60 mg po q day      Anxiety: pt counseled  -meds as above  -Paxil as above  -gabapentin as below     Insomnia: pt counseled  -vistaril prn      Pain: pt counseled  -start trial of home gabapentin- increase from 300 mg po TID to 400 mg po TID-Increase to 500 mg TID-Increase to 600 mg TID     Complicated bereavement: pt counseled      Nicotine Dependence: pt counseled  -started/conitnue nicotine 14 mg patch dermal      BZD misuses  -UDS negative today; pt denied recent in use  On early full remission  - pt counseled  - follow up with outpatient mental health providers after discharge for medication management and psychotherapy     Psychosocial stressors: pt counseled  -SW consulted to assist with resources     COPD: pt counseled  -Med consulted      HTN: pt counseled  -Med consulted     DM: pt counseled  -Med consulted     Thyroid disease: pt counseled  -Med consulted  -resumed Synthroid 75 mcg po q AM     GERD: pt counseled  -Med consulted     Chronic back pain: pt counseled  -Med consulted  -gabapentin as above    Allergies:  -Initiate cetirizine 10 mg daily          Discussed diagnosis, risks and benefits of proposed treatment vs alternative treatments vs no treatment, potential side effects of these treatments and the inherent unpredictability of treatment. The patient expresses understanding of the above and displays the capacity to agree with this treatment given said understanding. Patient also agrees that, currently, the benefits outweigh the risks and would like to pursue/continue treatment at this time.    Any medications being used off-label were discussed with the patient inclusive of the evidence  base for the use of the medications and consent was obtained for the off-label use of the medication.       DISCHARGE PLANNING  Expected Disposition Plan:  to be determined      NEED FOR CONTINUED HOSPITALIZATION  Psychiatric illness continues to pose a potential threat to life or bodily function, of self or others, thereby requiring the need for continued inpatient psychiatric hospitalization: Yes, due to: hallucinations, as evidenced by:  Ongoing concerns with grave disability with patient unable to perform basic feeding, hygiene and dressing activities without significant constant support.    Protective inpatient pyschiatric hospitalization required while a safe disposition plan is enacted: Yes    Patient stabilized and ready for discharge from inpatient psychiatric unit: No        The patient location is: [facility]    Visit type: audiovisual    Face to Face time with patient: 5  10 minutes of total time spent on the encounter, which includes face to face time and non-face to face time preparing to see the patient (eg, review of tests), Obtaining and/or reviewing separately obtained history, Documenting clinical information in the electronic or other health record, Independently interpreting results (not separately reported) and communicating results to the patient/family/caregiver, or Care coordination (not separately reported).     Each patient to whom he or she provides medical services by telemedicine is:  (1) informed of the relationship between the physician and patient and the respective role of any other health care provider with respect to management of the patient; and (2) notified that he or she may decline to receive medical services by telemedicine and may withdraw from such care at any time.        STAFF:   Everett Joseph III, MD  Psychiatry

## 2024-12-28 NOTE — PLAN OF CARE
Problem: Adult Behavioral Health Plan of Care  Goal: Plan of Care Review  Outcome: Progressing     Problem: Adult Behavioral Health Plan of Care  Goal: Patient-Specific Goal (Individualization)  Outcome: Progressing     Problem: Adult Behavioral Health Plan of Care  Goal: Adheres to Safety Considerations for Self and Others  Outcome: Progressing     Problem: Adult Behavioral Health Plan of Care  Goal: Optimized Coping Skills in Response to Life Stressors  Outcome: Progressing     Problem: Sepsis/Septic Shock  Goal: Blood Glucose Level Within Targeted Range  Outcome: Progressing     Problem: Anxiety Signs/Symptoms  Goal: Optimized Energy Level (Anxiety Signs/Symptoms)  Outcome: Progressing     Problem: Anxiety Signs/Symptoms  Goal: Improved Mood Symptoms (Anxiety Signs/Symptoms)  Outcome: Progressing     Problem: Psychotic Signs/Symptoms  Goal: Improved Behavioral Control (Psychotic Signs/Symptoms)  Outcome: Progressing     Problem: Depressive Signs/Symptoms  Goal: Optimized Energy Level (Depressive Signs/Symptoms)  Outcome: Progressing     Problem: Depressive Signs/Symptoms  Goal: Improved Mood Symptoms (Depressive Signs/Symptoms)  Outcome: Progressing

## 2024-12-29 PROCEDURE — S4991 NICOTINE PATCH NONLEGEND: HCPCS | Performed by: PSYCHIATRY & NEUROLOGY

## 2024-12-29 PROCEDURE — 25000003 PHARM REV CODE 250: Performed by: PSYCHIATRY & NEUROLOGY

## 2024-12-29 PROCEDURE — 99232 SBSQ HOSP IP/OBS MODERATE 35: CPT | Mod: ,,, | Performed by: STUDENT IN AN ORGANIZED HEALTH CARE EDUCATION/TRAINING PROGRAM

## 2024-12-29 PROCEDURE — 11400000 HC PSYCH PRIVATE ROOM

## 2024-12-29 PROCEDURE — 25000003 PHARM REV CODE 250: Performed by: EMERGENCY MEDICINE

## 2024-12-29 RX ADMIN — RISPERIDONE 2 MG: 1 TABLET, FILM COATED ORAL at 08:12

## 2024-12-29 RX ADMIN — GABAPENTIN 600 MG: 300 CAPSULE ORAL at 08:12

## 2024-12-29 RX ADMIN — HYDROXYZINE PAMOATE 50 MG: 50 CAPSULE ORAL at 08:12

## 2024-12-29 RX ADMIN — ALBUTEROL SULFATE 2 PUFF: 90 AEROSOL, METERED RESPIRATORY (INHALATION) at 08:12

## 2024-12-29 RX ADMIN — AMLODIPINE BESYLATE 10 MG: 10 TABLET ORAL at 08:12

## 2024-12-29 RX ADMIN — ACETAMINOPHEN 650 MG: 325 TABLET ORAL at 03:12

## 2024-12-29 RX ADMIN — ASPIRIN 81 MG: 81 TABLET, COATED ORAL at 08:12

## 2024-12-29 RX ADMIN — LEVOTHYROXINE SODIUM 75 MCG: 75 TABLET ORAL at 05:12

## 2024-12-29 RX ADMIN — Medication 6 MG: at 08:12

## 2024-12-29 RX ADMIN — NICOTINE 1 PATCH: 14 PATCH, EXTENDED RELEASE TRANSDERMAL at 08:12

## 2024-12-29 RX ADMIN — CETIRIZINE HYDROCHLORIDE 10 MG: 5 TABLET ORAL at 08:12

## 2024-12-29 RX ADMIN — GABAPENTIN 600 MG: 300 CAPSULE ORAL at 02:12

## 2024-12-29 RX ADMIN — FLUTICASONE PROPIONATE 100 MCG: 50 SPRAY, METERED NASAL at 08:12

## 2024-12-29 RX ADMIN — PAROXETINE HYDROCHLORIDE 60 MG: 20 TABLET, FILM COATED ORAL at 08:12

## 2024-12-29 NOTE — PROGRESS NOTES
"PSYCHIATRY DAILY INPATIENT PROGRESS NOTE  SUBSEQUENT HOSPITAL VISIT    ENCOUNTER DATE: 12/29/2024  SITE: [facility]    DATE OF ADMISSION: 12/23/2024  9:00 AM  LENGTH OF STAY: 6 days      CHIEF COMPLAINT   Vero Allen is a 64 y.o. female, seen during daily lombardo rounds on the inpatient unit.  Vero Allen presented with the chief complaint of psychosis(paranoia)/anxiety, "I feel like I'm going to have a nervous breakdown."       The patient was seen and examined. The chart was reviewed.     Reviewed notes from Rns and labs from the last 24 hours.    The patient's case was discussed with the treatment team/care providers today including Rns    Staff reports no behavioral or management issues.     The patient has been compliant with treatment.      Subjective 12/29/2024    Pt states "I am going to my brother's house for a few days, we are going to cook a ham."    Planning for discharge tomorrow.    The patient denies any side effects to medications.    Per RN:  Pt is noted interacting with peers at shift change. Pt is med compliant, still seeking meds. Pt requested something to sleep then returned to nurses station asking for something for indigestion. PRN given although pt stated it does not help her. Pt returned to room. Pt is noted to be sleeping at this time.         Psychiatric ROS (observed, reported, or endorsed/denied):  Depressed mood - less  Interest/pleasure/anhedonia: less  Guilt/hopelessness/worthlessness - No  Changes in Sleep - less  Changes in Appetite - No  Changes in Concentration - No  Changes in Energy - No  PMA/R- No  Suicidal- active/passive ideations - No  Homicidal ideations: active/passive ideations - No    Hallucinations - less  Delusions - Yes  Disorganized behavior - No  Disorganized speech - No  Negative symptoms - No    Elevated mood - No  Decreased need for sleep - No  Grandiosity - No  Racing thoughts - Yes  Impulsivity - No  Irritability- No  Increased " energy - No  Distractibility - No  Increase in goal-directed activity or PMA- No    Symptoms of ELSY - Yes  Symptoms of Panic Disorder- No  Symptoms of PTSD - No        Overall progress: Patient is showing mild improvement           Medical ROS  Review of Systems   Constitutional: Negative.    HENT:  Positive for congestion.    Eyes: Negative.    Respiratory:  Positive for cough.         Cough subjective   Cardiovascular: Negative.    Gastrointestinal: Negative.    Genitourinary: Negative.    Musculoskeletal: Negative.    Skin: Negative.    Neurological: Negative.    Endo/Heme/Allergies: Negative.    Psychiatric/Behavioral:          See HPI         PAST MEDICAL HISTORY   Past Medical History:   Diagnosis Date    Anxiety     Breast cancer 2010    Cancer     Breast    COPD (chronic obstructive pulmonary disease)     Depression     Diabetes mellitus     GERD (gastroesophageal reflux disease)     Hypertension     Insomnia     Thyroid disease            PSYCHOTROPIC MEDICATIONS   Scheduled Meds:   amLODIPine  10 mg Oral Daily    aspirin  81 mg Oral Daily    cetirizine  10 mg Oral Daily    fluticasone propionate  2 spray Each Nostril Daily    gabapentin  600 mg Oral TID    levothyroxine  75 mcg Oral Before breakfast    paroxetine  60 mg Oral Daily    risperiDONE  2 mg Oral BID     Continuous Infusions:  PRN Meds:.  Current Facility-Administered Medications:     acetaminophen, 650 mg, Oral, Q6H PRN    albuterol, 2 puff, Inhalation, Q4H PRN    aluminum-magnesium hydroxide-simethicone, 30 mL, Oral, Q6H PRN    benzonatate, 100 mg, Oral, TID PRN    benztropine mesylate, 2 mg, Intramuscular, Q8H PRN    hydrOXYzine pamoate, 50 mg, Oral, Q6H PRN    loperamide, 2 mg, Oral, PRN    melatonin, 6 mg, Oral, Nightly PRN    nicotine, 1 patch, Transdermal, Daily PRN    OLANZapine, 10 mg, Oral, Q8H PRN **AND** OLANZapine, 10 mg, Intramuscular, Q8H PRN    ondansetron, 4 mg, Oral, Q8H PRN    promethazine, 25 mg, Oral, Q6H  "PRN        EXAMINATION    VITALS   Vitals:    12/28/24 0753 12/28/24 1913 12/29/24 0707 12/29/24 0837   BP: (!) 154/70 128/66 (!) 150/66    BP Location: Left arm Left arm Left arm    Patient Position: Sitting Sitting Sitting    Pulse: 86 76 74 75   Resp: 20 20 20 20   Temp: 98 °F (36.7 °C) 98.1 °F (36.7 °C) 98.2 °F (36.8 °C)    TempSrc: Oral Oral Oral    SpO2: 96% 96% 99%    Weight:       Height:           Body mass index is 43.4 kg/m².        CONSTITUTIONAL  General Appearance: unremarkable, age appropriate    MUSCULOSKELETAL  Muscle Strength and Tone:no tremor, no tic  Abnormal Involuntary Movements: No  Gait and Station: non-ataxic    PSYCHIATRIC   Level of Consciousness: awake, alert , and oriented  Orientation: person, place, time, and situation  Grooming: Casually dressed and Disheveled  Psychomotor Behavior: normal, cooperative  Speech: normal tone, normal rate, normal pitch, normal volume  Language: grossly intact  Mood: "good"  Affect: Appropriate, Consistent with mood, and Incongruent with mood  Thought Process: More logical  Associations: intact   Thought Content: denies SI, denies HI, and no delusions  Perceptions: denies AH, denies  VH, and not RIS  Memory: Able to recall past events, Remote intact, and Recent intact  Attention:Attends to interview without distraction  Fund of Knowledge: Aware of current events and Vocabulary appropriate   Estimate if Intelligence:  Average based on work/education history, vocabulary and mental status exam  Insight: has awareness of illness  Judgment: behavior is adequate to circumstances        DIAGNOSTIC TESTING   Laboratory Results  No results found for this or any previous visit (from the past 24 hours).              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   Pain disorder     Complicated bereavement     BZD misuses      Psychosocial stressors     Nicotine " Dependence      COPD  HTN  DM  Thyroid disease  GERD  Chronic back pain  Seasonal allergies        PROBLEM LIST AND MANAGEMENT PLANS         Depression with psychosis: pt counseled  R/o SAD  -resumed home Risperdal- increase from 3 to 4 mg po q HS- change to 2 mg po BID- will conitnue to optimize  -resumed/continue home Paxil 60 mg po q day      Anxiety: pt counseled  -meds as above  -Paxil as above  -gabapentin as below     Insomnia: pt counseled  -vistaril prn      Pain: pt counseled  -start trial of home gabapentin- increase from 300 mg po TID to 400 mg po TID-Increase to 500 mg TID-Increase to 600 mg TID     Complicated bereavement: pt counseled      Nicotine Dependence: pt counseled  -started/conitnue nicotine 14 mg patch dermal      BZD misuses  -UDS negative today; pt denied recent in use  On early full remission  - pt counseled  - follow up with outpatient mental health providers after discharge for medication management and psychotherapy     Psychosocial stressors: pt counseled  -SW consulted to assist with resources     COPD: pt counseled  -Med consulted      HTN: pt counseled  -Med consulted     DM: pt counseled  -Med consulted     Thyroid disease: pt counseled  -Med consulted  -resumed Synthroid 75 mcg po q AM     GERD: pt counseled  -Med consulted     Chronic back pain: pt counseled  -Med consulted  -gabapentin as above    Allergies:  -Initiate cetirizine 10 mg daily          Discussed diagnosis, risks and benefits of proposed treatment vs alternative treatments vs no treatment, potential side effects of these treatments and the inherent unpredictability of treatment. The patient expresses understanding of the above and displays the capacity to agree with this treatment given said understanding. Patient also agrees that, currently, the benefits outweigh the risks and would like to pursue/continue treatment at this time.    Any medications being used off-label were discussed with the patient inclusive of  the evidence base for the use of the medications and consent was obtained for the off-label use of the medication.       DISCHARGE PLANNING  Expected Disposition Plan:  to be determined      NEED FOR CONTINUED HOSPITALIZATION  Psychiatric illness continues to pose a potential threat to life or bodily function, of self or others, thereby requiring the need for continued inpatient psychiatric hospitalization: Yes, due to: hallucinations, as evidenced by:  Ongoing concerns with grave disability with patient unable to perform basic feeding, hygiene and dressing activities without significant constant support.    Protective inpatient pyschiatric hospitalization required while a safe disposition plan is enacted: Yes    Patient stabilized and ready for discharge from inpatient psychiatric unit: No        The patient location is: [facility]    Visit type: audiovisual    Face to Face time with patient: 5  10 minutes of total time spent on the encounter, which includes face to face time and non-face to face time preparing to see the patient (eg, review of tests), Obtaining and/or reviewing separately obtained history, Documenting clinical information in the electronic or other health record, Independently interpreting results (not separately reported) and communicating results to the patient/family/caregiver, or Care coordination (not separately reported).     Each patient to whom he or she provides medical services by telemedicine is:  (1) informed of the relationship between the physician and patient and the respective role of any other health care provider with respect to management of the patient; and (2) notified that he or she may decline to receive medical services by telemedicine and may withdraw from such care at any time.        STAFF:   Everett Joseph III, MD  Psychiatry

## 2024-12-29 NOTE — PLAN OF CARE
Problem: Adult Behavioral Health Plan of Care  Goal: Plan of Care Review  Outcome: Progressing     Problem: Anxiety Signs/Symptoms  Goal: Improved Somatic Symptoms (Anxiety Signs/Symptoms)  Outcome: Progressing     Problem: Anxiety Signs/Symptoms  Goal: Enhanced Social, Occupational or Functional Skills (Anxiety Signs/Symptoms)  Outcome: Progressing

## 2024-12-29 NOTE — PLAN OF CARE
Problem: Bariatric Environmental Safety  Goal: Safety Maintained with Care  Outcome: Progressing     Problem: Adult Behavioral Health Plan of Care  Goal: Plan of Care Review  Outcome: Progressing  Goal: Patient-Specific Goal (Individualization)  Outcome: Progressing  Goal: Adheres to Safety Considerations for Self and Others  Outcome: Progressing  Goal: Absence of New-Onset Illness or Injury  Outcome: Progressing  Goal: Optimized Coping Skills in Response to Life Stressors  Outcome: Progressing  Goal: Develops/Participates in Therapeutic [place] to Support Successful Transition  Outcome: Progressing  Goal: Rounds/Family Conference  Outcome: Progressing     Problem: Diabetes Comorbidity  Goal: Blood Glucose Level Within Targeted Range  Outcome: Progressing     Problem: Sepsis/Septic Shock  Goal: Optimal Coping  Outcome: Progressing  Goal: Absence of Bleeding  Outcome: Progressing  Goal: Blood Glucose Level Within Targeted Range  Outcome: Progressing  Goal: Absence of Infection Signs and Symptoms  Outcome: Progressing  Goal: Optimal Nutrition Intake  Outcome: Progressing     Problem: Anxiety Signs/Symptoms  Goal: Optimized Energy Level (Anxiety Signs/Symptoms)  Outcome: Progressing  Goal: Optimized Cognitive Function (Anxiety Signs/Symptoms)  Outcome: Progressing  Goal: Improved Mood Symptoms (Anxiety Signs/Symptoms)  Outcome: Progressing  Goal: Improved Sleep (Anxiety Signs/Symptoms)  Outcome: Progressing  Goal: Enhanced Social, Occupational or Functional Skills (Anxiety Signs/Symptoms)  Outcome: Progressing  Goal: Improved Somatic Symptoms (Anxiety Signs/Symptoms)  Outcome: Progressing     Problem: Psychotic Signs/Symptoms  Goal: Improved Behavioral Control (Psychotic Signs/Symptoms)  Outcome: Progressing  Goal: Optimal Cognitive Function (Psychotic Signs/Symptoms)  Outcome: Progressing  Goal: Increased Participation and Engagement (Psychotic Signs/Symptoms)  Outcome: Progressing  Goal: Improved Mood Symptoms  (Psychotic Signs/Symptoms)  Outcome: Progressing  Goal: Improved Psychomotor Symptoms (Psychotic Signs/Symptoms)  Outcome: Progressing  Goal: Decreased Sensory Symptoms (Psychotic Signs/Symptoms)  Outcome: Progressing  Goal: Improved Sleep (Psychotic Signs/Symptoms)  Outcome: Progressing  Goal: Enhanced Social, Occupational or Functional Skills (Psychotic Signs/Symptoms)  Outcome: Progressing     Problem: Depressive Signs/Symptoms  Goal: Optimized Energy Level (Depressive Signs/Symptoms)  Outcome: Progressing  Goal: Optimized Cognitive Function (Depressive Signs/Symptoms)  Outcome: Progressing  Goal: Increased Participation and Engagement (Depressive Signs/Symptoms)  Outcome: Progressing  Goal: Enhanced Self-Esteem and Confidence (Depressive Signs/Symptoms)  Outcome: Progressing  Goal: Improved Mood Symptoms (Depressive Signs/Symptoms)  Outcome: Progressing  Goal: Optimized Nutrition Intake (Depressive Signs/Symptoms)  Outcome: Progressing  Goal: Improved Psychomotor Symptoms (Depressive Signs/Symptoms)  Outcome: Progressing  Goal: Improved Sleep (Depressive Signs/Symptoms)  Outcome: Progressing  Goal: Enhanced Social, Occupational or Functional Skills (Depressive Signs/Symptoms)  Outcome: Progressing

## 2024-12-29 NOTE — NURSING
Pt is noted interacting with peers at shift change. Pt is med compliant, still seeking meds. Pt requested something to sleep then returned to nurses station asking for something for indigestion. PRN given although pt stated it does not help her. Pt returned to room. Pt is noted to be sleeping at this time. Will continue to monitor for safety.

## 2024-12-29 NOTE — NURSING
Plan of care reviewed.  Pt has been in the common areas all morning. Pt interacting with peers and staff without difficulty.  Pt states she is feeling better and hoping to go home on tomorrow.  Pt denies si, hi or a v hallucinations.  Gravely disabled.  Pt states she is still waiting on the state to make a decision on nursing home placement.  Taking meds without side effects noted. Appetite adequate.  Pt states she slept well last night. Pt instructed to call for any needs or concerns at all.  Verbalized understanding. Will cont to monitor for safety.  Patient care ongoing.

## 2024-12-30 VITALS
WEIGHT: 245 LBS | TEMPERATURE: 96 F | HEIGHT: 63 IN | BODY MASS INDEX: 43.41 KG/M2 | RESPIRATION RATE: 20 BRPM | HEART RATE: 80 BPM | OXYGEN SATURATION: 96 % | SYSTOLIC BLOOD PRESSURE: 123 MMHG | DIASTOLIC BLOOD PRESSURE: 82 MMHG

## 2024-12-30 PROCEDURE — 25000003 PHARM REV CODE 250: Performed by: EMERGENCY MEDICINE

## 2024-12-30 PROCEDURE — 25000003 PHARM REV CODE 250: Performed by: PSYCHIATRY & NEUROLOGY

## 2024-12-30 PROCEDURE — 90833 PSYTX W PT W E/M 30 MIN: CPT | Mod: ,,, | Performed by: STUDENT IN AN ORGANIZED HEALTH CARE EDUCATION/TRAINING PROGRAM

## 2024-12-30 PROCEDURE — 99239 HOSP IP/OBS DSCHRG MGMT >30: CPT | Mod: ,,, | Performed by: STUDENT IN AN ORGANIZED HEALTH CARE EDUCATION/TRAINING PROGRAM

## 2024-12-30 RX ORDER — HYDROXYZINE PAMOATE 50 MG/1
50 CAPSULE ORAL EVERY 6 HOURS PRN
Qty: 90 CAPSULE | Refills: 90 | Status: SHIPPED | OUTPATIENT
Start: 2024-12-30

## 2024-12-30 RX ORDER — RISPERIDONE 2 MG/1
2 TABLET ORAL 2 TIMES DAILY
Qty: 60 TABLET | Refills: 0 | Status: SHIPPED | OUTPATIENT
Start: 2024-12-30 | End: 2025-12-30

## 2024-12-30 RX ORDER — GABAPENTIN 300 MG/1
600 CAPSULE ORAL 3 TIMES DAILY
Qty: 180 CAPSULE | Refills: 0 | Status: SHIPPED | OUTPATIENT
Start: 2024-12-30 | End: 2025-12-30

## 2024-12-30 RX ORDER — PAROXETINE 30 MG/1
60 TABLET, FILM COATED ORAL DAILY
Qty: 60 TABLET | Refills: 0 | Status: SHIPPED | OUTPATIENT
Start: 2024-12-30 | End: 2025-12-30

## 2024-12-30 RX ADMIN — LEVOTHYROXINE SODIUM 75 MCG: 75 TABLET ORAL at 06:12

## 2024-12-30 RX ADMIN — PAROXETINE HYDROCHLORIDE 60 MG: 20 TABLET, FILM COATED ORAL at 08:12

## 2024-12-30 RX ADMIN — ASPIRIN 81 MG: 81 TABLET, COATED ORAL at 08:12

## 2024-12-30 RX ADMIN — AMLODIPINE BESYLATE 10 MG: 10 TABLET ORAL at 08:12

## 2024-12-30 RX ADMIN — ALBUTEROL SULFATE 2 PUFF: 90 AEROSOL, METERED RESPIRATORY (INHALATION) at 08:12

## 2024-12-30 RX ADMIN — CETIRIZINE HYDROCHLORIDE 10 MG: 5 TABLET ORAL at 08:12

## 2024-12-30 RX ADMIN — FLUTICASONE PROPIONATE 100 MCG: 50 SPRAY, METERED NASAL at 08:12

## 2024-12-30 RX ADMIN — RISPERIDONE 2 MG: 1 TABLET, FILM COATED ORAL at 08:12

## 2024-12-30 RX ADMIN — GABAPENTIN 600 MG: 300 CAPSULE ORAL at 08:12

## 2024-12-30 NOTE — NURSING
Physical Therapy Evaluation    Visit Type: Initial Evaluation  Visit: 1  Referring Provider: Ritika Mesa DO  Medical Diagnosis (from order): Diagnosis Information    Diagnosis  M17.12 (ICD-10-CM) - Arthritis of left knee       Treatment Diagnosis: left knee - increased pain/symptoms, impaired strength and impaired tissue mobility.  Onset  - Date of onset:  February 2023  Chart reviewed at time of initial evaluation (relevant co-morbidities, allergies, tests and medications listed):   - Diagnostic tests reviewed: X-Ray, MRI studies and CT Scan  Past Medical History:  No date: Hearing loss  Current Outpatient Medications:  ibuprofen (MOTRIN) 600 MG tablet, Take 1 tablet by mouth every 6 hours as needed for Pain., Disp: 120 tablet, Rfl: 1  diclofenac (VOLTAREN) 1 % gel, Apply 2 g topically in the morning and 2 g in the evening., Disp: 100 g, Rfl: 0      SUBJECTIVE                                                                                                               Patient reports that when he is sitting for his prayer, the left knee hurts. When he gets up, the left knee hurts, lateral side. When he walks or sits, he doesn't have pain. Patient reports bending the knee a lot bothers him. Denies numbness/tingling in Siva legs.     Pain / Symptoms  - Pain/symptom is: intermittent  - Pain rating (out of 10): Current: 9   - Location: Lateral left knee   - Quality / Description: unable to specify  - Alleviating Factors: rest, over-the-counter medication    Function:   Limitations / Exacerbation Factors:   - , kneeling  Prior Level of Function: declining function, therefore referred to therapy,    Patient Goals: decreased pain.    Prior treatment  - outpatient PT  - Discharged from hospital, home health, or skilled nursing facility in last 30 days: no  Home Environment   - Patient lives with: significant other  - Type of home: single level home  - Assistance available: as needed  - Denies 2 or more falls or an  Pt is noted socializing with peers in activity room. Pt is cooperative with staff and med compliant. Pt stated meds are working. Pt is resting at this time no ss of distress noted. Will continue to monitor for safety.    unexplained fall with injury in the last year.  - Feel safe at home / work / school: yes      OBJECTIVE                                                                                                                    Observation   Left knee pain while transition from kneeling to standing, requiring UE support     Strength  (out of 5 unless noted, standard test position unless noted)   Hip:    - Flexion:        • Left: 3+        • Right: 4-  Knee:    - Flexion:        • Left: pain, 3+        • Right: 4         Palpation  Left  - Iliotibial Band: hypertonic, tenderness and trigger point  - Tensor Fasciae Latae: no palpable tenderness       Standing Balance  (MARIELY = base of support)  Firm Surface: Single Leg     - Left, Eyes Open (sec): 7     - Right, Eyes Open (sec): 7    Balance Tests   5 Times Sit to Stand 16 sec    [place]orms: 60-69 year old - 4.0 - 15.1 sec    Interpretation:        > 12 seconds indicates need for further assessment for fall risk for community dwelling elderly      > 16 seconds indicative of falls risk for Parkinson's disease          Outcome/Assessments  Outcome Measures:   Lower Extremity Functional Scale: LEFS Calculated Total: 45 (0=extreme difficulty; 80=no difficulty) see flowsheet for additional documentation      Treatment     Therapeutic Exercise  Performed to develop strength, improve range of motion, improve flexibility and reduce pain for increased independence and improved functional mobility with transfers, stairs, squats, return to prior level of function and tasks of daily living.    - Supine Active Straight Leg Raise  -2 sets - 5 reps  - Supine Bridge with Mini Swiss Ball Between Knees  - 1 set - 10 reps  - Supine Hamstring Stretch  - 10 reps - 3 sec hold  - Seated Long Arc Quad  - 1 set - 10 reps - 3 sec hold    Skilled input: verbal instruction/cues and tactile instruction/cues    Writer verbally educated and received verbal consent for hand placement, positioning of patient, and  techniques to be performed today from patient for clothing adjustments for techniques, hand placement and palpation for techniques and therapist position for techniques as described above and how they are pertinent to the patient's plan of care.  Home Exercise Program  Access Code: [email or web address]  URL: [email or web address]  Date: 10/12/2023  Prepared by: Camila Garcia    Exercises  - Supine Active Straight Leg Raise  - 1 x daily - 7 x weekly - 3 sets - 5 reps  - Supine Bridge with Mini Swiss Ball Between Knees  - 1 x daily - 7 x weekly - 2 sets - 10 reps  - Supine Hamstring Stretch  - 1 x daily - 7 x weekly - 10 reps - 3 sec hold  - Seated Long Arc Quad  - 1 x daily - 7 x weekly - 2 sets - 10 reps - 3 sec hold      ASSESSMENT                                                                                                          65 year old patient has reported functional limitations listed above impacted by signs and symptoms consistent with treatment diagnosis below.  Treatment Diagnosis:   - Involved: left knee.  - Symptoms/impairments: increased pain/symptoms, impaired strength and impaired tissue mobility.    Faisal Thomson presents to the clinic with left knee pain. Upon evaluation, patient demonstrates limited Siva hip flexion and knee extension strength, tightness into left distal iliotibial band, limited Siva LE strength per 5 Time Sit to Stand, and balance deficits. Due to these impairments, patient has been limited with performing kneeling to pray, transfers, stair climbing, and squatting. Patient will benefit from physical therapy to address the impairments listed above. Patient's prognosis is good.     Prognosis: Patient will benefit from skilled therapy.  Rehabilitative potential is: good.  Predicted patient presentation: Low (stable) - Patient comorbidities and complexities, as defined above, will have little effect on progress for prescribed plan of care.  Education:   - Present and  ready to learn: patient  - Results of above outlined education: Verbalizes understanding and Demonstrates understanding    PLAN                                                                                                                         The following skilled interventions to be implemented to achieve goals listed below:  Manual Therapy (55793)  Therapeutic Activity (82481)  Neuromuscular Re-Education (14426)  Therapeutic Exercise (59294)  Activities of Daily Living/Self Care (04446)  Heat/Cold (15831)    Frequency / Duration  1 times per week tapering as patient progresses for 8 weeks for an estimated total of 8 visits    Patient involved in and agreed to plan of care and goals.  Patient given attendance policy at time of initial evaluation.    Suggestions for next session as indicated: Progress per plan of care      Goals  Long Term Goals: to be met by end of plan of care  1. Patient will demonstrate improved 5 Time Sit to Stand time to < 12 seconds to assist with improving transfer performance/efficiency.   2. Patient will demonstrate ability to transition into/out of kneeling position without left knee pain to assist with performing prayers at home.   3. Patient will demonstrate ability to perform single leg stance balance > 10 seconds to decrease patient's risk falls while ambulating in the community.       Therapy procedure time and total treatment time can be found documented on the Time Entry flowsheet
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
## 2024-12-30 NOTE — DISCHARGE INSTRUCTIONS
Discharge instructions reviewed with patient, pharmacy, medications, and follow up mental health appointment. Patient verbalized understanding. Education to  completed and copy given to patient.

## 2024-12-30 NOTE — PLAN OF CARE
Patient alert and oriented, pleasant, and cooperative with no negative behaviors noted. Appetite is good for meals and compliant with medication without side effects noted. Patient endorses feeling better with depression and anxiety improved, denies S/HI, denies A/VH, no delusions present. Dr. Joseph visited with order for discharge home. Patient is in common area engaging with peers voicing no complaints. Staff initiating discharge protocol at this time. Continue to monitor.  Patient personal transportation her brother arrived. Discharge instructions reviewed with patient . Patient given paper work and left unit ambulatory with personal belongings accompanied by staff member downstairs to meet her brother. Patient left with her brother with no distress noted.

## 2024-12-30 NOTE — PLAN OF CARE
12/30/24 1124   Medicare Message   Important Message from Medicare regarding Discharge Appeal Rights Given to patient/caregiver;Explained to patient/caregiver;Signed/date by patient/caregiver;Other (comments)   Date IMM was signed 12/30/24   Time IMM was signed [id number]

## 2024-12-30 NOTE — PLAN OF CARE
Problem: Bariatric Environmental Safety  Goal: Safety Maintained with Care  Outcome: Progressing     Problem: Adult Behavioral Health Plan of Care  Goal: Plan of Care Review  Outcome: Progressing  Goal: Patient-Specific Goal (Individualization)  Outcome: Progressing  Goal: Adheres to Safety Considerations for Self and Others  Outcome: Progressing  Goal: Absence of New-Onset Illness or Injury  Outcome: Progressing  Goal: Optimized Coping Skills in Response to Life Stressors  Outcome: Progressing  Goal: Develops/Participates in Therapeutic [place] to Support Successful Transition  Outcome: Progressing  Goal: Rounds/Family Conference  Outcome: Progressing     Problem: Diabetes Comorbidity  Goal: Blood Glucose Level Within Targeted Range  Outcome: Progressing     Problem: Sepsis/Septic Shock  Goal: Optimal Coping  Outcome: Progressing  Goal: Absence of Bleeding  Outcome: Progressing  Goal: Blood Glucose Level Within Targeted Range  Outcome: Progressing  Goal: Absence of Infection Signs and Symptoms  Outcome: Progressing  Goal: Optimal Nutrition Intake  Outcome: Progressing     Problem: Anxiety Signs/Symptoms  Goal: Optimized Energy Level (Anxiety Signs/Symptoms)  Outcome: Progressing  Goal: Optimized Cognitive Function (Anxiety Signs/Symptoms)  Outcome: Progressing  Goal: Improved Mood Symptoms (Anxiety Signs/Symptoms)  Outcome: Progressing  Goal: Improved Sleep (Anxiety Signs/Symptoms)  Outcome: Progressing  Goal: Enhanced Social, Occupational or Functional Skills (Anxiety Signs/Symptoms)  Outcome: Progressing  Goal: Improved Somatic Symptoms (Anxiety Signs/Symptoms)  Outcome: Progressing     Problem: Psychotic Signs/Symptoms  Goal: Improved Behavioral Control (Psychotic Signs/Symptoms)  Outcome: Progressing  Goal: Optimal Cognitive Function (Psychotic Signs/Symptoms)  Outcome: Progressing  Goal: Increased Participation and Engagement (Psychotic Signs/Symptoms)  Outcome: Progressing  Goal: Improved Mood Symptoms  (Psychotic Signs/Symptoms)  Outcome: Progressing  Goal: Improved Psychomotor Symptoms (Psychotic Signs/Symptoms)  Outcome: Progressing  Goal: Decreased Sensory Symptoms (Psychotic Signs/Symptoms)  Outcome: Progressing  Goal: Improved Sleep (Psychotic Signs/Symptoms)  Outcome: Progressing  Goal: Enhanced Social, Occupational or Functional Skills (Psychotic Signs/Symptoms)  Outcome: Progressing     Problem: Depressive Signs/Symptoms  Goal: Optimized Energy Level (Depressive Signs/Symptoms)  Outcome: Progressing  Goal: Optimized Cognitive Function (Depressive Signs/Symptoms)  Outcome: Progressing  Goal: Increased Participation and Engagement (Depressive Signs/Symptoms)  Outcome: Progressing  Goal: Enhanced Self-Esteem and Confidence (Depressive Signs/Symptoms)  Outcome: Progressing  Goal: Improved Mood Symptoms (Depressive Signs/Symptoms)  Outcome: Progressing  Goal: Optimized Nutrition Intake (Depressive Signs/Symptoms)  Outcome: Progressing  Goal: Improved Psychomotor Symptoms (Depressive Signs/Symptoms)  Outcome: Progressing  Goal: Improved Sleep (Depressive Signs/Symptoms)  Outcome: Progressing  Goal: Enhanced Social, Occupational or Functional Skills (Depressive Signs/Symptoms)  Outcome: Progressing      0 = independent

## 2025-01-11 ENCOUNTER — HOSPITAL ENCOUNTER (EMERGENCY)
Facility: HOSPITAL | Age: 65
Discharge: HOME | End: 2025-01-12
Attending: EMERGENCY MEDICINE
Payer: MEDICARE

## 2025-01-11 DIAGNOSIS — J06.9 VIRAL URI WITH COUGH: Primary | ICD-10-CM

## 2025-01-11 LAB
CTP QC/QA: YES
CTP QC/QA: YES
POC MOLECULAR INFLUENZA A AGN: NEGATIVE
POC MOLECULAR INFLUENZA B AGN: NEGATIVE
SARS-COV-2 RDRP RESP QL NAA+PROBE: NEGATIVE

## 2025-01-11 PROCEDURE — 99282 EMERGENCY DEPT VISIT SF MDM: CPT

## 2025-01-11 PROCEDURE — 87502 INFLUENZA DNA AMP PROBE: CPT

## 2025-01-11 PROCEDURE — 87635 SARS-COV-2 COVID-19 AMP PRB: CPT | Performed by: EMERGENCY MEDICINE

## 2025-01-12 VITALS
RESPIRATION RATE: 18 BRPM | WEIGHT: 258 LBS | HEART RATE: 89 BPM | HEIGHT: 63 IN | BODY MASS INDEX: 45.71 KG/M2 | TEMPERATURE: 98 F | SYSTOLIC BLOOD PRESSURE: 122 MMHG | DIASTOLIC BLOOD PRESSURE: 79 MMHG | OXYGEN SATURATION: 95 %

## 2025-01-12 RX ORDER — LORATADINE 10 MG/1
10 TABLET ORAL DAILY
Qty: 30 TABLET | Refills: 0 | Status: SHIPPED | OUTPATIENT
Start: 2025-01-12 | End: 2025-02-11

## 2025-01-12 RX ORDER — FLUTICASONE PROPIONATE 50 MCG
1 SPRAY, SUSPENSION (ML) NASAL 2 TIMES DAILY PRN
Qty: 15 G | Refills: 0 | Status: SHIPPED | OUTPATIENT
Start: 2025-01-12

## 2025-01-12 NOTE — ED NOTES
AAOx4, skin W/D/P, cap refill<3 sec, MAEW, NAD, gait unsteady, patient wheeled and assisted into vehicle with ease.

## 2025-01-12 NOTE — ED PROVIDER NOTES
Encounter Date: 1/11/2025       History     Chief Complaint   Patient presents with    Nasal Congestion     Pt to ED wit complaints of nasal congestion x 1 hour. Took breathing treatment pta     64-year-old female presents to the emergency department for evaluation due to nasal congestion.  Onset proximally 1 hour prior to ED evaluation.  No pain.  No palliation.  No associated symptoms.  No dyspnea.  No wheezing.  No stridor.  Tolerating oral secretions.        Review of patient's allergies indicates:  No Known Allergies  Past Medical History:   Diagnosis Date    Anxiety     Breast cancer 2010    Cancer     Breast    COPD (chronic obstructive pulmonary disease)     Depression     Diabetes mellitus     GERD (gastroesophageal reflux disease)     Hypertension     Insomnia     Thyroid disease      Past Surgical History:   Procedure Laterality Date    BLADDER SUSPENSION      BREAST LUMPECTOMY      Right breast    CHOLECYSTECTOMY      HYSTERECTOMY      KNEE ARTHROSCOPY      Right knee    OOPHORECTOMY       Family History   Problem Relation Name Age of Onset    No Known Problems Mother      No Known Problems Father      No Known Problems Sister      No Known Problems Daughter      No Known Problems Maternal Aunt      No Known Problems Maternal Uncle      No Known Problems Paternal Aunt      No Known Problems Paternal Uncle      No Known Problems Maternal Grandmother      No Known Problems Maternal Grandfather      No Known Problems Paternal Grandmother      No Known Problems Paternal Grandfather      No Known Problems Other      Breast cancer Neg Hx      Ovarian cancer Neg Hx      BRCA 1/2 Neg Hx       Social History     Tobacco Use    Smoking status: Every Day     Current packs/day: 0.50     Average packs/day: 0.5 packs/day for 50.7 years (25.3 ttl pk-yrs)     Types: Cigarettes, Vaping with nicotine     Start date: 5/15/1974     Passive exposure: Current    Smokeless tobacco: Never   Substance Use Topics    Alcohol use:  Not Currently    Drug use: Never     Review of Systems   HENT:  Positive for congestion.    All other systems reviewed and are negative.      Physical Exam     Initial Vitals [01/11/25 2024]   BP Pulse Resp Temp SpO2   125/79 87 20 97.9 °F (36.6 °C) 96 %      MAP       --         Physical Exam    Nursing note and vitals reviewed.  Constitutional: She appears well-developed and well-nourished.   HENT:   Head: Normocephalic and atraumatic.   Eyes: EOM are normal. Pupils are equal, round, and reactive to light.   Neck: Neck supple.   Normal range of motion.  Cardiovascular:  Normal rate, regular rhythm and normal heart sounds.     Exam reveals no gallop and no friction rub.       No murmur heard.  Pulmonary/Chest: Effort normal and breath sounds normal.   Abdominal: Abdomen is soft. Bowel sounds are normal. She exhibits no distension. There is no abdominal tenderness.   Musculoskeletal:         General: Normal range of motion.      Cervical back: Normal range of motion and neck supple.     Neurological: She is alert and oriented to person, place, and time. GCS score is 15. GCS eye subscore is 4. GCS verbal subscore is 5. GCS motor subscore is 6.   Skin: Skin is warm and dry. No rash noted.   Psychiatric: She has a normal mood and affect.         ED Course   Procedures  Labs Reviewed   SARS-COV-2 RDRP GENE       Result Value    POC Rapid COVID Negative       Acceptable Yes     POCT INFLUENZA A/B MOLECULAR    POC Molecular Influenza A Ag Negative      POC Molecular Influenza B Ag Negative       Acceptable Yes            Imaging Results    None          Medications - No data to display  Medical Decision Making  Amount and/or Complexity of Data Reviewed  Labs: ordered.               ED Course as of 01/12/25 0024   Sun Jan 12, 2025   0020 No acute distress.  Influenza negative.  SARs COVID 19 is negative.  Lungs clear to auscultation bilaterally.  Oxygen saturation 94% on room air.  No dyspnea.   No wheezing.  No stridor.  Tolerating oral secretions.  Vital signs stable.  Afebrile.  Discharge home. [DH]      ED Course User Index  [DH] Luis Sheriff DO                           Clinical Impression:  Final diagnoses:  [J06.9] Viral URI with cough (Primary)          ED Disposition Condition    Discharge Stable          ED Prescriptions       Medication Sig Dispense Start Date End Date Auth. Provider    fluticasone propionate (FLONASE) 50 mcg/actuation nasal spray 1 spray (50 mcg total) by Each Nostril route 2 (two) times daily as needed for Rhinitis. 15 g 1/12/2025 -- Luis Sheriff DO    loratadine (CLARITIN) 10 mg tablet Take 1 tablet (10 mg total) by mouth once daily. 30 tablet 1/12/2025 2/11/2025 Luis Sheriff DO          Follow-up Information       Follow up With Specialties Details Why Contact Info    Burke Merchant MD Internal Medicine In 3 days  [address] [place] [id number]  [phone]               Luis Sheriff DO  01/12/25 0025

## 2025-01-13 ENCOUNTER — HOSPITAL ENCOUNTER (EMERGENCY)
Facility: HOSPITAL | Age: 65
Discharge: HOME | End: 2025-01-13
Attending: EMERGENCY MEDICINE
Payer: MEDICARE

## 2025-01-13 VITALS
TEMPERATURE: 98 F | RESPIRATION RATE: 20 BRPM | SYSTOLIC BLOOD PRESSURE: 123 MMHG | HEART RATE: 83 BPM | BODY MASS INDEX: 46.25 KG/M2 | OXYGEN SATURATION: 97 % | HEIGHT: 63 IN | WEIGHT: 261 LBS | DIASTOLIC BLOOD PRESSURE: 69 MMHG

## 2025-01-13 DIAGNOSIS — M79.89 LEG SWELLING: Primary | ICD-10-CM

## 2025-01-13 DIAGNOSIS — R60.9 EDEMA: ICD-10-CM

## 2025-01-13 LAB
ANION GAP SERPL CALC-SCNC: 8 MMOL/L (ref 8–16)
BACTERIA #/AREA URNS HPF: NEGATIVE /HPF
BASOPHILS # BLD AUTO: 0.02 K/UL (ref 0–0.2)
BASOPHILS NFR BLD: 0.2 % (ref 0–1.9)
BILIRUB UR QL STRIP: NEGATIVE
BUN SERPL-MCNC: 17 MG/DL (ref 8–23)
CALCIUM SERPL-MCNC: 8.5 MG/DL (ref 8.7–10.5)
CHLORIDE SERPL-SCNC: 103 MMOL/L (ref 95–110)
CLARITY UR: ABNORMAL
CO2 SERPL-SCNC: 30 MMOL/L (ref 23–29)
COLOR UR: YELLOW
CREAT SERPL-MCNC: 1.2 MG/DL (ref 0.5–1.4)
DIFFERENTIAL METHOD BLD: ABNORMAL
EOSINOPHIL # BLD AUTO: 0.2 K/UL (ref 0–0.5)
EOSINOPHIL NFR BLD: 2.3 % (ref 0–8)
ERYTHROCYTE [DISTWIDTH] IN BLOOD BY AUTOMATED COUNT: 14.7 % (ref 11.5–14.5)
EST. GFR  (NO RACE VARIABLE): 50.2 ML/MIN/1.73 M^2
GLUCOSE SERPL-MCNC: 129 MG/DL (ref 70–110)
GLUCOSE UR QL STRIP: NEGATIVE
HCT VFR BLD AUTO: 37.5 % (ref 37–48.5)
HGB BLD-MCNC: 11.9 G/DL (ref 12–16)
HGB UR QL STRIP: NEGATIVE
HYALINE CASTS #/AREA URNS LPF: 4 /LPF
IMM GRANULOCYTES # BLD AUTO: 0.07 K/UL (ref 0–0.04)
IMM GRANULOCYTES NFR BLD AUTO: 0.7 % (ref 0–0.5)
KETONES UR QL STRIP: NEGATIVE
LEUKOCYTE ESTERASE UR QL STRIP: NEGATIVE
LYMPHOCYTES # BLD AUTO: 1.9 K/UL (ref 1–4.8)
LYMPHOCYTES NFR BLD: 19.2 % (ref 18–48)
MCH RBC QN AUTO: 28.7 PG (ref 27–31)
MCHC RBC AUTO-ENTMCNC: 31.7 G/DL (ref 32–36)
MCV RBC AUTO: 91 FL (ref 82–98)
MICROSCOPIC COMMENT: ABNORMAL
MONOCYTES # BLD AUTO: 1.1 K/UL (ref 0.3–1)
MONOCYTES NFR BLD: 11.5 % (ref 4–15)
NEUTROPHILS # BLD AUTO: 6.5 K/UL (ref 1.8–7.7)
NEUTROPHILS NFR BLD: 66.1 % (ref 38–73)
NITRITE UR QL STRIP: NEGATIVE
NRBC BLD-RTO: 0 /100 WBC
NT-PROBNP SERPL-MCNC: 191 PG/ML (ref 5–900)
PH UR STRIP: 6 [PH] (ref 5–8)
PLATELET # BLD AUTO: 316 K/UL (ref 150–450)
PMV BLD AUTO: 9.7 FL (ref 9.2–12.9)
POTASSIUM SERPL-SCNC: 4.1 MMOL/L (ref 3.5–5.1)
PROT UR QL STRIP: NEGATIVE
RBC # BLD AUTO: 4.14 M/UL (ref 4–5.4)
RBC #/AREA URNS HPF: 1 /HPF (ref 0–4)
SODIUM SERPL-SCNC: 141 MMOL/L (ref 136–145)
SP GR UR STRIP: 1.01 (ref 1–1.03)
SQUAMOUS #/AREA URNS HPF: 16 /HPF
URN SPEC COLLECT METH UR: ABNORMAL
UROBILINOGEN UR STRIP-ACNC: 1 EU/DL
WBC # BLD AUTO: 9.86 K/UL (ref 3.9–12.7)
WBC #/AREA URNS HPF: 1 /HPF (ref 0–5)

## 2025-01-13 PROCEDURE — 63600175 PHARM REV CODE 636 W HCPCS: Performed by: EMERGENCY MEDICINE

## 2025-01-13 PROCEDURE — 81000 URINALYSIS NONAUTO W/SCOPE: CPT | Performed by: EMERGENCY MEDICINE

## 2025-01-13 PROCEDURE — 36415 COLL VENOUS BLD VENIPUNCTURE: CPT | Performed by: EMERGENCY MEDICINE

## 2025-01-13 PROCEDURE — 80048 BASIC METABOLIC PNL TOTAL CA: CPT | Performed by: EMERGENCY MEDICINE

## 2025-01-13 PROCEDURE — 83880 ASSAY OF NATRIURETIC PEPTIDE: CPT | Performed by: EMERGENCY MEDICINE

## 2025-01-13 PROCEDURE — 85025 COMPLETE CBC W/AUTO DIFF WBC: CPT | Performed by: EMERGENCY MEDICINE

## 2025-01-13 PROCEDURE — 96372 THER/PROPH/DIAG INJ SC/IM: CPT | Performed by: EMERGENCY MEDICINE

## 2025-01-13 PROCEDURE — 99284 EMERGENCY DEPT VISIT MOD MDM: CPT | Mod: 25

## 2025-01-13 RX ORDER — FUROSEMIDE 10 MG/ML
40 INJECTION INTRAMUSCULAR; INTRAVENOUS
Status: COMPLETED | OUTPATIENT
Start: 2025-01-13 | End: 2025-01-13

## 2025-01-13 RX ADMIN — FUROSEMIDE 40 MG: 10 INJECTION, SOLUTION INTRAMUSCULAR; INTRAVENOUS at 04:01

## 2025-01-13 NOTE — ED PROVIDER NOTES
EMERGENCY DEPARTMENT HISTORY AND PHYSICAL EXAM     This note is dictated on M*Modal word recognition program.  There are word recognition mistakes and grammatical errors that are occasionally missed on review.     Date: 1/13/2025   Patient Name: Vero Allen       History of Presenting Illness      Chief Complaint   Patient presents with    Leg Swelling     Pt presents to the ED for generalized edema to shawna legs and arms. C/o SOB, pt states she is always SOB.            Vero Allen is a 64 y.o. female with PMHX of COPD, anxiety, depression, benzodiazepine dependence who presents to the emergency department C/O lower extremity edema.    Patient reports bilateral lower extremity edema that became worse today.  No trauma or injury.  Has had this happened to her before.  She reports she has seen at outside hospital in the past and diagnosed with congestive heart failure.  She reports her respiratory status is at baseline.  She states she takes Lasix daily and has good urine output.  Denies high sodium foods        PCP: Burke Merchant MD        No current facility-administered medications for this encounter.     Current Outpatient Medications   Medication Sig Dispense Refill    albuterol (PROVENTIL/VENTOLIN HFA) 90 mcg/actuation inhaler Inhale 1-2 puffs into the lungs every 4 (four) hours as needed for Wheezing or Shortness of Breath. Rescue 8 g 1    albuterol-ipratropium (DUO-NEB) 2.5 mg-0.5 mg/3 mL nebulizer solution Take 3 mLs by nebulization every 4 (four) hours as needed for Wheezing or Shortness of Breath. Rescue 75 mL 0    amLODIPine (NORVASC) 10 MG tablet Take 10 mg by mouth.      aspirin (ECOTRIN) 81 MG EC tablet Take 81 mg by mouth once daily.      BREZTRI AEROSPHERE 160-9-4.8 mcg/actuation HFAA Inhale into the lungs.      esomeprazole (NEXIUM) 20 MG capsule Take 20 mg by mouth before breakfast.      estradioL (ESTRACE) 1 MG tablet Take 1 tablet (1 mg total) by mouth once daily. 90  tablet 3    fluticasone propionate (FLONASE) 50 mcg/actuation nasal spray 1 spray (50 mcg total) by Each Nostril route 2 (two) times daily as needed for Rhinitis. 15 g 0    gabapentin (NEURONTIN) 300 MG capsule Take 2 capsules (600 mg total) by mouth 3 (three) times daily. 180 capsule 0    hydrOXYzine pamoate (VISTARIL) 50 MG Cap Take 1 capsule (50 mg total) by mouth every 6 (six) hours as needed (Insomnia or anxiety). 90 capsule 90    levothyroxine (SYNTHROID) 75 MCG tablet Take 1 tablet (75 mcg total) by mouth before breakfast. 30 tablet 1    loratadine (CLARITIN) 10 mg tablet Take 1 tablet (10 mg total) by mouth once daily. 30 tablet 0    metFORMIN (GLUCOPHAGE-XR) 750 MG ER 24hr tablet Take by mouth.      nicotine (NICODERM CQ) 14 mg/24 hr Place 1 patch onto the skin daily as needed (nicotine withdrawal). 30 patch 0    paroxetine (PAXIL) 30 MG tablet Take 2 tablets (60 mg total) by mouth once daily. 60 tablet 0    risperiDONE (RISPERDAL) 2 MG tablet Take 1 tablet (2 mg total) by mouth 2 (two) times daily. 60 tablet 0           Past History     Past Medical History:   Past Medical History:   Diagnosis Date    Anxiety     Breast cancer 2010    Cancer     Breast    COPD (chronic obstructive pulmonary disease)     Depression     Diabetes mellitus     GERD (gastroesophageal reflux disease)     Hypertension     Insomnia     Thyroid disease         Past Surgical History:   Past Surgical History:   Procedure Laterality Date    BLADDER SUSPENSION      BREAST LUMPECTOMY      Right breast    CHOLECYSTECTOMY      HYSTERECTOMY      KNEE ARTHROSCOPY      Right knee    OOPHORECTOMY          Family History:   Family History   Problem Relation Name Age of Onset    No Known Problems Mother      No Known Problems Father      No Known Problems Sister      No Known Problems Daughter      No Known Problems Maternal Aunt      No Known Problems Maternal Uncle      No Known Problems Paternal Aunt      No Known Problems Paternal Uncle    "   No Known Problems Maternal Grandmother      No Known Problems Maternal Grandfather      No Known Problems Paternal Grandmother      No Known Problems Paternal Grandfather      No Known Problems Other      Breast cancer Neg Hx      Ovarian cancer Neg Hx      BRCA 1/2 Neg Hx          Social History:   Social History     Tobacco Use    Smoking status: Every Day     Current packs/day: 0.50     Average packs/day: 0.5 packs/day for 50.7 years (25.3 ttl pk-yrs)     Types: Cigarettes, Vaping with nicotine     Start date: 5/15/1974     Passive exposure: Current    Smokeless tobacco: Never   Substance Use Topics    Alcohol use: Not Currently    Drug use: Never        Allergies:   Review of patient's allergies indicates:  No Known Allergies       Review of Systems   Review of Systems   See HPI for pertinent positives and negatives       Physical Exam     Vitals:    01/13/25 1527 01/13/25 1528 01/13/25 1724 01/13/25 1726   BP: (!) 134/99  123/69    Pulse: 98  86 83   Resp: 18   20   Temp: 98 °F (36.7 °C)      SpO2: 98%   97%   Weight:  118.4 kg (261 lb)     Height:  5' 3" (1.6 m)        Physical Exam  Vitals and nursing note reviewed.   Constitutional:       General: She is not in acute distress.     Appearance: Normal appearance. She is not ill-appearing.   HENT:      Head: Normocephalic and atraumatic.      Right Ear: External ear normal.      Left Ear: External ear normal.      Nose: Nose normal. No congestion or rhinorrhea.      Mouth/Throat:      Mouth: Mucous membranes are moist.   Eyes:      Conjunctiva/sclera: Conjunctivae normal.      Pupils: Pupils are equal, round, and reactive to light.   Cardiovascular:      Rate and Rhythm: Normal rate and regular rhythm.   Pulmonary:      Effort: Pulmonary effort is normal. No respiratory distress.      Breath sounds: Wheezing present.   Musculoskeletal:         General: No deformity. Normal range of motion.      Cervical back: Normal range of motion. No rigidity.      Right " lower le+ Pitting Edema present.      Left lower le+ Pitting Edema present.   Skin:     General: Skin is dry.   Neurological:      General: No focal deficit present.      Mental Status: She is alert and oriented to person, place, and time. Mental status is at baseline.   Psychiatric:         Mood and Affect: Mood normal.         Behavior: Behavior normal.              Diagnostic Study Results      Labs -   No results found for this or any previous visit (from the past 12 hours).     Radiologic Studies -    X-Ray Chest 1 View   Final Result      No evidence of an acute pulmonary process.         Electronically signed by: Chuck Gilmore MD   Date:    2025   Time:    23:27           Medications given in the ED-   Medications   furosemide injection 40 mg (40 mg Intramuscular Given 25 1651)           Medical Decision Making    I am the first provider for this patient.     I reviewed the vital signs, available nursing notes, past medical history, past surgical history, family history and social history.     Vital Signs:  Reviewed the patient's vital signs.     Pulse Oximetry Analysis and Interpretation:    98% on Room Air, normal        CXR  Interpretation: (Per my independent interpretation, pending formal read)   CXR read by Dr. Henry Kapadia     Cardiac silhouette within normal limits, lung fields clear, agree with Radiology interpretation     External Test Results (Pertinent to encounter):    Records Reviewed: Nursing Notes    History Obtained By: Patient    Provider Notes: Vero Allen is a 64 y.o. female with dependent edema    Co-morbidities Considered:  COPD, chronic illness    Differential Diagnosis:  Dependent edema, renal dysfunction, CHF, volume overload      ED Course:    Patient presents with worsening lower extremity edema.  Well known to ED for anxiety and COPD exacerbations.  Respiratory status is at baseline today x-ray does not demonstrated volume overload.  CBC and  chemistry grossly unremarkable.  Given additional dose of furosemide in emergency department.  Advised close follow up with primary care provider.  Discussed management of dependent edema.  Patient expressed understanding and agreement with plan         Problems Addressed:  Dependent edema    Procedures:   Procedures       Diagnosis and Disposition     Critical Care:      DISCHARGE NOTE:       Vero Allen's  results have been reviewed with her.  She has been counseled regarding her diagnosis, treatment, and plan.  She verbally conveys understanding and agreement of the signs, symptoms, diagnosis, treatment and prognosis and additionally agrees to follow up as discussed.  She also agrees with the care-plan and conveys that all of her questions have been answered.  I have also provided discharge instructions for her that include: educational information regarding their diagnosis and treatment, and list of reasons why they would want to return to the ED prior to their follow-up appointment, should her condition change. She has been provided with education for proper emergency department utilization.         CLINICAL IMPRESSION:         1. Leg swelling    2. Edema              PLAN:   1. Discharge Home  2.      Medication List        ASK your doctor about these medications      albuterol 90 mcg/actuation inhaler  Commonly known as: PROVENTIL/VENTOLIN HFA  Inhale 1-2 puffs into the lungs every 4 (four) hours as needed for Wheezing or Shortness of Breath. Rescue     albuterol-ipratropium 2.5 mg-0.5 mg/3 mL nebulizer solution  Commonly known as: DUO-NEB  Take 3 mLs by nebulization every 4 (four) hours as needed for Wheezing or Shortness of Breath. Rescue     amLODIPine 10 MG tablet  Commonly known as: NORVASC     aspirin 81 MG EC tablet  Commonly known as: ECOTRIN     BREZTRI AEROSPHERE 160-9-4.8 mcg/actuation Hfaa  Generic drug: budesonide-glycopyr-formoterol     esomeprazole 20 MG capsule  Commonly known as:  NEXIUM     estradioL 1 MG tablet  Commonly known as: ESTRACE  Take 1 tablet (1 mg total) by mouth once daily.     fluticasone propionate 50 mcg/actuation nasal spray  Commonly known as: FLONASE  1 spray (50 mcg total) by Each Nostril route 2 (two) times daily as needed for Rhinitis.     gabapentin 300 MG capsule  Commonly known as: NEURONTIN  Take 2 capsules (600 mg total) by mouth 3 (three) times daily.     hydrOXYzine pamoate 50 MG Cap  Commonly known as: VISTARIL  Take 1 capsule (50 mg total) by mouth every 6 (six) hours as needed (Insomnia or anxiety).     levothyroxine 75 MCG tablet  Commonly known as: SYNTHROID  Take 1 tablet (75 mcg total) by mouth before breakfast.     loratadine 10 mg tablet  Commonly known as: CLARITIN  Take 1 tablet (10 mg total) by mouth once daily.     metFORMIN 750 MG ER 24hr tablet  Commonly known as: GLUCOPHAGE-XR     nicotine 14 mg/24 hr  Commonly known as: NICODERM CQ  Place 1 patch onto the skin daily as needed (nicotine withdrawal).     paroxetine 30 MG tablet  Commonly known as: PAXIL  Take 2 tablets (60 mg total) by mouth once daily.     risperiDONE 2 MG tablet  Commonly known as: RISPERDAL  Take 1 tablet (2 mg total) by mouth 2 (two) times daily.             3. Burke Merchant MD  [address] [place] [address]  [phone]    Schedule an appointment as soon as possible for a visit   Primary care follow up       _______________________________     Please note that this dictation was completed with [employer], the computer voice recognition software.  Quite often unanticipated grammatical, syntax, homophones, and other interpretive errors are inadvertently transcribed by the computer software.  Please disregard these errors.  Please excuse any errors that have escaped final proofreading.             Henry Kapadia MD  01/14/25 2060

## 2025-01-18 ENCOUNTER — HOSPITAL ENCOUNTER (EMERGENCY)
Facility: HOSPITAL | Age: 65
Discharge: HOME | End: 2025-01-18
Attending: EMERGENCY MEDICINE
Payer: MEDICARE

## 2025-01-18 VITALS
HEIGHT: 63 IN | DIASTOLIC BLOOD PRESSURE: 89 MMHG | TEMPERATURE: 98 F | HEART RATE: 91 BPM | SYSTOLIC BLOOD PRESSURE: 138 MMHG | BODY MASS INDEX: 44.83 KG/M2 | OXYGEN SATURATION: 98 % | RESPIRATION RATE: 18 BRPM | WEIGHT: 253 LBS

## 2025-01-18 VITALS
HEART RATE: 91 BPM | RESPIRATION RATE: 18 BRPM | OXYGEN SATURATION: 100 % | SYSTOLIC BLOOD PRESSURE: 117 MMHG | BODY MASS INDEX: 44.68 KG/M2 | WEIGHT: 252.19 LBS | DIASTOLIC BLOOD PRESSURE: 85 MMHG | TEMPERATURE: 98 F | HEIGHT: 63 IN

## 2025-01-18 DIAGNOSIS — F41.0 PANIC ATTACK: Primary | ICD-10-CM

## 2025-01-18 DIAGNOSIS — R06.2 WHEEZING: ICD-10-CM

## 2025-01-18 DIAGNOSIS — M79.89 LEG SWELLING: Primary | ICD-10-CM

## 2025-01-18 LAB
ALBUMIN SERPL BCP-MCNC: 3 G/DL (ref 3.5–5.2)
ALP SERPL-CCNC: 101 U/L (ref 55–135)
ALT SERPL W/O P-5'-P-CCNC: 12 U/L (ref 10–44)
ANION GAP SERPL CALC-SCNC: 12 MMOL/L (ref 8–16)
AST SERPL-CCNC: 12 U/L (ref 10–40)
BASOPHILS # BLD AUTO: 0.05 K/UL (ref 0–0.2)
BASOPHILS NFR BLD: 0.4 % (ref 0–1.9)
BILIRUB SERPL-MCNC: 0.3 MG/DL (ref 0.1–1)
BUN SERPL-MCNC: 16 MG/DL (ref 8–23)
CALCIUM SERPL-MCNC: 8.8 MG/DL (ref 8.7–10.5)
CHLORIDE SERPL-SCNC: 99 MMOL/L (ref 95–110)
CO2 SERPL-SCNC: 29 MMOL/L (ref 23–29)
CREAT SERPL-MCNC: 1.4 MG/DL (ref 0.5–1.4)
DIFFERENTIAL METHOD BLD: ABNORMAL
EOSINOPHIL # BLD AUTO: 0.2 K/UL (ref 0–0.5)
EOSINOPHIL NFR BLD: 1.6 % (ref 0–8)
ERYTHROCYTE [DISTWIDTH] IN BLOOD BY AUTOMATED COUNT: 14.7 % (ref 11.5–14.5)
EST. GFR  (NO RACE VARIABLE): 41.8 ML/MIN/1.73 M^2
GLUCOSE SERPL-MCNC: 147 MG/DL (ref 70–110)
HCT VFR BLD AUTO: 37 % (ref 37–48.5)
HGB BLD-MCNC: 12.2 G/DL (ref 12–16)
IMM GRANULOCYTES # BLD AUTO: 0.05 K/UL (ref 0–0.04)
IMM GRANULOCYTES NFR BLD AUTO: 0.4 % (ref 0–0.5)
LYMPHOCYTES # BLD AUTO: 1.9 K/UL (ref 1–4.8)
LYMPHOCYTES NFR BLD: 14.6 % (ref 18–48)
MCH RBC QN AUTO: 28.7 PG (ref 27–31)
MCHC RBC AUTO-ENTMCNC: 33 G/DL (ref 32–36)
MCV RBC AUTO: 87 FL (ref 82–98)
MONOCYTES # BLD AUTO: 1.4 K/UL (ref 0.3–1)
MONOCYTES NFR BLD: 10.9 % (ref 4–15)
NEUTROPHILS # BLD AUTO: 9.4 K/UL (ref 1.8–7.7)
NEUTROPHILS NFR BLD: 72.1 % (ref 38–73)
NRBC BLD-RTO: 0 /100 WBC
NT-PROBNP SERPL-MCNC: 479 PG/ML (ref 5–900)
PLATELET # BLD AUTO: 301 K/UL (ref 150–450)
PMV BLD AUTO: 10.2 FL (ref 9.2–12.9)
POTASSIUM SERPL-SCNC: 3.9 MMOL/L (ref 3.5–5.1)
PROT SERPL-MCNC: 6 G/DL (ref 6–8.4)
RBC # BLD AUTO: 4.25 M/UL (ref 4–5.4)
SODIUM SERPL-SCNC: 140 MMOL/L (ref 136–145)
WBC # BLD AUTO: 13.05 K/UL (ref 3.9–12.7)

## 2025-01-18 PROCEDURE — 99900031 HC PATIENT EDUCATION (STAT)

## 2025-01-18 PROCEDURE — 99284 EMERGENCY DEPT VISIT MOD MDM: CPT | Mod: 25

## 2025-01-18 PROCEDURE — 96372 THER/PROPH/DIAG INJ SC/IM: CPT | Performed by: EMERGENCY MEDICINE

## 2025-01-18 PROCEDURE — 99284 EMERGENCY DEPT VISIT MOD MDM: CPT | Mod: 25,27

## 2025-01-18 PROCEDURE — 99900035 HC TECH TIME PER 15 MIN (STAT)

## 2025-01-18 PROCEDURE — 94760 N-INVAS EAR/PLS OXIMETRY 1: CPT

## 2025-01-18 PROCEDURE — 94640 AIRWAY INHALATION TREATMENT: CPT

## 2025-01-18 PROCEDURE — 25000242 PHARM REV CODE 250 ALT 637 W/ HCPCS: Performed by: CLINICAL NURSE SPECIALIST

## 2025-01-18 PROCEDURE — 85025 COMPLETE CBC W/AUTO DIFF WBC: CPT | Performed by: CLINICAL NURSE SPECIALIST

## 2025-01-18 PROCEDURE — 63600175 PHARM REV CODE 636 W HCPCS: Performed by: EMERGENCY MEDICINE

## 2025-01-18 PROCEDURE — 36415 COLL VENOUS BLD VENIPUNCTURE: CPT | Performed by: CLINICAL NURSE SPECIALIST

## 2025-01-18 PROCEDURE — 25000003 PHARM REV CODE 250: Performed by: CLINICAL NURSE SPECIALIST

## 2025-01-18 PROCEDURE — 83880 ASSAY OF NATRIURETIC PEPTIDE: CPT | Performed by: CLINICAL NURSE SPECIALIST

## 2025-01-18 PROCEDURE — 80053 COMPREHEN METABOLIC PANEL: CPT | Performed by: CLINICAL NURSE SPECIALIST

## 2025-01-18 RX ORDER — LORAZEPAM 2 MG/ML
2 INJECTION INTRAMUSCULAR
Status: COMPLETED | OUTPATIENT
Start: 2025-01-18 | End: 2025-01-18

## 2025-01-18 RX ORDER — FUROSEMIDE 20 MG/1
20 TABLET ORAL DAILY
Qty: 3 TABLET | Refills: 0 | Status: SHIPPED | OUTPATIENT
Start: 2025-01-18 | End: 2025-01-21

## 2025-01-18 RX ORDER — IPRATROPIUM BROMIDE AND ALBUTEROL SULFATE 2.5; .5 MG/3ML; MG/3ML
3 SOLUTION RESPIRATORY (INHALATION)
Status: COMPLETED | OUTPATIENT
Start: 2025-01-18 | End: 2025-01-18

## 2025-01-18 RX ORDER — FUROSEMIDE 40 MG/1
40 TABLET ORAL
Status: COMPLETED | OUTPATIENT
Start: 2025-01-18 | End: 2025-01-18

## 2025-01-18 RX ADMIN — FUROSEMIDE 40 MG: 40 TABLET ORAL at 01:01

## 2025-01-18 RX ADMIN — LORAZEPAM 2 MG: 2 INJECTION INTRAMUSCULAR; INTRAVENOUS at 07:01

## 2025-01-18 RX ADMIN — IPRATROPIUM BROMIDE AND ALBUTEROL SULFATE 3 ML: 2.5; .5 SOLUTION RESPIRATORY (INHALATION) at 01:01

## 2025-01-18 NOTE — ED PROVIDER NOTES
"Encounter Date: 1/18/2025       History     Chief Complaint   Patient presents with    Leg Swelling     Patient complains of bilateral lower edema x's 7 days.      64-year-old female presents emergency room bilateral lower extremities for the last 7 days.  Patient states she takes 40 mg of Lasix twice a day.  Reports swelling to "entire body ".  Reports some weeping to left anterior lower extremity.  Patient was seen here on January 13th in which a workup was performed.  Patient is very anxious, in tears during assessment.  History of COPD, anxiety.  Patient also reports "floating feeling with her head" concerned that it may be 1 of her medications causing this issue.  Denies starting any new medication recently.  Patient is currently on prednisone 10 mg a day since the beginning of January        Review of patient's allergies indicates:  No Known Allergies  Past Medical History:   Diagnosis Date    Anxiety     Breast cancer 2010    Cancer     Breast    COPD (chronic obstructive pulmonary disease)     Depression     Diabetes mellitus     GERD (gastroesophageal reflux disease)     Hypertension     Insomnia     Thyroid disease      Past Surgical History:   Procedure Laterality Date    BLADDER SUSPENSION      BREAST LUMPECTOMY      Right breast    CHOLECYSTECTOMY      HYSTERECTOMY      KNEE ARTHROSCOPY      Right knee    OOPHORECTOMY       Family History   Problem Relation Name Age of Onset    No Known Problems Mother      No Known Problems Father      No Known Problems Sister      No Known Problems Daughter      No Known Problems Maternal Aunt      No Known Problems Maternal Uncle      No Known Problems Paternal Aunt      No Known Problems Paternal Uncle      No Known Problems Maternal Grandmother      No Known Problems Maternal Grandfather      No Known Problems Paternal Grandmother      No Known Problems Paternal Grandfather      No Known Problems Other      Breast cancer Neg Hx      Ovarian cancer Neg Hx      BRCA " 1/2 Neg Hx       Social History     Tobacco Use    Smoking status: Every Day     Current packs/day: 0.50     Average packs/day: 0.5 packs/day for 50.7 years (25.3 ttl pk-yrs)     Types: Cigarettes, Vaping with nicotine     Start date: 5/15/1974     Passive exposure: Current    Smokeless tobacco: Never   Substance Use Topics    Alcohol use: Not Currently    Drug use: Never     Review of Systems   Constitutional:  Negative for fever.   HENT:  Negative for sore throat.    Respiratory:  Negative for shortness of breath.    Cardiovascular:  Negative for chest pain.   Gastrointestinal:  Negative for nausea.   Genitourinary:  Negative for dysuria.   Musculoskeletal:  Positive for arthralgias, joint swelling and myalgias. Negative for back pain.   Skin:  Positive for wound. Negative for rash.   Neurological:  Negative for weakness.   Hematological:  Does not bruise/bleed easily.   Psychiatric/Behavioral:  Positive for agitation and decreased concentration. The patient is nervous/anxious.    All other systems reviewed and are negative.      Physical Exam     Initial Vitals [01/18/25 1328]   BP Pulse Resp Temp SpO2   (!) 135/95 98 18 97.6 °F (36.4 °C) 97 %      MAP       --         Physical Exam    Nursing note and vitals reviewed.  Constitutional: She appears well-developed and well-nourished.   HENT:   Head: Normocephalic and atraumatic.   Eyes: Pupils are equal, round, and reactive to light.   Neck:   Normal range of motion.  Cardiovascular:  Normal rate and regular rhythm.           Pulmonary/Chest: She has wheezes.   Abdominal: Abdomen is soft. Bowel sounds are normal.   Musculoskeletal:         General: Tenderness and edema present.      Cervical back: Normal range of motion.     Neurological: She is alert and oriented to person, place, and time.   Skin: Skin is warm and dry.   Clear watery drainage noted to left anterior shin.  No redness noted   Psychiatric: She has a normal mood and affect.         ED Course    Procedures  Labs Reviewed   CBC W/ AUTO DIFFERENTIAL - Abnormal       Result Value    WBC 13.05 (*)     RBC 4.25      Hemoglobin 12.2      Hematocrit 37.0      MCV 87      MCH 28.7      MCHC 33.0      RDW 14.7 (*)     Platelets 301      MPV 10.2      Immature Granulocytes 0.4      Gran # (ANC) 9.4 (*)     Immature Grans (Abs) 0.05 (*)     Lymph # 1.9      Mono # 1.4 (*)     Eos # 0.2      Baso # 0.05      nRBC 0      Gran % 72.1      Lymph % 14.6 (*)     Mono % 10.9      Eosinophil % 1.6      Basophil % 0.4      Differential Method Automated     COMPREHENSIVE METABOLIC PANEL - Abnormal    Sodium 140      Potassium 3.9      Chloride 99      CO2 29      Glucose 147 (*)     BUN 16      Creatinine 1.4      Calcium 8.8      Total Protein 6.0      Albumin 3.0 (*)     Total Bilirubin 0.3      Alkaline Phosphatase 101      AST 12      ALT 12      eGFR 41.8 (*)     Anion Gap 12     NT-PRO NATRIURETIC PEPTIDE    NT-proBNP 479            Imaging Results              X-Ray Chest AP Portable (In process)  Result time 01/18/25 14:37:00                     Medications   furosemide tablet 40 mg (40 mg Oral Given 1/18/25 1338)   albuterol-ipratropium 2.5 mg-0.5 mg/3 mL nebulizer solution 3 mL (3 mLs Nebulization Given 1/18/25 1353)     Medical Decision Making  Amount and/or Complexity of Data Reviewed  Labs: ordered.  Radiology: ordered.    Risk  Prescription drug management.                                      Clinical Impression:  Final diagnoses:  [R06.2] Wheezing  [M79.89] Leg swelling (Primary)          ED Disposition Condition    Discharge Stable          ED Prescriptions       Medication Sig Dispense Start Date End Date Auth. Provider    furosemide (LASIX) 20 MG tablet Take 1 tablet (20 mg total) by mouth once daily. for 3 days 3 tablet 1/18/2025 1/21/2025 Stefania Gatica NP          Follow-up Information       Follow up With Specialties Details Why Contact Info    Burke Merchant MD Internal Medicine  As needed [id number] [facility] [address]  [phone]               Stefania Gatica, GORGE  01/18/25 4612

## 2025-01-18 NOTE — DISCHARGE INSTRUCTIONS
Follow-up with PCP to evaluate if knee increase in Lasix dosage.  Schedule an appointment with Dr. Merchant to discuss possible increase of Lasix.

## 2025-01-19 NOTE — ED PROVIDER NOTES
Encounter Date: 1/18/2025       History     Chief Complaint   Patient presents with    Panic Attack     EMS reports pt has a hx of anxiety and is having a panic attack. Pt reports she ran out of her prescription of anxiety medication at home. Pt reports her chest hurts from the anxiety.     64-year-old female with a history of anxiety and panic attacks presents to the emergency department for evaluation due to a panic attack.  No pain.  No associated symptoms.        Review of patient's allergies indicates:  No Known Allergies  Past Medical History:   Diagnosis Date    Anxiety     Breast cancer 2010    Cancer     Breast    COPD (chronic obstructive pulmonary disease)     Depression     Diabetes mellitus     GERD (gastroesophageal reflux disease)     Hypertension     Insomnia     Thyroid disease      Past Surgical History:   Procedure Laterality Date    BLADDER SUSPENSION      BREAST LUMPECTOMY      Right breast    CHOLECYSTECTOMY      HYSTERECTOMY      KNEE ARTHROSCOPY      Right knee    OOPHORECTOMY       Family History   Problem Relation Name Age of Onset    No Known Problems Mother      No Known Problems Father      No Known Problems Sister      No Known Problems Daughter      No Known Problems Maternal Aunt      No Known Problems Maternal Uncle      No Known Problems Paternal Aunt      No Known Problems Paternal Uncle      No Known Problems Maternal Grandmother      No Known Problems Maternal Grandfather      No Known Problems Paternal Grandmother      No Known Problems Paternal Grandfather      No Known Problems Other      Breast cancer Neg Hx      Ovarian cancer Neg Hx      BRCA 1/2 Neg Hx       Social History     Tobacco Use    Smoking status: Every Day     Current packs/day: 0.50     Average packs/day: 0.5 packs/day for 50.7 years (25.3 ttl pk-yrs)     Types: Cigarettes, Vaping with nicotine     Start date: 5/15/1974     Passive exposure: Current    Smokeless tobacco: Never   Substance Use Topics    Alcohol  use: Not Currently    Drug use: Never     Review of Systems   Psychiatric/Behavioral:  The patient is nervous/anxious.    All other systems reviewed and are negative.      Physical Exam     Initial Vitals [01/18/25 1859]   BP Pulse Resp Temp SpO2   (!) 119/92 82 18 98.2 °F (36.8 °C) 96 %      MAP       --         Physical Exam    Nursing note and vitals reviewed.  Constitutional: She appears well-developed and well-nourished.   HENT:   Head: Normocephalic and atraumatic.   Eyes: EOM are normal. Pupils are equal, round, and reactive to light.   Neck: Neck supple.   Normal range of motion.  Cardiovascular:  Normal rate, regular rhythm and normal heart sounds.     Exam reveals no gallop and no friction rub.       No murmur heard.  Pulmonary/Chest: Effort normal and breath sounds normal.   Abdominal: Abdomen is soft. Bowel sounds are normal. She exhibits no distension. There is no abdominal tenderness.   Musculoskeletal:         General: Normal range of motion.      Cervical back: Normal range of motion and neck supple.     Neurological: She is alert and oriented to person, place, and time. GCS score is 15. GCS eye subscore is 4. GCS verbal subscore is 5. GCS motor subscore is 6.   Skin: Skin is warm and dry. No rash noted.   Psychiatric:   Anxiety/panic attack         ED Course   Procedures  Labs Reviewed - No data to display       Imaging Results    None          Medications   LORazepam injection 2 mg (2 mg Intramuscular Given 1/18/25 1922)     Medical Decision Making  Risk  Prescription drug management.               ED Course as of 01/18/25 2021   Sat Jan 18, 2025 2015 No acute distress.  Anxiety resolving following the administration of Ativan.  Vital signs stable.  Afebrile.  Discharge home. [DH]      ED Course User Index  [DH] Luis Sheriff DO                           Clinical Impression:  Final diagnoses:  [F41.0] Panic attack (Primary)          ED Disposition Condition    Discharge Stable          ED  Prescriptions    None       Follow-up Information       Follow up With Specialties Details Why Contact Info    Burke Merchant MD Internal Medicine In 3 days  [address] [facility] [address]  [facility] [address]  [phone]               Luis Sheriff,   01/18/25 2021

## 2025-01-21 ENCOUNTER — HOSPITAL ENCOUNTER (EMERGENCY)
Facility: HOSPITAL | Age: 65
Discharge: HOME | End: 2025-01-22
Attending: EMERGENCY MEDICINE
Payer: MEDICARE

## 2025-01-21 DIAGNOSIS — R06.02 SOB (SHORTNESS OF BREATH): ICD-10-CM

## 2025-01-21 DIAGNOSIS — J44.1 COPD WITH ACUTE EXACERBATION: Primary | ICD-10-CM

## 2025-01-21 LAB
ALBUMIN SERPL BCP-MCNC: 2.7 G/DL (ref 3.5–5.2)
ALP SERPL-CCNC: 85 U/L (ref 55–135)
ALT SERPL W/O P-5'-P-CCNC: 11 U/L (ref 10–44)
ANION GAP SERPL CALC-SCNC: 10 MMOL/L (ref 8–16)
AST SERPL-CCNC: 12 U/L (ref 10–40)
BASOPHILS # BLD AUTO: 0.04 K/UL (ref 0–0.2)
BASOPHILS NFR BLD: 0.4 % (ref 0–1.9)
BILIRUB SERPL-MCNC: 0.3 MG/DL (ref 0.1–1)
BUN SERPL-MCNC: 22 MG/DL (ref 8–23)
CALCIUM SERPL-MCNC: 8.4 MG/DL (ref 8.7–10.5)
CHLORIDE SERPL-SCNC: 98 MMOL/L (ref 95–110)
CO2 SERPL-SCNC: 32 MMOL/L (ref 23–29)
CREAT SERPL-MCNC: 1.8 MG/DL (ref 0.5–1.4)
DIFFERENTIAL METHOD BLD: ABNORMAL
EOSINOPHIL # BLD AUTO: 0.1 K/UL (ref 0–0.5)
EOSINOPHIL NFR BLD: 1 % (ref 0–8)
ERYTHROCYTE [DISTWIDTH] IN BLOOD BY AUTOMATED COUNT: 15.4 % (ref 11.5–14.5)
EST. GFR  (NO RACE VARIABLE): 30.9 ML/MIN/1.73 M^2
GLUCOSE SERPL-MCNC: 128 MG/DL (ref 70–110)
HCT VFR BLD AUTO: 34.6 % (ref 37–48.5)
HGB BLD-MCNC: 11.1 G/DL (ref 12–16)
IMM GRANULOCYTES # BLD AUTO: 0.05 K/UL (ref 0–0.04)
IMM GRANULOCYTES NFR BLD AUTO: 0.5 % (ref 0–0.5)
LYMPHOCYTES # BLD AUTO: 2.3 K/UL (ref 1–4.8)
LYMPHOCYTES NFR BLD: 20.6 % (ref 18–48)
MCH RBC QN AUTO: 28.5 PG (ref 27–31)
MCHC RBC AUTO-ENTMCNC: 32.1 G/DL (ref 32–36)
MCV RBC AUTO: 89 FL (ref 82–98)
MONOCYTES # BLD AUTO: 1.1 K/UL (ref 0.3–1)
MONOCYTES NFR BLD: 9.9 % (ref 4–15)
NEUTROPHILS # BLD AUTO: 7.5 K/UL (ref 1.8–7.7)
NEUTROPHILS NFR BLD: 67.6 % (ref 38–73)
NRBC BLD-RTO: 0 /100 WBC
NT-PROBNP SERPL-MCNC: 309 PG/ML (ref 5–900)
OHS QRS DURATION: 94 MS
OHS QTC CALCULATION: 486 MS
PLATELET # BLD AUTO: 223 K/UL (ref 150–450)
PMV BLD AUTO: 10.3 FL (ref 9.2–12.9)
POTASSIUM SERPL-SCNC: 3.4 MMOL/L (ref 3.5–5.1)
PROT SERPL-MCNC: 5.3 G/DL (ref 6–8.4)
RBC # BLD AUTO: 3.89 M/UL (ref 4–5.4)
SODIUM SERPL-SCNC: 140 MMOL/L (ref 136–145)
TROPONIN I SERPL DL<=0.01 NG/ML-MCNC: 9 NG/L (ref 0–14)
WBC # BLD AUTO: 11.06 K/UL (ref 3.9–12.7)

## 2025-01-21 PROCEDURE — 80053 COMPREHEN METABOLIC PANEL: CPT | Performed by: NURSE PRACTITIONER

## 2025-01-21 PROCEDURE — 94761 N-INVAS EAR/PLS OXIMETRY MLT: CPT

## 2025-01-21 PROCEDURE — 25000242 PHARM REV CODE 250 ALT 637 W/ HCPCS: Performed by: STUDENT IN AN ORGANIZED HEALTH CARE EDUCATION/TRAINING PROGRAM

## 2025-01-21 PROCEDURE — 94640 AIRWAY INHALATION TREATMENT: CPT | Mod: XB

## 2025-01-21 PROCEDURE — 99285 EMERGENCY DEPT VISIT HI MDM: CPT | Mod: 25

## 2025-01-21 PROCEDURE — 83880 ASSAY OF NATRIURETIC PEPTIDE: CPT | Performed by: NURSE PRACTITIONER

## 2025-01-21 PROCEDURE — 84484 ASSAY OF TROPONIN QUANT: CPT | Performed by: NURSE PRACTITIONER

## 2025-01-21 PROCEDURE — 96374 THER/PROPH/DIAG INJ IV PUSH: CPT

## 2025-01-21 PROCEDURE — 36415 COLL VENOUS BLD VENIPUNCTURE: CPT | Performed by: NURSE PRACTITIONER

## 2025-01-21 PROCEDURE — 96375 TX/PRO/DX INJ NEW DRUG ADDON: CPT

## 2025-01-21 PROCEDURE — 25000003 PHARM REV CODE 250: Performed by: NURSE PRACTITIONER

## 2025-01-21 PROCEDURE — 25000242 PHARM REV CODE 250 ALT 637 W/ HCPCS: Performed by: NURSE PRACTITIONER

## 2025-01-21 PROCEDURE — 93005 ELECTROCARDIOGRAM TRACING: CPT

## 2025-01-21 PROCEDURE — 93010 ELECTROCARDIOGRAM REPORT: CPT | Mod: ,,, | Performed by: INTERNAL MEDICINE

## 2025-01-21 PROCEDURE — 99900031 HC PATIENT EDUCATION (STAT)

## 2025-01-21 PROCEDURE — 99900035 HC TECH TIME PER 15 MIN (STAT)

## 2025-01-21 PROCEDURE — 85025 COMPLETE CBC W/AUTO DIFF WBC: CPT | Performed by: NURSE PRACTITIONER

## 2025-01-21 PROCEDURE — 63600175 PHARM REV CODE 636 W HCPCS: Mod: JZ,TB | Performed by: NURSE PRACTITIONER

## 2025-01-21 RX ORDER — FUROSEMIDE 10 MG/ML
40 INJECTION INTRAMUSCULAR; INTRAVENOUS
Status: COMPLETED | OUTPATIENT
Start: 2025-01-21 | End: 2025-01-21

## 2025-01-21 RX ORDER — POTASSIUM CHLORIDE 750 MG/1
10 TABLET, EXTENDED RELEASE ORAL
Status: COMPLETED | OUTPATIENT
Start: 2025-01-21 | End: 2025-01-21

## 2025-01-21 RX ORDER — IPRATROPIUM BROMIDE AND ALBUTEROL SULFATE 2.5; .5 MG/3ML; MG/3ML
3 SOLUTION RESPIRATORY (INHALATION)
Status: COMPLETED | OUTPATIENT
Start: 2025-01-21 | End: 2025-01-21

## 2025-01-21 RX ORDER — METHYLPREDNISOLONE SOD SUCC 125 MG
125 VIAL (EA) INJECTION
Status: COMPLETED | OUTPATIENT
Start: 2025-01-21 | End: 2025-01-21

## 2025-01-21 RX ADMIN — IPRATROPIUM BROMIDE AND ALBUTEROL SULFATE 3 ML: 2.5; .5 SOLUTION RESPIRATORY (INHALATION) at 07:01

## 2025-01-21 RX ADMIN — METHYLPREDNISOLONE SODIUM SUCCINATE 125 MG: 125 INJECTION, POWDER, FOR SOLUTION INTRAMUSCULAR; INTRAVENOUS at 09:01

## 2025-01-21 RX ADMIN — POTASSIUM CHLORIDE 10 MEQ: 750 TABLET, FILM COATED, EXTENDED RELEASE ORAL at 11:01

## 2025-01-21 RX ADMIN — IPRATROPIUM BROMIDE AND ALBUTEROL SULFATE 3 ML: 2.5; .5 SOLUTION RESPIRATORY (INHALATION) at 09:01

## 2025-01-21 RX ADMIN — FUROSEMIDE 40 MG: 10 INJECTION, SOLUTION INTRAMUSCULAR; INTRAVENOUS at 10:01

## 2025-01-21 NOTE — ED PROVIDER NOTES
Encounter Date: 1/21/2025       History     Chief Complaint   Patient presents with    Shortness of Breath     EMS reports SOB and chest pain that started this morning. EMS reports tachycardia on monitor.      Presents to ER with c/o increasing SOB since last pm. History of COPD not on home oxygen. No fever or cough. Reports stopped smoking about a month ago. Reports some pain to take a deep breath but no chest pain. Arrived on oxygen but removed an O2 saturations 95% on room air. Still with mild wheezing otherwise no distress. Seems anxious as this has been several visits to ER for similar.     The history is provided by the patient and the EMS personnel.     Review of patient's allergies indicates:  No Known Allergies  Past Medical History:   Diagnosis Date    Anxiety     Breast cancer 2010    Cancer     Breast    COPD (chronic obstructive pulmonary disease)     Depression     Diabetes mellitus     GERD (gastroesophageal reflux disease)     Hypertension     Insomnia     Thyroid disease      Past Surgical History:   Procedure Laterality Date    BLADDER SUSPENSION      BREAST LUMPECTOMY      Right breast    CHOLECYSTECTOMY      HYSTERECTOMY      KNEE ARTHROSCOPY      Right knee    OOPHORECTOMY       Family History   Problem Relation Name Age of Onset    No Known Problems Mother      No Known Problems Father      No Known Problems Sister      No Known Problems Daughter      No Known Problems Maternal Aunt      No Known Problems Maternal Uncle      No Known Problems Paternal Aunt      No Known Problems Paternal Uncle      No Known Problems Maternal Grandmother      No Known Problems Maternal Grandfather      No Known Problems Paternal Grandmother      No Known Problems Paternal Grandfather      No Known Problems Other      Breast cancer Neg Hx      Ovarian cancer Neg Hx      BRCA 1/2 Neg Hx       Social History     Tobacco Use    Smoking status: Every Day     Current packs/day: 0.50     Average packs/day: 0.5  packs/day for 50.7 years (25.3 ttl pk-yrs)     Types: Cigarettes, Vaping with nicotine     Start date: 5/15/1974     Passive exposure: Current    Smokeless tobacco: Never   Substance Use Topics    Alcohol use: Not Currently    Drug use: Never     Review of Systems   Respiratory:  Positive for cough, shortness of breath and wheezing.    Psychiatric/Behavioral:  The patient is nervous/anxious.    All other systems reviewed and are negative.      Physical Exam     Initial Vitals [01/21/25 0944]   BP Pulse Resp Temp SpO2   116/72 88 20 98.7 °F (37.1 °C) (!) 93 %      MAP       --         Physical Exam    Nursing note and vitals reviewed.  Constitutional: She appears well-developed and well-nourished. She is Obese . She is cooperative.   HENT:   Head: Normocephalic and atraumatic.   Right Ear: External ear normal.   Left Ear: External ear normal.   Nose: Nose normal. Mouth/Throat: Oropharynx is clear and moist.   Eyes: Conjunctivae are normal.   Neck: Neck supple.   Normal range of motion.  Cardiovascular:  Normal rate, regular rhythm, normal heart sounds and intact distal pulses.           Pulmonary/Chest: She has wheezes (rare).   Abdominal: Abdomen is soft. Bowel sounds are normal.   Musculoskeletal:         General: Normal range of motion.      Cervical back: Normal range of motion and neck supple.     Neurological: She is alert and oriented to person, place, and time. She has normal strength.   Skin: Skin is warm and dry.   Psychiatric: Her speech is normal and behavior is normal. Judgment and thought content normal. Her mood appears anxious. Cognition and memory are normal.         ED Course   Procedures  Labs Reviewed   CBC W/ AUTO DIFFERENTIAL - Abnormal       Result Value    WBC 11.06      RBC 3.89 (*)     Hemoglobin 11.1 (*)     Hematocrit 34.6 (*)     MCV 89      MCH 28.5      MCHC 32.1      RDW 15.4 (*)     Platelets 223      MPV 10.3      Immature Granulocytes 0.5      Gran # (ANC) 7.5      Immature Grans  (Abs) 0.05 (*)     Lymph # 2.3      Mono # 1.1 (*)     Eos # 0.1      Baso # 0.04      nRBC 0      Gran % 67.6      Lymph % 20.6      Mono % 9.9      Eosinophil % 1.0      Basophil % 0.4      Differential Method Automated     COMPREHENSIVE METABOLIC PANEL - Abnormal    Sodium 140      Potassium 3.4 (*)     Chloride 98      CO2 32 (*)     Glucose 128 (*)     BUN 22      Creatinine 1.8 (*)     Calcium 8.4 (*)     Total Protein 5.3 (*)     Albumin 2.7 (*)     Total Bilirubin 0.3      Alkaline Phosphatase 85      AST 12      ALT 11      eGFR 30.9 (*)     Anion Gap 10     NT-PRO NATRIURETIC PEPTIDE    NT-proBNP 309     TROPONIN I HIGH SENSITIVITY    Troponin I High Sensitivity 9          ECG Results              EKG 12-lead (Final result)        Collection Time Result Time QRS Duration OHS QTC Calculation    01/21/25 09:47:22 01/21/25 10:38:41 94 486                     Final result by Interface, Lab In [facility] (01/21/25 10:38:47)                   Narrative:    Test Reason : R06.02,    Vent. Rate :  87 BPM     Atrial Rate :  87 BPM     P-R Int : 176 ms          QRS Dur :  94 ms      QT Int : 404 ms       P-R-T Axes :  73  80  28 degrees    QTcB Int : 486 ms    Sinus rhythm with Premature supraventricular complexes  Nonspecific T wave abnormality  Prolonged QT  Abnormal ECG  When compared with ECG of 08-Oct-2024 15:49,  Premature supraventricular complexes are now Present  Confirmed by Leopoldo Justin ([id number]) on 1/21/2025 10:38:40 AM    Referred By: AAAREFERRAL SELF           Confirmed By: Leopoldo Justin                                  Imaging Results              X-Ray Chest 1 View (Final result)  Result time 01/21/25 10:26:37      Final result by Jh Lynne MD (01/21/25 10:26:37)                   Impression:      No evidence of acute disease.      Electronically signed by: Jh Lynne MD  Date:    01/21/2025  Time:    10:26               Narrative:    EXAMINATION:  XR CHEST 1 VIEW    CLINICAL HISTORY:  Shortness  of breath    COMPARISON:  Portable chest 01/18/2025.    FINDINGS:  Frontal chest radiograph demonstrates clear lungs.  No pleural fluid.  Mild enlargement of cardiac silhouette.  No osseous abnormality.                                       Medications   methylPREDNISolone sodium succinate injection 125 mg (125 mg Intravenous Given 1/21/25 0949)   albuterol-ipratropium 2.5 mg-0.5 mg/3 mL nebulizer solution 3 mL (3 mLs Nebulization Given 1/21/25 0954)   furosemide injection 40 mg (40 mg Intravenous Given 1/21/25 1049)   potassium chloride CR tablet 10 mEq (10 mEq Oral Given 1/21/25 1100)     Medical Decision Making  SOB for 2 days with history of COPD    Differential Dx: CHF, COPD, Viral syndrome    Amount and/or Complexity of Data Reviewed  Labs: ordered.  Radiology: ordered.  Discussion of management or test interpretation with external provider(s): Labs and imaging reviewed with pt. Vitals have remained stable. O2 sats normal on room air. Will DC home with no medication changes needed. Return to ER for new or wrosneing issues. Follow-up with PCP in 2-3 days for further evaluation     Risk  Prescription drug management.                                      Clinical Impression:  Final diagnoses:  [R06.02] SOB (shortness of breath)  [J44.1] COPD with acute exacerbation (Primary)          ED Disposition Condition    Discharge Stable          ED Prescriptions    None       Follow-up Information       Follow up With Specialties Details Why Contact Info    Burke Merchant MD Internal Medicine In 2 days As needed, If symptoms worsen, For further evaluation, Hospital follow-up [address] [facility]  [facility] [address]  [phone]               Marcellus Dickinson NP  01/21/25 115

## 2025-01-22 VITALS
RESPIRATION RATE: 18 BRPM | HEIGHT: 63 IN | OXYGEN SATURATION: 98 % | DIASTOLIC BLOOD PRESSURE: 62 MMHG | BODY MASS INDEX: 44.65 KG/M2 | WEIGHT: 252 LBS | SYSTOLIC BLOOD PRESSURE: 118 MMHG | HEART RATE: 104 BPM | TEMPERATURE: 98 F

## 2025-01-22 NOTE — ED NOTES
"Pt's niece called to check on pt and offer a ride to her. I instructed pt's niece to come to Ambulance Entrance where there is not any snow or ice due to it has been cleaned off. We will place her in the car there. Pt's niece reports "I'm on my way!"  "

## 2025-01-22 NOTE — ED NOTES
Pt complaining of feeling short of breath and requesting a breathing treatment.  Dr. Cruz notified.  Respiratory treatment ordered.  RT notified.

## 2025-01-24 ENCOUNTER — HOSPITAL ENCOUNTER (EMERGENCY)
Facility: HOSPITAL | Age: 65
Discharge: HOME | End: 2025-01-24
Attending: EMERGENCY MEDICINE
Payer: MEDICARE

## 2025-01-24 VITALS
HEART RATE: 69 BPM | SYSTOLIC BLOOD PRESSURE: 128 MMHG | TEMPERATURE: 98 F | BODY MASS INDEX: 45.18 KG/M2 | DIASTOLIC BLOOD PRESSURE: 86 MMHG | OXYGEN SATURATION: 100 % | HEIGHT: 63 IN | WEIGHT: 255 LBS | RESPIRATION RATE: 20 BRPM

## 2025-01-24 DIAGNOSIS — R07.9 CHEST PAIN: Primary | ICD-10-CM

## 2025-01-24 LAB
ALBUMIN SERPL BCP-MCNC: 2.9 G/DL (ref 3.5–5.2)
ALP SERPL-CCNC: 86 U/L (ref 55–135)
ALT SERPL W/O P-5'-P-CCNC: 11 U/L (ref 10–44)
ANION GAP SERPL CALC-SCNC: 10 MMOL/L (ref 8–16)
AST SERPL-CCNC: 12 U/L (ref 10–40)
BASOPHILS # BLD AUTO: 0.05 K/UL (ref 0–0.2)
BASOPHILS NFR BLD: 0.5 % (ref 0–1.9)
BILIRUB SERPL-MCNC: 0.4 MG/DL (ref 0.1–1)
BUN SERPL-MCNC: 33 MG/DL (ref 8–23)
CALCIUM SERPL-MCNC: 9.3 MG/DL (ref 8.7–10.5)
CHLORIDE SERPL-SCNC: 99 MMOL/L (ref 95–110)
CO2 SERPL-SCNC: 31 MMOL/L (ref 23–29)
CREAT SERPL-MCNC: 1.7 MG/DL (ref 0.5–1.4)
DIFFERENTIAL METHOD BLD: ABNORMAL
EOSINOPHIL # BLD AUTO: 0.2 K/UL (ref 0–0.5)
EOSINOPHIL NFR BLD: 1.7 % (ref 0–8)
ERYTHROCYTE [DISTWIDTH] IN BLOOD BY AUTOMATED COUNT: 15.5 % (ref 11.5–14.5)
EST. GFR  (NO RACE VARIABLE): 33.1 ML/MIN/1.73 M^2
GLUCOSE SERPL-MCNC: 141 MG/DL (ref 70–110)
HCT VFR BLD AUTO: 36 % (ref 37–48.5)
HGB BLD-MCNC: 11.4 G/DL (ref 12–16)
IMM GRANULOCYTES # BLD AUTO: 0.06 K/UL (ref 0–0.04)
IMM GRANULOCYTES NFR BLD AUTO: 0.6 % (ref 0–0.5)
LYMPHOCYTES # BLD AUTO: 1.4 K/UL (ref 1–4.8)
LYMPHOCYTES NFR BLD: 13.6 % (ref 18–48)
MCH RBC QN AUTO: 28.6 PG (ref 27–31)
MCHC RBC AUTO-ENTMCNC: 31.7 G/DL (ref 32–36)
MCV RBC AUTO: 90 FL (ref 82–98)
MONOCYTES # BLD AUTO: 1 K/UL (ref 0.3–1)
MONOCYTES NFR BLD: 10 % (ref 4–15)
NEUTROPHILS # BLD AUTO: 7.6 K/UL (ref 1.8–7.7)
NEUTROPHILS NFR BLD: 73.6 % (ref 38–73)
NRBC BLD-RTO: 0 /100 WBC
OHS QRS DURATION: 78 MS
OHS QTC CALCULATION: 445 MS
PLATELET # BLD AUTO: 247 K/UL (ref 150–450)
PMV BLD AUTO: 10.2 FL (ref 9.2–12.9)
POTASSIUM SERPL-SCNC: 3.9 MMOL/L (ref 3.5–5.1)
PROT SERPL-MCNC: 5.9 G/DL (ref 6–8.4)
RBC # BLD AUTO: 3.99 M/UL (ref 4–5.4)
SODIUM SERPL-SCNC: 140 MMOL/L (ref 136–145)
TROPONIN I SERPL DL<=0.01 NG/ML-MCNC: 7 NG/L (ref 0–14)
TROPONIN I SERPL DL<=0.01 NG/ML-MCNC: 9 NG/L (ref 0–14)
WBC # BLD AUTO: 10.28 K/UL (ref 3.9–12.7)

## 2025-01-24 PROCEDURE — 80053 COMPREHEN METABOLIC PANEL: CPT | Performed by: EMERGENCY MEDICINE

## 2025-01-24 PROCEDURE — 36415 COLL VENOUS BLD VENIPUNCTURE: CPT | Performed by: EMERGENCY MEDICINE

## 2025-01-24 PROCEDURE — 93010 ELECTROCARDIOGRAM REPORT: CPT | Mod: ,,, | Performed by: INTERNAL MEDICINE

## 2025-01-24 PROCEDURE — 84484 ASSAY OF TROPONIN QUANT: CPT | Performed by: EMERGENCY MEDICINE

## 2025-01-24 PROCEDURE — 99285 EMERGENCY DEPT VISIT HI MDM: CPT | Mod: 25

## 2025-01-24 PROCEDURE — 93005 ELECTROCARDIOGRAM TRACING: CPT

## 2025-01-24 PROCEDURE — 85025 COMPLETE CBC W/AUTO DIFF WBC: CPT | Performed by: EMERGENCY MEDICINE

## 2025-01-24 RX ORDER — KETOROLAC TROMETHAMINE 30 MG/ML
30 INJECTION, SOLUTION INTRAMUSCULAR; INTRAVENOUS
Status: DISCONTINUED | OUTPATIENT
Start: 2025-01-24 | End: 2025-01-24

## 2025-01-24 NOTE — ED PROVIDER NOTES
NAME:  Vero Allen  CSN:     [id number]  MRN:    [id number]  ADMIT DATE: 1/24/2025        eMERGENCY dEPARTMENT eNCOUnter    CHIEF COMPLAINT    Chief Complaint   Patient presents with    Chest Pain     Pt stated that shortly prior to arrival she began experiencing chest pain radiating through to her back shortly prior to arrival. Denied recent illness - stated that she has been stressed a lot.        HPI      Vero Allen is a 64 y.o. female who presents to the ED for chest pain that started this morning.  Patient has a history of anxiety.  She denies any other associated symptoms.          ALLERGIES    Review of patient's allergies indicates:  No Known Allergies    PAST MEDICAL HISTORY  Past Medical History:   Diagnosis Date    Anxiety     Breast cancer 2010    Cancer     Breast    COPD (chronic obstructive pulmonary disease)     Depression     Diabetes mellitus     GERD (gastroesophageal reflux disease)     Hypertension     Insomnia     Thyroid disease        SURGICAL HISTORY    Past Surgical History:   Procedure Laterality Date    BLADDER SUSPENSION      BREAST LUMPECTOMY      Right breast    CHOLECYSTECTOMY      HYSTERECTOMY      KNEE ARTHROSCOPY      Right knee    OOPHORECTOMY         SOCIAL HISTORY    Social History     Socioeconomic History    Marital status:     Number of children: 0   Tobacco Use    Smoking status: Former     Current packs/day: 0.50     Average packs/day: 0.5 packs/day for 50.7 years (25.3 ttl pk-yrs)     Types: Cigarettes, Vaping with nicotine     Start date: 5/15/1974     Passive exposure: Past    Smokeless tobacco: Never   Substance and Sexual Activity    Alcohol use: Not Currently    Drug use: Never    Sexual activity: Not Currently     Partners: Male     Birth control/protection: None     Social Drivers of Health     Financial Resource Strain: Low Risk  (12/24/2024)    Overall Financial Resource Strain (CARDIA)     Difficulty of Paying Living Expenses: Not  very hard   Recent Concern: Financial Resource Strain - Medium Risk (10/7/2024)    Overall Financial Resource Strain (CARDIA)     Difficulty of Paying Living Expenses: Somewhat hard   Food Insecurity: No Food Insecurity (12/24/2024)    Hunger Vital Sign     Worried About Running Out of Food in the Last Year: Never true     Ran Out of Food in the Last Year: Never true   Recent Concern: Food Insecurity - Food Insecurity Present (10/7/2024)    Hunger Vital Sign     Worried About Running Out of Food in the Last Year: Sometimes true     Ran Out of Food in the Last Year: Sometimes true   Transportation Needs: No Transportation Needs (12/24/2024)    TRANSPORTATION NEEDS     Transportation : No   Physical Activity: Inactive (12/24/2024)    Exercise Vital Sign     Days of Exercise per Week: 0 days     Minutes of Exercise per Session: 0 min   Stress: Stress Concern Present (12/24/2024)    [employer] - Occupational Stress Questionnaire     Feeling of Stress : Very much   Housing Stability: High Risk (12/24/2024)    Housing Stability Vital Sign     Unable to Pay for Housing in the Last Year: Yes     Homeless in the Last Year: No       FAMILY HISTORY    Family History   Problem Relation Name Age of Onset    No Known Problems Mother      No Known Problems Father      No Known Problems Sister      No Known Problems Daughter      No Known Problems Maternal Aunt      No Known Problems Maternal Uncle      No Known Problems Paternal Aunt      No Known Problems Paternal Uncle      No Known Problems Maternal Grandmother      No Known Problems Maternal Grandfather      No Known Problems Paternal Grandmother      No Known Problems Paternal Grandfather      No Known Problems Other      Breast cancer Neg Hx      Ovarian cancer Neg Hx      BRCA 1/2 Neg Hx         REVIEW OF SYSTEMS   ROS  All Systems otherwise negative except as noted in the History of Present Illness.        PHYSICAL EXAM    Reviewed Triage  "Note  VITAL SIGNS:   ED Triage Vitals   Encounter Vitals Group      BP 01/24/25 0849 (!) 115/58      Systolic BP Percentile --       Diastolic BP Percentile --       Pulse 01/24/25 0849 84      Resp 01/24/25 0849 20      Temp 01/24/25 0848 97.8 °F (36.6 °C)      Temp src --       SpO2 01/24/25 0849 (!) 93 %      Weight 01/24/25 0848 255 lb      Height 01/24/25 0848 5' 3"      Head Circumference --       Peak Flow --       Pain Score --       Pain Loc --       Pain Education --       Exclude from Growth Chart --        Patient Vitals for the past 24 hrs:   BP Temp Pulse Resp SpO2 Height Weight   01/24/25 1124 128/86 -- 69 20 100 % -- --   01/24/25 0923 -- -- 76 18 97 % -- --   01/24/25 0849 (!) 115/58 -- 84 20 (!) 93 % -- --   01/24/25 0848 -- 97.8 °F (36.6 °C) -- -- -- 5' 3" (1.6 m) 115.7 kg (255 lb)           Physical Exam    Constitutional:  Well-developed, well-nourished. No acute distress  HENT:  Normocephalic, atraumatic.  Eyes:  EOMI. Conjunctiva normal without discharge.   Neck: Normal range of motion.No stridor. No meningismus.   Respiratory:  Normal breath sounds bilaterally.  No respiratory distress, retractions, or conversational dyspnea. No wheezing. No rhonchi. No rales.   Cardiovascular:  Normal heart rate. Normal rhythm. No pitting lower extremity edema.   GI:  Abdomen soft, non-distended, non-tender. Normal bowel sounds. No guarding, rigidity or rebound.    : No CVA tenderness.   Musculoskeletal:  No gross deformity or limited range of motion of all major joints. No palpable bony deformity. No tenderness to palpation.  Integument:  Warm and dry. No rash.  Neurologic:  Normal motor function. Normal sensory function. No focal deficits noted. Alert and Interactive.  Psychiatric:  Affect normal. Mood normal.         LABS  Pertinent labs reviewed. (See chart for details)   Labs Reviewed   CBC W/ AUTO DIFFERENTIAL - Abnormal       Result Value    WBC 10.28      RBC 3.99 (*)     Hemoglobin 11.4 (*)     " Hematocrit 36.0 (*)     MCV 90      MCH 28.6      MCHC 31.7 (*)     RDW 15.5 (*)     Platelets 247      MPV 10.2      Immature Granulocytes 0.6 (*)     Gran # (ANC) 7.6      Immature Grans (Abs) 0.06 (*)     Lymph # 1.4      Mono # 1.0      Eos # 0.2      Baso # 0.05      nRBC 0      Gran % 73.6 (*)     Lymph % 13.6 (*)     Mono % 10.0      Eosinophil % 1.7      Basophil % 0.5      Differential Method Automated     COMPREHENSIVE METABOLIC PANEL - Abnormal    Sodium 140      Potassium 3.9      Chloride 99      CO2 31 (*)     Glucose 141 (*)     BUN 33 (*)     Creatinine 1.7 (*)     Calcium 9.3      Total Protein 5.9 (*)     Albumin 2.9 (*)     Total Bilirubin 0.4      Alkaline Phosphatase 86      AST 12      ALT 11      eGFR 33.1 (*)     Anion Gap 10     TROPONIN I HIGH SENSITIVITY    Troponin I High Sensitivity 9     TROPONIN I HIGH SENSITIVITY    Troponin I High Sensitivity 7           RADIOLOGY    Imaging Results              X-Ray Chest AP Portable (Final result)  Result time 01/24/25 10:05:14      Final result by Marshall Whitehead MD (01/24/25 10:05:14)                   Impression:      No acute findings.      Electronically signed by: Marshall Whitehead MD  Date:    01/24/2025  Time:    10:05               Narrative:    EXAMINATION:  XR CHEST AP PORTABLE    CLINICAL HISTORY:  cp;    COMPARISON:  Chest x-ray 01/18/2025.    FINDINGS:  Unremarkable AP cardiac silhouette.  No focal airspace disease.  No pleural fluid.                                      PROCEDURES    Procedures      EKG     Interpreted by ERP:     EKG Readings: (Independently Interpreted)   Rhythm: Normal Sinus Rhythm. Heart Rate: 103. Ectopy: No Ectopy PACs. Conduction: Normal. ST Segments: Normal ST Segments. T Waves: Normal. Clinical Impression: Normal Sinus Rhythm with PACs       ED COURSE & MEDICAL DECISION MAKING    Pertinent & Imaging studies reviewed. (See chart for details and specific orders.)        Medications - No data to display           Two sets troponins are negative.  I believe patient is suffering from anxiety.  She is advised to follow up with her PCP and get a stress test    DISPOSITION  Patient discharged in stable condition at No discharge date for patient encounter.      DISCHARGE INSTRUCTIONS & MEDS       Medication List        ASK your doctor about these medications      albuterol 90 mcg/actuation inhaler  Commonly known as: PROVENTIL/VENTOLIN HFA  Inhale 1-2 puffs into the lungs every 4 (four) hours as needed for Wheezing or Shortness of Breath. Rescue     albuterol-ipratropium 2.5 mg-0.5 mg/3 mL nebulizer solution  Commonly known as: DUO-NEB  Take 3 mLs by nebulization every 4 (four) hours as needed for Wheezing or Shortness of Breath. Rescue     amLODIPine 10 MG tablet  Commonly known as: NORVASC     aspirin 81 MG EC tablet  Commonly known as: ECOTRIN     BREZTRI AEROSPHERE 160-9-4.8 mcg/actuation Hfaa  Generic drug: budesonide-glycopyr-formoterol     esomeprazole 20 MG capsule  Commonly known as: NEXIUM     estradioL 1 MG tablet  Commonly known as: ESTRACE  Take 1 tablet (1 mg total) by mouth once daily.     fluticasone propionate 50 mcg/actuation nasal spray  Commonly known as: FLONASE  1 spray (50 mcg total) by Each Nostril route 2 (two) times daily as needed for Rhinitis.     furosemide 20 MG tablet  Commonly known as: LASIX  Take 1 tablet (20 mg total) by mouth once daily. for 3 days     gabapentin 300 MG capsule  Commonly known as: NEURONTIN  Take 2 capsules (600 mg total) by mouth 3 (three) times daily.     hydrOXYzine pamoate 50 MG Cap  Commonly known as: VISTARIL  Take 1 capsule (50 mg total) by mouth every 6 (six) hours as needed (Insomnia or anxiety).     levothyroxine 75 MCG tablet  Commonly known as: SYNTHROID  Take 1 tablet (75 mcg total) by mouth before breakfast.     loratadine 10 mg tablet  Commonly known as: CLARITIN  Take 1 tablet (10 mg total) by mouth once daily.     metFORMIN 750 MG ER 24hr tablet  Commonly known  as: GLUCOPHAGE-XR     nicotine 14 mg/24 hr  Commonly known as: NICODERM CQ  Place 1 patch onto the skin daily as needed (nicotine withdrawal).     paroxetine 30 MG tablet  Commonly known as: PAXIL  Take 2 tablets (60 mg total) by mouth once daily.     risperiDONE 2 MG tablet  Commonly known as: RISPERDAL  Take 1 tablet (2 mg total) by mouth 2 (two) times daily.                New Prescriptions    No medications on file           FINAL IMPRESSION    1. Chest pain              Blood Pressure Follow-Up Advised  Patient advised to follow up with PCP within 3-5 days for blood pressure re-check if blood pressure is equal to or greater than 120/80.         Critical care time spent with this patient (not including separately billable items) was  0 minutes.     DISCLAIMER: This note was prepared with Dragon NaturallySpeaking voice recognition transcription software. Garbled syntax, mangled pronouns, and other bizarre constructions may be attributed to that software system.      Jh Mccormick MD  01/24/2025  12:47 PM           Jh Mccormick MD  01/24/25 1252

## 2025-01-24 NOTE — ED NOTES
"Pt states, "I took my fluid pill this morning" pt urinated on herself.\, chnaged pts clothes and helped pt put on blue pants    "

## 2025-01-30 ENCOUNTER — LAB VISIT (OUTPATIENT)
Dept: LAB | Facility: HOSPITAL | Age: 65
End: 2025-01-30
Attending: INTERNAL MEDICINE
Payer: MEDICARE

## 2025-01-30 DIAGNOSIS — K92.2 ACUTE GASTROINTESTINAL HEMORRHAGE: Primary | ICD-10-CM

## 2025-01-30 LAB
APTT PPP: 23.6 SEC (ref 21–32)
BASOPHILS # BLD AUTO: 0.03 K/UL (ref 0–0.2)
BASOPHILS NFR BLD: 0.3 % (ref 0–1.9)
DIFFERENTIAL METHOD BLD: ABNORMAL
EOSINOPHIL # BLD AUTO: 0.2 K/UL (ref 0–0.5)
EOSINOPHIL NFR BLD: 2.4 % (ref 0–8)
ERYTHROCYTE [DISTWIDTH] IN BLOOD BY AUTOMATED COUNT: 15.5 % (ref 11.5–14.5)
HCT VFR BLD AUTO: 37.4 % (ref 37–48.5)
HGB BLD-MCNC: 11.8 G/DL (ref 12–16)
IMM GRANULOCYTES # BLD AUTO: 0.04 K/UL (ref 0–0.04)
IMM GRANULOCYTES NFR BLD AUTO: 0.4 % (ref 0–0.5)
INR PPP: 0.9 (ref 0.8–1.2)
LYMPHOCYTES # BLD AUTO: 2.1 K/UL (ref 1–4.8)
LYMPHOCYTES NFR BLD: 21 % (ref 18–48)
MCH RBC QN AUTO: 28.8 PG (ref 27–31)
MCHC RBC AUTO-ENTMCNC: 31.6 G/DL (ref 32–36)
MCV RBC AUTO: 91 FL (ref 82–98)
MONOCYTES # BLD AUTO: 0.8 K/UL (ref 0.3–1)
MONOCYTES NFR BLD: 8.4 % (ref 4–15)
NEUTROPHILS # BLD AUTO: 6.6 K/UL (ref 1.8–7.7)
NEUTROPHILS NFR BLD: 67.5 % (ref 38–73)
NRBC BLD-RTO: 0 /100 WBC
PLATELET # BLD AUTO: 297 K/UL (ref 150–450)
PMV BLD AUTO: 11.3 FL (ref 9.2–12.9)
PROTHROMBIN TIME: 10 SEC (ref 9–12.5)
RBC # BLD AUTO: 4.1 M/UL (ref 4–5.4)
WBC # BLD AUTO: 9.74 K/UL (ref 3.9–12.7)

## 2025-01-30 PROCEDURE — 85025 COMPLETE CBC W/AUTO DIFF WBC: CPT | Performed by: INTERNAL MEDICINE

## 2025-01-30 PROCEDURE — 36415 COLL VENOUS BLD VENIPUNCTURE: CPT | Performed by: INTERNAL MEDICINE

## 2025-01-30 PROCEDURE — 85730 THROMBOPLASTIN TIME PARTIAL: CPT | Performed by: INTERNAL MEDICINE

## 2025-01-30 PROCEDURE — 85610 PROTHROMBIN TIME: CPT | Performed by: INTERNAL MEDICINE

## 2025-02-04 ENCOUNTER — TELEPHONE (OUTPATIENT)
Dept: OBSTETRICS AND GYNECOLOGY | Facility: CLINIC | Age: 65
End: 2025-02-04
Payer: MEDICARE

## 2025-02-04 NOTE — TELEPHONE ENCOUNTER
Pt may increase to 2 mg daily for the next week and see if helps symptoms.  If does help then we can send new Rx.

## 2025-02-06 ENCOUNTER — LAB VISIT (OUTPATIENT)
Dept: LAB | Facility: HOSPITAL | Age: 65
End: 2025-02-06
Attending: INTERNAL MEDICINE
Payer: MEDICARE

## 2025-02-06 DIAGNOSIS — N39.0 URINARY TRACT INFECTION, SITE NOT SPECIFIED: ICD-10-CM

## 2025-02-06 DIAGNOSIS — D64.9 ANEMIA, UNSPECIFIED: ICD-10-CM

## 2025-02-06 LAB
BACTERIA #/AREA URNS HPF: NEGATIVE /HPF
BASOPHILS # BLD AUTO: 0.04 K/UL (ref 0–0.2)
BASOPHILS NFR BLD: 0.4 % (ref 0–1.9)
BILIRUB UR QL STRIP: NEGATIVE
CLARITY UR: ABNORMAL
COLOR UR: YELLOW
DIFFERENTIAL METHOD BLD: ABNORMAL
EOSINOPHIL # BLD AUTO: 0.2 K/UL (ref 0–0.5)
EOSINOPHIL NFR BLD: 2 % (ref 0–8)
ERYTHROCYTE [DISTWIDTH] IN BLOOD BY AUTOMATED COUNT: 15.1 % (ref 11.5–14.5)
GLUCOSE UR QL STRIP: NEGATIVE
HCT VFR BLD AUTO: 36.7 % (ref 37–48.5)
HGB BLD-MCNC: 11.7 G/DL (ref 12–16)
HGB UR QL STRIP: NEGATIVE
HYALINE CASTS #/AREA URNS LPF: 1.1 /LPF
IMM GRANULOCYTES # BLD AUTO: 0.03 K/UL (ref 0–0.04)
IMM GRANULOCYTES NFR BLD AUTO: 0.3 % (ref 0–0.5)
KETONES UR QL STRIP: NEGATIVE
LEUKOCYTE ESTERASE UR QL STRIP: NEGATIVE
LYMPHOCYTES # BLD AUTO: 2.2 K/UL (ref 1–4.8)
LYMPHOCYTES NFR BLD: 19.7 % (ref 18–48)
MCH RBC QN AUTO: 29 PG (ref 27–31)
MCHC RBC AUTO-ENTMCNC: 31.9 G/DL (ref 32–36)
MCV RBC AUTO: 91 FL (ref 82–98)
MICROSCOPIC COMMENT: ABNORMAL
MONOCYTES # BLD AUTO: 1.1 K/UL (ref 0.3–1)
MONOCYTES NFR BLD: 9.4 % (ref 4–15)
NEUTROPHILS # BLD AUTO: 7.6 K/UL (ref 1.8–7.7)
NEUTROPHILS NFR BLD: 68.2 % (ref 38–73)
NITRITE UR QL STRIP: NEGATIVE
NRBC BLD-RTO: 0 /100 WBC
PH UR STRIP: 6 [PH] (ref 5–8)
PLATELET # BLD AUTO: 288 K/UL (ref 150–450)
PMV BLD AUTO: 10.6 FL (ref 9.2–12.9)
PROT UR QL STRIP: NEGATIVE
RBC # BLD AUTO: 4.04 M/UL (ref 4–5.4)
RBC #/AREA URNS HPF: 2 /HPF (ref 0–4)
SP GR UR STRIP: 1.01 (ref 1–1.03)
SQUAMOUS #/AREA URNS HPF: 30 /HPF
URN SPEC COLLECT METH UR: ABNORMAL
UROBILINOGEN UR STRIP-ACNC: NEGATIVE EU/DL
WBC # BLD AUTO: 11.14 K/UL (ref 3.9–12.7)
WBC #/AREA URNS HPF: 2 /HPF (ref 0–5)

## 2025-02-06 PROCEDURE — 85025 COMPLETE CBC W/AUTO DIFF WBC: CPT | Performed by: INTERNAL MEDICINE

## 2025-02-06 PROCEDURE — 36415 COLL VENOUS BLD VENIPUNCTURE: CPT | Performed by: INTERNAL MEDICINE

## 2025-02-06 PROCEDURE — 81000 URINALYSIS NONAUTO W/SCOPE: CPT | Performed by: INTERNAL MEDICINE

## 2025-02-06 RX ORDER — ESTRADIOL 2 MG/1
2 TABLET ORAL DAILY
Qty: 30 TABLET | Refills: 11 | Status: SHIPPED | OUTPATIENT
Start: 2025-02-06 | End: 2025-03-03 | Stop reason: SDUPTHER

## 2025-02-22 ENCOUNTER — HOSPITAL ENCOUNTER (EMERGENCY)
Facility: HOSPITAL | Age: 65
Discharge: HOME | End: 2025-02-22
Attending: EMERGENCY MEDICINE
Payer: MEDICARE

## 2025-02-22 VITALS
WEIGHT: 250 LBS | HEIGHT: 63 IN | BODY MASS INDEX: 44.3 KG/M2 | RESPIRATION RATE: 60 BRPM | SYSTOLIC BLOOD PRESSURE: 172 MMHG | TEMPERATURE: 98 F | HEART RATE: 91 BPM | DIASTOLIC BLOOD PRESSURE: 105 MMHG | OXYGEN SATURATION: 93 %

## 2025-02-22 DIAGNOSIS — R06.02 SOB (SHORTNESS OF BREATH): ICD-10-CM

## 2025-02-22 DIAGNOSIS — J44.1 CHRONIC OBSTRUCTIVE PULMONARY DISEASE WITH ACUTE EXACERBATION: Primary | ICD-10-CM

## 2025-02-22 PROCEDURE — 94640 AIRWAY INHALATION TREATMENT: CPT

## 2025-02-22 PROCEDURE — 94761 N-INVAS EAR/PLS OXIMETRY MLT: CPT

## 2025-02-22 PROCEDURE — 25000242 PHARM REV CODE 250 ALT 637 W/ HCPCS: Performed by: EMERGENCY MEDICINE

## 2025-02-22 PROCEDURE — 99900031 HC PATIENT EDUCATION (STAT)

## 2025-02-22 PROCEDURE — 99285 EMERGENCY DEPT VISIT HI MDM: CPT | Mod: 25

## 2025-02-22 PROCEDURE — 99900035 HC TECH TIME PER 15 MIN (STAT)

## 2025-02-22 RX ORDER — OLMESARTAN MEDOXOMIL 20 MG/1
20 TABLET ORAL
COMMUNITY
Start: 2025-02-07

## 2025-02-22 RX ORDER — PREDNISONE 50 MG/1
50 TABLET ORAL DAILY
Qty: 4 TABLET | Refills: 0 | Status: SHIPPED | OUTPATIENT
Start: 2025-02-23 | End: 2025-02-27

## 2025-02-22 RX ORDER — MIRTAZAPINE 15 MG/1
15 TABLET, FILM COATED ORAL NIGHTLY
COMMUNITY
Start: 2025-01-29

## 2025-02-22 RX ORDER — PREDNISONE 20 MG/1
60 TABLET ORAL
Status: DISCONTINUED | OUTPATIENT
Start: 2025-02-22 | End: 2025-02-23 | Stop reason: HOSPADM

## 2025-02-22 RX ORDER — AZITHROMYCIN 250 MG/1
250 TABLET, FILM COATED ORAL
COMMUNITY
Start: 2025-02-22

## 2025-02-22 RX ORDER — NEBIVOLOL 10 MG/1
10 TABLET ORAL
COMMUNITY
Start: 2025-02-07

## 2025-02-22 RX ORDER — ALPRAZOLAM 0.5 MG/1
0.25-0.5 TABLET ORAL EVERY 8 HOURS PRN
COMMUNITY
Start: 2025-01-20

## 2025-02-22 RX ORDER — IPRATROPIUM BROMIDE AND ALBUTEROL SULFATE 2.5; .5 MG/3ML; MG/3ML
3 SOLUTION RESPIRATORY (INHALATION)
Status: COMPLETED | OUTPATIENT
Start: 2025-02-22 | End: 2025-02-22

## 2025-02-22 RX ADMIN — IPRATROPIUM BROMIDE AND ALBUTEROL SULFATE 3 ML: .5; 3 SOLUTION RESPIRATORY (INHALATION) at 07:02

## 2025-02-23 LAB
OHS QRS DURATION: 78 MS
OHS QTC CALCULATION: 465 MS

## 2025-02-23 NOTE — ED PROVIDER NOTES
EMERGENCY DEPARTMENT HISTORY AND PHYSICAL EXAM     This note is dictated on M*Modal word recognition program.  There are word recognition mistakes and grammatical errors that are occasionally missed on review.     Date: 2/22/2025   Patient Name: Vero Allen       History of Presenting Illness      Chief Complaint   Patient presents with    Chest Pain     Pt to the ER w/ complaints of chest pain x 1 hour. Reports pressure to the center of her chest. Also reports severe sob for same duration. Hx of copd. Duoneb given en route by ems.            Vero Allen is a 65 y.o. female with PMHX of COPD, anxiety, type 2 diabetes, obesity who presents to the emergency department C/O shortness of breath.    Patient reports shortness of breath that began about an hour prior to arrival.  Patient says it started at rest and she was not doing anything in particular.  She denies using bronchodilators today.  Patient went to urgent care earlier today for sinus congestion for about a week and was prescribed oral antibiotics (azithromycin).      PCP: Burke Merchant MD      Current Medications[1]        Past History     Past Medical History:   Past Medical History:   Diagnosis Date    Anxiety     Breast cancer 2010    Cancer     Breast    COPD (chronic obstructive pulmonary disease)     Depression     Diabetes mellitus     GERD (gastroesophageal reflux disease)     Hypertension     Insomnia     Thyroid disease         Past Surgical History:   Past Surgical History:   Procedure Laterality Date    BLADDER SUSPENSION      BREAST LUMPECTOMY      Right breast    CHOLECYSTECTOMY      HYSTERECTOMY      KNEE ARTHROSCOPY      Right knee    OOPHORECTOMY          Family History:   Family History   Problem Relation Name Age of Onset    No Known Problems Mother      No Known Problems Father      No Known Problems Sister      No Known Problems Daughter      No Known Problems Maternal Aunt      No Known Problems Maternal  Uncle      No Known Problems Paternal Aunt      No Known Problems Paternal Uncle      No Known Problems Maternal Grandmother      No Known Problems Maternal Grandfather      No Known Problems Paternal Grandmother      No Known Problems Paternal Grandfather      No Known Problems Other      Breast cancer Neg Hx      Ovarian cancer Neg Hx      BRCA 1/2 Neg Hx          Social History:   Social History[2]     Allergies:   Review of patient's allergies indicates:  No Known Allergies       Review of Systems   Review of Systems   See HPI for pertinent positives and negatives       Physical Exam     Vitals:    02/22/25 1915 02/22/25 1933 02/22/25 2004 02/22/25 2104   BP:  (!) 173/77 (!) 164/76 (!) 172/105   Pulse: 75 78 88 89   Resp: 18 (!) 28 (!) 22    Temp:       TempSrc:       SpO2: 100% 99% (!) 93% (!) 91%   Weight:       Height:          Physical Exam  Vitals and nursing note reviewed.   Constitutional:       General: She is not in acute distress.     Appearance: Normal appearance. She is obese. She is not ill-appearing.   HENT:      Head: Normocephalic and atraumatic.      Right Ear: External ear normal.      Left Ear: External ear normal.      Nose: Nose normal. No congestion or rhinorrhea.      Mouth/Throat:      Mouth: Mucous membranes are moist.   Eyes:      Conjunctiva/sclera: Conjunctivae normal.      Pupils: Pupils are equal, round, and reactive to light.   Pulmonary:      Effort: Pulmonary effort is normal. No respiratory distress.      Breath sounds: Decreased air movement present. Wheezing present.   Musculoskeletal:         General: No deformity. Normal range of motion.      Cervical back: Normal range of motion. No rigidity.   Skin:     General: Skin is dry.   Neurological:      General: No focal deficit present.      Mental Status: She is alert and oriented to person, place, and time. Mental status is at baseline.   Psychiatric:         Mood and Affect: Mood normal.         Behavior: Behavior normal.               Diagnostic Study Results      Labs -   No results found for this or any previous visit (from the past 12 hours).     Radiologic Studies -    X-Ray Chest 1 View    (Results Pending)        Medications given in the ED-   Medications   predniSONE tablet 60 mg (60 mg Oral Not Given 2/22/25 2130)   albuterol-ipratropium 2.5 mg-0.5 mg/3 mL nebulizer solution 3 mL (3 mLs Nebulization Given 2/22/25 1915)           Medical Decision Making    I am the first provider for this patient.     I reviewed the vital signs, available nursing notes, past medical history, past surgical history, family history and social history.     Vital Signs:  Reviewed the patient's vital signs.     Pulse Oximetry Analysis and Interpretation:    96% on Room Air, normal        CXR  Interpretation: (Per my independent interpretation, pending formal read)   CXR read by Dr. Henry Kapadia    Cardiac silhouette stable, hypoinflated lung fields, question right lower infiltrate/edema     External Test Results (Pertinent to encounter):    Records Reviewed: Nursing Notes, Current Prescription Medications, Old Medical Records, and External Medical Records     History Obtained By: Patient    Provider Notes: Vero Allen is a 65 y.o. female with SOB    Co-morbidities Considered: COPD    Differential Diagnosis:  Reactive airway, COPD, URI, pneumonia      ED Course:    9:54 PM  Patient presents with shortness of breath that started shortly prior to arrival.  Patient well known to emergency department.  At arrival appears to be at her respiratory baseline.  She does not have increased work of breathing or respiratory distress.  She the satting in the mid to low 90s.  Patient given bronchodilators reassessment better air flow with some mild end expiratory wheezing.  She is afebrile and not tachycardic.  Appears in no acute distress..  X-ray which is suspicious for possible developing right lower infiltrate versus atelectasis or edema.  Patient  was prescribed azithromycin and took 1st dose today which she can continue on.  Acute received steroid at urgent care and will continue on prednisone burst.  Encouraged patient to use bronchodilators every 4 hours for the next couple of days and to titrate back at as tolerated.  I encouraged her to call her primary care physician on Monday for follow-up and to return to the emergency department with the weekend if symptoms progress or get worse.  Patient expressed understanding and agreement with plan.         Problems Addressed:  SOB    Procedures:   Procedures       Diagnosis and Disposition     Critical Care:      DISCHARGE NOTE:       Vero Allen's  results have been reviewed with her.  She has been counseled regarding her diagnosis, treatment, and plan.  She verbally conveys understanding and agreement of the signs, symptoms, diagnosis, treatment and prognosis and additionally agrees to follow up as discussed.  She also agrees with the care-plan and conveys that all of her questions have been answered.  I have also provided discharge instructions for her that include: educational information regarding their diagnosis and treatment, and list of reasons why they would want to return to the ED prior to their follow-up appointment, should her condition change. She has been provided with education for proper emergency department utilization.         CLINICAL IMPRESSION:         1. Chronic obstructive pulmonary disease with acute exacerbation    2. SOB (shortness of breath)              PLAN:   1. Discharge Home  2.      Medication List        START taking these medications      predniSONE 50 MG Tab  Commonly known as: DELTASONE  Take 1 tablet (50 mg total) by mouth once daily. for 4 days  Start taking on: February 23, 2025            ASK your doctor about these medications      albuterol 90 mcg/actuation inhaler  Commonly known as: PROVENTIL/VENTOLIN HFA  Inhale 1-2 puffs into the lungs every 4 (four)  hours as needed for Wheezing or Shortness of Breath. Rescue     albuterol-ipratropium 2.5 mg-0.5 mg/3 mL nebulizer solution  Commonly known as: DUO-NEB  Take 3 mLs by nebulization every 4 (four) hours as needed for Wheezing or Shortness of Breath. Rescue     ALPRAZolam 0.5 MG tablet  Commonly known as: XANAX     amLODIPine 10 MG tablet  Commonly known as: NORVASC     aspirin 81 MG EC tablet  Commonly known as: ECOTRIN     azithromycin 250 MG tablet  Commonly known as: Z-PAOLA     BREZTRI AEROSPHERE 160-9-4.8 mcg/actuation [facility]  Generic drug: budesonide-glycopyr-formoterol     esomeprazole 20 MG capsule  Commonly known as: NEXIUM     estradioL 2 MG tablet  Commonly known as: ESTRACE  Take 1 tablet (2 mg total) by mouth once daily.     fluticasone propionate 50 mcg/actuation nasal spray  Commonly known as: FLONASE  1 spray (50 mcg total) by Each Nostril route 2 (two) times daily as needed for Rhinitis.     furosemide 20 MG tablet  Commonly known as: LASIX  Take 1 tablet (20 mg total) by mouth once daily. for 3 days     gabapentin 300 MG capsule  Commonly known as: NEURONTIN  Take 2 capsules (600 mg total) by mouth 3 (three) times daily.     hydrOXYzine pamoate 50 MG Cap  Commonly known as: VISTARIL  Take 1 capsule (50 mg total) by mouth every 6 (six) hours as needed (Insomnia or anxiety).     levothyroxine 75 MCG tablet  Commonly known as: SYNTHROID  Take 1 tablet (75 mcg total) by mouth before breakfast.     loratadine 10 mg tablet  Commonly known as: CLARITIN  Take 1 tablet (10 mg total) by mouth once daily.     metFORMIN 750 MG ER 24hr tablet  Commonly known as: GLUCOPHAGE-XR     mirtazapine 15 MG tablet  Commonly known as: REMERON     nebivoloL 10 MG Tab  Commonly known as: BYSTOLIC     nicotine 14 mg/24 hr  Commonly known as: NICODERM CQ  Place 1 patch onto the skin daily as needed (nicotine withdrawal).     olmesartan 20 MG tablet  Commonly known as: BENICAR     paroxetine 30 MG tablet  Commonly known as:  PAXIL  Take 2 tablets (60 mg total) by mouth once daily.     risperiDONE 2 MG tablet  Commonly known as: RISPERDAL  Take 1 tablet (2 mg total) by mouth 2 (two) times daily.               Where to Get Your Medications        These medications were sent to [facility] - [place], [place] - [address]  [address], [place] [address]      Phone: [phone]   predniSONE 50 MG Tab        3. Burke Merchant MD  [address]  [place] [address]  [phone]    Call in 2 days      [facility] Emergency Department  [address] [address] [phone]  Go to   If symptoms worsen       _______________________________     Please note that this dictation was completed with markedup, the computer voice recognition software.  Quite often unanticipated grammatical, syntax, homophones, and other interpretive errors are inadvertently transcribed by the computer software.  Please disregard these errors.  Please excuse any errors that have escaped final proofreading.               [1]   Current Facility-Administered Medications   Medication Dose Route Frequency Provider Last Rate Last Admin    predniSONE tablet 60 mg  60 mg Oral ED 1 Time Henry Kapadia MD         Current Outpatient Medications   Medication Sig Dispense Refill    ALPRAZolam (XANAX) 0.5 MG tablet Take 0.25-0.5 mg by mouth every 8 (eight) hours as needed.      azithromycin (Z-PAOLA) 250 MG tablet Take 250 mg by mouth.      mirtazapine (REMERON) 15 MG tablet Take 15 mg by mouth every evening.      nebivoloL (BYSTOLIC) 10 MG Tab Take 10 mg by mouth.      olmesartan (BENICAR) 20 MG tablet Take 20 mg by mouth.      albuterol (PROVENTIL/VENTOLIN HFA) 90 mcg/actuation inhaler Inhale 1-2 puffs into the lungs every 4 (four) hours as needed for Wheezing or Shortness of Breath. Rescue 8 g 1    albuterol-ipratropium (DUO-NEB) 2.5 mg-0.5 mg/3 mL nebulizer solution Take 3 mLs by nebulization every 4 (four)  hours as needed for Wheezing or Shortness of Breath. Rescue 75 mL 0    amLODIPine (NORVASC) 10 MG tablet Take 10 mg by mouth.      aspirin (ECOTRIN) 81 MG EC tablet Take 81 mg by mouth once daily.      BREZTRI AEROSPHERE 160-9-4.8 mcg/actuation HFAA Inhale into the lungs.      esomeprazole (NEXIUM) 20 MG capsule Take 20 mg by mouth before breakfast.      estradioL (ESTRACE) 2 MG tablet Take 1 tablet (2 mg total) by mouth once daily. 30 tablet 11    fluticasone propionate (FLONASE) 50 mcg/actuation nasal spray 1 spray (50 mcg total) by Each Nostril route 2 (two) times daily as needed for Rhinitis. 15 g 0    furosemide (LASIX) 20 MG tablet Take 1 tablet (20 mg total) by mouth once daily. for 3 days (Patient taking differently: Take 40 mg by mouth once daily.) 3 tablet 0    gabapentin (NEURONTIN) 300 MG capsule Take 2 capsules (600 mg total) by mouth 3 (three) times daily. 180 capsule 0    hydrOXYzine pamoate (VISTARIL) 50 MG Cap Take 1 capsule (50 mg total) by mouth every 6 (six) hours as needed (Insomnia or anxiety). 90 capsule 90    levothyroxine (SYNTHROID) 75 MCG tablet Take 1 tablet (75 mcg total) by mouth before breakfast. (Patient taking differently: Take 50 mcg by mouth before breakfast.) 30 tablet 1    loratadine (CLARITIN) 10 mg tablet Take 1 tablet (10 mg total) by mouth once daily. 30 tablet 0    metFORMIN (GLUCOPHAGE-XR) 750 MG ER 24hr tablet Take by mouth.      nicotine (NICODERM CQ) 14 mg/24 hr Place 1 patch onto the skin daily as needed (nicotine withdrawal). 30 patch 0    paroxetine (PAXIL) 30 MG tablet Take 2 tablets (60 mg total) by mouth once daily. 60 tablet 0    [START ON 2/23/2025] predniSONE (DELTASONE) 50 MG Tab Take 1 tablet (50 mg total) by mouth once daily. for 4 days 4 tablet 0    risperiDONE (RISPERDAL) 2 MG tablet Take 1 tablet (2 mg total) by mouth 2 (two) times daily. 60 tablet 0   [2]   Social History  Tobacco Use    Smoking status: Former     Current packs/day: 0.50     Average  packs/day: 0.5 packs/day for 50.8 years (25.4 ttl pk-yrs)     Types: Cigarettes, Vaping with nicotine     Start date: 5/15/1974     Passive exposure: Past    Smokeless tobacco: Never   Substance Use Topics    Alcohol use: Not Currently    Drug use: Never        Henry Kapadia MD  02/22/25 5422

## 2025-03-03 ENCOUNTER — TELEPHONE (OUTPATIENT)
Dept: OBSTETRICS AND GYNECOLOGY | Facility: CLINIC | Age: 65
End: 2025-03-03
Payer: MEDICARE

## 2025-03-03 RX ORDER — METFORMIN HYDROCHLORIDE 750 MG/1
750 TABLET, EXTENDED RELEASE ORAL
Qty: 30 TABLET | Refills: 11 | Status: SHIPPED | OUTPATIENT
Start: 2025-03-03 | End: 2026-03-03

## 2025-03-03 RX ORDER — ESTRADIOL 2 MG/1
2 TABLET ORAL DAILY
Qty: 30 TABLET | Refills: 11 | Status: SHIPPED | OUTPATIENT
Start: 2025-03-03 | End: 2026-03-03

## 2025-03-05 ENCOUNTER — TELEPHONE (OUTPATIENT)
Dept: OBSTETRICS AND GYNECOLOGY | Facility: CLINIC | Age: 65
End: 2025-03-05
Payer: MEDICARE

## 2025-03-05 ENCOUNTER — HOSPITAL ENCOUNTER (EMERGENCY)
Facility: HOSPITAL | Age: 65
Discharge: HOME | End: 2025-03-05
Attending: STUDENT IN AN ORGANIZED HEALTH CARE EDUCATION/TRAINING PROGRAM
Payer: MEDICARE

## 2025-03-05 VITALS
DIASTOLIC BLOOD PRESSURE: 68 MMHG | BODY MASS INDEX: 44.3 KG/M2 | OXYGEN SATURATION: 94 % | TEMPERATURE: 98 F | HEART RATE: 74 BPM | SYSTOLIC BLOOD PRESSURE: 147 MMHG | RESPIRATION RATE: 20 BRPM | HEIGHT: 63 IN | WEIGHT: 250 LBS

## 2025-03-05 DIAGNOSIS — R55 SYNCOPE: Primary | ICD-10-CM

## 2025-03-05 DIAGNOSIS — R51.9 ACUTE NONINTRACTABLE HEADACHE, UNSPECIFIED HEADACHE TYPE: ICD-10-CM

## 2025-03-05 LAB
ALBUMIN SERPL BCP-MCNC: 2.9 G/DL (ref 3.5–5.2)
ALP SERPL-CCNC: 109 U/L (ref 55–135)
ALT SERPL W/O P-5'-P-CCNC: 17 U/L (ref 10–44)
ANION GAP SERPL CALC-SCNC: 11 MMOL/L (ref 8–16)
AST SERPL-CCNC: 24 U/L (ref 10–40)
BASOPHILS # BLD AUTO: 0.03 K/UL (ref 0–0.2)
BASOPHILS NFR BLD: 0.3 % (ref 0–1.9)
BILIRUB SERPL-MCNC: 0.2 MG/DL (ref 0.1–1)
BUN SERPL-MCNC: 16 MG/DL (ref 8–23)
CALCIUM SERPL-MCNC: 9.3 MG/DL (ref 8.7–10.5)
CHLORIDE SERPL-SCNC: 105 MMOL/L (ref 95–110)
CO2 SERPL-SCNC: 25 MMOL/L (ref 23–29)
CREAT SERPL-MCNC: 1.3 MG/DL (ref 0.5–1.4)
DIFFERENTIAL METHOD BLD: ABNORMAL
EOSINOPHIL # BLD AUTO: 0.2 K/UL (ref 0–0.5)
EOSINOPHIL NFR BLD: 1.9 % (ref 0–8)
ERYTHROCYTE [DISTWIDTH] IN BLOOD BY AUTOMATED COUNT: 15.1 % (ref 11.5–14.5)
EST. GFR  (NO RACE VARIABLE): 45.4 ML/MIN/1.73 M^2
GLUCOSE SERPL-MCNC: 119 MG/DL (ref 70–110)
HCT VFR BLD AUTO: 36.4 % (ref 37–48.5)
HGB BLD-MCNC: 11.6 G/DL (ref 12–16)
IMM GRANULOCYTES # BLD AUTO: 0.05 K/UL (ref 0–0.04)
IMM GRANULOCYTES NFR BLD AUTO: 0.5 % (ref 0–0.5)
LYMPHOCYTES # BLD AUTO: 1.3 K/UL (ref 1–4.8)
LYMPHOCYTES NFR BLD: 12.2 % (ref 18–48)
MCH RBC QN AUTO: 28.1 PG (ref 27–31)
MCHC RBC AUTO-ENTMCNC: 31.9 G/DL (ref 32–36)
MCV RBC AUTO: 88 FL (ref 82–98)
MONOCYTES # BLD AUTO: 0.9 K/UL (ref 0.3–1)
MONOCYTES NFR BLD: 8.6 % (ref 4–15)
NEUTROPHILS # BLD AUTO: 8.3 K/UL (ref 1.8–7.7)
NEUTROPHILS NFR BLD: 76.5 % (ref 38–73)
NRBC BLD-RTO: 0 /100 WBC
NT-PROBNP SERPL-MCNC: 345 PG/ML (ref 5–900)
PLATELET # BLD AUTO: 250 K/UL (ref 150–450)
PMV BLD AUTO: 10.7 FL (ref 9.2–12.9)
POTASSIUM SERPL-SCNC: 4.2 MMOL/L (ref 3.5–5.1)
PROT SERPL-MCNC: 6.1 G/DL (ref 6–8.4)
RBC # BLD AUTO: 4.13 M/UL (ref 4–5.4)
SODIUM SERPL-SCNC: 141 MMOL/L (ref 136–145)
TROPONIN I SERPL DL<=0.01 NG/ML-MCNC: 6 NG/L (ref 0–14)
WBC # BLD AUTO: 10.78 K/UL (ref 3.9–12.7)

## 2025-03-05 PROCEDURE — 36415 COLL VENOUS BLD VENIPUNCTURE: CPT | Performed by: STUDENT IN AN ORGANIZED HEALTH CARE EDUCATION/TRAINING PROGRAM

## 2025-03-05 PROCEDURE — 96374 THER/PROPH/DIAG INJ IV PUSH: CPT

## 2025-03-05 PROCEDURE — 84484 ASSAY OF TROPONIN QUANT: CPT | Performed by: STUDENT IN AN ORGANIZED HEALTH CARE EDUCATION/TRAINING PROGRAM

## 2025-03-05 PROCEDURE — 93005 ELECTROCARDIOGRAM TRACING: CPT

## 2025-03-05 PROCEDURE — 96375 TX/PRO/DX INJ NEW DRUG ADDON: CPT

## 2025-03-05 PROCEDURE — 93010 ELECTROCARDIOGRAM REPORT: CPT | Mod: ,,, | Performed by: STUDENT IN AN ORGANIZED HEALTH CARE EDUCATION/TRAINING PROGRAM

## 2025-03-05 PROCEDURE — 25000003 PHARM REV CODE 250: Performed by: STUDENT IN AN ORGANIZED HEALTH CARE EDUCATION/TRAINING PROGRAM

## 2025-03-05 PROCEDURE — 80053 COMPREHEN METABOLIC PANEL: CPT | Performed by: STUDENT IN AN ORGANIZED HEALTH CARE EDUCATION/TRAINING PROGRAM

## 2025-03-05 PROCEDURE — 85025 COMPLETE CBC W/AUTO DIFF WBC: CPT | Performed by: STUDENT IN AN ORGANIZED HEALTH CARE EDUCATION/TRAINING PROGRAM

## 2025-03-05 PROCEDURE — 83880 ASSAY OF NATRIURETIC PEPTIDE: CPT | Performed by: STUDENT IN AN ORGANIZED HEALTH CARE EDUCATION/TRAINING PROGRAM

## 2025-03-05 PROCEDURE — 63600175 PHARM REV CODE 636 W HCPCS: Performed by: STUDENT IN AN ORGANIZED HEALTH CARE EDUCATION/TRAINING PROGRAM

## 2025-03-05 PROCEDURE — 99285 EMERGENCY DEPT VISIT HI MDM: CPT | Mod: 25

## 2025-03-05 RX ORDER — ACETAMINOPHEN 500 MG
1000 TABLET ORAL
Status: COMPLETED | OUTPATIENT
Start: 2025-03-05 | End: 2025-03-05

## 2025-03-05 RX ORDER — METOCLOPRAMIDE HYDROCHLORIDE 5 MG/ML
10 INJECTION INTRAMUSCULAR; INTRAVENOUS
Status: COMPLETED | OUTPATIENT
Start: 2025-03-05 | End: 2025-03-05

## 2025-03-05 RX ORDER — KETOROLAC TROMETHAMINE 30 MG/ML
15 INJECTION, SOLUTION INTRAMUSCULAR; INTRAVENOUS
Status: COMPLETED | OUTPATIENT
Start: 2025-03-05 | End: 2025-03-05

## 2025-03-05 RX ADMIN — ACETAMINOPHEN 1000 MG: 500 TABLET ORAL at 07:03

## 2025-03-05 RX ADMIN — METOCLOPRAMIDE 10 MG: 5 INJECTION, SOLUTION INTRAMUSCULAR; INTRAVENOUS at 07:03

## 2025-03-05 RX ADMIN — KETOROLAC TROMETHAMINE 15 MG: 30 INJECTION, SOLUTION INTRAMUSCULAR; INTRAVENOUS at 08:03

## 2025-03-06 LAB
OHS QRS DURATION: 86 MS
OHS QTC CALCULATION: 443 MS

## 2025-03-06 NOTE — ED PROVIDER NOTES
History  Chief Complaint   Patient presents with    Loss of Consciousness     Pt reports syncopal episode prior to arrival. Sister in law reports they were on the way to [place] and pt just went out.      The patient is a 65-year-old female with history of anxiety, COPD, depression, diabetes, and hypertension presents for evaluation of a syncopal episode.  Per patient she was getting into her friend's vehicle when she passed out.  Patient not noted to be on blood thinners.  Patient denied head trauma but is reporting headache that is started after the fall.  Patient does not endorse any preceding lightheadedness, dizziness or feelings of syncope.  All other systemic symptoms reviewed and noted to be negative as well.  Patient is followed by Cardiology Dr. Livingston.  Patient states she had a recent Holter monitor has a an appointment with him next week to follow up results of this        Past Medical History:   Diagnosis Date    Anxiety     Breast cancer 2010    Cancer     Breast    COPD (chronic obstructive pulmonary disease)     Depression     Diabetes mellitus     GERD (gastroesophageal reflux disease)     Hypertension     Insomnia     Thyroid disease        Past Surgical History:   Procedure Laterality Date    BLADDER SUSPENSION      BREAST LUMPECTOMY      Right breast    CHOLECYSTECTOMY      HYSTERECTOMY      KNEE ARTHROSCOPY      Right knee    OOPHORECTOMY         Family History   Problem Relation Name Age of Onset    No Known Problems Mother      No Known Problems Father      No Known Problems Sister      No Known Problems Daughter      No Known Problems Maternal Aunt      No Known Problems Maternal Uncle      No Known Problems Paternal Aunt      No Known Problems Paternal Uncle      No Known Problems Maternal Grandmother      No Known Problems Maternal Grandfather      No Known Problems Paternal Grandmother      No Known Problems Paternal Grandfather      No Known Problems Other      Breast cancer Neg Hx       "Ovarian cancer Neg Hx      BRCA 1/2 Neg Hx         Social History[1]    ROS  Review of Systems   Neurological:  Positive for syncope and headaches.       Physical Exam  BP (!) 147/68 (BP Location: Left forearm, Patient Position: Lying)   Pulse 74   Temp 98.1 °F (36.7 °C) (Oral)   Resp 20   Ht 5' 3" (1.6 m)   Wt 113.4 kg (250 lb)   SpO2 (!) 94%   Breastfeeding No   BMI 44.29 kg/m²   Physical Exam    Constitutional: She appears well-developed and well-nourished. She is cooperative.   HENT:   Head: Normocephalic and atraumatic.   Eyes: Conjunctivae, EOM and lids are normal. Pupils are equal, round, and reactive to light.   Neck: Phonation normal.   Normal range of motion.  Cardiovascular:  Normal rate, regular rhythm and intact distal pulses.           Pulmonary/Chest: Breath sounds normal. No respiratory distress. She has no wheezes. She has no rhonchi. She has no rales.   Abdominal: Abdomen is soft. There is no abdominal tenderness.   Musculoskeletal:         General: Edema (1+ in the lower extremities bilaterally) present.      Cervical back: Normal range of motion.     Neurological: She is alert and oriented to person, place, and time.   Skin: Skin is warm and dry.   Psychiatric: She has a normal mood and affect. Her speech is normal and behavior is normal.               Labs Reviewed   CBC W/ AUTO DIFFERENTIAL - Abnormal       Result Value    WBC 10.78      RBC 4.13      Hemoglobin 11.6 (*)     Hematocrit 36.4 (*)     MCV 88      MCH 28.1      MCHC 31.9 (*)     RDW 15.1 (*)     Platelets 250      MPV 10.7      Immature Granulocytes 0.5      Gran # (ANC) 8.3 (*)     Immature Grans (Abs) 0.05 (*)     Lymph # 1.3      Mono # 0.9      Eos # 0.2      Baso # 0.03      nRBC 0      Gran % 76.5 (*)     Lymph % 12.2 (*)     Mono % 8.6      Eosinophil % 1.9      Basophil % 0.3      Differential Method Automated     COMPREHENSIVE METABOLIC PANEL - Abnormal    Sodium 141      Potassium 4.2      Chloride 105      CO2 25 "      Glucose 119 (*)     BUN 16      Creatinine 1.3      Calcium 9.3      Total Protein 6.1      Albumin 2.9 (*)     Total Bilirubin 0.2      Alkaline Phosphatase 109      AST 24      ALT 17      eGFR 45.4 (*)     Anion Gap 11     NT-PRO NATRIURETIC PEPTIDE    NT-proBNP 345     TROPONIN I HIGH SENSITIVITY    Troponin I High Sensitivity 6       EKG Readings: (Independently Interpreted)   Initial Reading: No STEMI. Rhythm: Normal Sinus Rhythm. Heart Rate: 71. Ectopy: PACs.        Imaging Results              CT Head Without Contrast (Final result)  Result time 03/06/25 01:09:44      Final result by Chuck Gilmore MD (03/06/25 01:09:44)                   Impression:      No evidence of an acute intracranial abnormality.      Electronically signed by: Chuck Gilmore MD  Date:    03/06/2025  Time:    01:09               Narrative:    EXAMINATION:  CT HEAD WITHOUT CONTRAST    CLINICAL HISTORY:  Syncope, HA;    CT/nuclear cardiac exams in previous 12 months: 2    TECHNIQUE:  Axial CT images were obtained. Iterative reconstruction technique was used.    COMPARISON:  None    FINDINGS:  No intracranial hemorrhage, mass, mass effect or recent infarct evident.  Gray-white matter differentiation appears maintained.  No hydrocephalus.  Paranasal sinuses and mastoid air cells are clear.  Calvarium is intact.                                                  Procedures             Medical Decision Making  65-year-old female presents for evaluation of a syncopal episode.  Patient was getting into a car when she reportedly passed out.  No preceding symptoms noted.  EKG is a sinus arrhythmia without definitive acute ischemic change.  Patient has had multiple EKGs in the past with intermittent PACs and PVCs.  Patient did have a Holter monitor done with Dr. Latham and is scheduled to get the result next week.  [place] syncope score is 0.  Electrolytes, troponin and BNP unremarkable.  Did also obtain a CT scan of the patient's head given  headache and it was unremarkable.  No systemic symptoms or physical exam findings to indicate acute CVA.  Will discharge with advice to follow up with Dr. Latham in the outpatient setting.  Return precautions were given.    Amount and/or Complexity of Data Reviewed  Labs: ordered. Decision-making details documented in ED Course.  Radiology: ordered. Decision-making details documented in ED Course.    Risk  OTC drugs.  Prescription drug management.               ED Course as of 03/11/25 0000   Wed Mar 05, 2025   1859 WBC: 10.78 [NA]   1859 Hemoglobin(!): 11.6 [NA]   1859 Hematocrit(!): 36.4 [NA]   1859 Platelet Count: 250 [NA]   1934 Troponin I High Sensitivity: 6 [NA]   1937 NT-proBNP: 345 [NA]   1944 CT Head Without Contrast  No acute intracranial abnormality [NA]   1954 Comprehensive metabolic panel(!)  Unremarkable [NA]      ED Course User Index  [NA] Kellie Cruz MD       DISCHARGE NOTE:       Vero Allen's  results have been reviewed with her.  She has been counseled regarding her diagnosis, treatment, and plan.  She verbally conveys understanding and agreement of the signs, symptoms, diagnosis, treatment and prognosis and additionally agrees to follow up as discussed.  She also agrees with the care-plan and conveys that all of her questions have been answered.  I have also provided discharge instructions for her that include: educational information regarding their diagnosis and treatment, and list of reasons why they would want to return to the ED prior to their follow-up appointment, should her condition change. She has been provided with education for proper emergency department utilization.      CLINICAL IMPRESSION:         1. Syncope    2. Acute nonintractable headache, unspecified headache type              PLAN:   1. Discharge  2.   Discharge Medication List as of 3/5/2025  8:26 PM        3. Burke Merchant MD  [facility] [address] [address]  [phone]    Schedule an  appointment as soon as possible for a visit in 1 day      Adarsh Latham MD  [address] [place] [address]  [phone]    Go on 3/11/2025  As scheduled            [1]   Social History  Tobacco Use    Smoking status: Former     Current packs/day: 0.50     Average packs/day: 0.5 packs/day for 50.8 years (25.4 ttl pk-yrs)     Types: Cigarettes, Vaping with nicotine     Start date: 5/15/1974     Passive exposure: Past    Smokeless tobacco: Never   Substance Use Topics    Alcohol use: Not Currently    Drug use: Never        Kellie Cruz MD  03/11/25 0000

## 2025-03-06 NOTE — ED NOTES
"Patient's sister in law reports she was taking the patient to [employer] when she sat down in the car and "passed out for only a few seconds." No trauma involved during incident. No injuries noted.   "

## 2025-03-06 NOTE — DISCHARGE INSTRUCTIONS
Monitor for any repeat episodes in the outpatient setting.  Should this occur return for re-evaluation.  Follow up with Dr. Morrison as scheduled.

## 2025-03-07 ENCOUNTER — HOSPITAL ENCOUNTER (EMERGENCY)
Facility: HOSPITAL | Age: 65
Discharge: HOME | End: 2025-03-07
Attending: STUDENT IN AN ORGANIZED HEALTH CARE EDUCATION/TRAINING PROGRAM
Payer: MEDICARE

## 2025-03-07 VITALS
HEIGHT: 63 IN | HEART RATE: 74 BPM | SYSTOLIC BLOOD PRESSURE: 126 MMHG | TEMPERATURE: 98 F | RESPIRATION RATE: 20 BRPM | DIASTOLIC BLOOD PRESSURE: 57 MMHG | BODY MASS INDEX: 44.3 KG/M2 | OXYGEN SATURATION: 100 % | WEIGHT: 250 LBS

## 2025-03-07 DIAGNOSIS — J44.1 COPD EXACERBATION: Primary | ICD-10-CM

## 2025-03-07 LAB
ALBUMIN SERPL BCP-MCNC: 2.8 G/DL (ref 3.5–5.2)
ALP SERPL-CCNC: 94 U/L (ref 55–135)
ALT SERPL W/O P-5'-P-CCNC: 14 U/L (ref 10–44)
ANION GAP SERPL CALC-SCNC: 8 MMOL/L (ref 8–16)
AST SERPL-CCNC: 17 U/L (ref 10–40)
BASOPHILS # BLD AUTO: 0.06 K/UL (ref 0–0.2)
BASOPHILS NFR BLD: 0.6 % (ref 0–1.9)
BILIRUB SERPL-MCNC: 0.1 MG/DL (ref 0.1–1)
BUN SERPL-MCNC: 17 MG/DL (ref 8–23)
CALCIUM SERPL-MCNC: 9.1 MG/DL (ref 8.7–10.5)
CHLORIDE SERPL-SCNC: 106 MMOL/L (ref 95–110)
CO2 SERPL-SCNC: 27 MMOL/L (ref 23–29)
CREAT SERPL-MCNC: 1.5 MG/DL (ref 0.5–1.4)
DIFFERENTIAL METHOD BLD: ABNORMAL
EOSINOPHIL # BLD AUTO: 0.3 K/UL (ref 0–0.5)
EOSINOPHIL NFR BLD: 2.9 % (ref 0–8)
ERYTHROCYTE [DISTWIDTH] IN BLOOD BY AUTOMATED COUNT: 14.9 % (ref 11.5–14.5)
EST. GFR  (NO RACE VARIABLE): 38.2 ML/MIN/1.73 M^2
GLUCOSE SERPL-MCNC: 141 MG/DL (ref 70–110)
HCT VFR BLD AUTO: 35.8 % (ref 37–48.5)
HGB BLD-MCNC: 11.1 G/DL (ref 12–16)
IMM GRANULOCYTES # BLD AUTO: 0.04 K/UL (ref 0–0.04)
IMM GRANULOCYTES NFR BLD AUTO: 0.4 % (ref 0–0.5)
LYMPHOCYTES # BLD AUTO: 3.1 K/UL (ref 1–4.8)
LYMPHOCYTES NFR BLD: 31.3 % (ref 18–48)
MCH RBC QN AUTO: 27.7 PG (ref 27–31)
MCHC RBC AUTO-ENTMCNC: 31 G/DL (ref 32–36)
MCV RBC AUTO: 89 FL (ref 82–98)
MONOCYTES # BLD AUTO: 1 K/UL (ref 0.3–1)
MONOCYTES NFR BLD: 10.1 % (ref 4–15)
NEUTROPHILS # BLD AUTO: 5.4 K/UL (ref 1.8–7.7)
NEUTROPHILS NFR BLD: 54.7 % (ref 38–73)
NRBC BLD-RTO: 0 /100 WBC
OHS QRS DURATION: 88 MS
OHS QTC CALCULATION: 463 MS
PLATELET # BLD AUTO: 245 K/UL (ref 150–450)
PMV BLD AUTO: 10.6 FL (ref 9.2–12.9)
POTASSIUM SERPL-SCNC: 4.2 MMOL/L (ref 3.5–5.1)
PROT SERPL-MCNC: 5.9 G/DL (ref 6–8.4)
RBC # BLD AUTO: 4.01 M/UL (ref 4–5.4)
SODIUM SERPL-SCNC: 141 MMOL/L (ref 136–145)
TROPONIN I SERPL DL<=0.01 NG/ML-MCNC: <3 NG/L (ref 0–14)
WBC # BLD AUTO: 9.79 K/UL (ref 3.9–12.7)

## 2025-03-07 PROCEDURE — 63600175 PHARM REV CODE 636 W HCPCS: Performed by: STUDENT IN AN ORGANIZED HEALTH CARE EDUCATION/TRAINING PROGRAM

## 2025-03-07 PROCEDURE — 99285 EMERGENCY DEPT VISIT HI MDM: CPT | Mod: 25

## 2025-03-07 PROCEDURE — 96365 THER/PROPH/DIAG IV INF INIT: CPT

## 2025-03-07 PROCEDURE — 85025 COMPLETE CBC W/AUTO DIFF WBC: CPT | Performed by: STUDENT IN AN ORGANIZED HEALTH CARE EDUCATION/TRAINING PROGRAM

## 2025-03-07 PROCEDURE — 93005 ELECTROCARDIOGRAM TRACING: CPT

## 2025-03-07 PROCEDURE — 80053 COMPREHEN METABOLIC PANEL: CPT | Performed by: STUDENT IN AN ORGANIZED HEALTH CARE EDUCATION/TRAINING PROGRAM

## 2025-03-07 PROCEDURE — 25000242 PHARM REV CODE 250 ALT 637 W/ HCPCS: Performed by: STUDENT IN AN ORGANIZED HEALTH CARE EDUCATION/TRAINING PROGRAM

## 2025-03-07 PROCEDURE — 84484 ASSAY OF TROPONIN QUANT: CPT | Performed by: STUDENT IN AN ORGANIZED HEALTH CARE EDUCATION/TRAINING PROGRAM

## 2025-03-07 PROCEDURE — 93010 ELECTROCARDIOGRAM REPORT: CPT | Mod: ,,, | Performed by: INTERNAL MEDICINE

## 2025-03-07 PROCEDURE — 94640 AIRWAY INHALATION TREATMENT: CPT

## 2025-03-07 PROCEDURE — 99900035 HC TECH TIME PER 15 MIN (STAT)

## 2025-03-07 PROCEDURE — 94761 N-INVAS EAR/PLS OXIMETRY MLT: CPT

## 2025-03-07 PROCEDURE — 36415 COLL VENOUS BLD VENIPUNCTURE: CPT | Performed by: STUDENT IN AN ORGANIZED HEALTH CARE EDUCATION/TRAINING PROGRAM

## 2025-03-07 RX ORDER — AZITHROMYCIN 500 MG/1
500 TABLET, FILM COATED ORAL DAILY
Qty: 5 TABLET | Refills: 0 | Status: SHIPPED | OUTPATIENT
Start: 2025-03-07 | End: 2025-03-12

## 2025-03-07 RX ORDER — PREDNISONE 50 MG/1
50 TABLET ORAL DAILY
Qty: 5 TABLET | Refills: 0 | Status: SHIPPED | OUTPATIENT
Start: 2025-03-07 | End: 2025-03-12

## 2025-03-07 RX ORDER — MAGNESIUM SULFATE 1 G/100ML
1 INJECTION INTRAVENOUS ONCE
Status: COMPLETED | OUTPATIENT
Start: 2025-03-07 | End: 2025-03-07

## 2025-03-07 RX ORDER — IPRATROPIUM BROMIDE AND ALBUTEROL SULFATE 2.5; .5 MG/3ML; MG/3ML
3 SOLUTION RESPIRATORY (INHALATION)
Status: COMPLETED | OUTPATIENT
Start: 2025-03-07 | End: 2025-03-07

## 2025-03-07 RX ORDER — AMOXICILLIN AND CLAVULANATE POTASSIUM 875; 125 MG/1; MG/1
1 TABLET, FILM COATED ORAL 2 TIMES DAILY
Qty: 14 TABLET | Refills: 0 | Status: SHIPPED | OUTPATIENT
Start: 2025-03-07

## 2025-03-07 RX ORDER — ALBUTEROL SULFATE 90 UG/1
1-2 INHALANT RESPIRATORY (INHALATION)
Qty: 18 G | Refills: 0 | Status: SHIPPED | OUTPATIENT
Start: 2025-03-07 | End: 2025-03-14

## 2025-03-07 RX ADMIN — MAGNESIUM SULFATE IN DEXTROSE 1 G: 10 INJECTION, SOLUTION INTRAVENOUS at 02:03

## 2025-03-07 RX ADMIN — IPRATROPIUM BROMIDE AND ALBUTEROL SULFATE 3 ML: 2.5; .5 SOLUTION RESPIRATORY (INHALATION) at 03:03

## 2025-03-07 NOTE — DISCHARGE INSTRUCTIONS
Take medications as prescribed and follow up with your primary care physician for re-evaluation in the outpatient setting.  Return if any new or worsening symptoms

## 2025-03-07 NOTE — ED PROVIDER NOTES
"  History  Chief Complaint   Patient presents with    Shortness of Breath     Pt to the ER w/ complaints of sob and chest pain. Hx of asthma and copd. Chronic SOB. [facility] gave duoneb and solumedrol.      65-year-old female with history of COPD, diabetes and hypertension presents for evaluation of shortness of breath and chest pain.  Patient with history of COPD, reportedly using nebulizer treatments in the outpatient setting but states that they are not working.  Methylprednisolone and nebulizers given by EMS prior to arrival        Past Medical History:   Diagnosis Date    Anxiety     Breast cancer 2010    Cancer     Breast    COPD (chronic obstructive pulmonary disease)     Depression     Diabetes mellitus     GERD (gastroesophageal reflux disease)     Hypertension     Insomnia     Thyroid disease        Past Surgical History:   Procedure Laterality Date    BLADDER SUSPENSION      BREAST LUMPECTOMY      Right breast    CHOLECYSTECTOMY      HYSTERECTOMY      KNEE ARTHROSCOPY      Right knee    OOPHORECTOMY         Family History   Problem Relation Name Age of Onset    No Known Problems Mother      No Known Problems Father      No Known Problems Sister      No Known Problems Daughter      No Known Problems Maternal Aunt      No Known Problems Maternal Uncle      No Known Problems Paternal Aunt      No Known Problems Paternal Uncle      No Known Problems Maternal Grandmother      No Known Problems Maternal Grandfather      No Known Problems Paternal Grandmother      No Known Problems Paternal Grandfather      No Known Problems Other      Breast cancer Neg Hx      Ovarian cancer Neg Hx      BRCA 1/2 Neg Hx         Social History[1]    ROS  Review of Systems   Respiratory:  Positive for shortness of breath.    Cardiovascular:  Positive for chest pain.       Physical Exam  BP (!) 126/57   Pulse 74   Temp 98.1 °F (36.7 °C) (Oral)   Resp 20   Ht 5' 3" (1.6 m)   Wt 113.4 kg (250 lb)   SpO2 100%   Breastfeeding No   " BMI 44.29 kg/m²   Physical Exam    Constitutional: She appears well-developed and well-nourished. She is cooperative.   HENT:   Head: Normocephalic and atraumatic.   Eyes: Conjunctivae, EOM and lids are normal. Pupils are equal, round, and reactive to light.   Neck: Phonation normal.   Normal range of motion.  Cardiovascular:  Normal rate, regular rhythm and intact distal pulses.           Pulmonary/Chest: No respiratory distress. She has wheezes. She has no rhonchi. She has no rales.   Abdominal: Abdomen is soft. There is no abdominal tenderness.   Musculoskeletal:      Cervical back: Normal range of motion.     Neurological: She is alert and oriented to person, place, and time.   Skin: Skin is warm and dry.   Psychiatric: She has a normal mood and affect. Her speech is normal and behavior is normal.               Labs Reviewed   CBC W/ AUTO DIFFERENTIAL - Abnormal       Result Value    WBC 9.79      RBC 4.01      Hemoglobin 11.1 (*)     Hematocrit 35.8 (*)     MCV 89      MCH 27.7      MCHC 31.0 (*)     RDW 14.9 (*)     Platelets 245      MPV 10.6      Immature Granulocytes 0.4      Gran # (ANC) 5.4      Immature Grans (Abs) 0.04      Lymph # 3.1      Mono # 1.0      Eos # 0.3      Baso # 0.06      nRBC 0      Gran % 54.7      Lymph % 31.3      Mono % 10.1      Eosinophil % 2.9      Basophil % 0.6      Differential Method Automated     COMPREHENSIVE METABOLIC PANEL - Abnormal    Sodium 141      Potassium 4.2      Chloride 106      CO2 27      Glucose 141 (*)     BUN 17      Creatinine 1.5 (*)     Calcium 9.1      Total Protein 5.9 (*)     Albumin 2.8 (*)     Total Bilirubin 0.1      Alkaline Phosphatase 94      AST 17      ALT 14      eGFR 38.2 (*)     Anion Gap 8     TROPONIN I HIGH SENSITIVITY    Troponin I High Sensitivity <3       EKG Readings: (Independently Interpreted)   Initial Reading: No STEMI. Rhythm: Normal Sinus Rhythm. Heart Rate: 77.     Type of Interpretation: ED Physician (Independently  Interpreted).  Radiology Procedure Done: Portable CXR.  Interpretation: No focal consolidation, effusion or PTX        Imaging Results              X-Ray Chest 1 View (In process)                                 Procedures             Medical Decision Making  Amount and/or Complexity of Data Reviewed  Labs: ordered. Decision-making details documented in ED Course.  Radiology: ordered. Decision-making details documented in ED Course.    Risk  Prescription drug management.               ED Course as of 03/07/25 0424   Fri Mar 07, 2025   0235 X-Ray Chest 1 View  No focal consolidation, effusion, pneumothorax.  Cardiomegaly. [NA]   0249 Troponin I High Sensitivity: <3 [NA]   0423 WBC: 9.79 [NA]   0423 Hemoglobin(!): 11.1 [NA]   0423 Hematocrit(!): 35.8 [NA]   0423 Platelet Count: 245 [NA]   0423 Creatinine(!): 1.5 [NA]   0423 Patient with symptomatic improvement after medication administration.  Will discharge with prescriptions for symptomatic relief in the outpatient setting.  Suspect that he has episode related to COPD exacerbation.  EKG showing sinus rhythm without acute ischemic change.  Troponin unremarkable.  Chest x-ray unremarkable.  Labs reviewed and unremarkable. [NA]      ED Course User Index  [NA] Kellie Cruz MD       DISCHARGE NOTE:       Vero Bynumreaux's  results have been reviewed with her.  She has been counseled regarding her diagnosis, treatment, and plan.  She verbally conveys understanding and agreement of the signs, symptoms, diagnosis, treatment and prognosis and additionally agrees to follow up as discussed.  She also agrees with the care-plan and conveys that all of her questions have been answered.  I have also provided discharge instructions for her that include: educational information regarding their diagnosis and treatment, and list of reasons why they would want to return to the ED prior to their follow-up appointment, should her condition change. She has been provided  with education for proper emergency department utilization.      CLINICAL IMPRESSION:         1. COPD exacerbation              PLAN:   1. Discharge  2.   New Prescriptions    ALBUTEROL (PROVENTIL/VENTOLIN HFA) 90 MCG/ACTUATION INHALER    Inhale 1-2 puffs into the lungs every 4 to 6 hours as needed for Wheezing. Rescue    AMOXICILLIN-CLAVULANATE 875-125MG (AUGMENTIN) 875-125 MG PER TABLET    Take 1 tablet by mouth 2 (two) times daily.    AZITHROMYCIN (ZITHROMAX) 500 MG TABLET    Take 1 tablet (500 mg total) by mouth once daily. for 5 days    PREDNISONE (DELTASONE) 50 MG TAB    Take 1 tablet (50 mg total) by mouth once daily. for 5 days     3. Burke Merchant MD  [address] [address]  [phone]    Schedule an appointment as soon as possible for a visit in 3 days      [facility] Emergency Department  [address] [facility] [address] [phone]    As needed, If symptoms worsen                  [1]   Social History  Tobacco Use    Smoking status: Former     Current packs/day: 0.50     Average packs/day: 0.5 packs/day for 50.8 years (25.4 ttl pk-yrs)     Types: Cigarettes, Vaping with nicotine     Start date: 5/15/1974     Passive exposure: Past    Smokeless tobacco: Never   Substance Use Topics    Alcohol use: Not Currently    Drug use: Never        Kellie Cruz MD  03/07/25 0424

## 2025-03-11 ENCOUNTER — PATIENT OUTREACH (OUTPATIENT)
Facility: OTHER | Age: 65
End: 2025-03-11
Payer: MEDICARE

## 2025-03-12 NOTE — PROGRESS NOTES
ED Navigator attempted to contact patient on 2 separate occasions; patient is unable to reach. ED Navigator to close encounter at this time.    Shasta Benjamin  ED Navigator  [phone]

## 2025-03-25 RX ORDER — ESTRADIOL 2 MG/1
2 TABLET ORAL
Qty: 90 TABLET | Refills: 4 | Status: SHIPPED | OUTPATIENT
Start: 2025-03-25